# Patient Record
Sex: MALE | Race: WHITE | NOT HISPANIC OR LATINO | Employment: OTHER | URBAN - METROPOLITAN AREA
[De-identification: names, ages, dates, MRNs, and addresses within clinical notes are randomized per-mention and may not be internally consistent; named-entity substitution may affect disease eponyms.]

---

## 2017-01-13 ENCOUNTER — OFFICE VISIT (OUTPATIENT)
Dept: INTERNAL MEDICINE | Facility: CLINIC | Age: 62
End: 2017-01-13
Attending: INTERNAL MEDICINE
Payer: COMMERCIAL

## 2017-01-13 VITALS
OXYGEN SATURATION: 97 % | SYSTOLIC BLOOD PRESSURE: 128 MMHG | DIASTOLIC BLOOD PRESSURE: 68 MMHG | BODY MASS INDEX: 28.79 KG/M2 | WEIGHT: 190 LBS | HEART RATE: 64 BPM | HEIGHT: 68 IN

## 2017-01-13 DIAGNOSIS — G89.29 CHRONIC LOW BACK PAIN WITHOUT SCIATICA, UNSPECIFIED BACK PAIN LATERALITY: ICD-10-CM

## 2017-01-13 DIAGNOSIS — E03.9 HYPOTHYROIDISM, UNSPECIFIED TYPE: ICD-10-CM

## 2017-01-13 DIAGNOSIS — D51.0 PERNICIOUS ANEMIA: ICD-10-CM

## 2017-01-13 DIAGNOSIS — Z12.5 SCREENING FOR PROSTATE CANCER: ICD-10-CM

## 2017-01-13 DIAGNOSIS — R79.89 OTHER ABNORMAL BLOOD CHEMISTRY: ICD-10-CM

## 2017-01-13 DIAGNOSIS — E78.9 DISORDER OF LIPID METABOLISM: ICD-10-CM

## 2017-01-13 DIAGNOSIS — R53.83 OTHER MALAISE AND FATIGUE: ICD-10-CM

## 2017-01-13 DIAGNOSIS — E78.00 HIGH CHOLESTEROL: Primary | ICD-10-CM

## 2017-01-13 DIAGNOSIS — D50.9 IRON DEFICIENCY ANEMIA, UNSPECIFIED IRON DEFICIENCY ANEMIA TYPE: ICD-10-CM

## 2017-01-13 DIAGNOSIS — R53.81 OTHER MALAISE AND FATIGUE: ICD-10-CM

## 2017-01-13 DIAGNOSIS — I10 ESSENTIAL HYPERTENSION: ICD-10-CM

## 2017-01-13 DIAGNOSIS — E11.51 TYPE 2 DIABETES MELLITUS WITH DIABETIC PERIPHERAL ANGIOPATHY WITHOUT GANGRENE, WITHOUT LONG-TERM CURRENT USE OF INSULIN: ICD-10-CM

## 2017-01-13 DIAGNOSIS — E11.59 TYPE 2 DIABETES MELLITUS WITH OTHER CIRCULATORY COMPLICATION, UNSPECIFIED LONG TERM INSULIN USE STATUS: ICD-10-CM

## 2017-01-13 DIAGNOSIS — E55.9 VITAMIN D DEFICIENCY: ICD-10-CM

## 2017-01-13 DIAGNOSIS — Z00.00 ROUTINE ADULT HEALTH MAINTENANCE: Primary | ICD-10-CM

## 2017-01-13 DIAGNOSIS — I65.21 STENOSIS OF RIGHT CAROTID ARTERY: ICD-10-CM

## 2017-01-13 DIAGNOSIS — M54.50 CHRONIC LOW BACK PAIN WITHOUT SCIATICA, UNSPECIFIED BACK PAIN LATERALITY: ICD-10-CM

## 2017-01-13 PROCEDURE — 3078F DIAST BP <80 MM HG: CPT | Mod: S$GLB,,, | Performed by: INTERNAL MEDICINE

## 2017-01-13 PROCEDURE — G0444 DEPRESSION SCREEN ANNUAL: HCPCS | Mod: S$GLB,,, | Performed by: INTERNAL MEDICINE

## 2017-01-13 PROCEDURE — 99396 PREV VISIT EST AGE 40-64: CPT | Mod: 25,S$GLB,, | Performed by: INTERNAL MEDICINE

## 2017-01-13 PROCEDURE — 3074F SYST BP LT 130 MM HG: CPT | Mod: S$GLB,,, | Performed by: INTERNAL MEDICINE

## 2017-01-13 PROCEDURE — G0442 ANNUAL ALCOHOL SCREEN 15 MIN: HCPCS | Mod: S$GLB,,, | Performed by: INTERNAL MEDICINE

## 2017-01-13 RX ORDER — NEBIVOLOL 5 MG/1
5 TABLET ORAL DAILY
Qty: 90 TABLET | Refills: 3 | Status: SHIPPED | OUTPATIENT
Start: 2017-01-13 | End: 2018-01-09 | Stop reason: SDUPTHER

## 2017-01-13 NOTE — MR AVS SNAPSHOT
[facility]  [address] [place] [address], [address] [address]  Phone: [phone]  Fax: [phone]                  Gael Pedroza   2017 10:00 AM   Office Visit    Description:  Male : 1955   Provider:  LINSEY Steele MD   Department:  [facility]           Reason for Visit     Annual Exam           Diagnoses this Visit        Comments    High cholesterol    -  Primary     Essential hypertension         Stenosis of right carotid artery         Chronic low back pain without sciatica, unspecified back pain laterality         Type 2 diabetes mellitus with diabetic peripheral angiopathy without gangrene, without long-term current use of insulin                To Do List           Future Appointments        Provider Department Dept Phone    2017 2:30 PM JAMILAH Hernandez [phone]    2017 3:00 PM JAMILAH Hernandez [phone]      Goals (5 Years of Data)     None      Follow-Up and Disposition     Return in about 6 months (around 2017).       These Medications        Disp Refills Start End    nebivolol (BYSTOLIC) 5 MG Tab 90 tablet 3 2017    Take 1 tablet (5 mg total) by mouth once daily. - Oral    Pharmacy: [facility] - [address] #: [phone]         [facility]     [facility] On [facility] Nurse Care Line -  Assistance  Registered nurses in the [facility] Center provide clinical advisement, health education, appointment booking, and other advisory services.  Call for this free service at [phone].             Medications           Message regarding Medications     Verify the changes and/or additions to your medication regime listed below are the same as discussed with your clinician today.  If any of these changes or additions are incorrect, please notify your healthcare provider.             Verify that the below list of medications is an accurate representation of the medications you are  "currently taking.  If none reported, the list may be blank. If incorrect, please contact your healthcare provider. Carry this list with you in case of emergency.           Current Medications     aspirin (ECOTRIN) 81 MG EC tablet Take 1 tablet (81 mg total) by mouth once daily.    hydrOXYzine HCl (ATARAX) 25 MG tablet Take 1 tablet (25 mg total) by mouth 3 (three) times daily as needed for Itching.    nebivolol (BYSTOLIC) 5 MG Tab Take 1 tablet (5 mg total) by mouth once daily.    rosuvastatin (CRESTOR) 10 MG tablet Take 1 tablet (10 mg total) by mouth once daily.           Clinical Reference Information           Vital Signs - Last Recorded  Most recent update: 1/13/2017 10:24 AM by Cris Keith MA    BP Pulse Ht Wt SpO2 BMI    128/68 64 5' 8" (1.727 m) 86.2 kg (190 lb) 97% 28.89 kg/m2      Blood Pressure          Most Recent Value    BP  128/68      Allergies as of 1/13/2017     Lipitor [Atorvastatin]    Bactrim [Sulfamethoxazole-trimethoprim]    Levaquin [Levofloxacin]      Immunizations Administered on Date of Encounter - 1/13/2017     None      Orders Placed During Today's Visit     Future Labs/Procedures Expected by Expires    2D echo with color flow doppler  As directed 1/13/2018    CAR Ultrasound doppler carotid bliateral  As directed 1/13/2018    EKG 12-lead  As directed 1/13/2018      "

## 2017-01-15 NOTE — PROGRESS NOTES
Subjective:       Patient ID: Gael Pedroza is a 61 y.o. male.    Chief Complaint: Annual Exam    HPI Comments: Routine.    Hypertension   This is a chronic problem. The current episode started more than 1 year ago. The problem is controlled.     Review of Systems   Constitutional: Negative.    Respiratory: Negative.    Cardiovascular: Negative.        Objective:      Physical Exam   Constitutional: He appears well-developed and well-nourished.   HENT:   Head: Normocephalic and atraumatic.   Eyes: Pupils are equal, round, and reactive to light.   Cardiovascular: Normal rate, regular rhythm and normal heart sounds.  Exam reveals no gallop and no friction rub.    No murmur heard.  Pulmonary/Chest: Effort normal and breath sounds normal. He has no wheezes. He has no rales.   Musculoskeletal: He exhibits no edema.       Assessment:       1. High cholesterol    2. Essential hypertension    3. Stenosis of right carotid artery    4. Chronic low back pain without sciatica, unspecified back pain laterality    5. Type 2 diabetes mellitus with diabetic peripheral angiopathy without gangrene, without long-term current use of insulin        Plan:       Per orders and D/C instructions.    Screening: The patient was screened for depression with the PHQ2 questionnaire and possible health consequences were discussed with the patient, who understands (15 minutes spent). The patient was screened for the misuse of alcohol, by asking the number of drinks per average week, and if pt has had more than 4 drinks (more than 3 for women and elderly) in 1 day within the past year. The health and legal consequences of misuse were discussed (15 minutes spent). The patient was screened for obesity (BMI>30), If the current BMI > 30, then the possible consequences of obesity, as well as the benefits of diet, exercise, and weight loss were discussed (15 minutes spent).

## 2017-01-19 ENCOUNTER — CLINICAL SUPPORT (OUTPATIENT)
Dept: INTERNAL MEDICINE | Facility: CLINIC | Age: 62
End: 2017-01-19
Attending: INTERNAL MEDICINE
Payer: COMMERCIAL

## 2017-01-19 DIAGNOSIS — I65.21 STENOSIS OF RIGHT CAROTID ARTERY: Primary | ICD-10-CM

## 2017-01-19 DIAGNOSIS — I65.21 STENOSIS OF RIGHT CAROTID ARTERY: ICD-10-CM

## 2017-01-19 DIAGNOSIS — I51.89 DIASTOLIC DYSFUNCTION: ICD-10-CM

## 2017-01-19 DIAGNOSIS — I10 ESSENTIAL HYPERTENSION: ICD-10-CM

## 2017-01-19 DIAGNOSIS — I77.1 STENOSIS OF LEFT SUBCLAVIAN ARTERY: ICD-10-CM

## 2017-01-19 DIAGNOSIS — I77.1 SUBCLAVIAN ARTERY STENOSIS, LEFT: ICD-10-CM

## 2017-01-19 LAB
ALBUMIN SERPL-MCNC: 4.6 G/DL (ref 3.6–5.1)
ALBUMIN/CREAT UR: 3 MCG/MG CREAT
ALBUMIN/GLOB SERPL: 2 (CALC) (ref 1–2.5)
ALP SERPL-CCNC: 51 U/L (ref 40–115)
ALT SERPL-CCNC: 22 U/L (ref 9–46)
AORTIC MEAN GRADIENT: NORMAL MM HG (ref 0–9.9)
AORTIC PEAK GRADIENT: NORMAL MM HG (ref 0–9.9)
AORTIC PEAK VELOCITY: NORMAL M/S (ref 0–1.9)
AORTIC PRES. HALF TIME: NORMAL M/SEC (ref 599–999)
AORTIC ROOT (DIASTOLE): NORMAL (ref 1.3–3.7)
AORTIC VALVE (DIASTOLE): NORMAL (ref 1.5–2.6)
AORTIC VALVE AREA: NORMAL CM2 (ref 2.5–3.5)
AST SERPL-CCNC: 25 U/L (ref 10–35)
BASOPHILS # BLD AUTO: 40 CELLS/UL (ref 0–200)
BASOPHILS NFR BLD AUTO: 0.6 %
BILIRUB SERPL-MCNC: 0.5 MG/DL (ref 0.2–1.2)
BUN SERPL-MCNC: 22 MG/DL (ref 7–25)
BUN/CREAT SERPL: ABNORMAL (CALC) (ref 6–22)
CALCIUM SERPL-MCNC: 9.8 MG/DL (ref 8.6–10.3)
CHLORIDE SERPL-SCNC: 101 MMOL/L (ref 98–110)
CHOLEST SERPL-MCNC: 141 MG/DL (ref 125–200)
CHOLEST/HDLC SERPL: 2.9 (CALC)
CK SERPL-CCNC: 83 U/L (ref 44–196)
CO2 SERPL-SCNC: 30 MMOL/L (ref 20–31)
CREAT SERPL-MCNC: 0.93 MG/DL (ref 0.7–1.33)
CREAT UR-MCNC: 156 MG/DL (ref 20–370)
EOSINOPHIL # BLD AUTO: 134 CELLS/UL (ref 15–500)
EOSINOPHIL NFR BLD AUTO: 2 %
ERYTHROCYTE [DISTWIDTH] IN BLOOD BY AUTOMATED COUNT: 13.8 % (ref 11–15)
GFR SERPL CREATININE-BSD FRML MDRD: 90 ML/MIN/1.73M2
GLOBULIN SER CALC-MCNC: 2.3 G/DL (CALC) (ref 1.9–3.7)
GLUCOSE SERPL-MCNC: 129 MG/DL (ref 65–99)
HBA1C MFR BLD: 6.6 % OF TOTAL HGB
HCT VFR BLD AUTO: 45.8 % (ref 38.5–50)
HDLC SERPL-MCNC: 48 MG/DL
HGB BLD-MCNC: 15 G/DL (ref 13.2–17.1)
LDLC SERPL CALC-MCNC: 74 MG/DL (CALC)
LEFT ATRIUM (DIASTOLE): NORMAL (ref 2.5–4)
LEFT VENTRICLE (DIASTOLE): NORMAL (ref 3.5–5.5)
LEFT VENTRICLE (SYSTOLE): NORMAL (ref 2.2–4)
LYMPHOCYTES # BLD AUTO: 1802 CELLS/UL (ref 850–3900)
LYMPHOCYTES NFR BLD AUTO: 26.9 %
Lab: NORMAL
MCH RBC QN AUTO: 31.7 PG (ref 27–33)
MCHC RBC AUTO-ENTMCNC: 32.7 G/DL (ref 32–36)
MCV RBC AUTO: 96.9 FL (ref 80–100)
MICROALBUMIN UR-MCNC: 0.5 MG/DL
MITRAL PRES. HALF TIME: NORMAL M/SEC (ref 30–60)
MITRAL VALVE AREA: NORMAL CM2 (ref 4–6)
MITRAL VALVE E-A RATIO: NORMAL (ref 0.75–999)
MITRAL VALVE E-E PRIME: NORMAL (ref 0–7)
MONOCYTES # BLD AUTO: 429 CELLS/UL (ref 200–950)
MONOCYTES NFR BLD AUTO: 6.4 %
NEUTROPHILS # BLD AUTO: 4295 CELLS/UL (ref 1500–7800)
NEUTROPHILS NFR BLD AUTO: 64.1 %
NONHDLC SERPL-MCNC: 93 MG/DL (CALC)
PLATELET # BLD AUTO: 218 THOUSAND/UL (ref 140–400)
PMV BLD REES-ECKER: 8.4 FL (ref 7.5–11.5)
POSTERIOR WALL (SYSTOLE): NORMAL (ref 0.5–1.2)
POSTERIOR WALL(DIASTOLE): NORMAL (ref 0.6–1.2)
POTASSIUM SERPL-SCNC: 5.1 MMOL/L (ref 3.5–5.3)
PROT SERPL-MCNC: 6.9 G/DL (ref 6.1–8.1)
PSA SERPL-MCNC: 0.7 NG/ML
PULMONARY ARTERY PRESSURE: NORMAL MM HG (ref 0–29)
RBC # BLD AUTO: 4.73 MILLION/UL (ref 4.2–5.8)
RETIRED EF AND QEF - SEE NOTES: NORMAL %
RIGHT VENT (DIASTOLE): NORMAL (ref 0.8–2.6)
SEPTUM (DIASTOLE): NORMAL (ref 0.6–1.2)
SEPTUM (SYSTOLE): NORMAL (ref 0.5–1.1)
SODIUM SERPL-SCNC: 139 MMOL/L (ref 135–146)
TRIGL SERPL-MCNC: 94 MG/DL
TSH SERPL-ACNC: 0.97 MIU/L (ref 0.4–4.5)
VIT B12 SERPL-MCNC: 665 PG/ML (ref 200–1100)
WBC # BLD AUTO: 6.7 THOUSAND/UL (ref 3.8–10.8)

## 2017-01-19 PROCEDURE — 93880 EXTRACRANIAL BILAT STUDY: CPT | Mod: 51,S$GLB,, | Performed by: INTERNAL MEDICINE

## 2017-01-19 PROCEDURE — 93306 TTE W/DOPPLER COMPLETE: CPT | Mod: S$GLB,,, | Performed by: INTERNAL MEDICINE

## 2017-01-19 PROCEDURE — 93000 ELECTROCARDIOGRAM COMPLETE: CPT | Mod: 51,S$GLB,, | Performed by: INTERNAL MEDICINE

## 2017-01-21 PROBLEM — I51.89 LEFT VENTRICULAR DIASTOLIC DYSFUNCTION, NYHA CLASS 2: Status: ACTIVE | Noted: 2017-01-21

## 2017-07-06 ENCOUNTER — OFFICE VISIT (OUTPATIENT)
Dept: INTERNAL MEDICINE | Facility: CLINIC | Age: 62
End: 2017-07-06
Attending: INTERNAL MEDICINE
Payer: COMMERCIAL

## 2017-07-06 VITALS
SYSTOLIC BLOOD PRESSURE: 126 MMHG | OXYGEN SATURATION: 99 % | BODY MASS INDEX: 28.64 KG/M2 | WEIGHT: 189 LBS | DIASTOLIC BLOOD PRESSURE: 66 MMHG | HEIGHT: 68 IN | HEART RATE: 71 BPM

## 2017-07-06 DIAGNOSIS — E78.00 HIGH CHOLESTEROL: Primary | ICD-10-CM

## 2017-07-06 DIAGNOSIS — E11.51 TYPE 2 DIABETES MELLITUS WITH DIABETIC PERIPHERAL ANGIOPATHY WITHOUT GANGRENE, WITHOUT LONG-TERM CURRENT USE OF INSULIN: ICD-10-CM

## 2017-07-06 DIAGNOSIS — I65.21 STENOSIS OF RIGHT CAROTID ARTERY: Primary | ICD-10-CM

## 2017-07-06 DIAGNOSIS — I10 ESSENTIAL HYPERTENSION: ICD-10-CM

## 2017-07-06 PROCEDURE — 90471 IMMUNIZATION ADMIN: CPT | Mod: S$GLB,,, | Performed by: INTERNAL MEDICINE

## 2017-07-06 PROCEDURE — 3044F HG A1C LEVEL LT 7.0%: CPT | Mod: S$GLB,,, | Performed by: INTERNAL MEDICINE

## 2017-07-06 PROCEDURE — 99213 OFFICE O/P EST LOW 20 MIN: CPT | Mod: S$GLB,,, | Performed by: INTERNAL MEDICINE

## 2017-07-06 PROCEDURE — 90736 HZV VACCINE LIVE SUBQ: CPT | Mod: S$GLB,,, | Performed by: INTERNAL MEDICINE

## 2017-07-06 RX ORDER — VIT C/E/ZN/COPPR/LUTEIN/ZEAXAN 250MG-90MG
1000 CAPSULE ORAL DAILY
COMMUNITY

## 2017-07-06 RX ORDER — CYCLOBENZAPRINE HCL 10 MG
TABLET ORAL
Qty: 30 TABLET | Refills: 1 | Status: SHIPPED | OUTPATIENT
Start: 2017-07-06 | End: 2020-12-17

## 2017-07-06 RX ORDER — FERROUS SULFATE 325(65) MG
325 TABLET ORAL
COMMUNITY
End: 2021-04-13

## 2017-07-06 RX ORDER — ASCORBIC ACID 500 MG
500 TABLET ORAL DAILY
COMMUNITY

## 2017-07-06 NOTE — PROGRESS NOTES
Subjective:       Patient ID: Gael Pedroza is a 62 y.o. male.    Chief Complaint: Follow-up    Feels good.      Diabetes   He presents for his follow-up diabetic visit. He has type 2 diabetes mellitus. His disease course has been stable.     Review of Systems   Constitutional: Negative.    Respiratory: Negative.    Cardiovascular: Negative.    Musculoskeletal: Positive for back pain.       Objective:      Physical Exam   Constitutional: He appears well-developed and well-nourished.   HENT:   Head: Normocephalic and atraumatic.   Eyes: Pupils are equal, round, and reactive to light.   Cardiovascular: Normal rate, regular rhythm and normal heart sounds.  Exam reveals no gallop and no friction rub.    No murmur heard.  Pulmonary/Chest: Effort normal and breath sounds normal. He has no wheezes. He has no rales.   Musculoskeletal: He exhibits no edema.       Assessment:       1. High cholesterol    2. Type 2 diabetes mellitus with diabetic peripheral angiopathy without gangrene, without long-term current use of insulin    3. Essential hypertension        Plan:       Per orders and D/C instructions.  Continue meds/diet for DM, HTN, and high cholest. which are stable.  Flex prn LBP.

## 2017-11-10 DIAGNOSIS — E78.00 ELEVATED CHOLESTEROL: ICD-10-CM

## 2017-11-10 RX ORDER — ROSUVASTATIN CALCIUM 10 MG/1
TABLET, COATED ORAL
Qty: 90 TABLET | Refills: 3 | Status: SHIPPED | OUTPATIENT
Start: 2017-11-10 | End: 2018-12-04 | Stop reason: SDUPTHER

## 2018-01-09 ENCOUNTER — OFFICE VISIT (OUTPATIENT)
Dept: INTERNAL MEDICINE | Facility: CLINIC | Age: 63
End: 2018-01-09
Attending: INTERNAL MEDICINE
Payer: COMMERCIAL

## 2018-01-09 ENCOUNTER — LAB VISIT (OUTPATIENT)
Dept: LAB | Facility: OTHER | Age: 63
End: 2018-01-09
Attending: INTERNAL MEDICINE
Payer: COMMERCIAL

## 2018-01-09 VITALS
HEART RATE: 71 BPM | WEIGHT: 200 LBS | HEIGHT: 68 IN | OXYGEN SATURATION: 97 % | SYSTOLIC BLOOD PRESSURE: 130 MMHG | BODY MASS INDEX: 30.31 KG/M2 | DIASTOLIC BLOOD PRESSURE: 82 MMHG

## 2018-01-09 DIAGNOSIS — E78.00 HIGH CHOLESTEROL: ICD-10-CM

## 2018-01-09 DIAGNOSIS — R79.89 OTHER SPECIFIED ABNORMAL FINDINGS OF BLOOD CHEMISTRY: ICD-10-CM

## 2018-01-09 DIAGNOSIS — Z13.31 SCREENING FOR DEPRESSION: ICD-10-CM

## 2018-01-09 DIAGNOSIS — E55.9 VITAMIN D DEFICIENCY: ICD-10-CM

## 2018-01-09 DIAGNOSIS — Z12.5 SCREENING FOR PROSTATE CANCER: ICD-10-CM

## 2018-01-09 DIAGNOSIS — D50.8 OTHER IRON DEFICIENCY ANEMIA: ICD-10-CM

## 2018-01-09 DIAGNOSIS — I10 ESSENTIAL HYPERTENSION: ICD-10-CM

## 2018-01-09 DIAGNOSIS — E78.9 DISORDER OF LIPID METABOLISM: ICD-10-CM

## 2018-01-09 DIAGNOSIS — D51.0 PERNICIOUS ANEMIA: ICD-10-CM

## 2018-01-09 DIAGNOSIS — I65.21 STENOSIS OF RIGHT CAROTID ARTERY: Primary | ICD-10-CM

## 2018-01-09 DIAGNOSIS — I65.21 STENOSIS OF RIGHT CAROTID ARTERY: ICD-10-CM

## 2018-01-09 DIAGNOSIS — Z00.00 ROUTINE ADULT HEALTH MAINTENANCE: ICD-10-CM

## 2018-01-09 DIAGNOSIS — E11.51 TYPE 2 DIABETES MELLITUS WITH DIABETIC PERIPHERAL ANGIOPATHY WITHOUT GANGRENE, WITHOUT LONG-TERM CURRENT USE OF INSULIN: ICD-10-CM

## 2018-01-09 DIAGNOSIS — E03.9 HYPOTHYROIDISM, UNSPECIFIED TYPE: ICD-10-CM

## 2018-01-09 DIAGNOSIS — Z13.39 SCREENING FOR ALCOHOLISM: ICD-10-CM

## 2018-01-09 LAB
25(OH)D3+25(OH)D2 SERPL-MCNC: 35 NG/ML
ALBUMIN SERPL BCP-MCNC: 3.9 G/DL
ALP SERPL-CCNC: 63 U/L
ALT SERPL W/O P-5'-P-CCNC: 32 U/L
ANION GAP SERPL CALC-SCNC: 8 MMOL/L
AST SERPL-CCNC: 26 U/L
BASOPHILS # BLD AUTO: 0.04 K/UL
BASOPHILS NFR BLD: 0.6 %
BILIRUB SERPL-MCNC: 0.3 MG/DL
BUN SERPL-MCNC: 13 MG/DL
CALCIUM SERPL-MCNC: 9.3 MG/DL
CHLORIDE SERPL-SCNC: 105 MMOL/L
CHOLEST SERPL-MCNC: 136 MG/DL
CHOLEST/HDLC SERPL: 2.9 {RATIO}
CK SERPL-CCNC: 72 U/L
CO2 SERPL-SCNC: 28 MMOL/L
COMPLEXED PSA SERPL-MCNC: 0.98 NG/ML
CREAT SERPL-MCNC: 0.8 MG/DL
DIFFERENTIAL METHOD: ABNORMAL
EOSINOPHIL # BLD AUTO: 0.2 K/UL
EOSINOPHIL NFR BLD: 2.5 %
ERYTHROCYTE [DISTWIDTH] IN BLOOD BY AUTOMATED COUNT: 13.1 %
EST. GFR  (AFRICAN AMERICAN): >60 ML/MIN/1.73 M^2
EST. GFR  (NON AFRICAN AMERICAN): >60 ML/MIN/1.73 M^2
ESTIMATED AVG GLUCOSE: 131 MG/DL
GLUCOSE SERPL-MCNC: 122 MG/DL
HBA1C MFR BLD HPLC: 6.2 %
HCT VFR BLD AUTO: 42.9 %
HDLC SERPL-MCNC: 47 MG/DL
HDLC SERPL: 34.6 %
HGB BLD-MCNC: 14.1 G/DL
LDLC SERPL CALC-MCNC: 57.8 MG/DL
LYMPHOCYTES # BLD AUTO: 1.8 K/UL
LYMPHOCYTES NFR BLD: 28.4 %
MCH RBC QN AUTO: 31.5 PG
MCHC RBC AUTO-ENTMCNC: 32.9 G/DL
MCV RBC AUTO: 96 FL
MONOCYTES # BLD AUTO: 0.5 K/UL
MONOCYTES NFR BLD: 7.7 %
NEUTROPHILS # BLD AUTO: 3.9 K/UL
NEUTROPHILS NFR BLD: 60.6 %
NONHDLC SERPL-MCNC: 89 MG/DL
PLATELET # BLD AUTO: 216 K/UL
PMV BLD AUTO: 10.1 FL
POTASSIUM SERPL-SCNC: 4 MMOL/L
PROT SERPL-MCNC: 7.1 G/DL
RBC # BLD AUTO: 4.48 M/UL
SODIUM SERPL-SCNC: 141 MMOL/L
TRIGL SERPL-MCNC: 156 MG/DL
TSH SERPL DL<=0.005 MIU/L-ACNC: 0.94 UIU/ML
VIT B12 SERPL-MCNC: 775 PG/ML
WBC # BLD AUTO: 6.48 K/UL

## 2018-01-09 PROCEDURE — 82306 VITAMIN D 25 HYDROXY: CPT

## 2018-01-09 PROCEDURE — 84443 ASSAY THYROID STIM HORMONE: CPT

## 2018-01-09 PROCEDURE — 99396 PREV VISIT EST AGE 40-64: CPT | Mod: S$GLB,,, | Performed by: INTERNAL MEDICINE

## 2018-01-09 PROCEDURE — 93880 EXTRACRANIAL BILAT STUDY: CPT | Mod: S$GLB,,, | Performed by: INTERNAL MEDICINE

## 2018-01-09 PROCEDURE — 84153 ASSAY OF PSA TOTAL: CPT

## 2018-01-09 PROCEDURE — 82550 ASSAY OF CK (CPK): CPT

## 2018-01-09 PROCEDURE — 36415 COLL VENOUS BLD VENIPUNCTURE: CPT

## 2018-01-09 PROCEDURE — 80053 COMPREHEN METABOLIC PANEL: CPT

## 2018-01-09 PROCEDURE — 99214 OFFICE O/P EST MOD 30 MIN: CPT | Mod: 25,S$GLB,, | Performed by: INTERNAL MEDICINE

## 2018-01-09 PROCEDURE — G0444 DEPRESSION SCREEN ANNUAL: HCPCS | Mod: 59,S$GLB,, | Performed by: INTERNAL MEDICINE

## 2018-01-09 PROCEDURE — G0442 ANNUAL ALCOHOL SCREEN 15 MIN: HCPCS | Mod: 59,S$GLB,, | Performed by: INTERNAL MEDICINE

## 2018-01-09 PROCEDURE — 80061 LIPID PANEL: CPT

## 2018-01-09 PROCEDURE — 82607 VITAMIN B-12: CPT

## 2018-01-09 PROCEDURE — 85025 COMPLETE CBC W/AUTO DIFF WBC: CPT

## 2018-01-09 PROCEDURE — 83036 HEMOGLOBIN GLYCOSYLATED A1C: CPT

## 2018-01-09 RX ORDER — VALSARTAN 160 MG/1
160 TABLET ORAL DAILY
Qty: 90 TABLET | Refills: 1 | Status: SHIPPED | OUTPATIENT
Start: 2018-01-09 | End: 2018-12-04

## 2018-01-09 RX ORDER — NEBIVOLOL 5 MG/1
5 TABLET ORAL DAILY
Qty: 90 TABLET | Refills: 1 | Status: SHIPPED | OUTPATIENT
Start: 2018-01-09 | End: 2018-12-04 | Stop reason: SDUPTHER

## 2018-01-09 NOTE — PROGRESS NOTES
Subjective:       Patient ID: Gael Pedroza is a 62 y.o. male.    Chief Complaint: Follow-up (refill Bystolic (wants printed script) #90 )    The patient presents today for an annual wellness exam and also has specific complaints and medical problems to be addressed and managed.      Tired at the end of the work day.  Gained 11 lb.      Hypertension   This is a chronic problem. The current episode started more than 1 year ago. The problem is controlled.   Diabetes   He presents for his follow-up diabetic visit. He has type 2 diabetes mellitus. His disease course has been stable. Associated symptoms include fatigue.     Review of Systems   Constitutional: Positive for fatigue.   Respiratory: Negative.    Cardiovascular: Negative.    Psychiatric/Behavioral: Negative for dysphoric mood.       Objective:      Physical Exam   Constitutional: He appears well-developed and well-nourished.   HENT:   Head: Normocephalic and atraumatic.   Eyes: Pupils are equal, round, and reactive to light.   Neck: Carotid bruit is present.   Bilat carotid bruits   Cardiovascular: Normal rate, regular rhythm and normal heart sounds.  Exam reveals no gallop and no friction rub.    No murmur heard.  Pulmonary/Chest: Effort normal and breath sounds normal. He has no wheezes. He has no rales.   Musculoskeletal: He exhibits no edema.   Neurological: He is alert.       Assessment:       1. Stenosis of right carotid artery    2. Essential hypertension    3. High cholesterol    4. Type 2 diabetes mellitus with diabetic peripheral angiopathy without gangrene, without long-term current use of insulin    5. Routine adult health maintenance    6. Screening for depression    7. Screening for alcoholism        Plan:       Per orders and D/C instructions.  Continue meds/diet for DM and high cholest. which are stable.     Will add Valsartan for HTN.  Check labs. Prob increase Crestor to 20 mg.  Discussed referral to CV surgeon for almost 70% stenosis  of right carotid artery v. increasing meds and repeating US in 3 months. He favors the latter.    Screening: The patient was screened for depression with the PHQ2 questionnaire and possible health consequences were discussed with the patient, who understands (15 minutes spent). The patient was screened for the misuse of alcohol, by asking the number of drinks per average week, and if pt has had more than 4 drinks (more than 3 for women and elderly) in 1 day within the past year. The health and legal consequences of misuse were discussed (15 minutes spent). The patient was screened for obesity (BMI>30), If the current BMI > 30, then the possible consequences of obesity, as well as the benefits of diet, exercise, and weight loss were discussed (15 minutes spent).

## 2018-04-17 ENCOUNTER — OFFICE VISIT (OUTPATIENT)
Dept: INTERNAL MEDICINE | Facility: CLINIC | Age: 63
End: 2018-04-17
Attending: INTERNAL MEDICINE
Payer: COMMERCIAL

## 2018-04-17 VITALS
HEIGHT: 66 IN | OXYGEN SATURATION: 99 % | SYSTOLIC BLOOD PRESSURE: 130 MMHG | DIASTOLIC BLOOD PRESSURE: 80 MMHG | BODY MASS INDEX: 31.34 KG/M2 | HEART RATE: 60 BPM | WEIGHT: 195 LBS

## 2018-04-17 DIAGNOSIS — G89.29 CHRONIC LOW BACK PAIN WITHOUT SCIATICA, UNSPECIFIED BACK PAIN LATERALITY: ICD-10-CM

## 2018-04-17 DIAGNOSIS — E11.51 TYPE 2 DIABETES MELLITUS WITH DIABETIC PERIPHERAL ANGIOPATHY WITHOUT GANGRENE, WITHOUT LONG-TERM CURRENT USE OF INSULIN: ICD-10-CM

## 2018-04-17 DIAGNOSIS — E78.00 HIGH CHOLESTEROL: ICD-10-CM

## 2018-04-17 DIAGNOSIS — I65.23 BILATERAL CAROTID ARTERY STENOSIS: Primary | ICD-10-CM

## 2018-04-17 DIAGNOSIS — M54.50 CHRONIC LOW BACK PAIN WITHOUT SCIATICA, UNSPECIFIED BACK PAIN LATERALITY: ICD-10-CM

## 2018-04-17 DIAGNOSIS — I65.23 CAROTID STENOSIS, ASYMPTOMATIC, BILATERAL: ICD-10-CM

## 2018-04-17 DIAGNOSIS — I10 ESSENTIAL HYPERTENSION: ICD-10-CM

## 2018-04-17 DIAGNOSIS — I65.21 STENOSIS OF RIGHT CAROTID ARTERY: ICD-10-CM

## 2018-04-17 PROCEDURE — 93880 EXTRACRANIAL BILAT STUDY: CPT | Mod: S$GLB,,, | Performed by: INTERNAL MEDICINE

## 2018-04-17 PROCEDURE — 99214 OFFICE O/P EST MOD 30 MIN: CPT | Mod: S$GLB,,, | Performed by: INTERNAL MEDICINE

## 2018-04-18 DIAGNOSIS — I65.23 BILATERAL CAROTID ARTERY STENOSIS: Primary | ICD-10-CM

## 2018-04-18 NOTE — PROGRESS NOTES
Subjective:       Patient ID: Gael Pedroza is a 63 y.o. male.    Chief Complaint: Follow-up (3 month / cards before)    F/u for carotid stenosis.      Diabetes   He presents for his follow-up diabetic visit. He has type 2 diabetes mellitus. His disease course has been stable.   Hypertension   This is a chronic problem. The current episode started more than 1 year ago. The problem is controlled.     Review of Systems   Constitutional: Negative.    Respiratory: Negative.    Cardiovascular: Negative.        Objective:      Physical Exam   Constitutional: He appears well-developed and well-nourished.   HENT:   Head: Normocephalic and atraumatic.   Eyes: Pupils are equal, round, and reactive to light.   Neck: Carotid bruit is present.   Bilat carotid bruits   Cardiovascular: Normal rate, regular rhythm and normal heart sounds.  Exam reveals no gallop and no friction rub.    No murmur heard.  Pulmonary/Chest: Effort normal and breath sounds normal. He has no wheezes. He has no rales.   Musculoskeletal: He exhibits no edema.   Neurological: He is alert.       Assessment:       1. Bilateral carotid artery stenosis    2. Essential hypertension    3. High cholesterol    4. Chronic low back pain without sciatica, unspecified back pain laterality    5. Type 2 diabetes mellitus with diabetic peripheral angiopathy without gangrene, without long-term current use of insulin    6. Carotid stenosis, asymptomatic, bilateral        Plan:       Per orders and D/C instructions.  Continue meds/diet for DM, HTN, and high cholest. which are stable.     Carotid US to evaluate bilateral carotid stenoses.

## 2018-05-10 ENCOUNTER — PATIENT MESSAGE (OUTPATIENT)
Dept: INTERNAL MEDICINE | Facility: CLINIC | Age: 63
End: 2018-05-10

## 2018-05-30 ENCOUNTER — LAB VISIT (OUTPATIENT)
Dept: LAB | Facility: OTHER | Age: 63
End: 2018-05-30
Attending: INTERNAL MEDICINE
Payer: COMMERCIAL

## 2018-05-30 DIAGNOSIS — I65.23 BILATERAL CAROTID ARTERY STENOSIS: ICD-10-CM

## 2018-05-30 LAB
CREAT SERPL-MCNC: 0.9 MG/DL
EST. GFR  (AFRICAN AMERICAN): >60 ML/MIN/1.73 M^2
EST. GFR  (NON AFRICAN AMERICAN): >60 ML/MIN/1.73 M^2

## 2018-05-30 PROCEDURE — 36415 COLL VENOUS BLD VENIPUNCTURE: CPT

## 2018-05-30 PROCEDURE — 82565 ASSAY OF CREATININE: CPT

## 2018-06-01 ENCOUNTER — HOSPITAL ENCOUNTER (OUTPATIENT)
Dept: RADIOLOGY | Facility: OTHER | Age: 63
Discharge: HOME | End: 2018-06-01
Attending: INTERNAL MEDICINE
Payer: COMMERCIAL

## 2018-06-01 DIAGNOSIS — I65.23 BILATERAL CAROTID ARTERY STENOSIS: Primary | ICD-10-CM

## 2018-06-01 DIAGNOSIS — I65.23 BILATERAL CAROTID ARTERY STENOSIS: ICD-10-CM

## 2018-06-01 PROCEDURE — 25500020 PHARM REV CODE 255: Performed by: INTERNAL MEDICINE

## 2018-06-01 PROCEDURE — A9585 GADOBUTROL INJECTION: HCPCS | Performed by: INTERNAL MEDICINE

## 2018-06-01 PROCEDURE — 70548 MR ANGIOGRAPHY NECK W/DYE: CPT | Mod: 26,,, | Performed by: RADIOLOGY

## 2018-06-01 PROCEDURE — 70548 MR ANGIOGRAPHY NECK W/DYE: CPT | Mod: TC

## 2018-06-01 RX ORDER — GADOBUTROL 604.72 MG/ML
10 INJECTION INTRAVENOUS
Status: COMPLETED | OUTPATIENT
Start: 2018-06-01 | End: 2018-06-01

## 2018-06-01 RX ADMIN — GADOBUTROL 10 ML: 604.72 INJECTION INTRAVENOUS at 12:06

## 2018-06-04 ENCOUNTER — PATIENT MESSAGE (OUTPATIENT)
Dept: INTERNAL MEDICINE | Facility: CLINIC | Age: 63
End: 2018-06-04

## 2018-12-03 DIAGNOSIS — I10 ESSENTIAL HYPERTENSION: ICD-10-CM

## 2018-12-03 DIAGNOSIS — E78.00 ELEVATED CHOLESTEROL: ICD-10-CM

## 2018-12-04 ENCOUNTER — OFFICE VISIT (OUTPATIENT)
Dept: INTERNAL MEDICINE | Facility: CLINIC | Age: 63
End: 2018-12-04
Attending: INTERNAL MEDICINE
Payer: COMMERCIAL

## 2018-12-04 VITALS
SYSTOLIC BLOOD PRESSURE: 130 MMHG | HEIGHT: 66 IN | OXYGEN SATURATION: 99 % | DIASTOLIC BLOOD PRESSURE: 74 MMHG | HEART RATE: 73 BPM | BODY MASS INDEX: 31.5 KG/M2 | WEIGHT: 196 LBS

## 2018-12-04 DIAGNOSIS — Z00.00 ROUTINE ADULT HEALTH MAINTENANCE: Primary | ICD-10-CM

## 2018-12-04 DIAGNOSIS — I10 ESSENTIAL HYPERTENSION: ICD-10-CM

## 2018-12-04 DIAGNOSIS — Z12.5 SCREENING FOR PROSTATE CANCER: ICD-10-CM

## 2018-12-04 DIAGNOSIS — I77.1 STENOSIS OF LEFT SUBCLAVIAN ARTERY: ICD-10-CM

## 2018-12-04 DIAGNOSIS — E03.9 HYPOTHYROIDISM, UNSPECIFIED TYPE: ICD-10-CM

## 2018-12-04 DIAGNOSIS — E78.9 DISORDER OF LIPID METABOLISM: ICD-10-CM

## 2018-12-04 DIAGNOSIS — E78.00 ELEVATED CHOLESTEROL: ICD-10-CM

## 2018-12-04 DIAGNOSIS — I65.23 BILATERAL CAROTID ARTERY STENOSIS: ICD-10-CM

## 2018-12-04 DIAGNOSIS — E78.00 HIGH CHOLESTEROL: Primary | ICD-10-CM

## 2018-12-04 DIAGNOSIS — E55.9 VITAMIN D DEFICIENCY: ICD-10-CM

## 2018-12-04 DIAGNOSIS — R79.89 OTHER SPECIFIED ABNORMAL FINDINGS OF BLOOD CHEMISTRY: ICD-10-CM

## 2018-12-04 DIAGNOSIS — E11.51 TYPE 2 DIABETES MELLITUS WITH DIABETIC PERIPHERAL ANGIOPATHY WITHOUT GANGRENE, WITHOUT LONG-TERM CURRENT USE OF INSULIN: ICD-10-CM

## 2018-12-04 DIAGNOSIS — D50.8 OTHER IRON DEFICIENCY ANEMIA: ICD-10-CM

## 2018-12-04 DIAGNOSIS — D51.0 PERNICIOUS ANEMIA: ICD-10-CM

## 2018-12-04 PROCEDURE — 99214 OFFICE O/P EST MOD 30 MIN: CPT | Mod: S$GLB,,, | Performed by: INTERNAL MEDICINE

## 2018-12-04 RX ORDER — ROSUVASTATIN CALCIUM 10 MG/1
10 TABLET, COATED ORAL DAILY
Qty: 90 TABLET | Refills: 3 | Status: SHIPPED | OUTPATIENT
Start: 2018-12-04 | End: 2019-07-12 | Stop reason: SDUPTHER

## 2018-12-04 RX ORDER — NEBIVOLOL 5 MG/1
5 TABLET ORAL DAILY
Qty: 90 TABLET | Refills: 1 | Status: SHIPPED | OUTPATIENT
Start: 2018-12-04 | End: 2019-06-10 | Stop reason: SDUPTHER

## 2018-12-05 NOTE — PROGRESS NOTES
Subjective:       Patient ID: Gael Pedroza is a 63 y.o. male.    Chief Complaint: Follow-up (discuss cardio testing)    He is frustrated that all of his tests/procedures have different results  and he has had to see several MDs.  Carotid US 1/18 - Near 70% right ICA stenosis.  Carotid US 4/18 - >70% Right ICA stenosis.  MRA 6/1/18 - >70% stenosis.  I referred him to Dr. Butterfield, who referred him to Dr. Michelle, who did an Angio at [facility].  Angio - 6/27/18 - 40% Right ICA stenosis.  F/u on 7/1/18. Dr. Michelle told him to cont current meds and get F/u Carotid US, and that he was moving to [place].      Hypertension   This is a chronic problem. The current episode started more than 1 year ago. The problem is controlled.   Diabetes   He presents for his follow-up diabetic visit. He has type 2 diabetes mellitus. His disease course has been stable.     Review of Systems   Constitutional: Negative.    Respiratory: Negative.    Cardiovascular: Negative.        Objective:      Physical Exam   Constitutional: He appears well-developed and well-nourished.   HENT:   Head: Normocephalic and atraumatic.   Eyes: Pupils are equal, round, and reactive to light.   Neck: Carotid bruit is present.   Bilat carotid bruits   Cardiovascular: Normal rate, regular rhythm and normal heart sounds. Exam reveals no gallop and no friction rub.   No murmur heard.  Pulmonary/Chest: Effort normal and breath sounds normal. He has no wheezes. He has no rales.   Musculoskeletal: He exhibits no edema.   Neurological: He is alert.       Assessment:       1. High cholesterol    2. Type 2 diabetes mellitus with diabetic peripheral angiopathy without gangrene, without long-term current use of insulin    3. Bilateral carotid artery stenosis    4. Essential hypertension    5. Elevated cholesterol    6. Stenosis of left subclavian artery        Plan:       Per orders and D/C instructions.  Continue meds/diet for DM, HTN, and high cholest. which are stable.      I suggested he see Dr. Elise for monitoring of right carotid stenosis.

## 2018-12-09 LAB
ALBUMIN SERPL-MCNC: 4.4 G/DL (ref 3.6–5.1)
ALBUMIN/GLOB SERPL: 1.8 (CALC) (ref 1–2.5)
ALP SERPL-CCNC: 54 U/L (ref 40–115)
ALT SERPL-CCNC: 27 U/L (ref 9–46)
AST SERPL-CCNC: 24 U/L (ref 10–35)
BASOPHILS # BLD AUTO: 73 CELLS/UL (ref 0–200)
BASOPHILS NFR BLD AUTO: 1 %
BILIRUB SERPL-MCNC: 0.4 MG/DL (ref 0.2–1.2)
BUN SERPL-MCNC: 20 MG/DL (ref 7–25)
BUN/CREAT SERPL: ABNORMAL (CALC) (ref 6–22)
CALCIUM SERPL-MCNC: 9.5 MG/DL (ref 8.6–10.3)
CHLORIDE SERPL-SCNC: 103 MMOL/L (ref 98–110)
CHOLEST SERPL-MCNC: 159 MG/DL
CHOLEST/HDLC SERPL: 3.1 (CALC)
CK SERPL-CCNC: 53 U/L (ref 44–196)
CO2 SERPL-SCNC: 29 MMOL/L (ref 20–32)
CREAT SERPL-MCNC: 0.74 MG/DL (ref 0.7–1.25)
EOSINOPHIL # BLD AUTO: 131 CELLS/UL (ref 15–500)
EOSINOPHIL NFR BLD AUTO: 1.8 %
ERYTHROCYTE [DISTWIDTH] IN BLOOD BY AUTOMATED COUNT: 12.6 % (ref 11–15)
GFR SERPL CREATININE-BSD FRML MDRD: 100 ML/MIN/1.73M2
GLOBULIN SER CALC-MCNC: 2.4 G/DL (CALC) (ref 1.9–3.7)
GLUCOSE SERPL-MCNC: 140 MG/DL (ref 65–99)
HBA1C MFR BLD: 7 % OF TOTAL HGB
HCT VFR BLD AUTO: 45.3 % (ref 38.5–50)
HDLC SERPL-MCNC: 51 MG/DL
HGB BLD-MCNC: 15.3 G/DL (ref 13.2–17.1)
LDLC SERPL CALC-MCNC: 87 MG/DL (CALC)
LYMPHOCYTES # BLD AUTO: 1935 CELLS/UL (ref 850–3900)
LYMPHOCYTES NFR BLD AUTO: 26.5 %
MCH RBC QN AUTO: 31.9 PG (ref 27–33)
MCHC RBC AUTO-ENTMCNC: 33.8 G/DL (ref 32–36)
MCV RBC AUTO: 94.6 FL (ref 80–100)
MONOCYTES # BLD AUTO: 548 CELLS/UL (ref 200–950)
MONOCYTES NFR BLD AUTO: 7.5 %
NEUTROPHILS # BLD AUTO: 4614 CELLS/UL (ref 1500–7800)
NEUTROPHILS NFR BLD AUTO: 63.2 %
NONHDLC SERPL-MCNC: 108 MG/DL (CALC)
PLATELET # BLD AUTO: 239 THOUSAND/UL (ref 140–400)
PMV BLD REES-ECKER: 10.3 FL (ref 7.5–12.5)
POTASSIUM SERPL-SCNC: 4.3 MMOL/L (ref 3.5–5.3)
PROT SERPL-MCNC: 6.8 G/DL (ref 6.1–8.1)
PSA SERPL-MCNC: 0.8 NG/ML
RBC # BLD AUTO: 4.79 MILLION/UL (ref 4.2–5.8)
SODIUM SERPL-SCNC: 141 MMOL/L (ref 135–146)
TRIGL SERPL-MCNC: 111 MG/DL
TSH SERPL-ACNC: 1.52 MIU/L (ref 0.4–4.5)
VIT B12 SERPL-MCNC: 733 PG/ML (ref 200–1100)
WBC # BLD AUTO: 7.3 THOUSAND/UL (ref 3.8–10.8)

## 2019-06-10 ENCOUNTER — OFFICE VISIT (OUTPATIENT)
Dept: INTERNAL MEDICINE | Facility: CLINIC | Age: 64
End: 2019-06-10
Attending: INTERNAL MEDICINE
Payer: COMMERCIAL

## 2019-06-10 VITALS
SYSTOLIC BLOOD PRESSURE: 160 MMHG | OXYGEN SATURATION: 97 % | HEART RATE: 95 BPM | DIASTOLIC BLOOD PRESSURE: 80 MMHG | BODY MASS INDEX: 31.5 KG/M2 | HEIGHT: 66 IN | WEIGHT: 196 LBS

## 2019-06-10 DIAGNOSIS — E11.51 TYPE 2 DIABETES MELLITUS WITH DIABETIC PERIPHERAL ANGIOPATHY WITHOUT GANGRENE, WITHOUT LONG-TERM CURRENT USE OF INSULIN: ICD-10-CM

## 2019-06-10 DIAGNOSIS — Z13.39 SCREENING FOR ALCOHOLISM: ICD-10-CM

## 2019-06-10 DIAGNOSIS — E66.9 OBESITY (BMI 30.0-34.9): ICD-10-CM

## 2019-06-10 DIAGNOSIS — Z13.31 SCREENING FOR DEPRESSION: ICD-10-CM

## 2019-06-10 DIAGNOSIS — D50.8 OTHER IRON DEFICIENCY ANEMIA: ICD-10-CM

## 2019-06-10 DIAGNOSIS — I10 ESSENTIAL HYPERTENSION: ICD-10-CM

## 2019-06-10 DIAGNOSIS — Z00.00 ROUTINE ADULT HEALTH MAINTENANCE: Primary | ICD-10-CM

## 2019-06-10 DIAGNOSIS — D51.0 PERNICIOUS ANEMIA: ICD-10-CM

## 2019-06-10 DIAGNOSIS — E55.9 VITAMIN D DEFICIENCY: ICD-10-CM

## 2019-06-10 DIAGNOSIS — Z13.89 SCREENING FOR OBESITY: ICD-10-CM

## 2019-06-10 DIAGNOSIS — R79.89 OTHER SPECIFIED ABNORMAL FINDINGS OF BLOOD CHEMISTRY: ICD-10-CM

## 2019-06-10 DIAGNOSIS — I65.23 BILATERAL CAROTID ARTERY STENOSIS: ICD-10-CM

## 2019-06-10 DIAGNOSIS — Z00.00 ROUTINE ADULT HEALTH MAINTENANCE: ICD-10-CM

## 2019-06-10 DIAGNOSIS — Z12.5 SCREENING FOR PROSTATE CANCER: ICD-10-CM

## 2019-06-10 DIAGNOSIS — E78.00 HIGH CHOLESTEROL: Primary | ICD-10-CM

## 2019-06-10 DIAGNOSIS — E78.9 DISORDER OF LIPID METABOLISM: ICD-10-CM

## 2019-06-10 PROBLEM — E66.811 OBESITY (BMI 30.0-34.9): Status: ACTIVE | Noted: 2019-06-10

## 2019-06-10 PROCEDURE — 99396 PR PREVENTIVE VISIT,EST,40-64: ICD-10-PCS | Mod: 25,S$GLB,, | Performed by: INTERNAL MEDICINE

## 2019-06-10 PROCEDURE — 90471 PNEUMOCOCCAL POLYSACCHARIDE VACCINE 23-VALENT =>2YO SQ IM: ICD-10-PCS | Mod: S$GLB,,, | Performed by: INTERNAL MEDICINE

## 2019-06-10 PROCEDURE — G0442 ANNUAL ALCOHOL SCREEN 15 MIN: HCPCS | Mod: 59,S$GLB,, | Performed by: INTERNAL MEDICINE

## 2019-06-10 PROCEDURE — G0442 PR  ALCOHOL SCREENING: ICD-10-PCS | Mod: 59,S$GLB,, | Performed by: INTERNAL MEDICINE

## 2019-06-10 PROCEDURE — G0447 BEHAVIOR COUNSEL OBESITY 15M: HCPCS | Mod: 59,S$GLB,, | Performed by: INTERNAL MEDICINE

## 2019-06-10 PROCEDURE — 90471 IMMUNIZATION ADMIN: CPT | Mod: S$GLB,,, | Performed by: INTERNAL MEDICINE

## 2019-06-10 PROCEDURE — 90732 PPSV23 VACC 2 YRS+ SUBQ/IM: CPT | Mod: S$GLB,,, | Performed by: INTERNAL MEDICINE

## 2019-06-10 PROCEDURE — 99396 PREV VISIT EST AGE 40-64: CPT | Mod: 25,S$GLB,, | Performed by: INTERNAL MEDICINE

## 2019-06-10 PROCEDURE — G0444 PR DEPRESSION SCREENING: ICD-10-PCS | Mod: 59,S$GLB,, | Performed by: INTERNAL MEDICINE

## 2019-06-10 PROCEDURE — G0444 DEPRESSION SCREEN ANNUAL: HCPCS | Mod: 59,S$GLB,, | Performed by: INTERNAL MEDICINE

## 2019-06-10 PROCEDURE — G0447 PR OBESITY COUNSELING: ICD-10-PCS | Mod: 59,S$GLB,, | Performed by: INTERNAL MEDICINE

## 2019-06-10 PROCEDURE — 90732 PNEUMOCOCCAL POLYSACCHARIDE VACCINE 23-VALENT =>2YO SQ IM: ICD-10-PCS | Mod: S$GLB,,, | Performed by: INTERNAL MEDICINE

## 2019-06-10 RX ORDER — VALSARTAN 160 MG/1
160 TABLET ORAL DAILY
Qty: 90 TABLET | Refills: 3 | Status: SHIPPED | OUTPATIENT
Start: 2019-06-10 | End: 2019-06-11

## 2019-06-10 RX ORDER — NEBIVOLOL 5 MG/1
5 TABLET ORAL DAILY
Qty: 90 TABLET | Refills: 3 | Status: SHIPPED | OUTPATIENT
Start: 2019-06-10 | End: 2020-06-23 | Stop reason: SDUPTHER

## 2019-06-10 NOTE — PROGRESS NOTES
Subjective:       Patient ID: Gael Pedroza is a 64 y.o. male.    Chief Complaint: Follow-up (6 month / printed script for Bystolic / need colonoscopy orders / discuss annual cardiac testing)      Adult Wellness Exam:    Mental Conditions: None  Depression Risk Annual Factors: None  BMI: See Vital signs   Colon screen:    See Health Maintenance Report      Females: Mammogram and PAP: per Gyn                                 Vaccines (Flu, Adacel, Shingrix): See Health Maintenance Report  Routine labs (Cholesterol, Glucose/Hgb A1C, and TSH): Done or ordered.     The patient's current health status is: Good   Patient was educated on all medical problems and routine health maintanence. See Patient Instructions.                             Hypertension   This is a chronic problem. The current episode started more than 1 year ago. The problem is uncontrolled.     Review of Systems   Constitutional: Negative.    Respiratory: Negative.    Cardiovascular: Negative.    Musculoskeletal: Positive for back pain.   Psychiatric/Behavioral: Negative for dysphoric mood.       Objective:      Physical Exam   Constitutional: He appears well-developed and well-nourished.   HENT:   Head: Normocephalic and atraumatic.   Eyes: Pupils are equal, round, and reactive to light.   Neck: Carotid bruit is present.   Bilat carotid bruits   Cardiovascular: Normal rate, regular rhythm and normal heart sounds. Exam reveals no gallop and no friction rub.   No murmur heard.  Pulmonary/Chest: Effort normal and breath sounds normal. He has no wheezes. He has no rales.   Musculoskeletal: He exhibits no edema.   Neurological: He is alert.       Assessment:       1. High cholesterol    2. Type 2 diabetes mellitus with diabetic peripheral angiopathy without gangrene, without long-term current use of insulin    3. Bilateral carotid artery stenosis    4. Essential hypertension    5. Routine adult health maintenance    6. Screening for depression    7.  Screening for alcoholism    8. Screening for obesity    9. Obesity (BMI 30.0-34.9)        Plan:       Per orders and D/C instructions.  Continue meds/diet for DM, and high cholest. which are stable.     Add Valvartan for HTN.  He will consult with Cardiology about monitoring his carotid artery stenosis, as he has had multiple conflicting test results.  Cologuard.    Screening: The patient was screened for depression with the PHQ2 questionnaire and possible health consequences were discussed with the patient, who understands (15 minutes spent). The patient was screened for the misuse of alcohol, by asking the number of drinks per average week, and if pt has had more than 4 drinks (more than 3 for women and elderly) in 1 day within the past year. The health and legal consequences of misuse were discussed (15 minutes spent). The patient was screened for obesity (BMI>30), If the current BMI > 30, then the possible consequences of obesity, as well as the benefits of diet, exercise, and weight loss were discussed. Any behavioral risks were identified, and methods to achieve appropriate treatment goals were discussed (15 minutes spent).

## 2019-06-11 DIAGNOSIS — I10 ESSENTIAL HYPERTENSION: ICD-10-CM

## 2019-06-11 DIAGNOSIS — M54.2 NECK PAIN: Primary | ICD-10-CM

## 2019-06-11 RX ORDER — VALSARTAN 160 MG/1
160 TABLET ORAL DAILY
Qty: 90 TABLET | Refills: 3 | Status: SHIPPED | OUTPATIENT
Start: 2019-06-11 | End: 2020-05-24

## 2019-06-12 LAB
ALBUMIN SERPL-MCNC: 4.1 G/DL (ref 3.6–5.1)
ALBUMIN/CREAT UR: 6 MCG/MG CREAT
ALBUMIN/GLOB SERPL: 1.8 (CALC) (ref 1–2.5)
ALP SERPL-CCNC: 53 U/L (ref 40–115)
ALT SERPL-CCNC: 18 U/L (ref 9–46)
AST SERPL-CCNC: 19 U/L (ref 10–35)
BASOPHILS # BLD AUTO: 40 CELLS/UL (ref 0–200)
BASOPHILS NFR BLD AUTO: 0.6 %
BILIRUB SERPL-MCNC: 0.4 MG/DL (ref 0.2–1.2)
BUN SERPL-MCNC: 11 MG/DL (ref 7–25)
BUN/CREAT SERPL: ABNORMAL (CALC) (ref 6–22)
CALCIUM SERPL-MCNC: 9.1 MG/DL (ref 8.6–10.3)
CHLORIDE SERPL-SCNC: 103 MMOL/L (ref 98–110)
CHOLEST SERPL-MCNC: 131 MG/DL
CHOLEST/HDLC SERPL: 2.4 (CALC)
CO2 SERPL-SCNC: 28 MMOL/L (ref 20–32)
CREAT SERPL-MCNC: 0.71 MG/DL (ref 0.7–1.25)
CREAT UR-MCNC: 146 MG/DL (ref 20–320)
EOSINOPHIL # BLD AUTO: 152 CELLS/UL (ref 15–500)
EOSINOPHIL NFR BLD AUTO: 2.3 %
ERYTHROCYTE [DISTWIDTH] IN BLOOD BY AUTOMATED COUNT: 12.2 % (ref 11–15)
GFRSERPLBLD MDRD-ARVRAT: 101 ML/MIN/1.73M2
GLOBULIN SER CALC-MCNC: 2.3 G/DL (CALC) (ref 1.9–3.7)
GLUCOSE SERPL-MCNC: 130 MG/DL (ref 65–99)
HBA1C MFR BLD: 7.2 % OF TOTAL HGB
HCT VFR BLD AUTO: 42 % (ref 38.5–50)
HDLC SERPL-MCNC: 54 MG/DL
HGB BLD-MCNC: 14.3 G/DL (ref 13.2–17.1)
LDLC SERPL CALC-MCNC: 59 MG/DL (CALC)
LYMPHOCYTES # BLD AUTO: 1419 CELLS/UL (ref 850–3900)
LYMPHOCYTES NFR BLD AUTO: 21.5 %
MCH RBC QN AUTO: 32.1 PG (ref 27–33)
MCHC RBC AUTO-ENTMCNC: 34 G/DL (ref 32–36)
MCV RBC AUTO: 94.2 FL (ref 80–100)
MICROALBUMIN UR-MCNC: 0.9 MG/DL
MONOCYTES # BLD AUTO: 508 CELLS/UL (ref 200–950)
MONOCYTES NFR BLD AUTO: 7.7 %
NEUTROPHILS # BLD AUTO: 4481 CELLS/UL (ref 1500–7800)
NEUTROPHILS NFR BLD AUTO: 67.9 %
NONHDLC SERPL-MCNC: 77 MG/DL (CALC)
PLATELET # BLD AUTO: 208 THOUSAND/UL (ref 140–400)
PMV BLD REES-ECKER: 10 FL (ref 7.5–12.5)
POTASSIUM SERPL-SCNC: 4.3 MMOL/L (ref 3.5–5.3)
PROT SERPL-MCNC: 6.4 G/DL (ref 6.1–8.1)
RBC # BLD AUTO: 4.46 MILLION/UL (ref 4.2–5.8)
SODIUM SERPL-SCNC: 139 MMOL/L (ref 135–146)
TRIGL SERPL-MCNC: 93 MG/DL
WBC # BLD AUTO: 6.6 THOUSAND/UL (ref 3.8–10.8)

## 2019-06-15 ENCOUNTER — PATIENT MESSAGE (OUTPATIENT)
Dept: INTERNAL MEDICINE | Facility: CLINIC | Age: 64
End: 2019-06-15

## 2019-06-15 RX ORDER — METFORMIN HYDROCHLORIDE 500 MG/1
500 TABLET, EXTENDED RELEASE ORAL
Qty: 90 TABLET | Refills: 3 | Status: SHIPPED | OUTPATIENT
Start: 2019-06-15 | End: 2020-05-24

## 2019-07-12 DIAGNOSIS — E78.00 ELEVATED CHOLESTEROL: ICD-10-CM

## 2019-07-12 RX ORDER — ROSUVASTATIN CALCIUM 10 MG/1
10 TABLET, COATED ORAL DAILY
Qty: 30 TABLET | Refills: 0 | Status: SHIPPED | OUTPATIENT
Start: 2019-07-12 | End: 2020-01-14

## 2019-08-20 DIAGNOSIS — Z12.11 COLON CANCER SCREENING: Primary | ICD-10-CM

## 2019-09-09 ENCOUNTER — OFFICE VISIT (OUTPATIENT)
Dept: CARDIOLOGY | Facility: CLINIC | Age: 64
End: 2019-09-09
Attending: INTERNAL MEDICINE
Payer: COMMERCIAL

## 2019-09-09 VITALS
WEIGHT: 193 LBS | SYSTOLIC BLOOD PRESSURE: 127 MMHG | HEART RATE: 69 BPM | HEIGHT: 66 IN | DIASTOLIC BLOOD PRESSURE: 69 MMHG | BODY MASS INDEX: 31.02 KG/M2

## 2019-09-09 DIAGNOSIS — E78.00 HIGH CHOLESTEROL: ICD-10-CM

## 2019-09-09 DIAGNOSIS — I65.23 BILATERAL CAROTID ARTERY STENOSIS: ICD-10-CM

## 2019-09-09 DIAGNOSIS — E11.51 TYPE 2 DIABETES MELLITUS WITH DIABETIC PERIPHERAL ANGIOPATHY WITHOUT GANGRENE, WITHOUT LONG-TERM CURRENT USE OF INSULIN: ICD-10-CM

## 2019-09-09 DIAGNOSIS — I10 ESSENTIAL HYPERTENSION: ICD-10-CM

## 2019-09-09 DIAGNOSIS — I77.1 STENOSIS OF LEFT SUBCLAVIAN ARTERY: ICD-10-CM

## 2019-09-09 DIAGNOSIS — E66.9 OBESITY (BMI 30.0-34.9): ICD-10-CM

## 2019-09-09 PROCEDURE — 93000 ELECTROCARDIOGRAM COMPLETE: CPT | Mod: S$GLB,,, | Performed by: INTERNAL MEDICINE

## 2019-09-09 PROCEDURE — 93000 PR ELECTROCARDIOGRAM, COMPLETE: ICD-10-PCS | Mod: S$GLB,,, | Performed by: INTERNAL MEDICINE

## 2019-09-09 PROCEDURE — 99205 PR OFFICE/OUTPT VISIT, NEW, LEVL V, 60-74 MIN: ICD-10-PCS | Mod: 25,S$GLB,, | Performed by: INTERNAL MEDICINE

## 2019-09-09 PROCEDURE — 99205 OFFICE O/P NEW HI 60 MIN: CPT | Mod: 25,S$GLB,, | Performed by: INTERNAL MEDICINE

## 2019-09-09 NOTE — LETTER
September 9, 2019      HARLAN Steele MD  [address]  [address]  [facility] [address]           [facility]  [address], [address]  [facility] [address]  Phone: [phone]  Fax: [phone]          Patient: Gael Pedroza   MR Number: [id number]   YOB: 1955   Date of Visit: 9/9/2019       Dear Dr. HARLAN Steele:    Thank you for referring Gael Pedroza to me for evaluation. Attached you will find relevant portions of my assessment and plan of care.    If you have questions, please do not hesitate to call me. I look forward to following Gael Pedroza along with you.    Sincerely,    Raquel Elise MD    Enclosure  CC:  No Recipients    If you would like to receive this communication electronically, please contact [email or web address] or [phone] to request more information on [employer] Link access.    For providers and/or their staff who would like to refer a patient to [facility], please contact us through our one-stop-shop provider referral line, [facility], at [phone].    If you feel you have received this communication in error or would no longer like to receive these types of communications, please e-mail [email or web address]

## 2019-09-09 NOTE — PROGRESS NOTES
Subjective:     Gael Pedroza is a 64 y.o. male with hypertension, hypercholesterolemia and diabetes mellitus type 2. He is mildly obese. He has [place] carotid disease. A Carotid Duplex study on 4/17/2018 revealed the SUZANNE to have a velocity of 2.4 m/s. He had a MRA that confirmed a 70% stenosis. On 6/27/2008 he had a 4V angiogram at [facility] that revealed the SUZANNE to have a 40% stenosis. The left subclavian had a 30% lesion. He is concerned about the different findings on the tests. No exertional chest pain or exertional dyspnea. No palpitations or weak spells. Feeling well overall. Referred for evaluation.        Hypertension   This is a chronic problem. The current episode started more than 1 year ago. The problem is unchanged. The problem is controlled (usually 120-130/70-80 mmHg at home). Associated symptoms include neck pain. Pertinent negatives include no anxiety, blurred vision, chest pain, headaches, malaise/fatigue, orthopnea, palpitations, peripheral edema, PND, shortness of breath or sweats. There is no history of chronic renal disease.   Hyperlipidemia   This is a chronic problem. The current episode started more than 1 year ago. The problem is controlled. Recent lipid tests were reviewed and are normal. Exacerbating diseases include diabetes and obesity. He has no history of chronic renal disease, hypothyroidism, liver disease or nephrotic syndrome. Pertinent negatives include no chest pain, focal sensory loss, focal weakness, leg pain, myalgias or shortness of breath.       Review of Systems   Constitution: Negative for chills, fever and malaise/fatigue.   HENT: Negative for nosebleeds.    Eyes: Negative for blurred vision, double vision, vision loss in left eye and vision loss in right eye.   Cardiovascular: Negative for chest pain, claudication, dyspnea on exertion, irregular heartbeat, leg swelling, near-syncope, orthopnea, palpitations, paroxysmal nocturnal dyspnea and syncope.   Respiratory:  Negative for cough, hemoptysis, shortness of breath and wheezing.    Endocrine: Negative for cold intolerance and heat intolerance.   Hematologic/Lymphatic: Negative for bleeding problem. Does not bruise/bleed easily.   Skin: Negative for color change and rash.   Musculoskeletal: Positive for back pain and neck pain. Negative for falls, muscle weakness and myalgias.   Gastrointestinal: Negative for heartburn, hematemesis, hematochezia, hemorrhoids, jaundice, melena, nausea and vomiting.   Genitourinary: Negative for dysuria and hematuria.   Neurological: Negative for dizziness, focal weakness, headaches, light-headedness, loss of balance, numbness, vertigo and weakness.   Psychiatric/Behavioral: Negative for altered mental status, depression and memory loss. The patient is not nervous/anxious.    Allergic/Immunologic: Negative for hives and persistent infections.       Current Outpatient Medications on File Prior to Visit   Medication Sig Dispense Refill    ascorbic acid, vitamin C, (VITAMIN C) 500 MG tablet Take 500 mg by mouth once daily.      cholecalciferol, vitamin D3, 1,000 unit capsule Take 1,000 Units by mouth once daily.      cyclobenzaprine (FLEXERIL) 10 MG tablet 1/2-1 tab nightly as needed 30 tablet 1    ferrous sulfate 325 mg (65 mg iron) Tab tablet Take 325 mg by mouth every 48 hours.      metFORMIN (GLUCOPHAGE-XR) 500 MG 24 hr tablet Take 1 tablet (500 mg total) by mouth daily with breakfast. 90 tablet 3    multivitamin with minerals tablet Take 1 tablet by mouth once daily.      nebivolol (BYSTOLIC) 5 MG Tab Take 1 tablet (5 mg total) by mouth once daily. 90 tablet 3    rosuvastatin (CRESTOR) 10 MG tablet Take 1 tablet (10 mg total) by mouth once daily. 30 tablet 0    valsartan (DIOVAN) 160 MG tablet Take 1 tablet (160 mg total) by mouth once daily. 90 tablet 3    aspirin (ECOTRIN) 81 MG EC tablet Take 1 tablet (81 mg total) by mouth once daily. 30 tablet 11     No current  "facility-administered medications on file prior to visit.        /69   Pulse 69   Ht 5' 6" (1.676 m)   Wt 87.5 kg (193 lb)   BMI 31.15 kg/m²       Objective:     Physical Exam   Constitutional: He is oriented to person, place, and time. He appears well-developed and well-nourished.  Non-toxic appearance. No distress.   HENT:   Head: Normocephalic and atraumatic.   Nose: Nose normal.   Eyes: Right eye exhibits no discharge. Left eye exhibits no discharge. Right conjunctiva is not injected. Left conjunctiva is not injected. Right pupil is round. Left pupil is round. Pupils are equal.   Neck: Neck supple. No JVD present. Carotid bruit is present (soft bruit on the right side). No thyromegaly present.   Bruit over left subclavian artery.    Cardiovascular: Normal rate, regular rhythm, S1 normal and S2 normal.  No extrasystoles are present. PMI is not displaced. Exam reveals no gallop.   Pulses:       Radial pulses are 2+ on the right side, and 2+ on the left side.        Femoral pulses are 2+ on the right side, and 2+ on the left side.       Dorsalis pedis pulses are 2+ on the right side, and 2+ on the left side.        Posterior tibial pulses are 2+ on the right side, and 2+ on the left side.   Pulmonary/Chest: Effort normal and breath sounds normal.   Abdominal: Soft. Normal appearance. There is no hepatosplenomegaly. There is no tenderness.   Musculoskeletal:        Right ankle: He exhibits no swelling, no ecchymosis and no deformity.        Left ankle: He exhibits no swelling, no ecchymosis and no deformity.   Lymphadenopathy:        Head (right side): No submandibular adenopathy present.        Head (left side): No submandibular adenopathy present.     He has no cervical adenopathy.   Neurological: He is alert and oriented to person, place, and time. He is not disoriented. No cranial nerve deficit.   Skin: Skin is warm, dry and intact. No rash noted. He is not diaphoretic. No cyanosis. Nails show no " clubbing.   Psychiatric: He has a normal mood and affect. His speech is normal and behavior is normal. Judgment and thought content normal. Cognition and memory are normal.       Assessment:     1. Bilateral carotid artery stenosis    2. Stenosis of left subclavian artery    3. Essential hypertension    4. High cholesterol    5. Type 2 diabetes mellitus with diabetic peripheral angiopathy without gangrene, without long-term current use of insulin    6. Obesity (BMI 30.0-34.9)        Plan:     1. Carotid Artery Stenosis   4/17/2018: Carotid Duplex: SUZANNE: 2.4 m/s - 70-80%. LICA: 1.3 m/s - <50%.   6/1/2018: MRA: SUZANNE: About 70%.   6/27/2018: [facility]: AA, 4V: SUZANNE: 40%. LICA: Mild. Left Subclavian: 30%.   On aspirin 81 mg Q24.   Discussed in detail.   Do Carotid Duplex.    2. Subclavian Artery Disease   6/27/2018: [facility]: AA, 4V: Left Subclavian: 30%.   Asymptomatic.    3. Hypertension   2012: Diagnosed.   On nebivolol 5 mg Q24 and valsartan 160 mg Q24.   Keeping log at home.   Well controlled.    4. Hypercholesterolemia   2012: Began statin.   Felt he had muscle and joint pain on rosuvastatin 20 mg Q24.   On rosuvastatin 10 mg Q24.   6/11/2019: Chol 131. HDL 54. LDL 59. TG 93.   On rosuvastatin 10 mg Q24.   Favorable lipid panel.    5. Diabetes Mellitus, Type 2   2019: Diagnosed. Complications: JERICA & PAD. Medications: Oral agent.   On metformin.    6. Mild Obesity   9/9/2019: Weight 88 kg. BMI 31.    7. Primary Care   Dr. Lino Steele.    F/u 6 months.    Raquel Elise M.D.      Copy:    Dr. Lino Steele.      10/7/2019 8:24 PM, Addendum:    10/7/2019: Carotid Duplex: SUZANNE: Severe plaquing - 2.0 m/s - 60-70%. LICA: Moderate plaquing - 1.1 m/s - <50%.    I discussed the above test result and the implications of the findings over the phone.    Raquel Elise M.D.

## 2019-10-07 ENCOUNTER — CLINICAL SUPPORT (OUTPATIENT)
Dept: CARDIOLOGY | Facility: CLINIC | Age: 64
End: 2019-10-07
Attending: INTERNAL MEDICINE
Payer: COMMERCIAL

## 2019-10-07 DIAGNOSIS — I65.23 BILATERAL CAROTID ARTERY STENOSIS: ICD-10-CM

## 2019-10-07 LAB
LEFT CBA DIAS: 30 CM/S
LEFT CBA SYS: 64 CM/S
LEFT CCA DIST DIAS: 41 CM/S
LEFT CCA DIST SYS: 101 CM/S
LEFT CCA MID DIAS: 41 CM/S
LEFT CCA MID SYS: 125 CM/S
LEFT CCA PROX DIAS: 44 CM/S
LEFT CCA PROX SYS: 130 CM/S
LEFT ECA DIAS: 48 CM/S
LEFT ECA SYS: 163 CM/S
LEFT ICA DIST DIAS: 43 CM/S
LEFT ICA DIST SYS: 113 CM/S
LEFT ICA MID DIAS: 42 CM/S
LEFT ICA MID SYS: 93 CM/S
LEFT ICA PROX DIAS: 32 CM/S
LEFT ICA PROX SYS: 109 CM/S
LEFT VERTEBRAL DIAS: 17 CM/S
LEFT VERTEBRAL SYS: 39 CM/S
OHS CV CAROTID RIGHT ICA EDV HIGHEST: 100
OHS CV CAROTID ULTRASOUND LEFT ICA/CCA RATIO: 0.87
OHS CV CAROTID ULTRASOUND RIGHT ICA/CCA RATIO: 1.85
OHS CV PV CAROTID LEFT HIGHEST CCA: 130
OHS CV PV CAROTID LEFT HIGHEST ICA: 113
OHS CV PV CAROTID RIGHT HIGHEST CCA: 113
OHS CV PV CAROTID RIGHT HIGHEST ICA: 209
OHS CV US CAROTID LEFT HIGHEST EDV: 43
RIGHT CBA DIAS: 23 CM/S
RIGHT CBA SYS: 70 CM/S
RIGHT CCA DIST DIAS: 27 CM/S
RIGHT CCA DIST SYS: 88 CM/S
RIGHT CCA MID DIAS: 30 CM/S
RIGHT CCA MID SYS: 99 CM/S
RIGHT CCA PROX DIAS: 20 CM/S
RIGHT CCA PROX SYS: 113 CM/S
RIGHT ECA DIAS: 48 CM/S
RIGHT ECA SYS: 211 CM/S
RIGHT ICA DIST DIAS: 100 CM/S
RIGHT ICA DIST SYS: 203 CM/S
RIGHT ICA MID DIAS: 73 CM/S
RIGHT ICA MID SYS: 209 CM/S
RIGHT ICA PROX DIAS: 45 CM/S
RIGHT ICA PROX SYS: 136 CM/S
RIGHT VERTEBRAL DIAS: 20 CM/S
RIGHT VERTEBRAL SYS: 58 CM/S

## 2019-10-07 PROCEDURE — 93880 CV US DOPPLER CAROTID (CUPID ONLY): ICD-10-PCS | Mod: S$GLB,,, | Performed by: INTERNAL MEDICINE

## 2019-10-07 PROCEDURE — 93880 EXTRACRANIAL BILAT STUDY: CPT | Mod: S$GLB,,, | Performed by: INTERNAL MEDICINE

## 2019-12-10 ENCOUNTER — OFFICE VISIT (OUTPATIENT)
Dept: INTERNAL MEDICINE | Facility: CLINIC | Age: 64
End: 2019-12-10
Attending: INTERNAL MEDICINE
Payer: COMMERCIAL

## 2019-12-10 VITALS
DIASTOLIC BLOOD PRESSURE: 68 MMHG | HEIGHT: 66 IN | BODY MASS INDEX: 31.02 KG/M2 | SYSTOLIC BLOOD PRESSURE: 112 MMHG | OXYGEN SATURATION: 97 % | WEIGHT: 193 LBS | HEART RATE: 74 BPM

## 2019-12-10 DIAGNOSIS — Z12.5 SCREENING FOR PROSTATE CANCER: ICD-10-CM

## 2019-12-10 DIAGNOSIS — D50.8 OTHER IRON DEFICIENCY ANEMIA: ICD-10-CM

## 2019-12-10 DIAGNOSIS — R79.89 OTHER SPECIFIED ABNORMAL FINDINGS OF BLOOD CHEMISTRY: ICD-10-CM

## 2019-12-10 DIAGNOSIS — E78.9 DISORDER OF LIPID METABOLISM: ICD-10-CM

## 2019-12-10 DIAGNOSIS — D51.0 PERNICIOUS ANEMIA: ICD-10-CM

## 2019-12-10 DIAGNOSIS — E55.9 VITAMIN D DEFICIENCY: ICD-10-CM

## 2019-12-10 DIAGNOSIS — E78.00 HIGH CHOLESTEROL: Primary | ICD-10-CM

## 2019-12-10 DIAGNOSIS — I10 ESSENTIAL HYPERTENSION: ICD-10-CM

## 2019-12-10 DIAGNOSIS — E03.9 HYPOTHYROIDISM, UNSPECIFIED TYPE: ICD-10-CM

## 2019-12-10 DIAGNOSIS — I65.23 BILATERAL CAROTID ARTERY STENOSIS: ICD-10-CM

## 2019-12-10 DIAGNOSIS — E11.51 TYPE 2 DIABETES MELLITUS WITH DIABETIC PERIPHERAL ANGIOPATHY WITHOUT GANGRENE, WITHOUT LONG-TERM CURRENT USE OF INSULIN: ICD-10-CM

## 2019-12-10 PROCEDURE — 99214 PR OFFICE/OUTPT VISIT, EST, LEVL IV, 30-39 MIN: ICD-10-PCS | Mod: S$GLB,,, | Performed by: INTERNAL MEDICINE

## 2019-12-10 PROCEDURE — 99214 OFFICE O/P EST MOD 30 MIN: CPT | Mod: S$GLB,,, | Performed by: INTERNAL MEDICINE

## 2019-12-10 NOTE — PROGRESS NOTES
Subjective:       Patient ID: Gael Pedroza is a 64 y.o. male.    Chief Complaint: Follow-up (6 month)    Follow-up   This is a chronic problem. The current episode started more than 1 year ago. The problem has been unchanged.   Hypertension   This is a chronic problem. The current episode started more than 1 year ago. The problem is controlled.   Diabetes   He presents for his follow-up diabetic visit. He has type 2 diabetes mellitus. His disease course has been stable.     Review of Systems   Constitutional: Negative.    Respiratory: Negative.    Cardiovascular: Negative.        Objective:      Physical Exam   Constitutional: He appears well-developed and well-nourished.   HENT:   Head: Normocephalic and atraumatic.   Eyes: Pupils are equal, round, and reactive to light.   Neck: Carotid bruit is present.   Bilat carotid bruits   Cardiovascular: Normal rate, regular rhythm and normal heart sounds. Exam reveals no gallop and no friction rub.   No murmur heard.  Pulmonary/Chest: Effort normal and breath sounds normal. He has no wheezes. He has no rales.   Musculoskeletal: He exhibits no edema.   Neurological: He is alert.       Assessment:       1. High cholesterol    2. Type 2 diabetes mellitus with diabetic peripheral angiopathy without gangrene, without long-term current use of insulin    3. Bilateral carotid artery stenosis    4. Essential hypertension        Plan:       Per orders and D/C instructions.  Continue meds/diet for DM, HTN, and high cholesterol, which are stable.     F/u with Dr. Elise for carotid stenosis.  Check labs.

## 2019-12-12 LAB
ALBUMIN SERPL-MCNC: 4.2 G/DL (ref 3.6–5.1)
ALBUMIN/GLOB SERPL: 1.8 (CALC) (ref 1–2.5)
ALP SERPL-CCNC: 48 U/L (ref 40–115)
ALT SERPL-CCNC: 20 U/L (ref 9–46)
AST SERPL-CCNC: 19 U/L (ref 10–35)
BASOPHILS # BLD AUTO: 41 CELLS/UL (ref 0–200)
BASOPHILS NFR BLD AUTO: 0.6 %
BILIRUB SERPL-MCNC: 0.5 MG/DL (ref 0.2–1.2)
BUN SERPL-MCNC: 13 MG/DL (ref 7–25)
BUN/CREAT SERPL: ABNORMAL (CALC) (ref 6–22)
CALCIUM SERPL-MCNC: 9.2 MG/DL (ref 8.6–10.3)
CHLORIDE SERPL-SCNC: 102 MMOL/L (ref 98–110)
CHOLEST SERPL-MCNC: 151 MG/DL
CHOLEST/HDLC SERPL: 2.9 (CALC)
CO2 SERPL-SCNC: 29 MMOL/L (ref 20–32)
CREAT SERPL-MCNC: 0.91 MG/DL (ref 0.7–1.25)
EOSINOPHIL # BLD AUTO: 104 CELLS/UL (ref 15–500)
EOSINOPHIL NFR BLD AUTO: 1.5 %
ERYTHROCYTE [DISTWIDTH] IN BLOOD BY AUTOMATED COUNT: 12.4 % (ref 11–15)
GFRSERPLBLD MDRD-ARVRAT: 90 ML/MIN/1.73M2
GLOBULIN SER CALC-MCNC: 2.4 G/DL (CALC) (ref 1.9–3.7)
GLUCOSE SERPL-MCNC: 131 MG/DL (ref 65–99)
HBA1C MFR BLD: 6.8 % OF TOTAL HGB
HCT VFR BLD AUTO: 41.1 % (ref 38.5–50)
HDLC SERPL-MCNC: 52 MG/DL
HGB BLD-MCNC: 14.5 G/DL (ref 13.2–17.1)
LDLC SERPL CALC-MCNC: 82 MG/DL (CALC)
LYMPHOCYTES # BLD AUTO: 1691 CELLS/UL (ref 850–3900)
LYMPHOCYTES NFR BLD AUTO: 24.5 %
MCH RBC QN AUTO: 33.3 PG (ref 27–33)
MCHC RBC AUTO-ENTMCNC: 35.3 G/DL (ref 32–36)
MCV RBC AUTO: 94.5 FL (ref 80–100)
MONOCYTES # BLD AUTO: 483 CELLS/UL (ref 200–950)
MONOCYTES NFR BLD AUTO: 7 %
NEUTROPHILS # BLD AUTO: 4582 CELLS/UL (ref 1500–7800)
NEUTROPHILS NFR BLD AUTO: 66.4 %
NONHDLC SERPL-MCNC: 99 MG/DL (CALC)
PLATELET # BLD AUTO: 222 THOUSAND/UL (ref 140–400)
PMV BLD REES-ECKER: 10.4 FL (ref 7.5–12.5)
POTASSIUM SERPL-SCNC: 4.3 MMOL/L (ref 3.5–5.3)
PROT SERPL-MCNC: 6.6 G/DL (ref 6.1–8.1)
PSA SERPL-MCNC: 0.8 NG/ML
RBC # BLD AUTO: 4.35 MILLION/UL (ref 4.2–5.8)
SODIUM SERPL-SCNC: 139 MMOL/L (ref 135–146)
TRIGL SERPL-MCNC: 87 MG/DL
TSH SERPL-ACNC: 1.23 MIU/L (ref 0.4–4.5)
VIT B12 SERPL-MCNC: 797 PG/ML (ref 200–1100)
WBC # BLD AUTO: 6.9 THOUSAND/UL (ref 3.8–10.8)

## 2019-12-13 ENCOUNTER — PATIENT MESSAGE (OUTPATIENT)
Dept: INTERNAL MEDICINE | Facility: CLINIC | Age: 64
End: 2019-12-13

## 2020-01-14 DIAGNOSIS — E78.00 ELEVATED CHOLESTEROL: ICD-10-CM

## 2020-01-14 RX ORDER — ROSUVASTATIN CALCIUM 10 MG/1
TABLET, COATED ORAL
Qty: 90 TABLET | Refills: 3 | Status: SHIPPED | OUTPATIENT
Start: 2020-01-14 | End: 2021-01-06

## 2020-03-10 ENCOUNTER — OFFICE VISIT (OUTPATIENT)
Dept: CARDIOLOGY | Facility: CLINIC | Age: 65
End: 2020-03-10
Attending: INTERNAL MEDICINE
Payer: COMMERCIAL

## 2020-03-10 VITALS
SYSTOLIC BLOOD PRESSURE: 132 MMHG | BODY MASS INDEX: 30.05 KG/M2 | DIASTOLIC BLOOD PRESSURE: 67 MMHG | WEIGHT: 187 LBS | HEIGHT: 66 IN | HEART RATE: 61 BPM

## 2020-03-10 DIAGNOSIS — I65.23 BILATERAL CAROTID ARTERY STENOSIS: ICD-10-CM

## 2020-03-10 DIAGNOSIS — E66.9 MILD OBESITY: ICD-10-CM

## 2020-03-10 DIAGNOSIS — E78.00 HIGH CHOLESTEROL: ICD-10-CM

## 2020-03-10 DIAGNOSIS — I77.1 STENOSIS OF LEFT SUBCLAVIAN ARTERY: ICD-10-CM

## 2020-03-10 DIAGNOSIS — E11.51 TYPE 2 DIABETES MELLITUS WITH DIABETIC PERIPHERAL ANGIOPATHY WITHOUT GANGRENE, WITHOUT LONG-TERM CURRENT USE OF INSULIN: ICD-10-CM

## 2020-03-10 DIAGNOSIS — I10 ESSENTIAL HYPERTENSION: ICD-10-CM

## 2020-03-10 PROCEDURE — 99214 PR OFFICE/OUTPT VISIT, EST, LEVL IV, 30-39 MIN: ICD-10-PCS | Mod: S$GLB,,, | Performed by: INTERNAL MEDICINE

## 2020-03-10 PROCEDURE — 99214 OFFICE O/P EST MOD 30 MIN: CPT | Mod: S$GLB,,, | Performed by: INTERNAL MEDICINE

## 2020-03-10 NOTE — PROGRESS NOTES
Subjective:     Gael Pedroza is a 64 y.o. male with hypertension, hypercholesterolemia and diabetes mellitus type 2. He is mildly obese. He has known carotid disease. A Carotid Duplex study on 4/17/2018 revealed the SUZANNE to have a velocity of 2.4 m/s. He had a MRA that confirmed a 70% stenosis. On 6/27/2008 he had a 4V angiogram at [facility] that revealed the SUZANNE to have a 40% stenosis. The left subclavian had a 30% lesion. He was concerned about the different findings on the tests. On 10/7/2019 he had a Carotid Duplex which revealed the SUZANNE to have severe plaquing with a peak velocity of 2.0 m/s correlating with a stenosis in the 60-70% range. The LICA had moderate plaquing with a velocity of 1.1 m/s suggesting a less than 50% narrowing. No exertional chest pain or exertional dyspnea. No palpitations or weak spells. Feeling well overall.     Hypertension   This is a chronic problem. The current episode started more than 1 year ago. The problem is unchanged. The problem is controlled (usually 120-130/70-80 mmHg at home). Associated symptoms include neck pain. Pertinent negatives include no anxiety, blurred vision, chest pain, headaches, malaise/fatigue, orthopnea, palpitations, peripheral edema, PND, shortness of breath or sweats. There is no history of chronic renal disease.   Hyperlipidemia   This is a chronic problem. The current episode started more than 1 year ago. The problem is controlled. Recent lipid tests were reviewed and are normal. Exacerbating diseases include diabetes and obesity. He has no history of chronic renal disease, hypothyroidism, liver disease or nephrotic syndrome. Pertinent negatives include no chest pain, focal sensory loss, focal weakness, leg pain, myalgias or shortness of breath.       Review of Systems   Constitution: Negative for chills, fever and malaise/fatigue.   HENT: Negative for nosebleeds.    Eyes: Negative for blurred vision, double vision, photophobia, vision loss in  left eye and vision loss in right eye.   Cardiovascular: Negative for chest pain, claudication, dyspnea on exertion, irregular heartbeat, leg swelling, near-syncope, orthopnea, palpitations, paroxysmal nocturnal dyspnea and syncope.   Respiratory: Negative for cough, hemoptysis, shortness of breath and wheezing.    Endocrine: Negative for cold intolerance and heat intolerance.   Hematologic/Lymphatic: Negative for bleeding problem. Does not bruise/bleed easily.   Skin: Negative for color change and rash.   Musculoskeletal: Positive for back pain and neck pain. Negative for falls, muscle weakness and myalgias.   Gastrointestinal: Negative for heartburn, hematemesis, hematochezia, hemorrhoids, jaundice, melena, nausea and vomiting.   Genitourinary: Negative for dysuria and hematuria.   Neurological: Negative for dizziness, focal weakness, headaches, light-headedness, loss of balance, numbness, tremors, vertigo and weakness.   Psychiatric/Behavioral: Negative for altered mental status, depression and memory loss. The patient is not nervous/anxious.    Allergic/Immunologic: Negative for hives and persistent infections.       Current Outpatient Medications on File Prior to Visit   Medication Sig Dispense Refill    ascorbic acid, vitamin C, (VITAMIN C) 500 MG tablet Take 500 mg by mouth once daily.      aspirin (ECOTRIN) 81 MG EC tablet Take 1 tablet (81 mg total) by mouth once daily. 30 tablet 11    cholecalciferol, vitamin D3, 1,000 unit capsule Take 1,000 Units by mouth once daily.      cyclobenzaprine (FLEXERIL) 10 MG tablet 1/2-1 tab nightly as needed 30 tablet 1    ferrous sulfate 325 mg (65 mg iron) Tab tablet Take 325 mg by mouth every 48 hours.      metFORMIN (GLUCOPHAGE-XR) 500 MG 24 hr tablet Take 1 tablet (500 mg total) by mouth daily with breakfast. 90 tablet 3    multivitamin with minerals tablet Take 1 tablet by mouth once daily.      nebivolol (BYSTOLIC) 5 MG Tab Take 1 tablet (5 mg total) by mouth  "once daily. 90 tablet 3    rosuvastatin (CRESTOR) 10 MG tablet TAKE 1 TABLET ONCE DAILY 90 tablet 3    valsartan (DIOVAN) 160 MG tablet Take 1 tablet (160 mg total) by mouth once daily. 90 tablet 3     No current facility-administered medications on file prior to visit.        /67   Pulse 61   Ht 5' 6" (1.676 m)   Wt 84.8 kg (187 lb)   BMI 30.18 kg/m²       Objective:     Physical Exam   Constitutional: He is oriented to person, place, and time. He appears well-developed and well-nourished.  Non-toxic appearance. No distress.   HENT:   Head: Normocephalic and atraumatic.   Nose: Nose normal.   Eyes: Right eye exhibits no discharge. Left eye exhibits no discharge. Right conjunctiva is not injected. Left conjunctiva is not injected. Right pupil is round. Left pupil is round. Pupils are equal.   Neck: Neck supple. No JVD present. Carotid bruit is present (soft bruit on the right side). No thyromegaly present.   Bruit over left subclavian artery.    Cardiovascular: Normal rate, regular rhythm, S1 normal and S2 normal.  No extrasystoles are present. PMI is not displaced. Exam reveals no gallop.   Pulses:       Radial pulses are 2+ on the right side, and 2+ on the left side.        Femoral pulses are 2+ on the right side, and 2+ on the left side.       Dorsalis pedis pulses are 2+ on the right side, and 2+ on the left side.        Posterior tibial pulses are 2+ on the right side, and 2+ on the left side.   Pulmonary/Chest: Effort normal and breath sounds normal.   Abdominal: Soft. Normal appearance. There is no hepatosplenomegaly. There is no tenderness.   Musculoskeletal:        Right ankle: He exhibits no swelling, no ecchymosis and no deformity.        Left ankle: He exhibits no swelling, no ecchymosis and no deformity.   Lymphadenopathy:        Head (right side): No submandibular adenopathy present.        Head (left side): No submandibular adenopathy present.     He has no cervical adenopathy. "   Neurological: He is alert and oriented to person, place, and time. He is not disoriented. No cranial nerve deficit.   Skin: Skin is warm, dry and intact. No rash noted. He is not diaphoretic.   Psychiatric: He has a normal mood and affect. His speech is normal and behavior is normal. Judgment and thought content normal. Cognition and memory are normal.       Assessment:     1. Bilateral carotid artery stenosis    2. Stenosis of left subclavian artery    3. Essential hypertension    4. High cholesterol    5. Type 2 diabetes mellitus with diabetic peripheral angiopathy without gangrene, without long-term current use of insulin    6. Mild obesity        Plan:     1. Carotid Artery Stenosis   4/17/2018: Carotid Duplex: SUZANNE: 2.4 m/s - 70-80%. LICA: 1.3 m/s - <50%.   6/1/2018: MRA: SUZANNE: About 70%.   6/27/2018: [facility]: AA, 4V: SUZANNE: 40%. LICA: Mild. Left Subclavian: 30%.   10/7/2019: Carotid Duplex: SUZANNE: Severe plaquing - 2.0 m/s - 60-70%. LICA: Moderate plaquing - 1.1 m/s - <50%.   On aspirin 81 mg Q24.   10/2020: Plan next Carotid Duplex. Then yearly.    2. Subclavian Artery Disease   6/27/2018: [facility]: KB 4V: Left Subclavian: 30%.   Asymptomatic.    3. Hypertension   2012: Diagnosed.   On nebivolol 5 mg Q24 and valsartan 160 mg Q24.   Keeping log at home.   Well controlled.    4. Hypercholesterolemia   2012: Began statin.   Felt he had muscle and joint pain on rosuvastatin 20 mg Q24.   On rosuvastatin 10 mg Q24.   6/11/2019: Chol 131. HDL 54. LDL 59. TG 93.   12/11/2019: Chol 151. HDL 52. LDL 82. TG 87.   On rosuvastatin 10 mg Q24.   Favorable lipid panel.    5. Diabetes Mellitus, Type 2   2019: Diagnosed. Complications: JERICA & PAD. Medications: Oral agent.   On metformin.    6. Mild Obesity   9/9/2019: Weight 88 kg. BMI 31.    7. Primary Care   Dr. Lino Steele.    F/u 6 months.    Raquel Elise M.D.      10/6/2020 5:53 PM, Addendum:    10/6/2020: Carotid Duplex: SUZANNE: Moderate plaquing - 1.5 m/s - 50-60%. LICA:  Moderate plaquing - 1.1 m/s - <50%.    I discussed the above test result and the implications of the findings over the phone.    Raquel Elise M.D.

## 2020-05-24 DIAGNOSIS — I10 ESSENTIAL HYPERTENSION: ICD-10-CM

## 2020-05-24 RX ORDER — METFORMIN HYDROCHLORIDE 500 MG/1
TABLET, EXTENDED RELEASE ORAL
Qty: 90 TABLET | Refills: 3 | Status: SHIPPED | OUTPATIENT
Start: 2020-05-24 | End: 2021-05-14

## 2020-05-24 RX ORDER — VALSARTAN 160 MG/1
TABLET ORAL
Qty: 90 TABLET | Refills: 3 | Status: SHIPPED | OUTPATIENT
Start: 2020-05-24 | End: 2021-05-14

## 2020-06-01 DIAGNOSIS — I65.23 BILATERAL CAROTID ARTERY STENOSIS: Primary | ICD-10-CM

## 2020-06-12 ENCOUNTER — TELEPHONE (OUTPATIENT)
Dept: CARDIOLOGY | Facility: CLINIC | Age: 65
End: 2020-06-12

## 2020-06-23 DIAGNOSIS — I10 ESSENTIAL HYPERTENSION: ICD-10-CM

## 2020-06-23 RX ORDER — NEBIVOLOL 5 MG/1
5 TABLET ORAL DAILY
Qty: 90 TABLET | Refills: 3 | Status: SHIPPED | OUTPATIENT
Start: 2020-06-23 | End: 2020-06-23 | Stop reason: SDUPTHER

## 2020-06-23 RX ORDER — NEBIVOLOL 5 MG/1
5 TABLET ORAL DAILY
Qty: 90 TABLET | Refills: 3 | Status: SHIPPED | OUTPATIENT
Start: 2020-06-23 | End: 2021-06-07

## 2020-06-26 ENCOUNTER — OFFICE VISIT (OUTPATIENT)
Dept: INTERNAL MEDICINE | Facility: CLINIC | Age: 65
End: 2020-06-26
Attending: INTERNAL MEDICINE
Payer: COMMERCIAL

## 2020-06-26 VITALS
BODY MASS INDEX: 30.37 KG/M2 | HEART RATE: 86 BPM | DIASTOLIC BLOOD PRESSURE: 70 MMHG | SYSTOLIC BLOOD PRESSURE: 130 MMHG | WEIGHT: 189 LBS | OXYGEN SATURATION: 97 % | HEIGHT: 66 IN

## 2020-06-26 DIAGNOSIS — Z13.39 SCREENING FOR ALCOHOLISM: ICD-10-CM

## 2020-06-26 DIAGNOSIS — R79.89 OTHER SPECIFIED ABNORMAL FINDINGS OF BLOOD CHEMISTRY: ICD-10-CM

## 2020-06-26 DIAGNOSIS — Z00.00 ROUTINE ADULT HEALTH MAINTENANCE: ICD-10-CM

## 2020-06-26 DIAGNOSIS — R22.1 NECK MASS: ICD-10-CM

## 2020-06-26 DIAGNOSIS — Z13.31 SCREENING FOR DEPRESSION: ICD-10-CM

## 2020-06-26 DIAGNOSIS — Z12.5 SCREENING FOR PROSTATE CANCER: ICD-10-CM

## 2020-06-26 DIAGNOSIS — E11.51 TYPE 2 DIABETES MELLITUS WITH DIABETIC PERIPHERAL ANGIOPATHY WITHOUT GANGRENE, WITHOUT LONG-TERM CURRENT USE OF INSULIN: ICD-10-CM

## 2020-06-26 DIAGNOSIS — D51.0 PERNICIOUS ANEMIA: ICD-10-CM

## 2020-06-26 DIAGNOSIS — M77.8 THUMB TENDONITIS: ICD-10-CM

## 2020-06-26 DIAGNOSIS — E55.9 VITAMIN D DEFICIENCY: ICD-10-CM

## 2020-06-26 DIAGNOSIS — I65.23 BILATERAL CAROTID ARTERY STENOSIS: ICD-10-CM

## 2020-06-26 DIAGNOSIS — E66.9 MILD OBESITY: ICD-10-CM

## 2020-06-26 DIAGNOSIS — E78.9 DISORDER OF LIPID METABOLISM: ICD-10-CM

## 2020-06-26 DIAGNOSIS — D50.8 OTHER IRON DEFICIENCY ANEMIA: ICD-10-CM

## 2020-06-26 DIAGNOSIS — E78.00 HIGH CHOLESTEROL: Primary | ICD-10-CM

## 2020-06-26 DIAGNOSIS — L29.9 ITCHING: ICD-10-CM

## 2020-06-26 DIAGNOSIS — I10 ESSENTIAL HYPERTENSION: ICD-10-CM

## 2020-06-26 DIAGNOSIS — Z13.89 SCREENING FOR OBESITY: ICD-10-CM

## 2020-06-26 PROCEDURE — 99396 PREV VISIT EST AGE 40-64: CPT | Mod: 25,S$GLB,, | Performed by: INTERNAL MEDICINE

## 2020-06-26 PROCEDURE — 99214 PR OFFICE/OUTPT VISIT, EST, LEVL IV, 30-39 MIN: ICD-10-PCS | Mod: 25,S$GLB,, | Performed by: INTERNAL MEDICINE

## 2020-06-26 PROCEDURE — G0442 PR  ALCOHOL SCREENING: ICD-10-PCS | Mod: 59,S$GLB,, | Performed by: INTERNAL MEDICINE

## 2020-06-26 PROCEDURE — 3017F COLORECTAL CA SCREEN DOC REV: CPT | Mod: S$GLB,,, | Performed by: INTERNAL MEDICINE

## 2020-06-26 PROCEDURE — G0442 ANNUAL ALCOHOL SCREEN 15 MIN: HCPCS | Mod: 59,S$GLB,, | Performed by: INTERNAL MEDICINE

## 2020-06-26 PROCEDURE — 3066F NEPHROPATHY DOC TX: CPT | Mod: S$GLB,,, | Performed by: INTERNAL MEDICINE

## 2020-06-26 PROCEDURE — 99396 PR PREVENTIVE VISIT,EST,40-64: ICD-10-PCS | Mod: 25,S$GLB,, | Performed by: INTERNAL MEDICINE

## 2020-06-26 PROCEDURE — 99214 OFFICE O/P EST MOD 30 MIN: CPT | Mod: 25,S$GLB,, | Performed by: INTERNAL MEDICINE

## 2020-06-26 PROCEDURE — G0447 BEHAVIOR COUNSEL OBESITY 15M: HCPCS | Mod: S$GLB,,, | Performed by: INTERNAL MEDICINE

## 2020-06-26 PROCEDURE — G0447 PR OBESITY COUNSELING: ICD-10-PCS | Mod: S$GLB,,, | Performed by: INTERNAL MEDICINE

## 2020-06-26 PROCEDURE — 3066F PR DOCUMENTATION OF TREATMENT FOR NEPHROPATHY: ICD-10-PCS | Mod: S$GLB,,, | Performed by: INTERNAL MEDICINE

## 2020-06-26 PROCEDURE — 3017F PR COLORECTAL CANCER SCREEN RESULTS DOCUMENT/REVIEW: ICD-10-PCS | Mod: S$GLB,,, | Performed by: INTERNAL MEDICINE

## 2020-06-26 RX ORDER — HYDROXYZINE HYDROCHLORIDE 25 MG/1
25 TABLET, FILM COATED ORAL 3 TIMES DAILY PRN
Qty: 30 TABLET | Refills: 1 | Status: SHIPPED | OUTPATIENT
Start: 2020-06-26 | End: 2023-01-06

## 2020-06-26 NOTE — PROGRESS NOTES
Adult Wellness Exam:    Mental Conditions: None  Depression Risk Factors: None  BMI: See Vital signs   Colon screen:    See Health Maintenance Report      Females: Mammogram and PAP: per Gyn                                 Vaccines (Flu, Adacel, Shingrix): See Health Maintenance Report  Routine labs (Cholesterol, Glucose/Hgb A1C, and TSH): Done or ordered.     The patient's current health status is: Fair/Good   Patient was educated on all medical problems and routine health maintanence. See Patient Instructions.

## 2020-06-26 NOTE — PROGRESS NOTES
Subjective:       Patient ID: Gael Pedroza is a 65 y.o. male.    Chief Complaint: Follow-up and Wrist Pain (right)    He has had pain and tenderness in his right wrist for a few weeks.  It is mostly at the base of the thumb but some on the opposite side of the wrist.  He has recently noticed a swelling in the back of his neck.    Follow-up  This is a chronic problem. The current episode started more than 1 year ago. The problem has been unchanged. Associated symptoms include arthralgias and neck pain.   Wrist Pain   This is a new problem. The current episode started 1 to 4 weeks ago.   Diabetes  He presents for his follow-up diabetic visit. He has type 2 diabetes mellitus. His disease course has been stable.   Hypertension  This is a chronic problem. The problem is unchanged. The problem is controlled. Associated symptoms include neck pain.     Review of Systems   Constitutional: Negative.    Respiratory: Negative.    Cardiovascular: Negative.    Musculoskeletal: Positive for arthralgias, back pain and neck pain.   Psychiatric/Behavioral: Negative for dysphoric mood.         Objective:      Physical Exam  Constitutional:       Appearance: He is well-developed.   HENT:      Head: Normocephalic and atraumatic.   Eyes:      Pupils: Pupils are equal, round, and reactive to light.   Neck:      Vascular: Carotid bruit present.      Comments: Bilat carotid bruits  Cardiovascular:      Rate and Rhythm: Normal rate and regular rhythm.      Heart sounds: Normal heart sounds. No murmur. No friction rub. No gallop.    Pulmonary:      Effort: Pulmonary effort is normal.      Breath sounds: Normal breath sounds. No wheezing or rales.   Musculoskeletal:      Right wrist: He exhibits tenderness.        Arms:       Comments: Tender tendon at the base of the right.   Skin:            Comments: About a 3 x 4 cm mobile subcutaneous mass in the posterior neck.   Neurological:      Mental Status: He is alert.         Assessment:        1. High cholesterol    2. Itching    3. Type 2 diabetes mellitus with diabetic peripheral angiopathy without gangrene, without long-term current use of insulin    4. Bilateral carotid artery stenosis    5. Essential hypertension    6. Mild obesity    7. Thumb tendonitis    8. Neck mass    9. Routine adult health maintenance    10. Screening for alcoholism    11. Screening for obesity    12. Screening for depression        Plan:       Per orders and D/C instructions.  Continue meds/diet for DM, HTN, and high cholesterol, which are stable.     follow-up with Dr. Elise for carotid artery stenosis.  He will wear a thumb splint for his right thumb tendinitis.  He will monitor the posterior neck mass which I believe is a lipoma.  Check labs.    Screening: The patient was screened for depression with the PHQ2 questionnaire and possible health consequences were discussed with the patient, who understands (15 minutes spent). The patient was screened for the misuse of alcohol, by asking the number of drinks per average week, and if pt has had more than 4 drinks (more than 3 for women and elderly) in 1 day within the past year. The health and legal consequences of misuse were discussed (15 minutes spent). The patient was screened for obesity (BMI>30), If the current BMI > 30, then the possible consequences of obesity, as well as the benefits of diet, exercise, and weight loss were discussed. Any behavioral risks were identified, and methods to achieve appropriate treatment goals were discussed (15 minutes spent).

## 2020-06-30 ENCOUNTER — LAB VISIT (OUTPATIENT)
Dept: LAB | Facility: HOSPITAL | Age: 65
End: 2020-06-30
Attending: INTERNAL MEDICINE
Payer: COMMERCIAL

## 2020-06-30 DIAGNOSIS — R79.89 OTHER SPECIFIED ABNORMAL FINDINGS OF BLOOD CHEMISTRY: ICD-10-CM

## 2020-06-30 DIAGNOSIS — D50.8 OTHER IRON DEFICIENCY ANEMIA: ICD-10-CM

## 2020-06-30 DIAGNOSIS — E78.9 DISORDER OF LIPID METABOLISM: ICD-10-CM

## 2020-06-30 LAB
ALBUMIN SERPL BCP-MCNC: 4 G/DL (ref 3.5–5.2)
ALP SERPL-CCNC: 49 U/L (ref 55–135)
ALT SERPL W/O P-5'-P-CCNC: 25 U/L (ref 10–44)
ANION GAP SERPL CALC-SCNC: 7 MMOL/L (ref 8–16)
AST SERPL-CCNC: 22 U/L (ref 10–40)
BASOPHILS # BLD AUTO: 0.05 K/UL (ref 0–0.2)
BASOPHILS NFR BLD: 0.6 % (ref 0–1.9)
BILIRUB SERPL-MCNC: 0.5 MG/DL (ref 0.1–1)
BUN SERPL-MCNC: 20 MG/DL (ref 8–23)
CALCIUM SERPL-MCNC: 9.2 MG/DL (ref 8.7–10.5)
CHLORIDE SERPL-SCNC: 103 MMOL/L (ref 95–110)
CHOLEST SERPL-MCNC: 149 MG/DL (ref 120–199)
CHOLEST/HDLC SERPL: 2.9 {RATIO} (ref 2–5)
CO2 SERPL-SCNC: 28 MMOL/L (ref 23–29)
CREAT SERPL-MCNC: 0.9 MG/DL (ref 0.5–1.4)
DIFFERENTIAL METHOD: ABNORMAL
EOSINOPHIL # BLD AUTO: 0.2 K/UL (ref 0–0.5)
EOSINOPHIL NFR BLD: 2 % (ref 0–8)
ERYTHROCYTE [DISTWIDTH] IN BLOOD BY AUTOMATED COUNT: 12.8 % (ref 11.5–14.5)
EST. GFR  (AFRICAN AMERICAN): >60 ML/MIN/1.73 M^2
EST. GFR  (NON AFRICAN AMERICAN): >60 ML/MIN/1.73 M^2
ESTIMATED AVG GLUCOSE: 146 MG/DL (ref 68–131)
GLUCOSE SERPL-MCNC: 144 MG/DL (ref 70–110)
HBA1C MFR BLD HPLC: 6.7 % (ref 4–5.6)
HCT VFR BLD AUTO: 43.8 % (ref 40–54)
HDLC SERPL-MCNC: 52 MG/DL (ref 40–75)
HDLC SERPL: 34.9 % (ref 20–50)
HGB BLD-MCNC: 14.5 G/DL (ref 14–18)
IMM GRANULOCYTES # BLD AUTO: 0.03 K/UL (ref 0–0.04)
IMM GRANULOCYTES NFR BLD AUTO: 0.4 % (ref 0–0.5)
LDLC SERPL CALC-MCNC: 80 MG/DL (ref 63–159)
LYMPHOCYTES # BLD AUTO: 2.1 K/UL (ref 1–4.8)
LYMPHOCYTES NFR BLD: 25.9 % (ref 18–48)
MCH RBC QN AUTO: 32.4 PG (ref 27–31)
MCHC RBC AUTO-ENTMCNC: 33.1 G/DL (ref 32–36)
MCV RBC AUTO: 98 FL (ref 82–98)
MONOCYTES # BLD AUTO: 0.6 K/UL (ref 0.3–1)
MONOCYTES NFR BLD: 7.9 % (ref 4–15)
NEUTROPHILS # BLD AUTO: 5 K/UL (ref 1.8–7.7)
NEUTROPHILS NFR BLD: 63.2 % (ref 38–73)
NONHDLC SERPL-MCNC: 97 MG/DL
NRBC BLD-RTO: 0 /100 WBC
PLATELET # BLD AUTO: 230 K/UL (ref 150–350)
PMV BLD AUTO: 9.8 FL (ref 9.2–12.9)
POTASSIUM SERPL-SCNC: 4.5 MMOL/L (ref 3.5–5.1)
PROT SERPL-MCNC: 7.2 G/DL (ref 6–8.4)
RBC # BLD AUTO: 4.47 M/UL (ref 4.6–6.2)
SODIUM SERPL-SCNC: 138 MMOL/L (ref 136–145)
TRIGL SERPL-MCNC: 85 MG/DL (ref 30–150)
WBC # BLD AUTO: 7.95 K/UL (ref 3.9–12.7)

## 2020-06-30 PROCEDURE — 85025 COMPLETE CBC W/AUTO DIFF WBC: CPT

## 2020-06-30 PROCEDURE — 80053 COMPREHEN METABOLIC PANEL: CPT

## 2020-06-30 PROCEDURE — 36415 COLL VENOUS BLD VENIPUNCTURE: CPT

## 2020-06-30 PROCEDURE — 83036 HEMOGLOBIN GLYCOSYLATED A1C: CPT

## 2020-06-30 PROCEDURE — 80061 LIPID PANEL: CPT

## 2020-10-06 ENCOUNTER — HOSPITAL ENCOUNTER (OUTPATIENT)
Dept: CARDIOLOGY | Facility: OTHER | Age: 65
Discharge: HOME | End: 2020-10-06
Attending: INTERNAL MEDICINE
Payer: COMMERCIAL

## 2020-10-06 LAB
LEFT ARM DIASTOLIC BLOOD PRESSURE: 64 MMHG
LEFT ARM SYSTOLIC BLOOD PRESSURE: 137 MMHG
LEFT CCA DIST DIAS: 23 CM/S
LEFT CCA DIST SYS: 107 CM/S
LEFT CCA PROX DIAS: 16 CM/S
LEFT CCA PROX SYS: 94 CM/S
LEFT ECA SYS: 181 CM/S
LEFT ICA DIST DIAS: 26 CM/S
LEFT ICA DIST SYS: 84 CM/S
LEFT ICA MID DIAS: 23 CM/S
LEFT ICA MID SYS: 68 CM/S
LEFT ICA PROX DIAS: 12 CM/S
LEFT ICA PROX SYS: 73 CM/S
LEFT VERTEBRAL DIAS: 14 CM/S
LEFT VERTEBRAL SYS: 44 CM/S
OHS CV CAROTID RIGHT ICA EDV HIGHEST: 39
OHS CV CAROTID ULTRASOUND LEFT ICA/CCA RATIO: 0.79
OHS CV CAROTID ULTRASOUND RIGHT ICA/CCA RATIO: 1.65
OHS CV PV CAROTID LEFT HIGHEST CCA: 107
OHS CV PV CAROTID LEFT HIGHEST ICA: 84
OHS CV PV CAROTID RIGHT HIGHEST CCA: 114
OHS CV PV CAROTID RIGHT HIGHEST ICA: 150
OHS CV US CAROTID LEFT HIGHEST EDV: 26
RIGHT ARM DIASTOLIC BLOOD PRESSURE: 62 MMHG
RIGHT ARM SYSTOLIC BLOOD PRESSURE: 135 MMHG
RIGHT CCA DIST DIAS: 21 CM/S
RIGHT CCA DIST SYS: 91 CM/S
RIGHT CCA PROX DIAS: 16 CM/S
RIGHT CCA PROX SYS: 114 CM/S
RIGHT ECA SYS: 282 CM/S
RIGHT ICA DIST DIAS: 30 CM/S
RIGHT ICA DIST SYS: 102 CM/S
RIGHT ICA MID DIAS: 30 CM/S
RIGHT ICA MID SYS: 121 CM/S
RIGHT ICA PROX DIAS: 39 CM/S
RIGHT ICA PROX SYS: 150 CM/S
RIGHT VERTEBRAL DIAS: 12 CM/S
RIGHT VERTEBRAL SYS: 57 CM/S

## 2020-10-06 PROCEDURE — 93880 EXTRACRANIAL BILAT STUDY: CPT

## 2020-10-13 ENCOUNTER — OFFICE VISIT (OUTPATIENT)
Dept: CARDIOLOGY | Facility: CLINIC | Age: 65
End: 2020-10-13
Attending: INTERNAL MEDICINE
Payer: COMMERCIAL

## 2020-10-13 VITALS
DIASTOLIC BLOOD PRESSURE: 80 MMHG | HEART RATE: 70 BPM | WEIGHT: 190.5 LBS | SYSTOLIC BLOOD PRESSURE: 130 MMHG | BODY MASS INDEX: 30.62 KG/M2 | HEIGHT: 66 IN

## 2020-10-13 DIAGNOSIS — I65.23 BILATERAL CAROTID ARTERY STENOSIS: ICD-10-CM

## 2020-10-13 DIAGNOSIS — I77.1 STENOSIS OF LEFT SUBCLAVIAN ARTERY: ICD-10-CM

## 2020-10-13 DIAGNOSIS — I10 ESSENTIAL HYPERTENSION: ICD-10-CM

## 2020-10-13 DIAGNOSIS — E66.9 MILD OBESITY: ICD-10-CM

## 2020-10-13 DIAGNOSIS — E78.00 HIGH CHOLESTEROL: ICD-10-CM

## 2020-10-13 DIAGNOSIS — E11.51 TYPE 2 DIABETES MELLITUS WITH DIABETIC PERIPHERAL ANGIOPATHY WITHOUT GANGRENE, WITHOUT LONG-TERM CURRENT USE OF INSULIN: ICD-10-CM

## 2020-10-13 PROCEDURE — 93000 ELECTROCARDIOGRAM COMPLETE: CPT | Mod: S$GLB,,, | Performed by: INTERNAL MEDICINE

## 2020-10-13 PROCEDURE — 99999 PR PBB SHADOW E&M-EST. PATIENT-LVL IV: CPT | Mod: PBBFAC,,, | Performed by: INTERNAL MEDICINE

## 2020-10-13 PROCEDURE — 99214 OFFICE O/P EST MOD 30 MIN: CPT | Mod: 25,S$GLB,, | Performed by: INTERNAL MEDICINE

## 2020-10-13 PROCEDURE — 93000 PR ELECTROCARDIOGRAM, COMPLETE: ICD-10-PCS | Mod: S$GLB,,, | Performed by: INTERNAL MEDICINE

## 2020-10-13 PROCEDURE — 93010 ELECTROCARDIOGRAM REPORT: CPT | Mod: S$GLB,,, | Performed by: INTERNAL MEDICINE

## 2020-10-13 PROCEDURE — 93010 EKG 12-LEAD: ICD-10-PCS | Mod: S$GLB,,, | Performed by: INTERNAL MEDICINE

## 2020-10-13 PROCEDURE — 99214 PR OFFICE/OUTPT VISIT, EST, LEVL IV, 30-39 MIN: ICD-10-PCS | Mod: 25,S$GLB,, | Performed by: INTERNAL MEDICINE

## 2020-10-13 PROCEDURE — 99999 PR PBB SHADOW E&M-EST. PATIENT-LVL IV: ICD-10-PCS | Mod: PBBFAC,,, | Performed by: INTERNAL MEDICINE

## 2020-10-13 PROCEDURE — 93005 ELECTROCARDIOGRAM TRACING: CPT

## 2020-10-13 RX ORDER — EZETIMIBE 10 MG/1
10 TABLET ORAL DAILY
Qty: 90 TABLET | Refills: 3 | Status: SHIPPED | OUTPATIENT
Start: 2020-10-13 | End: 2021-04-13 | Stop reason: SDUPTHER

## 2020-10-13 RX ORDER — ASPIRIN 81 MG/1
81 TABLET ORAL DAILY
Qty: 90 TABLET | Refills: 3 | Status: SHIPPED | OUTPATIENT
Start: 2020-10-13 | End: 2021-10-14 | Stop reason: SDUPTHER

## 2020-10-13 RX ORDER — SULCONAZOLE NITRATE 10 MG/ML
SOLUTION TOPICAL
COMMUNITY
Start: 2020-09-18

## 2020-10-13 NOTE — PROGRESS NOTES
Subjective:     Gael Pedroza is a 65 y.o. male with hypertension, hypercholesterolemia and diabetes mellitus type 2. He is mildly obese. He has known carotid disease. A Carotid Duplex study on 4/17/2018 revealed the SUZANNE to have a velocity of 2.4 m/s. He had a MRA that confirmed a 70% stenosis. On 6/27/2008 he had a 4V angiogram at [facility] that revealed the SUZANNE to have a 40% stenosis. The left subclavian had a 30% lesion. He was concerned about the different findings on the tests. On 10/7/2019 he had a Carotid Duplex which revealed the SUZANNE to have severe plaquing with a peak velocity of 2.0 m/s correlating with a stenosis in the 60-70% range. The LICA had moderate plaquing with a velocity of 1.1 m/s suggesting a less than 50% narrowing. On 10/6/2020 he had a Carotid Duplex that revealed SUZANNE to have moderate plaquing with a peak velocity of 1.5 m/s correlating with a stenosis in the 50-60% range. The LICA had moderate plaquing with a velocity of 1.1 m/s correlating with a <50% stenosis. No exertional chest pain or exertional dyspnea. No palpitations or weak spells. Feeling well overall.     Hypertension  This is a chronic problem. The current episode started more than 1 year ago. The problem is unchanged. The problem is controlled (usually 125-150/75-85 mmHg at home). Associated symptoms include neck pain. Pertinent negatives include no anxiety, blurred vision, chest pain, headaches, malaise/fatigue, orthopnea, palpitations, peripheral edema, PND, shortness of breath or sweats. There is no history of chronic renal disease.   Hyperlipidemia  This is a chronic problem. The current episode started more than 1 year ago. The problem is controlled. Recent lipid tests were reviewed and are normal. Exacerbating diseases include diabetes and obesity. He has no history of chronic renal disease, hypothyroidism, liver disease or nephrotic syndrome. Pertinent negatives include no chest pain, focal sensory loss, focal  weakness, leg pain, myalgias or shortness of breath.       Review of Systems   Constitution: Negative for chills, fever and malaise/fatigue.   HENT: Negative for nosebleeds.    Eyes: Negative for blurred vision, double vision, photophobia, vision loss in left eye and vision loss in right eye.   Cardiovascular: Negative for chest pain, claudication, dyspnea on exertion, irregular heartbeat, leg swelling, near-syncope, orthopnea, palpitations, paroxysmal nocturnal dyspnea and syncope.   Respiratory: Negative for cough, hemoptysis, shortness of breath and wheezing.    Endocrine: Negative for cold intolerance and heat intolerance.   Hematologic/Lymphatic: Negative for bleeding problem. Does not bruise/bleed easily.   Skin: Negative for color change and rash.   Musculoskeletal: Positive for back pain and neck pain. Negative for falls, muscle weakness and myalgias.   Gastrointestinal: Negative for heartburn, hematemesis, hematochezia, hemorrhoids, jaundice, melena, nausea and vomiting.   Genitourinary: Negative for dysuria and hematuria.   Neurological: Negative for dizziness, focal weakness, headaches, light-headedness, loss of balance, numbness, tremors, vertigo and weakness.   Psychiatric/Behavioral: Negative for altered mental status, depression and memory loss. The patient is not nervous/anxious.    Allergic/Immunologic: Negative for hives and persistent infections.       Current Outpatient Medications on File Prior to Visit   Medication Sig Dispense Refill    ascorbic acid, vitamin C, (VITAMIN C) 500 MG tablet Take 500 mg by mouth once daily.      aspirin (ECOTRIN) 81 MG EC tablet Take 1 tablet (81 mg total) by mouth once daily. 30 tablet 11    cholecalciferol, vitamin D3, 1,000 unit capsule Take 1,000 Units by mouth once daily.      EXELDERM 1 % external solution       hydrOXYzine HCL (ATARAX) 25 MG tablet Take 1 tablet (25 mg total) by mouth 3 (three) times daily as needed for Itching. (Patient taking  "differently: Take 25 mg by mouth as needed for Itching. ) 30 tablet 1    metFORMIN (GLUCOPHAGE-XR) 500 MG XR 24hr tablet TAKE 1 TABLET DAILY WITH   BREAKFAST 90 tablet 3    multivitamin with minerals tablet Take 1 tablet by mouth once daily.      nebivoloL (BYSTOLIC) 5 MG Tab Take 1 tablet (5 mg total) by mouth once daily. 90 tablet 3    rosuvastatin (CRESTOR) 10 MG tablet TAKE 1 TABLET ONCE DAILY 90 tablet 3    valsartan (DIOVAN) 160 MG tablet TAKE 1 TABLET ONCE DAILY 90 tablet 3    cyclobenzaprine (FLEXERIL) 10 MG tablet 1/2-1 tab nightly as needed (Patient not taking: Reported on 10/13/2020) 30 tablet 1    ferrous sulfate 325 mg (65 mg iron) Tab tablet Take 325 mg by mouth every 48 hours.       No current facility-administered medications on file prior to visit.        /80 Comment: Average at home  Pulse 70   Ht 5' 6" (1.676 m)   Wt 86.4 kg (190 lb 7.6 oz)   BMI 30.74 kg/m²       Objective:     Physical Exam   Constitutional: He is oriented to person, place, and time. He appears well-developed and well-nourished.  Non-toxic appearance. No distress.   HENT:   Head: Normocephalic and atraumatic.   Nose: Nose normal.   Eyes: Right eye exhibits no discharge. Left eye exhibits no discharge. Right conjunctiva is not injected. Left conjunctiva is not injected. Right pupil is round. Left pupil is round. Pupils are equal.   Neck: Neck supple. No JVD present. Carotid bruit is present (soft bruit on the right side). No thyromegaly present.   Bruit over left subclavian artery.    Cardiovascular: Normal rate, regular rhythm, S1 normal and S2 normal.  No extrasystoles are present. PMI is not displaced. Exam reveals no gallop.   Pulses:       Radial pulses are 2+ on the right side and 2+ on the left side.        Femoral pulses are 2+ on the right side and 2+ on the left side.       Dorsalis pedis pulses are 2+ on the right side and 2+ on the left side.        Posterior tibial pulses are 2+ on the right side and " 2+ on the left side.   Pulmonary/Chest: Effort normal and breath sounds normal.   Abdominal: Soft. Normal appearance. There is no hepatosplenomegaly. There is no abdominal tenderness.   Musculoskeletal:      Right ankle: He exhibits no swelling, no ecchymosis and no deformity.      Left ankle: He exhibits no swelling, no ecchymosis and no deformity.   Lymphadenopathy:        Head (right side): No submandibular adenopathy present.        Head (left side): No submandibular adenopathy present.     He has no cervical adenopathy.   Neurological: He is alert and oriented to person, place, and time. He is not disoriented. No cranial nerve deficit.   Skin: Skin is warm, dry and intact. No rash noted. He is not diaphoretic.   Psychiatric: He has a normal mood and affect. His speech is normal and behavior is normal. Judgment and thought content normal. Cognition and memory are normal.       Assessment:     1. Bilateral carotid artery stenosis    2. Stenosis of left subclavian artery    3. Essential hypertension    4. High cholesterol    5. Type 2 diabetes mellitus with diabetic peripheral angiopathy without gangrene, without long-term current use of insulin    6. Mild obesity        Plan:     1. Carotid Artery Stenosis   4/17/2018: Carotid Duplex: SUZANNE: 2.4 m/s - 70-80%. LICA: 1.3 m/s - <50%.   6/1/2018: MRA: SUZANNE: About 70%.   6/27/2018: [facility]: KB 4V: SUZANNE: 40%. LICA: Mild. Left Subclavian: 30%.   10/7/2019: Carotid Duplex: SUZANNE: Severe plaquing - 2.0 m/s - 60-70%. LICA: Moderate plaquing - 1.1 m/s - <50%.   10/6/2020: Carotid Duplex: SUZANNE: Moderate plaquing - 1.5 m/s - 50-60%. LICA: Moderate plaquing - 1.1 m/s - <50%.    On aspirin 81 mg Q24.   10/2021: Plan next Carotid Duplex. Then yearly.    2. Subclavian Artery Disease   6/27/2018: [facility]: AA 4V: Left Subclavian: 30%.   Asymptomatic.    3. Hypertension   2012: Diagnosed.   On nebivolol 5 mg Q24 and valsartan 160 mg Q24.   Keeping log at home.   Well controlled.    4.  Hypercholesterolemia   2012: Began statin.   Felt he had muscle and joint pain on rosuvastatin 20 mg Q24.   On rosuvastatin 10 mg Q24.   6/11/2019: Chol 131. HDL 54. LDL 59. TG 93.   12/11/2019: Chol 151. HDL 52. LDL 82. TG 87.   6/30/2020: Chol 149. HDL 52. LDL 80. TG 85.   On rosuvastatin 10 mg Q24.   Quite favorable lipid panel but JERICA.    10/13/2020: Ezetimibe 10 mg Q24 was begun in addition to rosuvastatin 10 mg Q24.   12/2020: Do lipid panel, LFTs and CPK.    5. Diabetes Mellitus, Type 2   2019: Diagnosed. Complications: JERICA & PAD. Medications: Oral agent.   On metformin.    6. Mild Obesity   9/9/2019: Weight 88 kg. BMI 31.    7. Primary Care   Dr. Lino Steele.    F/u 6 months.    Raquel Elise M.D.

## 2020-10-14 ENCOUNTER — TELEPHONE (OUTPATIENT)
Dept: CARDIOLOGY | Facility: CLINIC | Age: 65
End: 2020-10-14

## 2020-10-14 NOTE — TELEPHONE ENCOUNTER
Patient would like you to call him regarding his cholesterol medication and his blood pressure medication. Please call.    ----- Message from Susie Dominguez sent at 10/14/2020 12:47 PM CDT -----  Regarding: Patient Call Back  Who Called:BARBARA MARTINEZ [[id number]]    What is the request in detail: Would like a call back in regards to needing clarity on medication prescribed for him.    Can the clinic reply by  [facility]?No     Best Call Back Number:[phone]

## 2020-12-17 ENCOUNTER — OFFICE VISIT (OUTPATIENT)
Dept: INTERNAL MEDICINE | Facility: CLINIC | Age: 65
End: 2020-12-17
Attending: INTERNAL MEDICINE
Payer: COMMERCIAL

## 2020-12-17 VITALS
BODY MASS INDEX: 31.02 KG/M2 | WEIGHT: 193 LBS | DIASTOLIC BLOOD PRESSURE: 70 MMHG | HEIGHT: 66 IN | HEART RATE: 83 BPM | OXYGEN SATURATION: 97 % | SYSTOLIC BLOOD PRESSURE: 120 MMHG

## 2020-12-17 DIAGNOSIS — M19.90 ARTHRITIS: Primary | ICD-10-CM

## 2020-12-17 DIAGNOSIS — Z12.5 SCREENING FOR PROSTATE CANCER: ICD-10-CM

## 2020-12-17 DIAGNOSIS — Z00.00 ROUTINE ADULT HEALTH MAINTENANCE: ICD-10-CM

## 2020-12-17 DIAGNOSIS — E03.9 HYPOTHYROIDISM, UNSPECIFIED TYPE: ICD-10-CM

## 2020-12-17 DIAGNOSIS — I65.23 BILATERAL CAROTID ARTERY STENOSIS: ICD-10-CM

## 2020-12-17 DIAGNOSIS — I10 ESSENTIAL HYPERTENSION: ICD-10-CM

## 2020-12-17 DIAGNOSIS — E55.9 VITAMIN D DEFICIENCY: ICD-10-CM

## 2020-12-17 DIAGNOSIS — E78.00 HIGH CHOLESTEROL: Primary | ICD-10-CM

## 2020-12-17 DIAGNOSIS — R79.89 OTHER SPECIFIED ABNORMAL FINDINGS OF BLOOD CHEMISTRY: ICD-10-CM

## 2020-12-17 DIAGNOSIS — D50.8 OTHER IRON DEFICIENCY ANEMIA: ICD-10-CM

## 2020-12-17 DIAGNOSIS — E78.9 DISORDER OF LIPID METABOLISM: ICD-10-CM

## 2020-12-17 DIAGNOSIS — M25.531 RIGHT WRIST PAIN: ICD-10-CM

## 2020-12-17 DIAGNOSIS — D51.0 PERNICIOUS ANEMIA: ICD-10-CM

## 2020-12-17 DIAGNOSIS — E11.51 TYPE 2 DIABETES MELLITUS WITH DIABETIC PERIPHERAL ANGIOPATHY WITHOUT GANGRENE, WITHOUT LONG-TERM CURRENT USE OF INSULIN: ICD-10-CM

## 2020-12-17 PROCEDURE — 99214 OFFICE O/P EST MOD 30 MIN: CPT | Mod: S$GLB,,, | Performed by: INTERNAL MEDICINE

## 2020-12-17 PROCEDURE — 99214 PR OFFICE/OUTPT VISIT, EST, LEVL IV, 30-39 MIN: ICD-10-PCS | Mod: S$GLB,,, | Performed by: INTERNAL MEDICINE

## 2020-12-17 NOTE — PROGRESS NOTES
Subjective:       Patient ID: Gael Pedroza is a 65 y.o. male.    Chief Complaint: Follow-up (6 month)    He has chronic pain in his right wrist.  Is worse when he tries to use it.    Diabetes  He presents for his follow-up diabetic visit. He has type 2 diabetes mellitus. His disease course has been stable.   Hypertension  This is a chronic problem. The current episode started more than 1 year ago. The problem is controlled.     Review of Systems   Constitutional: Negative.    Respiratory: Negative.    Cardiovascular: Negative.    Musculoskeletal: Positive for arthralgias and back pain.         Objective:      Physical Exam  Constitutional:       Appearance: He is well-developed.   HENT:      Head: Normocephalic and atraumatic.   Eyes:      Pupils: Pupils are equal, round, and reactive to light.   Neck:      Vascular: Carotid bruit present.      Comments: Bilat carotid bruits  Cardiovascular:      Rate and Rhythm: Normal rate and regular rhythm.      Heart sounds: Normal heart sounds. No murmur. No friction rub. No gallop.    Pulmonary:      Effort: Pulmonary effort is normal.      Breath sounds: Normal breath sounds. No wheezing or rales.   Musculoskeletal:      Right wrist: He exhibits tenderness.        Arms:       Comments: Tender tendon at the base of the right.   Skin:            Comments: About a 3 x 4 cm mobile subcutaneous mass in the posterior neck.   Neurological:      Mental Status: He is alert.         Assessment:       1. High cholesterol    2. Type 2 diabetes mellitus with diabetic peripheral angiopathy without gangrene, without long-term current use of insulin    3. Bilateral carotid artery stenosis    4. Essential hypertension    5. Right wrist pain        Plan:       Per orders and D/C instructions.  Continue meds/diet for DM, HTN, and high cholesterol, which are stable.     follow-up with cardiology for carotid stenosis.  He will see Dr. kusum Parrish for his right wrist pain.  Check labs.

## 2020-12-18 ENCOUNTER — LAB VISIT (OUTPATIENT)
Dept: LAB | Facility: HOSPITAL | Age: 65
End: 2020-12-18
Attending: INTERNAL MEDICINE
Payer: COMMERCIAL

## 2020-12-18 DIAGNOSIS — Z12.5 SCREENING FOR PROSTATE CANCER: ICD-10-CM

## 2020-12-18 DIAGNOSIS — D50.8 OTHER IRON DEFICIENCY ANEMIA: ICD-10-CM

## 2020-12-18 DIAGNOSIS — Z00.00 ROUTINE ADULT HEALTH MAINTENANCE: ICD-10-CM

## 2020-12-18 DIAGNOSIS — R79.89 OTHER SPECIFIED ABNORMAL FINDINGS OF BLOOD CHEMISTRY: ICD-10-CM

## 2020-12-18 DIAGNOSIS — E78.00 HIGH CHOLESTEROL: ICD-10-CM

## 2020-12-18 DIAGNOSIS — E03.9 HYPOTHYROIDISM, UNSPECIFIED TYPE: ICD-10-CM

## 2020-12-18 DIAGNOSIS — E78.9 DISORDER OF LIPID METABOLISM: ICD-10-CM

## 2020-12-18 DIAGNOSIS — M19.90 ARTHRITIS: ICD-10-CM

## 2020-12-18 LAB
ALBUMIN SERPL BCP-MCNC: 4.1 G/DL (ref 3.5–5.2)
ALBUMIN SERPL BCP-MCNC: 4.1 G/DL (ref 3.5–5.2)
ALP SERPL-CCNC: 51 U/L (ref 55–135)
ALP SERPL-CCNC: 51 U/L (ref 55–135)
ALT SERPL W/O P-5'-P-CCNC: 35 U/L (ref 10–44)
ALT SERPL W/O P-5'-P-CCNC: 35 U/L (ref 10–44)
ANION GAP SERPL CALC-SCNC: 10 MMOL/L (ref 8–16)
AST SERPL-CCNC: 29 U/L (ref 10–40)
AST SERPL-CCNC: 29 U/L (ref 10–40)
BASOPHILS # BLD AUTO: 0.04 K/UL (ref 0–0.2)
BASOPHILS NFR BLD: 0.7 % (ref 0–1.9)
BILIRUB DIRECT SERPL-MCNC: 0.2 MG/DL (ref 0.1–0.3)
BILIRUB SERPL-MCNC: 0.4 MG/DL (ref 0.1–1)
BILIRUB SERPL-MCNC: 0.4 MG/DL (ref 0.1–1)
BUN SERPL-MCNC: 17 MG/DL (ref 8–23)
CALCIUM SERPL-MCNC: 9 MG/DL (ref 8.7–10.5)
CCP AB SER IA-ACNC: 1.1 U/ML
CHLORIDE SERPL-SCNC: 104 MMOL/L (ref 95–110)
CHOLEST SERPL-MCNC: 115 MG/DL (ref 120–199)
CHOLEST/HDLC SERPL: 2.3 {RATIO} (ref 2–5)
CK SERPL-CCNC: 57 U/L (ref 20–200)
CO2 SERPL-SCNC: 26 MMOL/L (ref 23–29)
COMPLEXED PSA SERPL-MCNC: 0.67 NG/ML (ref 0–4)
CREAT SERPL-MCNC: 0.8 MG/DL (ref 0.5–1.4)
DIFFERENTIAL METHOD: ABNORMAL
EOSINOPHIL # BLD AUTO: 0.1 K/UL (ref 0–0.5)
EOSINOPHIL NFR BLD: 2 % (ref 0–8)
ERYTHROCYTE [DISTWIDTH] IN BLOOD BY AUTOMATED COUNT: 12.5 % (ref 11.5–14.5)
EST. GFR  (AFRICAN AMERICAN): >60 ML/MIN/1.73 M^2
EST. GFR  (NON AFRICAN AMERICAN): >60 ML/MIN/1.73 M^2
ESTIMATED AVG GLUCOSE: 154 MG/DL (ref 68–131)
GLUCOSE SERPL-MCNC: 150 MG/DL (ref 70–110)
HBA1C MFR BLD HPLC: 7 % (ref 4–5.6)
HCT VFR BLD AUTO: 42 % (ref 40–54)
HDLC SERPL-MCNC: 51 MG/DL (ref 40–75)
HDLC SERPL: 44.3 % (ref 20–50)
HGB BLD-MCNC: 14 G/DL (ref 14–18)
IMM GRANULOCYTES # BLD AUTO: 0.02 K/UL (ref 0–0.04)
IMM GRANULOCYTES NFR BLD AUTO: 0.3 % (ref 0–0.5)
LDLC SERPL CALC-MCNC: 48.6 MG/DL (ref 63–159)
LYMPHOCYTES # BLD AUTO: 1.6 K/UL (ref 1–4.8)
LYMPHOCYTES NFR BLD: 26.4 % (ref 18–48)
MCH RBC QN AUTO: 32.3 PG (ref 27–31)
MCHC RBC AUTO-ENTMCNC: 33.3 G/DL (ref 32–36)
MCV RBC AUTO: 97 FL (ref 82–98)
MONOCYTES # BLD AUTO: 0.5 K/UL (ref 0.3–1)
MONOCYTES NFR BLD: 7.6 % (ref 4–15)
NEUTROPHILS # BLD AUTO: 3.8 K/UL (ref 1.8–7.7)
NEUTROPHILS NFR BLD: 63 % (ref 38–73)
NONHDLC SERPL-MCNC: 64 MG/DL
NRBC BLD-RTO: 0 /100 WBC
PLATELET # BLD AUTO: 243 K/UL (ref 150–350)
PMV BLD AUTO: 9.8 FL (ref 9.2–12.9)
POTASSIUM SERPL-SCNC: 4.4 MMOL/L (ref 3.5–5.1)
PROT SERPL-MCNC: 7 G/DL (ref 6–8.4)
PROT SERPL-MCNC: 7 G/DL (ref 6–8.4)
RBC # BLD AUTO: 4.33 M/UL (ref 4.6–6.2)
SODIUM SERPL-SCNC: 140 MMOL/L (ref 136–145)
TRIGL SERPL-MCNC: 77 MG/DL (ref 30–150)
TSH SERPL DL<=0.005 MIU/L-ACNC: 0.98 UIU/ML (ref 0.4–4)
WBC # BLD AUTO: 6.05 K/UL (ref 3.9–12.7)

## 2020-12-18 PROCEDURE — 84153 ASSAY OF PSA TOTAL: CPT

## 2020-12-18 PROCEDURE — 86703 HIV-1/HIV-2 1 RESULT ANTBDY: CPT

## 2020-12-18 PROCEDURE — 83036 HEMOGLOBIN GLYCOSYLATED A1C: CPT

## 2020-12-18 PROCEDURE — 80053 COMPREHEN METABOLIC PANEL: CPT

## 2020-12-18 PROCEDURE — 85025 COMPLETE CBC W/AUTO DIFF WBC: CPT

## 2020-12-18 PROCEDURE — 36415 COLL VENOUS BLD VENIPUNCTURE: CPT

## 2020-12-18 PROCEDURE — 80061 LIPID PANEL: CPT

## 2020-12-18 PROCEDURE — 84443 ASSAY THYROID STIM HORMONE: CPT

## 2020-12-18 PROCEDURE — 82550 ASSAY OF CK (CPK): CPT

## 2020-12-18 PROCEDURE — 80076 HEPATIC FUNCTION PANEL: CPT

## 2020-12-18 PROCEDURE — 86200 CCP ANTIBODY: CPT

## 2020-12-21 LAB — HIV 1+2 AB+HIV1 P24 AG SERPL QL IA: NEGATIVE

## 2021-04-13 ENCOUNTER — OFFICE VISIT (OUTPATIENT)
Dept: CARDIOLOGY | Facility: CLINIC | Age: 66
End: 2021-04-13
Attending: INTERNAL MEDICINE
Payer: COMMERCIAL

## 2021-04-13 VITALS
HEART RATE: 69 BPM | BODY MASS INDEX: 30.54 KG/M2 | DIASTOLIC BLOOD PRESSURE: 75 MMHG | HEIGHT: 66 IN | WEIGHT: 190.06 LBS | SYSTOLIC BLOOD PRESSURE: 125 MMHG

## 2021-04-13 DIAGNOSIS — I65.23 BILATERAL CAROTID ARTERY STENOSIS: ICD-10-CM

## 2021-04-13 DIAGNOSIS — E66.9 MILD OBESITY: ICD-10-CM

## 2021-04-13 DIAGNOSIS — I77.1 STENOSIS OF LEFT SUBCLAVIAN ARTERY: ICD-10-CM

## 2021-04-13 DIAGNOSIS — E11.51 TYPE 2 DIABETES MELLITUS WITH DIABETIC PERIPHERAL ANGIOPATHY WITHOUT GANGRENE, WITHOUT LONG-TERM CURRENT USE OF INSULIN: ICD-10-CM

## 2021-04-13 DIAGNOSIS — I10 ESSENTIAL HYPERTENSION: ICD-10-CM

## 2021-04-13 DIAGNOSIS — E78.00 HIGH CHOLESTEROL: ICD-10-CM

## 2021-04-13 PROCEDURE — 99999 PR PBB SHADOW E&M-EST. PATIENT-LVL III: ICD-10-PCS | Mod: PBBFAC,,, | Performed by: INTERNAL MEDICINE

## 2021-04-13 PROCEDURE — 99214 PR OFFICE/OUTPT VISIT, EST, LEVL IV, 30-39 MIN: ICD-10-PCS | Mod: S$GLB,,, | Performed by: INTERNAL MEDICINE

## 2021-04-13 PROCEDURE — 99214 OFFICE O/P EST MOD 30 MIN: CPT | Mod: S$GLB,,, | Performed by: INTERNAL MEDICINE

## 2021-04-13 PROCEDURE — 99999 PR PBB SHADOW E&M-EST. PATIENT-LVL III: CPT | Mod: PBBFAC,,, | Performed by: INTERNAL MEDICINE

## 2021-04-13 RX ORDER — EZETIMIBE 10 MG/1
10 TABLET ORAL DAILY
Qty: 90 TABLET | Refills: 3 | Status: SHIPPED | OUTPATIENT
Start: 2021-04-13 | End: 2022-04-18 | Stop reason: SDUPTHER

## 2021-06-17 ENCOUNTER — OFFICE VISIT (OUTPATIENT)
Dept: INTERNAL MEDICINE | Facility: CLINIC | Age: 66
End: 2021-06-17
Attending: INTERNAL MEDICINE
Payer: COMMERCIAL

## 2021-06-17 VITALS
BODY MASS INDEX: 30.53 KG/M2 | HEART RATE: 80 BPM | SYSTOLIC BLOOD PRESSURE: 120 MMHG | HEIGHT: 66 IN | OXYGEN SATURATION: 96 % | WEIGHT: 190 LBS | DIASTOLIC BLOOD PRESSURE: 70 MMHG

## 2021-06-17 DIAGNOSIS — E11.51 TYPE 2 DIABETES MELLITUS WITH DIABETIC PERIPHERAL ANGIOPATHY WITHOUT GANGRENE, WITHOUT LONG-TERM CURRENT USE OF INSULIN: ICD-10-CM

## 2021-06-17 DIAGNOSIS — Z12.5 SCREENING FOR PROSTATE CANCER: ICD-10-CM

## 2021-06-17 DIAGNOSIS — I65.23 BILATERAL CAROTID ARTERY STENOSIS: ICD-10-CM

## 2021-06-17 DIAGNOSIS — Z13.89 SCREENING FOR OBESITY: ICD-10-CM

## 2021-06-17 DIAGNOSIS — D50.8 OTHER IRON DEFICIENCY ANEMIA: ICD-10-CM

## 2021-06-17 DIAGNOSIS — R79.89 OTHER SPECIFIED ABNORMAL FINDINGS OF BLOOD CHEMISTRY: ICD-10-CM

## 2021-06-17 DIAGNOSIS — D51.0 PERNICIOUS ANEMIA: ICD-10-CM

## 2021-06-17 DIAGNOSIS — E55.9 VITAMIN D DEFICIENCY: ICD-10-CM

## 2021-06-17 DIAGNOSIS — E78.00 HIGH CHOLESTEROL: Primary | ICD-10-CM

## 2021-06-17 DIAGNOSIS — E78.9 DISORDER OF LIPID METABOLISM: ICD-10-CM

## 2021-06-17 DIAGNOSIS — I51.89 LEFT VENTRICULAR DIASTOLIC DYSFUNCTION, NYHA CLASS 2: ICD-10-CM

## 2021-06-17 DIAGNOSIS — Z13.31 SCREENING FOR DEPRESSION: ICD-10-CM

## 2021-06-17 DIAGNOSIS — E03.9 HYPOTHYROIDISM, UNSPECIFIED TYPE: ICD-10-CM

## 2021-06-17 DIAGNOSIS — Z00.00 ROUTINE ADULT HEALTH MAINTENANCE: Primary | ICD-10-CM

## 2021-06-17 DIAGNOSIS — G89.29 CHRONIC LOW BACK PAIN WITHOUT SCIATICA, UNSPECIFIED BACK PAIN LATERALITY: ICD-10-CM

## 2021-06-17 DIAGNOSIS — I10 ESSENTIAL HYPERTENSION: ICD-10-CM

## 2021-06-17 DIAGNOSIS — E66.9 MILD OBESITY: ICD-10-CM

## 2021-06-17 DIAGNOSIS — Z13.39 SCREENING FOR ALCOHOLISM: ICD-10-CM

## 2021-06-17 DIAGNOSIS — M54.50 CHRONIC LOW BACK PAIN WITHOUT SCIATICA, UNSPECIFIED BACK PAIN LATERALITY: ICD-10-CM

## 2021-06-17 PROCEDURE — G0447 PR OBESITY COUNSELING: ICD-10-PCS | Mod: S$GLB,,, | Performed by: INTERNAL MEDICINE

## 2021-06-17 PROCEDURE — G0442 ANNUAL ALCOHOL SCREEN 15 MIN: HCPCS | Mod: S$GLB,,, | Performed by: INTERNAL MEDICINE

## 2021-06-17 PROCEDURE — G0447 BEHAVIOR COUNSEL OBESITY 15M: HCPCS | Mod: S$GLB,,, | Performed by: INTERNAL MEDICINE

## 2021-06-17 PROCEDURE — 99214 OFFICE O/P EST MOD 30 MIN: CPT | Mod: 25,S$GLB,, | Performed by: INTERNAL MEDICINE

## 2021-06-17 PROCEDURE — G0444 PR DEPRESSION SCREENING: ICD-10-PCS | Mod: S$GLB,,, | Performed by: INTERNAL MEDICINE

## 2021-06-17 PROCEDURE — 99214 PR OFFICE/OUTPT VISIT, EST, LEVL IV, 30-39 MIN: ICD-10-PCS | Mod: 25,S$GLB,, | Performed by: INTERNAL MEDICINE

## 2021-06-17 PROCEDURE — G0442 PR  ALCOHOL SCREENING: ICD-10-PCS | Mod: S$GLB,,, | Performed by: INTERNAL MEDICINE

## 2021-06-17 PROCEDURE — G0444 DEPRESSION SCREEN ANNUAL: HCPCS | Mod: S$GLB,,, | Performed by: INTERNAL MEDICINE

## 2021-06-18 ENCOUNTER — LAB VISIT (OUTPATIENT)
Dept: LAB | Facility: HOSPITAL | Age: 66
End: 2021-06-18
Attending: INTERNAL MEDICINE
Payer: COMMERCIAL

## 2021-06-18 DIAGNOSIS — D51.0 PERNICIOUS ANEMIA: ICD-10-CM

## 2021-06-18 DIAGNOSIS — E78.00 HIGH CHOLESTEROL: ICD-10-CM

## 2021-06-18 DIAGNOSIS — Z12.5 SCREENING FOR PROSTATE CANCER: ICD-10-CM

## 2021-06-18 DIAGNOSIS — E03.9 HYPOTHYROIDISM, UNSPECIFIED TYPE: ICD-10-CM

## 2021-06-18 DIAGNOSIS — Z00.00 ROUTINE ADULT HEALTH MAINTENANCE: ICD-10-CM

## 2021-06-18 DIAGNOSIS — E78.9 DISORDER OF LIPID METABOLISM: ICD-10-CM

## 2021-06-18 DIAGNOSIS — R79.89 OTHER SPECIFIED ABNORMAL FINDINGS OF BLOOD CHEMISTRY: ICD-10-CM

## 2021-06-18 DIAGNOSIS — D50.8 OTHER IRON DEFICIENCY ANEMIA: ICD-10-CM

## 2021-06-18 DIAGNOSIS — E55.9 VITAMIN D DEFICIENCY: ICD-10-CM

## 2021-06-18 LAB
25(OH)D3+25(OH)D2 SERPL-MCNC: 48 NG/ML (ref 30–96)
ALBUMIN SERPL BCP-MCNC: 4.2 G/DL (ref 3.5–5.2)
ALP SERPL-CCNC: 47 U/L (ref 55–135)
ALT SERPL W/O P-5'-P-CCNC: 48 U/L (ref 10–44)
ANION GAP SERPL CALC-SCNC: 9 MMOL/L (ref 8–16)
AST SERPL-CCNC: 32 U/L (ref 10–40)
BASOPHILS # BLD AUTO: 0.05 K/UL (ref 0–0.2)
BASOPHILS NFR BLD: 0.6 % (ref 0–1.9)
BILIRUB SERPL-MCNC: 0.4 MG/DL (ref 0.1–1)
BUN SERPL-MCNC: 14 MG/DL (ref 8–23)
CALCIUM SERPL-MCNC: 9.3 MG/DL (ref 8.7–10.5)
CHLORIDE SERPL-SCNC: 104 MMOL/L (ref 95–110)
CHOLEST SERPL-MCNC: 115 MG/DL (ref 120–199)
CHOLEST SERPL-MCNC: 115 MG/DL (ref 120–199)
CHOLEST/HDLC SERPL: 2.1 {RATIO} (ref 2–5)
CHOLEST/HDLC SERPL: 2.1 {RATIO} (ref 2–5)
CO2 SERPL-SCNC: 28 MMOL/L (ref 23–29)
COMPLEXED PSA SERPL-MCNC: 0.84 NG/ML (ref 0–4)
CREAT SERPL-MCNC: 0.9 MG/DL (ref 0.5–1.4)
DIFFERENTIAL METHOD: ABNORMAL
EOSINOPHIL # BLD AUTO: 0.1 K/UL (ref 0–0.5)
EOSINOPHIL NFR BLD: 1.3 % (ref 0–8)
ERYTHROCYTE [DISTWIDTH] IN BLOOD BY AUTOMATED COUNT: 12.3 % (ref 11.5–14.5)
EST. GFR  (AFRICAN AMERICAN): >60 ML/MIN/1.73 M^2
EST. GFR  (NON AFRICAN AMERICAN): >60 ML/MIN/1.73 M^2
ESTIMATED AVG GLUCOSE: 163 MG/DL (ref 68–131)
GLUCOSE SERPL-MCNC: 156 MG/DL (ref 70–110)
HBA1C MFR BLD: 7.3 % (ref 4–5.6)
HCT VFR BLD AUTO: 43.8 % (ref 40–54)
HDLC SERPL-MCNC: 54 MG/DL (ref 40–75)
HDLC SERPL-MCNC: 54 MG/DL (ref 40–75)
HDLC SERPL: 47 % (ref 20–50)
HDLC SERPL: 47 % (ref 20–50)
HGB BLD-MCNC: 14.6 G/DL (ref 14–18)
IMM GRANULOCYTES # BLD AUTO: 0.02 K/UL (ref 0–0.04)
IMM GRANULOCYTES NFR BLD AUTO: 0.2 % (ref 0–0.5)
LDLC SERPL CALC-MCNC: 48 MG/DL (ref 63–159)
LDLC SERPL CALC-MCNC: 48 MG/DL (ref 63–159)
LYMPHOCYTES # BLD AUTO: 2.1 K/UL (ref 1–4.8)
LYMPHOCYTES NFR BLD: 25.5 % (ref 18–48)
MCH RBC QN AUTO: 32.2 PG (ref 27–31)
MCHC RBC AUTO-ENTMCNC: 33.3 G/DL (ref 32–36)
MCV RBC AUTO: 97 FL (ref 82–98)
MONOCYTES # BLD AUTO: 0.6 K/UL (ref 0.3–1)
MONOCYTES NFR BLD: 6.8 % (ref 4–15)
NEUTROPHILS # BLD AUTO: 5.4 K/UL (ref 1.8–7.7)
NEUTROPHILS NFR BLD: 65.6 % (ref 38–73)
NONHDLC SERPL-MCNC: 61 MG/DL
NONHDLC SERPL-MCNC: 61 MG/DL
NRBC BLD-RTO: 0 /100 WBC
PLATELET # BLD AUTO: 217 K/UL (ref 150–450)
PMV BLD AUTO: 9.4 FL (ref 9.2–12.9)
POTASSIUM SERPL-SCNC: 4.2 MMOL/L (ref 3.5–5.1)
PROT SERPL-MCNC: 6.9 G/DL (ref 6–8.4)
RBC # BLD AUTO: 4.54 M/UL (ref 4.6–6.2)
SODIUM SERPL-SCNC: 141 MMOL/L (ref 136–145)
TRIGL SERPL-MCNC: 65 MG/DL (ref 30–150)
TRIGL SERPL-MCNC: 65 MG/DL (ref 30–150)
TSH SERPL DL<=0.005 MIU/L-ACNC: 1.3 UIU/ML (ref 0.4–4)
VIT B12 SERPL-MCNC: 791 PG/ML (ref 210–950)
WBC # BLD AUTO: 8.19 K/UL (ref 3.9–12.7)

## 2021-06-18 PROCEDURE — 82306 VITAMIN D 25 HYDROXY: CPT | Performed by: INTERNAL MEDICINE

## 2021-06-18 PROCEDURE — 85025 COMPLETE CBC W/AUTO DIFF WBC: CPT | Performed by: INTERNAL MEDICINE

## 2021-06-18 PROCEDURE — 83036 HEMOGLOBIN GLYCOSYLATED A1C: CPT | Performed by: INTERNAL MEDICINE

## 2021-06-18 PROCEDURE — 80053 COMPREHEN METABOLIC PANEL: CPT | Performed by: INTERNAL MEDICINE

## 2021-06-18 PROCEDURE — 82607 VITAMIN B-12: CPT | Performed by: INTERNAL MEDICINE

## 2021-06-18 PROCEDURE — 80061 LIPID PANEL: CPT | Performed by: INTERNAL MEDICINE

## 2021-06-18 PROCEDURE — 84443 ASSAY THYROID STIM HORMONE: CPT | Performed by: INTERNAL MEDICINE

## 2021-06-18 PROCEDURE — 84153 ASSAY OF PSA TOTAL: CPT | Performed by: INTERNAL MEDICINE

## 2021-06-18 PROCEDURE — 36415 COLL VENOUS BLD VENIPUNCTURE: CPT | Performed by: INTERNAL MEDICINE

## 2021-06-19 RX ORDER — METFORMIN HYDROCHLORIDE 500 MG/1
1000 TABLET, EXTENDED RELEASE ORAL
Qty: 180 TABLET | Refills: 3 | Status: SHIPPED | OUTPATIENT
Start: 2021-06-19 | End: 2021-09-14 | Stop reason: SDUPTHER

## 2021-09-13 ENCOUNTER — LAB VISIT (OUTPATIENT)
Dept: FAMILY MEDICINE | Facility: CLINIC | Age: 66
End: 2021-09-13
Payer: COMMERCIAL

## 2021-09-13 DIAGNOSIS — R05.9 COUGH: ICD-10-CM

## 2021-09-13 PROCEDURE — U0003 INFECTIOUS AGENT DETECTION BY NUCLEIC ACID (DNA OR RNA); SEVERE ACUTE RESPIRATORY SYNDROME CORONAVIRUS 2 (SARS-COV-2) (CORONAVIRUS DISEASE [COVID-19]), AMPLIFIED PROBE TECHNIQUE, MAKING USE OF HIGH THROUGHPUT TECHNOLOGIES AS DESCRIBED BY CMS-2020-01-R: HCPCS | Performed by: INTERNAL MEDICINE

## 2021-09-13 PROCEDURE — U0005 INFEC AGEN DETEC AMPLI PROBE: HCPCS | Performed by: INTERNAL MEDICINE

## 2021-09-14 ENCOUNTER — TELEPHONE (OUTPATIENT)
Dept: FAMILY MEDICINE | Facility: CLINIC | Age: 66
End: 2021-09-14

## 2021-09-14 DIAGNOSIS — U07.1 COVID-19: Primary | ICD-10-CM

## 2021-09-14 DIAGNOSIS — U07.1 COVID-19 VIRUS DETECTED: Primary | ICD-10-CM

## 2021-09-14 LAB
SARS-COV-2 RNA RESP QL NAA+PROBE: DETECTED
SARS-COV-2- CYCLE NUMBER: 21

## 2021-09-14 RX ORDER — METFORMIN HYDROCHLORIDE 500 MG/1
1000 TABLET, EXTENDED RELEASE ORAL
Qty: 180 TABLET | Refills: 3 | Status: SHIPPED | OUTPATIENT
Start: 2021-09-14 | End: 2022-04-12 | Stop reason: SDUPTHER

## 2021-09-16 ENCOUNTER — INFUSION (OUTPATIENT)
Dept: INFECTIOUS DISEASES | Facility: HOSPITAL | Age: 66
End: 2021-09-16
Attending: EMERGENCY MEDICINE
Payer: COMMERCIAL

## 2021-09-16 VITALS
WEIGHT: 190 LBS | TEMPERATURE: 98 F | OXYGEN SATURATION: 97 % | RESPIRATION RATE: 18 BRPM | HEART RATE: 60 BPM | DIASTOLIC BLOOD PRESSURE: 63 MMHG | SYSTOLIC BLOOD PRESSURE: 128 MMHG | BODY MASS INDEX: 28.79 KG/M2 | HEIGHT: 68 IN

## 2021-09-16 DIAGNOSIS — U07.1 COVID-19: Primary | ICD-10-CM

## 2021-09-16 PROCEDURE — 63600175 PHARM REV CODE 636 W HCPCS: Performed by: INTERNAL MEDICINE

## 2021-09-16 PROCEDURE — 25000003 PHARM REV CODE 250: Performed by: INTERNAL MEDICINE

## 2021-09-16 PROCEDURE — M0243 CASIRIVI AND IMDEVI INFUSION: HCPCS | Performed by: INTERNAL MEDICINE

## 2021-09-16 RX ORDER — ALBUTEROL SULFATE 90 UG/1
2 AEROSOL, METERED RESPIRATORY (INHALATION)
Status: DISCONTINUED | OUTPATIENT
Start: 2021-09-16 | End: 2021-12-14

## 2021-09-16 RX ORDER — SODIUM CHLORIDE 0.9 % (FLUSH) 0.9 %
10 SYRINGE (ML) INJECTION
Status: DISCONTINUED | OUTPATIENT
Start: 2021-09-16 | End: 2021-12-14

## 2021-09-16 RX ORDER — DIPHENHYDRAMINE HYDROCHLORIDE 50 MG/ML
25 INJECTION INTRAMUSCULAR; INTRAVENOUS ONCE AS NEEDED
Status: DISCONTINUED | OUTPATIENT
Start: 2021-09-16 | End: 2021-12-14

## 2021-09-16 RX ORDER — ONDANSETRON 4 MG/1
4 TABLET, ORALLY DISINTEGRATING ORAL ONCE AS NEEDED
Status: DISCONTINUED | OUTPATIENT
Start: 2021-09-16 | End: 2021-12-14

## 2021-09-16 RX ORDER — ACETAMINOPHEN 325 MG/1
650 TABLET ORAL ONCE AS NEEDED
Status: DISCONTINUED | OUTPATIENT
Start: 2021-09-16 | End: 2021-12-14

## 2021-09-16 RX ORDER — EPINEPHRINE 0.3 MG/.3ML
0.3 INJECTION SUBCUTANEOUS
Status: DISCONTINUED | OUTPATIENT
Start: 2021-09-16 | End: 2021-12-14

## 2021-09-16 RX ADMIN — CASIRIVIMAB AND IMDEVIMAB 600 MG: 600; 600 INJECTION, SOLUTION, CONCENTRATE INTRAVENOUS at 11:09

## 2021-10-01 ENCOUNTER — HOSPITAL ENCOUNTER (OUTPATIENT)
Dept: CARDIOLOGY | Facility: OTHER | Age: 66
Discharge: HOME | End: 2021-10-01
Attending: INTERNAL MEDICINE
Payer: COMMERCIAL

## 2021-10-01 DIAGNOSIS — I65.23 BILATERAL CAROTID ARTERY STENOSIS: ICD-10-CM

## 2021-10-01 LAB
LEFT CCA DIST DIAS: 23 CM/S
LEFT CCA DIST SYS: 112 CM/S
LEFT CCA PROX DIAS: 20 CM/S
LEFT CCA PROX SYS: 103 CM/S
LEFT ECA SYS: 162 CM/S
LEFT ICA DIST DIAS: 27 CM/S
LEFT ICA DIST SYS: 89 CM/S
LEFT ICA MID DIAS: 21 CM/S
LEFT ICA MID SYS: 84 CM/S
LEFT ICA PROX DIAS: 19 CM/S
LEFT ICA PROX SYS: 100 CM/S
LEFT VERTEBRAL DIAS: 11 CM/S
LEFT VERTEBRAL SYS: 45 CM/S
OHS CV CAROTID RIGHT ICA EDV HIGHEST: 40
OHS CV CAROTID ULTRASOUND LEFT ICA/CCA RATIO: 0.89
OHS CV CAROTID ULTRASOUND RIGHT ICA/CCA RATIO: 2.49
OHS CV PV CAROTID LEFT HIGHEST CCA: 112
OHS CV PV CAROTID LEFT HIGHEST ICA: 100
OHS CV PV CAROTID RIGHT HIGHEST CCA: 122
OHS CV PV CAROTID RIGHT HIGHEST ICA: 187
OHS CV US CAROTID LEFT HIGHEST EDV: 27
RIGHT ARM DIASTOLIC BLOOD PRESSURE: 65 MMHG
RIGHT ARM SYSTOLIC BLOOD PRESSURE: 129 MMHG
RIGHT CCA DIST DIAS: 16 CM/S
RIGHT CCA DIST SYS: 75 CM/S
RIGHT CCA PROX DIAS: 17 CM/S
RIGHT CCA PROX SYS: 122 CM/S
RIGHT ECA SYS: 306 CM/S
RIGHT ICA DIST DIAS: 20 CM/S
RIGHT ICA DIST SYS: 80 CM/S
RIGHT ICA MID DIAS: 40 CM/S
RIGHT ICA MID SYS: 156 CM/S
RIGHT ICA PROX DIAS: 36 CM/S
RIGHT ICA PROX SYS: 187 CM/S
RIGHT VERTEBRAL DIAS: 13 CM/S
RIGHT VERTEBRAL SYS: 84 CM/S

## 2021-10-01 PROCEDURE — 93880 CV US DOPPLER CAROTID (CUPID ONLY): ICD-10-PCS | Mod: 26,,, | Performed by: INTERNAL MEDICINE

## 2021-10-01 PROCEDURE — 93880 EXTRACRANIAL BILAT STUDY: CPT

## 2021-10-01 PROCEDURE — 93880 EXTRACRANIAL BILAT STUDY: CPT | Mod: 26,,, | Performed by: INTERNAL MEDICINE

## 2021-10-14 ENCOUNTER — OFFICE VISIT (OUTPATIENT)
Dept: CARDIOLOGY | Facility: CLINIC | Age: 66
End: 2021-10-14
Attending: INTERNAL MEDICINE
Payer: COMMERCIAL

## 2021-10-14 VITALS
HEIGHT: 68 IN | OXYGEN SATURATION: 98 % | DIASTOLIC BLOOD PRESSURE: 62 MMHG | WEIGHT: 187.38 LBS | HEART RATE: 64 BPM | BODY MASS INDEX: 28.4 KG/M2 | SYSTOLIC BLOOD PRESSURE: 113 MMHG

## 2021-10-14 DIAGNOSIS — Z86.16 HISTORY OF SEVERE ACUTE RESPIRATORY SYNDROME CORONAVIRUS 2 (SARS-COV-2) DISEASE: ICD-10-CM

## 2021-10-14 DIAGNOSIS — E78.00 HIGH CHOLESTEROL: ICD-10-CM

## 2021-10-14 DIAGNOSIS — E11.51 TYPE 2 DIABETES MELLITUS WITH DIABETIC PERIPHERAL ANGIOPATHY WITHOUT GANGRENE, WITHOUT LONG-TERM CURRENT USE OF INSULIN: ICD-10-CM

## 2021-10-14 DIAGNOSIS — I10 ESSENTIAL HYPERTENSION: ICD-10-CM

## 2021-10-14 DIAGNOSIS — I77.1 STENOSIS OF LEFT SUBCLAVIAN ARTERY: ICD-10-CM

## 2021-10-14 DIAGNOSIS — I65.23 BILATERAL CAROTID ARTERY STENOSIS: ICD-10-CM

## 2021-10-14 DIAGNOSIS — E66.3 OVERWEIGHT: ICD-10-CM

## 2021-10-14 PROCEDURE — 99999 PR PBB SHADOW E&M-EST. PATIENT-LVL III: CPT | Mod: PBBFAC,,, | Performed by: INTERNAL MEDICINE

## 2021-10-14 PROCEDURE — 99214 OFFICE O/P EST MOD 30 MIN: CPT | Mod: S$GLB,,, | Performed by: INTERNAL MEDICINE

## 2021-10-14 PROCEDURE — 99214 PR OFFICE/OUTPT VISIT, EST, LEVL IV, 30-39 MIN: ICD-10-PCS | Mod: S$GLB,,, | Performed by: INTERNAL MEDICINE

## 2021-10-14 PROCEDURE — 99999 PR PBB SHADOW E&M-EST. PATIENT-LVL III: ICD-10-PCS | Mod: PBBFAC,,, | Performed by: INTERNAL MEDICINE

## 2021-10-14 RX ORDER — ASPIRIN 81 MG/1
81 TABLET ORAL DAILY
Qty: 90 TABLET | Refills: 3 | Status: SHIPPED | OUTPATIENT
Start: 2021-10-14 | End: 2022-04-18 | Stop reason: SDUPTHER

## 2021-12-14 ENCOUNTER — OFFICE VISIT (OUTPATIENT)
Dept: INTERNAL MEDICINE | Facility: CLINIC | Age: 66
End: 2021-12-14
Attending: INTERNAL MEDICINE
Payer: COMMERCIAL

## 2021-12-14 VITALS
HEIGHT: 68 IN | BODY MASS INDEX: 28.64 KG/M2 | HEART RATE: 75 BPM | DIASTOLIC BLOOD PRESSURE: 69 MMHG | WEIGHT: 189 LBS | SYSTOLIC BLOOD PRESSURE: 116 MMHG | OXYGEN SATURATION: 98 %

## 2021-12-14 DIAGNOSIS — E11.51 TYPE 2 DIABETES MELLITUS WITH DIABETIC PERIPHERAL ANGIOPATHY WITHOUT GANGRENE, WITHOUT LONG-TERM CURRENT USE OF INSULIN: Primary | ICD-10-CM

## 2021-12-14 DIAGNOSIS — E55.9 VITAMIN D DEFICIENCY: ICD-10-CM

## 2021-12-14 DIAGNOSIS — E11.51 TYPE 2 DIABETES MELLITUS WITH DIABETIC PERIPHERAL ANGIOPATHY WITHOUT GANGRENE, WITHOUT LONG-TERM CURRENT USE OF INSULIN: ICD-10-CM

## 2021-12-14 DIAGNOSIS — E78.9 DISORDER OF LIPID METABOLISM: ICD-10-CM

## 2021-12-14 DIAGNOSIS — R79.89 OTHER SPECIFIED ABNORMAL FINDINGS OF BLOOD CHEMISTRY: ICD-10-CM

## 2021-12-14 DIAGNOSIS — D51.0 PERNICIOUS ANEMIA: ICD-10-CM

## 2021-12-14 DIAGNOSIS — Z00.00 ROUTINE ADULT HEALTH MAINTENANCE: ICD-10-CM

## 2021-12-14 DIAGNOSIS — E78.00 HIGH CHOLESTEROL: Primary | ICD-10-CM

## 2021-12-14 DIAGNOSIS — I10 ESSENTIAL HYPERTENSION: ICD-10-CM

## 2021-12-14 DIAGNOSIS — D50.8 OTHER IRON DEFICIENCY ANEMIA: ICD-10-CM

## 2021-12-14 DIAGNOSIS — Z12.5 SCREENING FOR PROSTATE CANCER: ICD-10-CM

## 2021-12-14 PROCEDURE — 99396 PR PREVENTIVE VISIT,EST,40-64: ICD-10-PCS | Mod: S$GLB,,, | Performed by: INTERNAL MEDICINE

## 2021-12-14 PROCEDURE — 99396 PREV VISIT EST AGE 40-64: CPT | Mod: S$GLB,,, | Performed by: INTERNAL MEDICINE

## 2021-12-15 ENCOUNTER — LAB VISIT (OUTPATIENT)
Dept: LAB | Facility: HOSPITAL | Age: 66
End: 2021-12-15
Attending: INTERNAL MEDICINE
Payer: COMMERCIAL

## 2021-12-15 DIAGNOSIS — E78.9 DISORDER OF LIPID METABOLISM: ICD-10-CM

## 2021-12-15 DIAGNOSIS — R79.89 OTHER SPECIFIED ABNORMAL FINDINGS OF BLOOD CHEMISTRY: ICD-10-CM

## 2021-12-15 DIAGNOSIS — D50.8 OTHER IRON DEFICIENCY ANEMIA: ICD-10-CM

## 2021-12-15 PROBLEM — Z78.9 MICROALBUMINURIA ABSENT: Status: ACTIVE | Noted: 2021-12-15

## 2021-12-15 LAB
ALBUMIN SERPL BCP-MCNC: 4 G/DL (ref 3.5–5.2)
ALP SERPL-CCNC: 49 U/L (ref 55–135)
ALT SERPL W/O P-5'-P-CCNC: 29 U/L (ref 10–44)
ANION GAP SERPL CALC-SCNC: 6 MMOL/L (ref 8–16)
AST SERPL-CCNC: 23 U/L (ref 10–40)
BASOPHILS # BLD AUTO: 0.04 K/UL (ref 0–0.2)
BASOPHILS NFR BLD: 0.7 % (ref 0–1.9)
BILIRUB SERPL-MCNC: 0.5 MG/DL (ref 0.1–1)
BUN SERPL-MCNC: 14 MG/DL (ref 8–23)
CALCIUM SERPL-MCNC: 9.5 MG/DL (ref 8.7–10.5)
CHLORIDE SERPL-SCNC: 103 MMOL/L (ref 95–110)
CHOLEST SERPL-MCNC: 101 MG/DL (ref 120–199)
CHOLEST/HDLC SERPL: 2.3 {RATIO} (ref 2–5)
CO2 SERPL-SCNC: 31 MMOL/L (ref 23–29)
CREAT SERPL-MCNC: 0.9 MG/DL (ref 0.5–1.4)
DIFFERENTIAL METHOD: ABNORMAL
EOSINOPHIL # BLD AUTO: 0.2 K/UL (ref 0–0.5)
EOSINOPHIL NFR BLD: 2.5 % (ref 0–8)
ERYTHROCYTE [DISTWIDTH] IN BLOOD BY AUTOMATED COUNT: 12.5 % (ref 11.5–14.5)
EST. GFR  (AFRICAN AMERICAN): >60 ML/MIN/1.73 M^2
EST. GFR  (NON AFRICAN AMERICAN): >60 ML/MIN/1.73 M^2
ESTIMATED AVG GLUCOSE: 183 MG/DL (ref 68–131)
GLUCOSE SERPL-MCNC: 161 MG/DL (ref 70–110)
HBA1C MFR BLD: 8 % (ref 4–5.6)
HCT VFR BLD AUTO: 43.5 % (ref 40–54)
HDLC SERPL-MCNC: 43 MG/DL (ref 40–75)
HDLC SERPL: 42.6 % (ref 20–50)
HGB BLD-MCNC: 14.7 G/DL (ref 14–18)
IMM GRANULOCYTES # BLD AUTO: 0 K/UL (ref 0–0.04)
IMM GRANULOCYTES NFR BLD AUTO: 0 % (ref 0–0.5)
LDLC SERPL CALC-MCNC: 43.2 MG/DL (ref 63–159)
LYMPHOCYTES # BLD AUTO: 1.9 K/UL (ref 1–4.8)
LYMPHOCYTES NFR BLD: 30.5 % (ref 18–48)
MCH RBC QN AUTO: 32.5 PG (ref 27–31)
MCHC RBC AUTO-ENTMCNC: 33.8 G/DL (ref 32–36)
MCV RBC AUTO: 96 FL (ref 82–98)
MONOCYTES # BLD AUTO: 0.6 K/UL (ref 0.3–1)
MONOCYTES NFR BLD: 10 % (ref 4–15)
NEUTROPHILS # BLD AUTO: 3.4 K/UL (ref 1.8–7.7)
NEUTROPHILS NFR BLD: 56.3 % (ref 38–73)
NONHDLC SERPL-MCNC: 58 MG/DL
NRBC BLD-RTO: 0 /100 WBC
PLATELET # BLD AUTO: 208 K/UL (ref 150–450)
PMV BLD AUTO: 9.7 FL (ref 9.2–12.9)
POTASSIUM SERPL-SCNC: 4.7 MMOL/L (ref 3.5–5.1)
PROT SERPL-MCNC: 6.6 G/DL (ref 6–8.4)
RBC # BLD AUTO: 4.52 M/UL (ref 4.6–6.2)
SODIUM SERPL-SCNC: 140 MMOL/L (ref 136–145)
TRIGL SERPL-MCNC: 74 MG/DL (ref 30–150)
WBC # BLD AUTO: 6.07 K/UL (ref 3.9–12.7)

## 2021-12-15 PROCEDURE — 80053 COMPREHEN METABOLIC PANEL: CPT | Performed by: INTERNAL MEDICINE

## 2021-12-15 PROCEDURE — 80061 LIPID PANEL: CPT | Performed by: INTERNAL MEDICINE

## 2021-12-15 PROCEDURE — 36415 COLL VENOUS BLD VENIPUNCTURE: CPT | Performed by: INTERNAL MEDICINE

## 2021-12-15 PROCEDURE — 83036 HEMOGLOBIN GLYCOSYLATED A1C: CPT | Performed by: INTERNAL MEDICINE

## 2021-12-15 PROCEDURE — 85025 COMPLETE CBC W/AUTO DIFF WBC: CPT | Performed by: INTERNAL MEDICINE

## 2022-04-12 DIAGNOSIS — I10 ESSENTIAL HYPERTENSION: ICD-10-CM

## 2022-04-12 DIAGNOSIS — E11.51 TYPE 2 DIABETES MELLITUS WITH DIABETIC PERIPHERAL ANGIOPATHY WITHOUT GANGRENE, WITHOUT LONG-TERM CURRENT USE OF INSULIN: Primary | ICD-10-CM

## 2022-04-12 RX ORDER — METFORMIN HYDROCHLORIDE 500 MG/1
1000 TABLET, EXTENDED RELEASE ORAL
Qty: 180 TABLET | Refills: 3 | Status: SHIPPED | OUTPATIENT
Start: 2022-04-12 | End: 2022-07-01

## 2022-04-12 RX ORDER — VALSARTAN 160 MG/1
160 TABLET ORAL DAILY
Qty: 90 TABLET | Refills: 3 | Status: SHIPPED | OUTPATIENT
Start: 2022-04-12 | End: 2023-02-28 | Stop reason: SDUPTHER

## 2022-04-13 DIAGNOSIS — E11.9 TYPE 2 DIABETES MELLITUS WITHOUT COMPLICATION: ICD-10-CM

## 2022-04-18 ENCOUNTER — OFFICE VISIT (OUTPATIENT)
Dept: CARDIOLOGY | Facility: CLINIC | Age: 67
End: 2022-04-18
Attending: INTERNAL MEDICINE
Payer: MEDICARE

## 2022-04-18 VITALS
HEART RATE: 67 BPM | WEIGHT: 189.38 LBS | OXYGEN SATURATION: 97 % | DIASTOLIC BLOOD PRESSURE: 70 MMHG | HEIGHT: 68 IN | BODY MASS INDEX: 28.7 KG/M2 | SYSTOLIC BLOOD PRESSURE: 130 MMHG

## 2022-04-18 DIAGNOSIS — Z86.16 HISTORY OF SEVERE ACUTE RESPIRATORY SYNDROME CORONAVIRUS 2 (SARS-COV-2) DISEASE: ICD-10-CM

## 2022-04-18 DIAGNOSIS — I77.1 STENOSIS OF LEFT SUBCLAVIAN ARTERY: ICD-10-CM

## 2022-04-18 DIAGNOSIS — E11.51 TYPE 2 DIABETES MELLITUS WITH DIABETIC PERIPHERAL ANGIOPATHY WITHOUT GANGRENE, WITHOUT LONG-TERM CURRENT USE OF INSULIN: ICD-10-CM

## 2022-04-18 DIAGNOSIS — E66.3 OVERWEIGHT: ICD-10-CM

## 2022-04-18 DIAGNOSIS — E78.00 HIGH CHOLESTEROL: ICD-10-CM

## 2022-04-18 DIAGNOSIS — I65.23 BILATERAL CAROTID ARTERY STENOSIS: ICD-10-CM

## 2022-04-18 DIAGNOSIS — I10 ESSENTIAL HYPERTENSION: ICD-10-CM

## 2022-04-18 PROCEDURE — 3078F DIAST BP <80 MM HG: CPT | Mod: CPTII,S$GLB,, | Performed by: INTERNAL MEDICINE

## 2022-04-18 PROCEDURE — 99214 PR OFFICE/OUTPT VISIT, EST, LEVL IV, 30-39 MIN: ICD-10-PCS | Mod: 25,S$GLB,, | Performed by: INTERNAL MEDICINE

## 2022-04-18 PROCEDURE — 3078F PR MOST RECENT DIASTOLIC BLOOD PRESSURE < 80 MM HG: ICD-10-PCS | Mod: CPTII,S$GLB,, | Performed by: INTERNAL MEDICINE

## 2022-04-18 PROCEDURE — 4010F PR ACE/ARB THEARPY RXD/TAKEN: ICD-10-PCS | Mod: CPTII,S$GLB,, | Performed by: INTERNAL MEDICINE

## 2022-04-18 PROCEDURE — 3008F PR BODY MASS INDEX (BMI) DOCUMENTED: ICD-10-PCS | Mod: CPTII,S$GLB,, | Performed by: INTERNAL MEDICINE

## 2022-04-18 PROCEDURE — 1159F PR MEDICATION LIST DOCUMENTED IN MEDICAL RECORD: ICD-10-PCS | Mod: CPTII,S$GLB,, | Performed by: INTERNAL MEDICINE

## 2022-04-18 PROCEDURE — 3052F PR MOST RECENT HEMOGLOBIN A1C LEVEL 8.0 - < 9.0%: ICD-10-PCS | Mod: CPTII,S$GLB,, | Performed by: INTERNAL MEDICINE

## 2022-04-18 PROCEDURE — 93000 PR ELECTROCARDIOGRAM, COMPLETE: ICD-10-PCS | Mod: S$GLB,,, | Performed by: INTERNAL MEDICINE

## 2022-04-18 PROCEDURE — 93000 ELECTROCARDIOGRAM COMPLETE: CPT | Mod: S$GLB,,, | Performed by: INTERNAL MEDICINE

## 2022-04-18 PROCEDURE — 93005 ELECTROCARDIOGRAM TRACING: CPT

## 2022-04-18 PROCEDURE — 1160F RVW MEDS BY RX/DR IN RCRD: CPT | Mod: CPTII,S$GLB,, | Performed by: INTERNAL MEDICINE

## 2022-04-18 PROCEDURE — 99499 RISK ADDL DX/OHS AUDIT: ICD-10-PCS | Mod: S$GLB,,, | Performed by: INTERNAL MEDICINE

## 2022-04-18 PROCEDURE — 3075F SYST BP GE 130 - 139MM HG: CPT | Mod: CPTII,S$GLB,, | Performed by: INTERNAL MEDICINE

## 2022-04-18 PROCEDURE — 99214 OFFICE O/P EST MOD 30 MIN: CPT | Mod: 25,S$GLB,, | Performed by: INTERNAL MEDICINE

## 2022-04-18 PROCEDURE — 3052F HG A1C>EQUAL 8.0%<EQUAL 9.0%: CPT | Mod: CPTII,S$GLB,, | Performed by: INTERNAL MEDICINE

## 2022-04-18 PROCEDURE — 1159F MED LIST DOCD IN RCRD: CPT | Mod: CPTII,S$GLB,, | Performed by: INTERNAL MEDICINE

## 2022-04-18 PROCEDURE — 3008F BODY MASS INDEX DOCD: CPT | Mod: CPTII,S$GLB,, | Performed by: INTERNAL MEDICINE

## 2022-04-18 PROCEDURE — 3075F PR MOST RECENT SYSTOLIC BLOOD PRESS GE 130-139MM HG: ICD-10-PCS | Mod: CPTII,S$GLB,, | Performed by: INTERNAL MEDICINE

## 2022-04-18 PROCEDURE — 99499 UNLISTED E&M SERVICE: CPT | Mod: S$GLB,,, | Performed by: INTERNAL MEDICINE

## 2022-04-18 PROCEDURE — 99999 PR PBB SHADOW E&M-EST. PATIENT-LVL III: CPT | Mod: PBBFAC,,, | Performed by: INTERNAL MEDICINE

## 2022-04-18 PROCEDURE — 99999 PR PBB SHADOW E&M-EST. PATIENT-LVL III: ICD-10-PCS | Mod: PBBFAC,,, | Performed by: INTERNAL MEDICINE

## 2022-04-18 PROCEDURE — 1160F PR REVIEW ALL MEDS BY PRESCRIBER/CLIN PHARMACIST DOCUMENTED: ICD-10-PCS | Mod: CPTII,S$GLB,, | Performed by: INTERNAL MEDICINE

## 2022-04-18 PROCEDURE — 4010F ACE/ARB THERAPY RXD/TAKEN: CPT | Mod: CPTII,S$GLB,, | Performed by: INTERNAL MEDICINE

## 2022-04-18 RX ORDER — ASPIRIN 81 MG/1
81 TABLET ORAL DAILY
Qty: 90 TABLET | Refills: 3 | Status: SHIPPED | OUTPATIENT
Start: 2022-04-18

## 2022-04-18 RX ORDER — EZETIMIBE 10 MG/1
10 TABLET ORAL DAILY
Qty: 90 TABLET | Refills: 3 | Status: SHIPPED | OUTPATIENT
Start: 2022-04-18 | End: 2023-02-28 | Stop reason: SDUPTHER

## 2022-04-18 RX ORDER — NEBIVOLOL 5 MG/1
5 TABLET ORAL DAILY
Qty: 90 TABLET | Refills: 3 | Status: SHIPPED | OUTPATIENT
Start: 2022-04-18 | End: 2023-05-08

## 2022-04-18 NOTE — PROGRESS NOTES
Subjective:     Gael Pedroza is a 67 y.o. male with hypertension, hypercholesterolemia and diabetes mellitus type 2. He is mildly obese. He has known carotid disease. A carotid duplex study on 4/17/2018 revealed the right internal carotid artery to have a velocity of 2.4 m/s. He had a MRA that confirmed a 70% stenosis. On 6/27/2008 he had a four vessel cerebral angiogram at [facility] that revealed the right internal carotid artery to have a 40% stenosis. The left subclavian had a 30% lesion. He was concerned about the different findings on the tests. On 10/7/2019 he had a carotid duplex which revealed the right internal carotid artery to have severe plaquing with a peak velocity of 2.0 m/s correlating with a stenosis in the 60-70% range. The left internal carotid artery had moderate plaquing with a velocity of 1.1 m/s suggesting a less than 50% narrowing. On 10/6/2020 he had a carotid duplex that revealed the right internal carotid artery to have moderate plaquing with a peak velocity of 1.5 m/s correlating with a stenosis in the 50-60% range. The left internal carotid artery had moderate plaquing with a velocity of 1.1 m/s correlating with a <50% stenosis. In 9/2021 he had a mild case of COVID-19 after he evacuated even after he was fully vaccinated. No exertional chest pain or exertional dyspnea. No palpitations or weak spells. No bleeding. No issues with any of his prescribed medications. Feeling well overall.       Hypertension  This is a chronic problem. The current episode started more than 1 year ago. The problem is unchanged. The problem is controlled (usually 120-130/70-80 mmHg at home). Pertinent negatives include no anxiety, blurred vision, chest pain, headaches, malaise/fatigue, neck pain, orthopnea, palpitations, peripheral edema, PND, shortness of breath or sweats. There is no history of chronic renal disease.   Hyperlipidemia  This is a chronic problem. The current episode started more  than 1 year ago. The problem is controlled. Recent lipid tests were reviewed and are normal. Exacerbating diseases include diabetes and obesity. He has no history of chronic renal disease, hypothyroidism, liver disease or nephrotic syndrome. Pertinent negatives include no chest pain, focal sensory loss, focal weakness, leg pain, myalgias or shortness of breath.       Review of Systems   Constitutional: Negative for chills, fever and malaise/fatigue.   HENT: Negative for nosebleeds.    Eyes: Negative for blurred vision, double vision, photophobia, vision loss in left eye and vision loss in right eye.   Cardiovascular: Negative for chest pain, claudication, dyspnea on exertion, irregular heartbeat, leg swelling, near-syncope, orthopnea, palpitations, paroxysmal nocturnal dyspnea and syncope.   Respiratory: Negative for cough, hemoptysis, shortness of breath and wheezing.    Endocrine: Negative for cold intolerance and heat intolerance.   Hematologic/Lymphatic: Negative for bleeding problem. Does not bruise/bleed easily.   Skin: Negative for color change and rash.   Musculoskeletal: Positive for back pain. Negative for falls, muscle weakness, myalgias and neck pain.   Gastrointestinal: Negative for heartburn, hematemesis, hematochezia, hemorrhoids, jaundice, melena, nausea and vomiting.   Genitourinary: Negative for dysuria and hematuria.   Neurological: Negative for dizziness, focal weakness, headaches, light-headedness, loss of balance, numbness, tremors, vertigo and weakness.   Psychiatric/Behavioral: Negative for altered mental status, depression and memory loss. The patient is not nervous/anxious.    Allergic/Immunologic: Negative for hives and persistent infections.       Current Outpatient Medications on File Prior to Visit   Medication Sig Dispense Refill    ascorbic acid, vitamin C, (VITAMIN C) 500 MG tablet Take 500 mg by mouth once daily.      aspirin (ECOTRIN) 81 MG EC tablet Take 1 tablet (81 mg total)  "by mouth once daily. 90 tablet 3    BYSTOLIC 5 mg Tab TAKE 1 TABLET DAILY 90 tablet 3    cholecalciferol, vitamin D3, 1,000 unit capsule Take 1,000 Units by mouth once daily.      EXELDERM 1 % external solution       ezetimibe (ZETIA) 10 mg tablet Take 1 tablet (10 mg total) by mouth once daily. 90 tablet 3    hydrOXYzine HCL (ATARAX) 25 MG tablet Take 1 tablet (25 mg total) by mouth 3 (three) times daily as needed for Itching. 30 tablet 1    metFORMIN (GLUCOPHAGE-XR) 500 MG ER 24hr tablet Take 2 tablets (1,000 mg total) by mouth daily with breakfast. 180 tablet 3    multivitamin with minerals tablet Take 1 tablet by mouth once daily.      rosuvastatin (CRESTOR) 10 MG tablet TAKE 1 TABLET ONCE DAILY 90 tablet 3    valsartan (DIOVAN) 160 MG tablet Take 1 tablet (160 mg total) by mouth once daily. 90 tablet 3     No current facility-administered medications on file prior to visit.       /70 (BP Location: Left arm, Patient Position: Sitting, BP Method: Large (Manual))   Pulse 67   Ht 5' 8" (1.727 m)   Wt 85.9 kg (189 lb 6 oz)   SpO2 97%   BMI 28.79 kg/m²       Objective:     Physical Exam  Constitutional:       General: He is not in acute distress.     Appearance: Normal appearance. He is well-developed. He is not toxic-appearing or diaphoretic.   HENT:      Head: Normocephalic and atraumatic.      Nose: Nose normal.   Eyes:      General:         Right eye: No discharge.         Left eye: No discharge.      Conjunctiva/sclera:      Right eye: Right conjunctiva is not injected.      Left eye: Left conjunctiva is not injected.      Pupils: Pupils are equal.      Right eye: Pupil is round.      Left eye: Pupil is round.   Neck:      Thyroid: No thyromegaly.      Vascular: Carotid bruit (soft bruit on the right side) present. No JVD.      Comments: Bruit over left subclavian artery.   Cardiovascular:      Rate and Rhythm: Normal rate and regular rhythm.  No extrasystoles are present.     Chest Wall: PMI " is not displaced.      Pulses:           Radial pulses are 2+ on the right side and 2+ on the left side.        Femoral pulses are 2+ on the right side and 2+ on the left side.       Dorsalis pedis pulses are 2+ on the right side and 2+ on the left side.        Posterior tibial pulses are 2+ on the right side and 2+ on the left side.      Heart sounds: S1 normal and S2 normal.     No gallop.   Pulmonary:      Effort: Pulmonary effort is normal.      Breath sounds: Normal breath sounds.   Abdominal:      Palpations: Abdomen is soft.      Tenderness: There is no abdominal tenderness.   Musculoskeletal:      Cervical back: Neck supple.      Right ankle: No swelling, deformity or ecchymosis.      Left ankle: No swelling, deformity or ecchymosis.   Lymphadenopathy:      Head:      Right side of head: No submandibular adenopathy.      Left side of head: No submandibular adenopathy.      Cervical: No cervical adenopathy.   Skin:     General: Skin is warm and dry.      Findings: No rash.   Neurological:      General: No focal deficit present.      Mental Status: He is alert and oriented to person, place, and time. He is not disoriented.      Cranial Nerves: No cranial nerve deficit.   Psychiatric:         Attention and Perception: Attention and perception normal.         Mood and Affect: Mood and affect normal.         Speech: Speech normal.         Behavior: Behavior normal.         Thought Content: Thought content normal.         Cognition and Memory: Cognition and memory normal.         Judgment: Judgment normal.         Assessment:     1. Bilateral carotid artery stenosis    2. Stenosis of left subclavian artery    3. Essential hypertension    4. High cholesterol    5. Type 2 diabetes mellitus with diabetic peripheral angiopathy without gangrene, without long-term current use of insulin    6. Overweight    7. History of severe acute respiratory syndrome coronavirus 2 (SARS-CoV-2) disease        Plan:     1. Carotid  Artery Stenosis   4/17/2018: Carotid Duplex: SUZANNE: 2.4 m/s - 70-80%. LICA: 1.3 m/s - <50%.   6/1/2018: MRA: SUZANNE: About 70%.   6/27/2018: [facility]: AA, 4V: SUZANNE: 40%. LICA: Mild. Left Subclavian: 30%.   10/7/2019: Carotid Duplex: SUZANNE: Severe plaquing - 2.0 m/s - 60-70%. LICA: Moderate plaquing - 1.1 m/s - <50%.   10/6/2020: Carotid Duplex: SUZANNE: Moderate plaquing - 1.5 m/s - 50-60%. LICA: Moderate plaquing - 1.1 m/s - <50%.   10/1/2021: Carotid Duplex: SUZANNE: Severe plaquing - 1.9 m/s - 60-70%. LICA: Severe plaquing - 1.1 m/s - <50%.    On aspirin 81 mg Q24.   10/2022: Plan next Carotid Duplex. Then yearly.    2. Subclavian Artery Disease   6/27/2018: [facility]: AA, 4V: Left Subclavian: 30%.   Asymptomatic.    3. Hypertension   2012: Diagnosed.   On nebivolol 5 mg Q24 and valsartan 160 mg Q24.   Keeping log at home.   Well controlled.    4. Hypercholesterolemia   2012: Began statin.   Felt he had muscle and joint pain on rosuvastatin 20 mg Q24.   On rosuvastatin 10 mg Q24.   6/11/2019: Chol 131. HDL 54. LDL 59. TG 93.   12/11/2019: Chol 151. HDL 52. LDL 82. TG 87.   6/30/2020: Chol 149. HDL 52. LDL 80. TG 85.   On rosuvastatin 10 mg Q24.   10/13/2020: Ezetimibe 10 mg Q24 was begun in addition to rosuvastatin 10 mg Q24 with quite favorable lipid panel but JERICA.    On rosuvastatin 10 mg Q24 and ezetimibe 10 mg Q24.   12/18/2020: Chol 115. HDL 51. LDL 49. TG 77.   12/15/2021: Chol 101. HDL 43. LDL 43. TG 74.   On rosuvastatin 10 mg Q24 and ezetimibe 10 mg Q24.   Very favorable lipid panel.    5. Diabetes Mellitus, Type 2   2019: Diagnosed. Complications: JERICA & PAD. Medications: Oral agent.   On metformin.   12/15/2021: HgbA1C 8.0%.    6. Overweight   9/9/2019: Weight 88 kg. BMI 31.   10/14/2021: Weight 85 kg. BMI 28.    7. History of Severe Acute Respiratory Syndrome - Corona Virus 2 Infection   9/2021: Mild course. Fully vaccinated. Received antibody.    8. Primary Care   Dr. Lino Steele.    F/u 6 months.    Raquel Elise,  M.D.

## 2022-05-19 DIAGNOSIS — E11.9 TYPE 2 DIABETES MELLITUS WITHOUT COMPLICATION, UNSPECIFIED WHETHER LONG TERM INSULIN USE: ICD-10-CM

## 2022-05-24 ENCOUNTER — PATIENT MESSAGE (OUTPATIENT)
Dept: ADMINISTRATIVE | Facility: HOSPITAL | Age: 67
End: 2022-05-24
Payer: MEDICARE

## 2022-05-30 ENCOUNTER — PATIENT MESSAGE (OUTPATIENT)
Dept: ADMINISTRATIVE | Facility: HOSPITAL | Age: 67
End: 2022-05-30
Payer: MEDICARE

## 2022-06-09 DIAGNOSIS — E78.00 ELEVATED CHOLESTEROL: ICD-10-CM

## 2022-06-09 RX ORDER — ROSUVASTATIN CALCIUM 10 MG/1
10 TABLET, COATED ORAL DAILY
Qty: 90 TABLET | Refills: 3 | Status: SHIPPED | OUTPATIENT
Start: 2022-06-09 | End: 2023-04-04

## 2022-06-30 ENCOUNTER — OFFICE VISIT (OUTPATIENT)
Dept: INTERNAL MEDICINE | Facility: CLINIC | Age: 67
End: 2022-06-30
Attending: INTERNAL MEDICINE
Payer: MEDICARE

## 2022-06-30 VITALS
HEIGHT: 68 IN | HEART RATE: 80 BPM | BODY MASS INDEX: 28.04 KG/M2 | DIASTOLIC BLOOD PRESSURE: 65 MMHG | WEIGHT: 185 LBS | OXYGEN SATURATION: 98 % | SYSTOLIC BLOOD PRESSURE: 110 MMHG

## 2022-06-30 DIAGNOSIS — E03.9 HYPOTHYROIDISM, UNSPECIFIED TYPE: ICD-10-CM

## 2022-06-30 DIAGNOSIS — E55.9 VITAMIN D DEFICIENCY: ICD-10-CM

## 2022-06-30 DIAGNOSIS — E78.00 HIGH CHOLESTEROL: Primary | ICD-10-CM

## 2022-06-30 DIAGNOSIS — D51.0 PERNICIOUS ANEMIA: ICD-10-CM

## 2022-06-30 DIAGNOSIS — M79.605 LEFT LEG PAIN: ICD-10-CM

## 2022-06-30 DIAGNOSIS — E11.51 TYPE 2 DIABETES MELLITUS WITH DIABETIC PERIPHERAL ANGIOPATHY WITHOUT GANGRENE, WITHOUT LONG-TERM CURRENT USE OF INSULIN: Primary | ICD-10-CM

## 2022-06-30 DIAGNOSIS — Z13.31 SCREENING FOR DEPRESSION: ICD-10-CM

## 2022-06-30 DIAGNOSIS — E11.51 TYPE 2 DIABETES MELLITUS WITH DIABETIC PERIPHERAL ANGIOPATHY WITHOUT GANGRENE, WITHOUT LONG-TERM CURRENT USE OF INSULIN: ICD-10-CM

## 2022-06-30 DIAGNOSIS — Z12.5 SCREENING FOR PROSTATE CANCER: ICD-10-CM

## 2022-06-30 DIAGNOSIS — D50.8 OTHER IRON DEFICIENCY ANEMIA: ICD-10-CM

## 2022-06-30 DIAGNOSIS — I65.23 BILATERAL CAROTID ARTERY STENOSIS: ICD-10-CM

## 2022-06-30 DIAGNOSIS — I10 ESSENTIAL HYPERTENSION: ICD-10-CM

## 2022-06-30 DIAGNOSIS — Z13.39 SCREENING FOR ALCOHOLISM: ICD-10-CM

## 2022-06-30 DIAGNOSIS — R79.89 OTHER SPECIFIED ABNORMAL FINDINGS OF BLOOD CHEMISTRY: ICD-10-CM

## 2022-06-30 DIAGNOSIS — E78.9 DISORDER OF LIPID METABOLISM: ICD-10-CM

## 2022-06-30 PROCEDURE — 3074F PR MOST RECENT SYSTOLIC BLOOD PRESSURE < 130 MM HG: ICD-10-PCS | Mod: CPTII,S$GLB,, | Performed by: INTERNAL MEDICINE

## 2022-06-30 PROCEDURE — 3008F PR BODY MASS INDEX (BMI) DOCUMENTED: ICD-10-PCS | Mod: CPTII,S$GLB,, | Performed by: INTERNAL MEDICINE

## 2022-06-30 PROCEDURE — G0442 PR  ALCOHOL SCREENING: ICD-10-PCS | Mod: 59,S$GLB,, | Performed by: INTERNAL MEDICINE

## 2022-06-30 PROCEDURE — 4010F ACE/ARB THERAPY RXD/TAKEN: CPT | Mod: CPTII,S$GLB,, | Performed by: INTERNAL MEDICINE

## 2022-06-30 PROCEDURE — 1159F MED LIST DOCD IN RCRD: CPT | Mod: CPTII,S$GLB,, | Performed by: INTERNAL MEDICINE

## 2022-06-30 PROCEDURE — G0444 PR DEPRESSION SCREENING: ICD-10-PCS | Mod: 59,S$GLB,, | Performed by: INTERNAL MEDICINE

## 2022-06-30 PROCEDURE — 1160F PR REVIEW ALL MEDS BY PRESCRIBER/CLIN PHARMACIST DOCUMENTED: ICD-10-PCS | Mod: CPTII,S$GLB,, | Performed by: INTERNAL MEDICINE

## 2022-06-30 PROCEDURE — G0444 DEPRESSION SCREEN ANNUAL: HCPCS | Mod: 59,S$GLB,, | Performed by: INTERNAL MEDICINE

## 2022-06-30 PROCEDURE — 1160F RVW MEDS BY RX/DR IN RCRD: CPT | Mod: CPTII,S$GLB,, | Performed by: INTERNAL MEDICINE

## 2022-06-30 PROCEDURE — 3074F SYST BP LT 130 MM HG: CPT | Mod: CPTII,S$GLB,, | Performed by: INTERNAL MEDICINE

## 2022-06-30 PROCEDURE — G0442 ANNUAL ALCOHOL SCREEN 15 MIN: HCPCS | Mod: 59,S$GLB,, | Performed by: INTERNAL MEDICINE

## 2022-06-30 PROCEDURE — 3008F BODY MASS INDEX DOCD: CPT | Mod: CPTII,S$GLB,, | Performed by: INTERNAL MEDICINE

## 2022-06-30 PROCEDURE — 3052F HG A1C>EQUAL 8.0%<EQUAL 9.0%: CPT | Mod: CPTII,S$GLB,, | Performed by: INTERNAL MEDICINE

## 2022-06-30 PROCEDURE — 99214 OFFICE O/P EST MOD 30 MIN: CPT | Mod: S$GLB,,, | Performed by: INTERNAL MEDICINE

## 2022-06-30 PROCEDURE — 3078F PR MOST RECENT DIASTOLIC BLOOD PRESSURE < 80 MM HG: ICD-10-PCS | Mod: CPTII,S$GLB,, | Performed by: INTERNAL MEDICINE

## 2022-06-30 PROCEDURE — 99214 PR OFFICE/OUTPT VISIT, EST, LEVL IV, 30-39 MIN: ICD-10-PCS | Mod: S$GLB,,, | Performed by: INTERNAL MEDICINE

## 2022-06-30 PROCEDURE — 1159F PR MEDICATION LIST DOCUMENTED IN MEDICAL RECORD: ICD-10-PCS | Mod: CPTII,S$GLB,, | Performed by: INTERNAL MEDICINE

## 2022-06-30 PROCEDURE — 4010F PR ACE/ARB THEARPY RXD/TAKEN: ICD-10-PCS | Mod: CPTII,S$GLB,, | Performed by: INTERNAL MEDICINE

## 2022-06-30 PROCEDURE — 3078F DIAST BP <80 MM HG: CPT | Mod: CPTII,S$GLB,, | Performed by: INTERNAL MEDICINE

## 2022-06-30 PROCEDURE — 3052F PR MOST RECENT HEMOGLOBIN A1C LEVEL 8.0 - < 9.0%: ICD-10-PCS | Mod: CPTII,S$GLB,, | Performed by: INTERNAL MEDICINE

## 2022-06-30 NOTE — PROGRESS NOTES
Subjective:       Patient ID: Gael Pedroza is a 67 y.o. male.    Chief Complaint: Follow-up and Leg Pain (Pain behind knee)    He has been climbing up and down a ladder recently.  He has some pain below his left knee on the lateral side.  After looking at a computer screen for 2 hours his eyes water significantly.    Follow-up    Leg Pain     Diabetes  He presents for his follow-up diabetic visit. He has type 2 diabetes mellitus. His disease course has been stable.   Hypertension  This is a chronic problem. The current episode started more than 1 year ago. The problem is controlled.     Review of Systems   Constitutional: Negative.    Eyes: Positive for visual disturbance.   Respiratory: Negative.    Cardiovascular: Negative.    Musculoskeletal: Positive for leg pain.         Objective:      Physical Exam  Vitals and nursing note reviewed.   Constitutional:       Appearance: He is well-developed.   HENT:      Head: Normocephalic and atraumatic.   Eyes:      Pupils: Pupils are equal, round, and reactive to light.   Neck:      Vascular: Carotid bruit present.   Cardiovascular:      Rate and Rhythm: Normal rate and regular rhythm.      Heart sounds: Normal heart sounds.   Pulmonary:      Effort: Pulmonary effort is normal.   Musculoskeletal:        Legs:    Neurological:      Mental Status: He is alert.         Assessment:       Problem List Items Addressed This Visit        Unprioritized    Type 2 diabetes mellitus with circulatory disorder, without long-term current use of insulin    High cholesterol - Primary    Essential hypertension    Bilateral carotid artery stenosis      Other Visit Diagnoses     Left leg pain        Screening for depression        Screening for alcoholism              Plan:       Per orders and D/C instructions.  Continue diet and/or meds for DM, HTN, and high cholesterol, which are stable.     follow-up with cardiology for bilateral carotid artery stenosis.  His left leg pain is  consistent with tendinitis.  Check routine labs.    Screening: The patient was screened for depression with the PHQ2 questionnaire and possible health consequences were discussed with the patient, who understands (15 minutes spent).       The patient was screened for the misuse of alcohol, by asking the number of drinks per average week, and if pt has had more than 4 drinks (more than 3 for women and elderly) in 1 day within the past year. The health and legal consequences of misuse were discussed (15 minutes spent).

## 2022-06-30 NOTE — PATIENT INSTRUCTIONS
See your ophthalmologist Dr. Elder as soon as possible.                                                                        Tips for Good Diabetes Care - 2022    The ABCs    A: Hemoglobin A1C at least every 6 months with a goal < 7.0  (Every 3 months if diabetes not controlled)    B: Blood pressure every visit with a goal < 130/80 mm Hg    C. Cholesterol profile at least yearly with a goal LDL <100    D. Measure GFR - Glomerular Filtration Rate (a measure of kidney function done by blood draw) every 6 months. Some meds may need to be adjusted or eliminated when GFR < 60 ml/min.    E. Dilated eye exam done by an Ophthalmologist at least every 2 years    F. Have a comprehensive foot exam at least every year    1. Exercise - Exercise aerobically with a target heart rate of (220-age) x 0.8  Exercise 30-45 minutes on most days of the week    2. Diet and Supplements- All supplements can be obtained through a varied, healthy diet   Calcium: 1,000 - 1,500 mg each day   8 oz milk or Calcium fortified O.J. = 300 mg,  1oz of cheese = 100-200 mg  Vitamin D: 1,000 iu each day- Can probably be obtained by 30 min. of direct sunlight each day       3 oz. Hqshdu=984 iu,  3 oz. Tuna =150 iu, Milk or fortified O.J. = 120 iu  Fish oil: 1-2 grams each day or about 840 mg of EPA and DHA (Omega-3 fatty acids) each day       3 oz. [place]=2 grams,  3 oz. Tuna=1.3 grams,  3 oz. drained light Tuna= 0.25 grams  Fat intake: Not to exceed 30% of total daily calories    3. Lifestyle - Alcohol: 1 drink = 12 oz. domestic beer, 5 oz. wine, or 1 oz. hard liquor             Males: </= 14 drinks per week with no more than 4 in any one day             Females: </= 7 drinks per week with no more than 3 in any one day  Salt: 1.5-3 grams of Sodium each day.  Tobacco: Dont smoke, or quit smoking (discuss with your doctor)  Depression: If you feel depressed discuss with your doctor  Weight: Maintain a healthy body weight. Stay within 10% of:     Males:  106 lbs. + 6 lbs per inch height above 5 feet                Females: 100 lbs + 5 lbs per inch height above 5 feet    4. Immunizations - Influenza vaccine every year in the fall  Pneumonia vaccine after diagnosis (Pneumovax-23), at age 65 (Prevnar-13), and at age 66 (Pneumovax-23).       For more information from the American Diabetes Association about diabetes and diet go to www.diabetes.org.

## 2022-07-01 ENCOUNTER — LAB VISIT (OUTPATIENT)
Dept: LAB | Facility: HOSPITAL | Age: 67
End: 2022-07-01
Attending: INTERNAL MEDICINE
Payer: MEDICARE

## 2022-07-01 DIAGNOSIS — E11.51 TYPE 2 DIABETES MELLITUS WITH DIABETIC PERIPHERAL ANGIOPATHY WITHOUT GANGRENE, WITHOUT LONG-TERM CURRENT USE OF INSULIN: ICD-10-CM

## 2022-07-01 LAB
ALBUMIN/CREAT UR: 5.2 UG/MG (ref 0–30)
CREAT UR-MCNC: 172 MG/DL (ref 23–375)
MICROALBUMIN UR DL<=1MG/L-MCNC: 9 UG/ML

## 2022-07-01 PROCEDURE — 82570 ASSAY OF URINE CREATININE: CPT | Performed by: INTERNAL MEDICINE

## 2022-07-01 PROCEDURE — 82043 UR ALBUMIN QUANTITATIVE: CPT | Performed by: INTERNAL MEDICINE

## 2022-07-01 RX ORDER — DAPAGLIFLOZIN AND METFORMIN HYDROCHLORIDE 5; 1000 MG/1; MG/1
2 TABLET, FILM COATED, EXTENDED RELEASE ORAL DAILY
Qty: 60 TABLET | Refills: 5 | Status: SHIPPED | OUTPATIENT
Start: 2022-07-01 | End: 2022-08-03 | Stop reason: SDUPTHER

## 2022-07-26 DIAGNOSIS — Z12.11 COLON CANCER SCREENING: Primary | ICD-10-CM

## 2022-08-03 DIAGNOSIS — E11.51 TYPE 2 DIABETES MELLITUS WITH DIABETIC PERIPHERAL ANGIOPATHY WITHOUT GANGRENE, WITHOUT LONG-TERM CURRENT USE OF INSULIN: Primary | ICD-10-CM

## 2022-08-09 RX ORDER — DAPAGLIFLOZIN AND METFORMIN HYDROCHLORIDE 5; 1000 MG/1; MG/1
2 TABLET, FILM COATED, EXTENDED RELEASE ORAL DAILY
Qty: 180 TABLET | Refills: 2 | Status: SHIPPED | OUTPATIENT
Start: 2022-08-09 | End: 2022-12-02

## 2022-08-16 LAB — NONINV COLON CA DNA+OCC BLD SCRN STL QL: NEGATIVE

## 2022-10-18 ENCOUNTER — OFFICE VISIT (OUTPATIENT)
Dept: CARDIOLOGY | Facility: CLINIC | Age: 67
End: 2022-10-18
Attending: INTERNAL MEDICINE
Payer: MEDICARE

## 2022-10-18 VITALS
SYSTOLIC BLOOD PRESSURE: 125 MMHG | DIASTOLIC BLOOD PRESSURE: 70 MMHG | WEIGHT: 173.75 LBS | HEART RATE: 90 BPM | BODY MASS INDEX: 26.33 KG/M2 | OXYGEN SATURATION: 95 % | HEIGHT: 68 IN

## 2022-10-18 DIAGNOSIS — I10 ESSENTIAL HYPERTENSION: ICD-10-CM

## 2022-10-18 DIAGNOSIS — E66.3 OVERWEIGHT: ICD-10-CM

## 2022-10-18 DIAGNOSIS — E78.00 ELEVATED CHOLESTEROL: ICD-10-CM

## 2022-10-18 DIAGNOSIS — E11.51 TYPE 2 DIABETES MELLITUS WITH DIABETIC PERIPHERAL ANGIOPATHY WITHOUT GANGRENE, WITHOUT LONG-TERM CURRENT USE OF INSULIN: ICD-10-CM

## 2022-10-18 DIAGNOSIS — Z86.16 HISTORY OF SEVERE ACUTE RESPIRATORY SYNDROME CORONAVIRUS 2 (SARS-COV-2) DISEASE: ICD-10-CM

## 2022-10-18 DIAGNOSIS — I77.1 STENOSIS OF LEFT SUBCLAVIAN ARTERY: ICD-10-CM

## 2022-10-18 DIAGNOSIS — I65.23 BILATERAL CAROTID ARTERY STENOSIS: ICD-10-CM

## 2022-10-18 DIAGNOSIS — E78.00 HIGH CHOLESTEROL: ICD-10-CM

## 2022-10-18 PROCEDURE — 3074F SYST BP LT 130 MM HG: CPT | Mod: CPTII,S$GLB,, | Performed by: INTERNAL MEDICINE

## 2022-10-18 PROCEDURE — 3061F NEG MICROALBUMINURIA REV: CPT | Mod: CPTII,S$GLB,, | Performed by: INTERNAL MEDICINE

## 2022-10-18 PROCEDURE — 1160F PR REVIEW ALL MEDS BY PRESCRIBER/CLIN PHARMACIST DOCUMENTED: ICD-10-PCS | Mod: CPTII,S$GLB,, | Performed by: INTERNAL MEDICINE

## 2022-10-18 PROCEDURE — 99214 PR OFFICE/OUTPT VISIT, EST, LEVL IV, 30-39 MIN: ICD-10-PCS | Mod: S$GLB,,, | Performed by: INTERNAL MEDICINE

## 2022-10-18 PROCEDURE — 3046F HEMOGLOBIN A1C LEVEL >9.0%: CPT | Mod: CPTII,S$GLB,, | Performed by: INTERNAL MEDICINE

## 2022-10-18 PROCEDURE — 3061F PR NEG MICROALBUMINURIA RESULT DOCUMENTED/REVIEW: ICD-10-PCS | Mod: CPTII,S$GLB,, | Performed by: INTERNAL MEDICINE

## 2022-10-18 PROCEDURE — 3046F PR MOST RECENT HEMOGLOBIN A1C LEVEL > 9.0%: ICD-10-PCS | Mod: CPTII,S$GLB,, | Performed by: INTERNAL MEDICINE

## 2022-10-18 PROCEDURE — 99214 OFFICE O/P EST MOD 30 MIN: CPT | Mod: S$GLB,,, | Performed by: INTERNAL MEDICINE

## 2022-10-18 PROCEDURE — 1101F PT FALLS ASSESS-DOCD LE1/YR: CPT | Mod: CPTII,S$GLB,, | Performed by: INTERNAL MEDICINE

## 2022-10-18 PROCEDURE — 3078F DIAST BP <80 MM HG: CPT | Mod: CPTII,S$GLB,, | Performed by: INTERNAL MEDICINE

## 2022-10-18 PROCEDURE — 1101F PR PT FALLS ASSESS DOC 0-1 FALLS W/OUT INJ PAST YR: ICD-10-PCS | Mod: CPTII,S$GLB,, | Performed by: INTERNAL MEDICINE

## 2022-10-18 PROCEDURE — 1159F PR MEDICATION LIST DOCUMENTED IN MEDICAL RECORD: ICD-10-PCS | Mod: CPTII,S$GLB,, | Performed by: INTERNAL MEDICINE

## 2022-10-18 PROCEDURE — 1159F MED LIST DOCD IN RCRD: CPT | Mod: CPTII,S$GLB,, | Performed by: INTERNAL MEDICINE

## 2022-10-18 PROCEDURE — 99999 PR PBB SHADOW E&M-EST. PATIENT-LVL III: CPT | Mod: PBBFAC,,, | Performed by: INTERNAL MEDICINE

## 2022-10-18 PROCEDURE — 3288F FALL RISK ASSESSMENT DOCD: CPT | Mod: CPTII,S$GLB,, | Performed by: INTERNAL MEDICINE

## 2022-10-18 PROCEDURE — 3078F PR MOST RECENT DIASTOLIC BLOOD PRESSURE < 80 MM HG: ICD-10-PCS | Mod: CPTII,S$GLB,, | Performed by: INTERNAL MEDICINE

## 2022-10-18 PROCEDURE — 3066F NEPHROPATHY DOC TX: CPT | Mod: CPTII,S$GLB,, | Performed by: INTERNAL MEDICINE

## 2022-10-18 PROCEDURE — 3066F PR DOCUMENTATION OF TREATMENT FOR NEPHROPATHY: ICD-10-PCS | Mod: CPTII,S$GLB,, | Performed by: INTERNAL MEDICINE

## 2022-10-18 PROCEDURE — 3288F PR FALLS RISK ASSESSMENT DOCUMENTED: ICD-10-PCS | Mod: CPTII,S$GLB,, | Performed by: INTERNAL MEDICINE

## 2022-10-18 PROCEDURE — 3074F PR MOST RECENT SYSTOLIC BLOOD PRESSURE < 130 MM HG: ICD-10-PCS | Mod: CPTII,S$GLB,, | Performed by: INTERNAL MEDICINE

## 2022-10-18 PROCEDURE — 4010F PR ACE/ARB THEARPY RXD/TAKEN: ICD-10-PCS | Mod: CPTII,S$GLB,, | Performed by: INTERNAL MEDICINE

## 2022-10-18 PROCEDURE — 99999 PR PBB SHADOW E&M-EST. PATIENT-LVL III: ICD-10-PCS | Mod: PBBFAC,,, | Performed by: INTERNAL MEDICINE

## 2022-10-18 PROCEDURE — 1160F RVW MEDS BY RX/DR IN RCRD: CPT | Mod: CPTII,S$GLB,, | Performed by: INTERNAL MEDICINE

## 2022-10-18 PROCEDURE — 4010F ACE/ARB THERAPY RXD/TAKEN: CPT | Mod: CPTII,S$GLB,, | Performed by: INTERNAL MEDICINE

## 2022-10-18 NOTE — PROGRESS NOTES
Subjective:     Gael Pedroza is a 67 y.o. male with hypertension, hypercholesterolemia and diabetes mellitus type 2. He is mildly obese. He has known carotid disease. A carotid duplex study on 4/17/2018 revealed the right internal carotid artery to have a velocity of 2.4 m/s. He had a magnetic resonance angiographic imaging study that confirmed a 70% stenosis. On 6/27/2008 he had a four vessel cerebral angiogram at [facility] that revealed the right internal carotid artery to have a 40% stenosis. The left subclavian had a 30% lesion. He was concerned about the different findings on the tests. On 10/7/2019 he had a carotid duplex which revealed the right internal carotid artery to have severe plaquing with a peak velocity of 2.0 m/s correlating with a stenosis in the 60-70% range. The left internal carotid artery had moderate plaquing with a velocity of 1.1 m/s suggesting a less than 50% narrowing. On 10/6/2020 he had a carotid duplex that revealed the right internal carotid artery to have moderate plaquing with a peak velocity of 1.5 m/s correlating with a stenosis in the 50-60% range. The left internal carotid artery had moderate plaquing with a velocity of 1.1 m/s correlating with a <50% stenosis. In 9/2021 he had a mild case of COVID-19 being fully vaccinated. No exertional chest pain or exertional dyspnea. No palpitations or weak spells. No bleeding. No issues with any of his prescribed medications. Feeling well overall.       Hypertension  This is a chronic problem. The current episode started more than 1 year ago. The problem is unchanged. The problem is controlled (usually 120-130/70-80 mmHg at home). Pertinent negatives include no anxiety, blurred vision, chest pain, headaches, malaise/fatigue, neck pain, orthopnea, palpitations, peripheral edema, PND, shortness of breath or sweats. There is no history of chronic renal disease.   Hyperlipidemia  This is a chronic problem. The current episode  started more than 1 year ago. The problem is controlled. Recent lipid tests were reviewed and are normal. Exacerbating diseases include diabetes and obesity. He has no history of chronic renal disease, hypothyroidism, liver disease or nephrotic syndrome. Pertinent negatives include no chest pain, focal sensory loss, focal weakness, leg pain, myalgias or shortness of breath.     Review of Systems   Constitutional: Negative for chills, fever and malaise/fatigue.   HENT:  Negative for nosebleeds.    Eyes:  Negative for blurred vision, double vision, photophobia, vision loss in left eye and vision loss in right eye.   Cardiovascular:  Negative for chest pain, claudication, dyspnea on exertion, irregular heartbeat, leg swelling, near-syncope, orthopnea, palpitations, paroxysmal nocturnal dyspnea and syncope.   Respiratory:  Negative for cough, hemoptysis, shortness of breath and wheezing.    Endocrine: Negative for cold intolerance and heat intolerance.   Hematologic/Lymphatic: Negative for bleeding problem. Does not bruise/bleed easily.   Skin:  Negative for color change and rash.   Musculoskeletal:  Positive for back pain. Negative for falls, muscle weakness, myalgias and neck pain.   Gastrointestinal:  Negative for heartburn, hematemesis, hematochezia, hemorrhoids, jaundice, melena, nausea and vomiting.   Genitourinary:  Negative for dysuria and hematuria.   Neurological:  Negative for dizziness, focal weakness, headaches, light-headedness, loss of balance, numbness, tremors, vertigo and weakness.   Psychiatric/Behavioral:  Negative for altered mental status, depression and memory loss. The patient is not nervous/anxious.    Allergic/Immunologic: Negative for hives and persistent infections.       Current Outpatient Medications on File Prior to Visit   Medication Sig Dispense Refill    ascorbic acid, vitamin C, (VITAMIN C) 500 MG tablet Take 500 mg by mouth once daily.      aspirin (ECOTRIN) 81 MG EC tablet Take 1  "tablet (81 mg total) by mouth once daily. 90 tablet 3    cholecalciferol, vitamin D3, 1,000 unit capsule Take 1,000 Units by mouth once daily.      dapagliflozin-metformin (XIGDUO XR) 5-1,000 mg TBph Take 2 tablets by mouth once daily. 180 tablet 2    ezetimibe (ZETIA) 10 mg tablet Take 1 tablet (10 mg total) by mouth once daily. 90 tablet 3    multivitamin with minerals tablet Take 1 tablet by mouth once daily.      nebivoloL (BYSTOLIC) 5 MG Tab Take 1 tablet (5 mg total) by mouth once daily. 90 tablet 3    rosuvastatin (CRESTOR) 10 MG tablet Take 1 tablet (10 mg total) by mouth once daily. 90 tablet 3    valsartan (DIOVAN) 160 MG tablet Take 1 tablet (160 mg total) by mouth once daily. 90 tablet 3    EXELDERM 1 % external solution       hydrOXYzine HCL (ATARAX) 25 MG tablet Take 1 tablet (25 mg total) by mouth 3 (three) times daily as needed for Itching. (Patient not taking: Reported on 10/18/2022) 30 tablet 1     No current facility-administered medications on file prior to visit.       /70 Comment: average at home  Pulse 90   Ht 5' 8" (1.727 m)   Wt 78.8 kg (173 lb 11.6 oz)   SpO2 95%   BMI 26.41 kg/m²       Objective:     Physical Exam  Constitutional:       General: He is not in acute distress.     Appearance: Normal appearance. He is well-developed. He is not toxic-appearing or diaphoretic.   HENT:      Head: Normocephalic and atraumatic.      Nose: Nose normal.   Eyes:      General:         Right eye: No discharge.         Left eye: No discharge.      Conjunctiva/sclera:      Right eye: Right conjunctiva is not injected.      Left eye: Left conjunctiva is not injected.      Pupils: Pupils are equal.      Right eye: Pupil is round.      Left eye: Pupil is round.   Neck:      Thyroid: No thyromegaly.      Vascular: Carotid bruit (soft bruit on the right side) present. No JVD.      Comments: Bruit over left subclavian artery.   Cardiovascular:      Rate and Rhythm: Normal rate and regular rhythm. No " extrasystoles are present.     Chest Wall: PMI is not displaced.      Pulses:           Radial pulses are 2+ on the right side and 2+ on the left side.        Femoral pulses are 2+ on the right side and 2+ on the left side.       Dorsalis pedis pulses are 2+ on the right side and 2+ on the left side.        Posterior tibial pulses are 2+ on the right side and 2+ on the left side.      Heart sounds: S1 normal and S2 normal.     No gallop.   Pulmonary:      Effort: Pulmonary effort is normal.      Breath sounds: Normal breath sounds.   Abdominal:      Palpations: Abdomen is soft.      Tenderness: There is no abdominal tenderness.   Musculoskeletal:      Cervical back: Neck supple.      Right ankle: No swelling, deformity or ecchymosis.      Left ankle: No swelling, deformity or ecchymosis.   Lymphadenopathy:      Head:      Right side of head: No submandibular adenopathy.      Left side of head: No submandibular adenopathy.      Cervical: No cervical adenopathy.   Skin:     General: Skin is warm and dry.      Findings: No rash.   Neurological:      General: No focal deficit present.      Mental Status: He is alert and oriented to person, place, and time. He is not disoriented.      Cranial Nerves: No cranial nerve deficit.   Psychiatric:         Attention and Perception: Attention and perception normal.         Mood and Affect: Mood and affect normal.         Speech: Speech normal.         Behavior: Behavior normal.         Thought Content: Thought content normal.         Cognition and Memory: Cognition and memory normal.         Judgment: Judgment normal.       Assessment:     1. Bilateral carotid artery stenosis    2. Stenosis of left subclavian artery    3. Essential hypertension    4. High cholesterol    5. Type 2 diabetes mellitus with diabetic peripheral angiopathy without gangrene, without long-term current use of insulin    6. Overweight    7. History of severe acute respiratory syndrome coronavirus 2  (SARS-CoV-2) disease        Plan:     1. Carotid Artery Stenosis   4/17/2018: Carotid Duplex: SUZANNE: 2.4 m/s - 70-80%. LICA: 1.3 m/s - <50%.   6/1/2018: MRA: SUZANNE: About 70%.   6/27/2018: [facility]: AA, 4V: SUZANNE: 40%. LICA: Mild. Left Subclavian: 30%.   10/7/2019: Carotid Duplex: SUZANNE: Severe plaquing - 2.0 m/s - 60-70%. LICA: Moderate plaquing - 1.1 m/s - <50%.   10/6/2020: Carotid Duplex: SUZANNE: Moderate plaquing - 1.5 m/s - 50-60%. LICA: Moderate plaquing - 1.1 m/s - <50%.   10/1/2021: Carotid Duplex: SUZANNE: Severe plaquing - 1.9 m/s - 60-70%. LICA: Severe plaquing - 1.1 m/s - <50%.    On aspirin 81 mg Q24.   10/2022: Plan next Carotid Duplex. Then yearly.    2. Subclavian Artery Disease   6/27/2018: [facility]: AA, 4V: Left Subclavian: 30%.   Asymptomatic.    3. Hypertension   2012: Diagnosed.   On nebivolol 5 mg Q24 and valsartan 160 mg Q24.   Keeping log at home.   Well controlled.    4. Hypercholesterolemia   2012: Began statin.   Felt he had muscle and joint pain on rosuvastatin 20 mg Q24.   On rosuvastatin 10 mg Q24.   6/11/2019: Chol 131. HDL 54. LDL 59. TG 93.   12/11/2019: Chol 151. HDL 52. LDL 82. TG 87.   6/30/2020: Chol 149. HDL 52. LDL 80. TG 85.   On rosuvastatin 10 mg Q24.   10/13/2020: Ezetimibe 10 mg Q24 was begun in addition to rosuvastatin 10 mg Q24 with quite favorable lipid panel but JERICA.    On rosuvastatin 10 mg Q24 and ezetimibe 10 mg Q24.   12/18/2020: Chol 115. HDL 51. LDL 49. TG 77.   12/15/2021: Chol 101. HDL 43. LDL 43. TG 74.   7/1/2022: Chol 106. HDL 49. LDL 45. TG 58.   On rosuvastatin 10 mg Q24 and ezetimibe 10 mg Q24.   Very favorable lipid panel.    5. Diabetes Mellitus, Type 2   2019: Diagnosed. Complications: JERICA & PAD. Medications: Oral agent.   On dapagliflozin 5 mg/metformin 1,000 mg 2 tabs Q24.   12/15/2021: HgbA1C 8.0%.   7/1/2022: HbgA1C 9.1%.    6. Overweight   9/9/2019: Weight 88 kg. BMI 31.   10/14/2021: Weight 85 kg. BMI 28.   10/18/2022: Weight 79 kg. BMI 26.    7. History of  Severe Acute Respiratory Syndrome - Corona Virus 2 Infection   9/2021: Mild course. Fully vaccinated. Received antibody.    8. Primary Care   Dr. Lino Steele.    F/u 6 months.    Raquel Elise M.D.        10/20/2022 5:35 PM, Addendum:    10/20/2022: Carotid Duplex: SUZANNE: Moderate plaquing - 1.5 m/s - 50-60%. LICA: Moderate plaquing - 1.1 m/s - <50%.    I discussed the above test result and the implications of the findings over the phone.    Raquel Elise M.D.

## 2022-10-20 ENCOUNTER — HOSPITAL ENCOUNTER (OUTPATIENT)
Dept: CARDIOLOGY | Facility: OTHER | Age: 67
Discharge: HOME | End: 2022-10-20
Attending: INTERNAL MEDICINE
Payer: MEDICARE

## 2022-10-20 VITALS
HEIGHT: 68 IN | WEIGHT: 173 LBS | BODY MASS INDEX: 26.22 KG/M2 | SYSTOLIC BLOOD PRESSURE: 127 MMHG | DIASTOLIC BLOOD PRESSURE: 61 MMHG

## 2022-10-20 DIAGNOSIS — I65.23 BILATERAL CAROTID ARTERY STENOSIS: ICD-10-CM

## 2022-10-20 DIAGNOSIS — I77.1 STENOSIS OF LEFT SUBCLAVIAN ARTERY: ICD-10-CM

## 2022-10-20 LAB
LEFT ARM DIASTOLIC BLOOD PRESSURE: 63 MMHG
LEFT ARM SYSTOLIC BLOOD PRESSURE: 125 MMHG
LEFT CBA DIAS: 20 CM/S
LEFT CBA SYS: 103 CM/S
LEFT CCA DIST DIAS: 24 CM/S
LEFT CCA DIST SYS: 101 CM/S
LEFT CCA MID DIAS: 21 CM/S
LEFT CCA MID SYS: 121 CM/S
LEFT CCA PROX DIAS: 18 CM/S
LEFT CCA PROX SYS: 95 CM/S
LEFT ECA DIAS: 21 CM/S
LEFT ECA SYS: 200 CM/S
LEFT ICA DIST DIAS: 22 CM/S
LEFT ICA DIST SYS: 74 CM/S
LEFT ICA MID DIAS: 24 CM/S
LEFT ICA MID SYS: 113 CM/S
LEFT ICA PROX DIAS: 22 CM/S
LEFT ICA PROX SYS: 114 CM/S
LEFT VERTEBRAL DIAS: 13 CM/S
LEFT VERTEBRAL SYS: 45 CM/S
OHS CV CAROTID RIGHT ICA EDV HIGHEST: 42
OHS CV CAROTID ULTRASOUND LEFT ICA/CCA RATIO: 1.13
OHS CV CAROTID ULTRASOUND RIGHT ICA/CCA RATIO: 1.93
OHS CV PV CAROTID LEFT HIGHEST CCA: 121
OHS CV PV CAROTID LEFT HIGHEST ICA: 114
OHS CV PV CAROTID RIGHT HIGHEST CCA: 112
OHS CV PV CAROTID RIGHT HIGHEST ICA: 145
OHS CV US CAROTID LEFT HIGHEST EDV: 24
RIGHT ARM DIASTOLIC BLOOD PRESSURE: 61 MMHG
RIGHT ARM SYSTOLIC BLOOD PRESSURE: 127 MMHG
RIGHT CBA DIAS: 12 CM/S
RIGHT CBA SYS: 69 CM/S
RIGHT CCA DIST DIAS: 11 CM/S
RIGHT CCA DIST SYS: 75 CM/S
RIGHT CCA MID DIAS: 16 CM/S
RIGHT CCA MID SYS: 100 CM/S
RIGHT CCA PROX DIAS: 18 CM/S
RIGHT CCA PROX SYS: 112 CM/S
RIGHT ECA DIAS: 21 CM/S
RIGHT ECA SYS: 228 CM/S
RIGHT ICA DIST DIAS: 25 CM/S
RIGHT ICA DIST SYS: 100 CM/S
RIGHT ICA MID DIAS: 42 CM/S
RIGHT ICA MID SYS: 145 CM/S
RIGHT ICA PROX DIAS: 19 CM/S
RIGHT ICA PROX SYS: 89 CM/S
RIGHT VERTEBRAL DIAS: 11 CM/S
RIGHT VERTEBRAL SYS: 54 CM/S

## 2022-10-20 PROCEDURE — 93880 CV US DOPPLER CAROTID (CUPID ONLY): ICD-10-PCS | Mod: 26,,, | Performed by: INTERNAL MEDICINE

## 2022-10-20 PROCEDURE — 93880 EXTRACRANIAL BILAT STUDY: CPT | Mod: 26,,, | Performed by: INTERNAL MEDICINE

## 2022-10-20 PROCEDURE — 93880 EXTRACRANIAL BILAT STUDY: CPT

## 2022-10-23 ENCOUNTER — PATIENT MESSAGE (OUTPATIENT)
Dept: CARDIOLOGY | Facility: CLINIC | Age: 67
End: 2022-10-23
Payer: MEDICARE

## 2023-01-03 ENCOUNTER — PES CALL (OUTPATIENT)
Dept: ADMINISTRATIVE | Facility: OTHER | Age: 68
End: 2023-01-03
Payer: MEDICARE

## 2023-01-06 ENCOUNTER — OFFICE VISIT (OUTPATIENT)
Dept: INTERNAL MEDICINE | Facility: CLINIC | Age: 68
End: 2023-01-06
Attending: INTERNAL MEDICINE
Payer: MEDICARE

## 2023-01-06 VITALS
WEIGHT: 169 LBS | HEIGHT: 68 IN | SYSTOLIC BLOOD PRESSURE: 118 MMHG | HEART RATE: 87 BPM | DIASTOLIC BLOOD PRESSURE: 62 MMHG | OXYGEN SATURATION: 96 % | BODY MASS INDEX: 25.61 KG/M2

## 2023-01-06 DIAGNOSIS — E78.9 DISORDER OF LIPID METABOLISM: Primary | ICD-10-CM

## 2023-01-06 DIAGNOSIS — D50.8 OTHER IRON DEFICIENCY ANEMIA: ICD-10-CM

## 2023-01-06 DIAGNOSIS — D51.0 PERNICIOUS ANEMIA: ICD-10-CM

## 2023-01-06 DIAGNOSIS — Z12.5 SCREENING FOR PROSTATE CANCER: ICD-10-CM

## 2023-01-06 DIAGNOSIS — I10 ESSENTIAL HYPERTENSION: ICD-10-CM

## 2023-01-06 DIAGNOSIS — E11.51 TYPE 2 DIABETES MELLITUS WITH DIABETIC PERIPHERAL ANGIOPATHY WITHOUT GANGRENE, WITHOUT LONG-TERM CURRENT USE OF INSULIN: ICD-10-CM

## 2023-01-06 DIAGNOSIS — E55.9 VITAMIN D DEFICIENCY: ICD-10-CM

## 2023-01-06 DIAGNOSIS — E78.00 HIGH CHOLESTEROL: Primary | ICD-10-CM

## 2023-01-06 DIAGNOSIS — Z13.31 SCREENING FOR DEPRESSION: ICD-10-CM

## 2023-01-06 DIAGNOSIS — Z13.39 SCREENING FOR ALCOHOLISM: ICD-10-CM

## 2023-01-06 DIAGNOSIS — R79.89 OTHER SPECIFIED ABNORMAL FINDINGS OF BLOOD CHEMISTRY: ICD-10-CM

## 2023-01-06 PROCEDURE — 3074F PR MOST RECENT SYSTOLIC BLOOD PRESSURE < 130 MM HG: ICD-10-PCS | Mod: CPTII,S$GLB,, | Performed by: INTERNAL MEDICINE

## 2023-01-06 PROCEDURE — 99499 UNLISTED E&M SERVICE: CPT | Mod: S$GLB,,, | Performed by: INTERNAL MEDICINE

## 2023-01-06 PROCEDURE — 1160F PR REVIEW ALL MEDS BY PRESCRIBER/CLIN PHARMACIST DOCUMENTED: ICD-10-PCS | Mod: CPTII,S$GLB,, | Performed by: INTERNAL MEDICINE

## 2023-01-06 PROCEDURE — 1160F RVW MEDS BY RX/DR IN RCRD: CPT | Mod: CPTII,S$GLB,, | Performed by: INTERNAL MEDICINE

## 2023-01-06 PROCEDURE — 3078F PR MOST RECENT DIASTOLIC BLOOD PRESSURE < 80 MM HG: ICD-10-PCS | Mod: CPTII,S$GLB,, | Performed by: INTERNAL MEDICINE

## 2023-01-06 PROCEDURE — 99499 RISK ADDL DX/OHS AUDIT: ICD-10-PCS | Mod: S$GLB,,, | Performed by: INTERNAL MEDICINE

## 2023-01-06 PROCEDURE — 3074F SYST BP LT 130 MM HG: CPT | Mod: CPTII,S$GLB,, | Performed by: INTERNAL MEDICINE

## 2023-01-06 PROCEDURE — G0444 DEPRESSION SCREEN ANNUAL: HCPCS | Mod: 59,S$GLB,, | Performed by: INTERNAL MEDICINE

## 2023-01-06 PROCEDURE — 1159F PR MEDICATION LIST DOCUMENTED IN MEDICAL RECORD: ICD-10-PCS | Mod: CPTII,S$GLB,, | Performed by: INTERNAL MEDICINE

## 2023-01-06 PROCEDURE — 3008F BODY MASS INDEX DOCD: CPT | Mod: CPTII,S$GLB,, | Performed by: INTERNAL MEDICINE

## 2023-01-06 PROCEDURE — 3078F DIAST BP <80 MM HG: CPT | Mod: CPTII,S$GLB,, | Performed by: INTERNAL MEDICINE

## 2023-01-06 PROCEDURE — G0444 PR DEPRESSION SCREENING: ICD-10-PCS | Mod: 59,S$GLB,, | Performed by: INTERNAL MEDICINE

## 2023-01-06 PROCEDURE — 3008F PR BODY MASS INDEX (BMI) DOCUMENTED: ICD-10-PCS | Mod: CPTII,S$GLB,, | Performed by: INTERNAL MEDICINE

## 2023-01-06 PROCEDURE — G0442 ANNUAL ALCOHOL SCREEN 15 MIN: HCPCS | Mod: 59,S$GLB,, | Performed by: INTERNAL MEDICINE

## 2023-01-06 PROCEDURE — 99214 OFFICE O/P EST MOD 30 MIN: CPT | Mod: 25,S$GLB,, | Performed by: INTERNAL MEDICINE

## 2023-01-06 PROCEDURE — 99214 PR OFFICE/OUTPT VISIT, EST, LEVL IV, 30-39 MIN: ICD-10-PCS | Mod: 25,S$GLB,, | Performed by: INTERNAL MEDICINE

## 2023-01-06 PROCEDURE — G0442 PR  ALCOHOL SCREENING: ICD-10-PCS | Mod: 59,S$GLB,, | Performed by: INTERNAL MEDICINE

## 2023-01-06 PROCEDURE — 1159F MED LIST DOCD IN RCRD: CPT | Mod: CPTII,S$GLB,, | Performed by: INTERNAL MEDICINE

## 2023-01-06 NOTE — PATIENT INSTRUCTIONS
Tips for Good Diabetes Care - 2023    The ABCs    A: Hemoglobin A1C at least every 6 months with a goal < 7.0  (Every 3 months if diabetes not controlled)    B: Blood pressure every visit with a goal < 130/80 mm Hg    C. Cholesterol profile at least yearly with a goal LDL <100    D. Measure GFR - Glomerular Filtration Rate (a measure of kidney function done by blood draw) every 6 months. Some meds may need to be adjusted or eliminated when GFR < 60 ml/min.    E. Dilated eye exam done by an Ophthalmologist at least every 2 years    F. Have a comprehensive foot exam at least every year    1. Exercise - Exercise aerobically with a target heart rate of (220-age) x 0.8  Exercise 30-45 minutes on most days of the week    2. Diet and Supplements- All supplements can be obtained through a varied, healthy diet   Calcium: 1,000 - 1,500 mg each day   8 oz milk = 300 mg,  1oz of cheese = 100-200 mg  Vitamin D: 1,000 iu each day- Can probably be obtained by 30 min. of direct sunlight each day       3 oz. Nnggtt=272 iu,  3 oz. Tuna =150 iu, Milk = 120 iu  Fish oil: 1-2 grams each day or about 840 mg of EPA and DHA (Omega-3 fatty acids) each day       3 oz. [place]=2 grams,  3 oz. Tuna=1.3 grams,  3 oz. drained light Tuna= 0.25 grams  Fat intake: Not to exceed 30% of total daily calories    3. Lifestyle - Alcohol: 1 drink = 12 oz. domestic beer, 5 oz. wine, or 1 oz. hard liquor             Males: </= 14 drinks per week with no more than 4 in any one day             Females: </= 7 drinks per week with no more than 3 in any one day  Salt: 1.5-3 grams of Sodium each day.  Tobacco: Dont smoke, or quit smoking (discuss with your doctor)  Depression: If you feel depressed discuss with your doctor  Weight: Maintain a healthy body weight. Stay within 10% of:     Males: 106 lbs. + 6 lbs per inch height above 5 feet                Females: 100 lbs + 5 lbs per inch height  above 5 feet    4. Immunizations - Influenza vaccine every year in the fall  Pneumonia vaccine after diagnosis (Prevnar-20), and at age 65 (Prevnar-20)      For more information from the American Diabetes Association about diabetes and diet go to www.diabetes.org.                                                                                   r

## 2023-01-06 NOTE — PROGRESS NOTES
Subjective:       Patient ID: Gael Pedroza is a 67 y.o. male.    Chief Complaint: Follow-up (Would like A1C checked ) and Hyperlipidemia    He has lost 16 lb in last 6 months.  His recent blood sugar has been from 100-110 fasting.  He was taking Glucoguard, which has chromium and cinnamon in it and says his sugars were lower.    Follow-up    Hyperlipidemia    Diabetes  He presents for his follow-up diabetic visit. He has type 2 diabetes mellitus. His disease course has been stable.   Hypertension  This is a chronic problem. The current episode started more than 1 year ago. The problem is controlled.   Review of Systems   Constitutional: Negative.    Respiratory: Negative.     Cardiovascular: Negative.        Objective:      Physical Exam  Vitals and nursing note reviewed.   Constitutional:       Appearance: He is well-developed.   HENT:      Head: Normocephalic and atraumatic.   Eyes:      Pupils: Pupils are equal, round, and reactive to light.   Cardiovascular:      Rate and Rhythm: Normal rate and regular rhythm.      Heart sounds: Normal heart sounds.   Pulmonary:      Effort: Pulmonary effort is normal.   Neurological:      Mental Status: He is alert.       Assessment:       Problem List Items Addressed This Visit          Unprioritized    High cholesterol - Primary    Type 2 diabetes mellitus with circulatory disorder, without long-term current use of insulin    Essential hypertension     Other Visit Diagnoses       Screening for alcoholism        Screening for depression                  Plan:       Per orders and D/C instructions.  Continue diet and/or meds for DM, HTN, and high cholesterol, which are stable.    Check routine labs.    Screening: The patient was screened for depression with the PHQ2 questionnaire and possible health consequences were discussed with the patient, who understands (15 minutes spent).       The patient was screened for the misuse of alcohol, by asking the number of drinks per  average week, and if pt has had more than 4 drinks (more than 3 for women and elderly) in 1 day within the past year. The health and legal consequences of misuse were discussed (15 minutes spent).

## 2023-01-10 ENCOUNTER — LAB VISIT (OUTPATIENT)
Dept: LAB | Facility: HOSPITAL | Age: 68
End: 2023-01-10
Attending: INTERNAL MEDICINE
Payer: MEDICARE

## 2023-01-10 DIAGNOSIS — D50.8 OTHER IRON DEFICIENCY ANEMIA: ICD-10-CM

## 2023-01-10 DIAGNOSIS — E78.9 DISORDER OF LIPID METABOLISM: ICD-10-CM

## 2023-01-10 DIAGNOSIS — E55.9 VITAMIN D DEFICIENCY: ICD-10-CM

## 2023-01-10 DIAGNOSIS — R79.89 OTHER SPECIFIED ABNORMAL FINDINGS OF BLOOD CHEMISTRY: ICD-10-CM

## 2023-01-10 LAB
25(OH)D3+25(OH)D2 SERPL-MCNC: 58 NG/ML (ref 30–96)
ALBUMIN SERPL BCP-MCNC: 3.8 G/DL (ref 3.5–5.2)
ALP SERPL-CCNC: 40 U/L (ref 55–135)
ALT SERPL W/O P-5'-P-CCNC: 21 U/L (ref 10–44)
ANION GAP SERPL CALC-SCNC: 10 MMOL/L (ref 8–16)
AST SERPL-CCNC: 23 U/L (ref 10–40)
BASOPHILS # BLD AUTO: 0.05 K/UL (ref 0–0.2)
BASOPHILS NFR BLD: 0.9 % (ref 0–1.9)
BILIRUB SERPL-MCNC: 0.6 MG/DL (ref 0.1–1)
BUN SERPL-MCNC: 12 MG/DL (ref 8–23)
CALCIUM SERPL-MCNC: 9.2 MG/DL (ref 8.7–10.5)
CHLORIDE SERPL-SCNC: 103 MMOL/L (ref 95–110)
CHOLEST SERPL-MCNC: 80 MG/DL (ref 120–199)
CHOLEST/HDLC SERPL: 1.9 {RATIO} (ref 2–5)
CO2 SERPL-SCNC: 26 MMOL/L (ref 23–29)
CREAT SERPL-MCNC: 0.8 MG/DL (ref 0.5–1.4)
DIFFERENTIAL METHOD: ABNORMAL
EOSINOPHIL # BLD AUTO: 0.1 K/UL (ref 0–0.5)
EOSINOPHIL NFR BLD: 2.1 % (ref 0–8)
ERYTHROCYTE [DISTWIDTH] IN BLOOD BY AUTOMATED COUNT: 13.6 % (ref 11.5–14.5)
EST. GFR  (NO RACE VARIABLE): >60 ML/MIN/1.73 M^2
ESTIMATED AVG GLUCOSE: 128 MG/DL (ref 68–131)
GLUCOSE SERPL-MCNC: 101 MG/DL (ref 70–110)
HBA1C MFR BLD: 6.1 % (ref 4–5.6)
HCT VFR BLD AUTO: 44.3 % (ref 40–54)
HDLC SERPL-MCNC: 42 MG/DL (ref 40–75)
HDLC SERPL: 52.5 % (ref 20–50)
HGB BLD-MCNC: 14.6 G/DL (ref 14–18)
IMM GRANULOCYTES # BLD AUTO: 0.02 K/UL (ref 0–0.04)
IMM GRANULOCYTES NFR BLD AUTO: 0.4 % (ref 0–0.5)
LDLC SERPL CALC-MCNC: 30.4 MG/DL (ref 63–159)
LYMPHOCYTES # BLD AUTO: 1.4 K/UL (ref 1–4.8)
LYMPHOCYTES NFR BLD: 24.1 % (ref 18–48)
MCH RBC QN AUTO: 31.6 PG (ref 27–31)
MCHC RBC AUTO-ENTMCNC: 33 G/DL (ref 32–36)
MCV RBC AUTO: 96 FL (ref 82–98)
MONOCYTES # BLD AUTO: 0.5 K/UL (ref 0.3–1)
MONOCYTES NFR BLD: 8.5 % (ref 4–15)
NEUTROPHILS # BLD AUTO: 3.6 K/UL (ref 1.8–7.7)
NEUTROPHILS NFR BLD: 64 % (ref 38–73)
NONHDLC SERPL-MCNC: 38 MG/DL
NRBC BLD-RTO: 0 /100 WBC
PLATELET # BLD AUTO: 208 K/UL (ref 150–450)
PMV BLD AUTO: 10.1 FL (ref 9.2–12.9)
POTASSIUM SERPL-SCNC: 4.2 MMOL/L (ref 3.5–5.1)
PROT SERPL-MCNC: 6.6 G/DL (ref 6–8.4)
RBC # BLD AUTO: 4.62 M/UL (ref 4.6–6.2)
SODIUM SERPL-SCNC: 139 MMOL/L (ref 136–145)
TRIGL SERPL-MCNC: 38 MG/DL (ref 30–150)
WBC # BLD AUTO: 5.65 K/UL (ref 3.9–12.7)

## 2023-01-10 PROCEDURE — 82306 VITAMIN D 25 HYDROXY: CPT | Performed by: INTERNAL MEDICINE

## 2023-01-10 PROCEDURE — 80061 LIPID PANEL: CPT | Performed by: INTERNAL MEDICINE

## 2023-01-10 PROCEDURE — 80053 COMPREHEN METABOLIC PANEL: CPT | Performed by: INTERNAL MEDICINE

## 2023-01-10 PROCEDURE — 85025 COMPLETE CBC W/AUTO DIFF WBC: CPT | Performed by: INTERNAL MEDICINE

## 2023-01-10 PROCEDURE — 36415 COLL VENOUS BLD VENIPUNCTURE: CPT | Performed by: INTERNAL MEDICINE

## 2023-01-10 PROCEDURE — 83036 HEMOGLOBIN GLYCOSYLATED A1C: CPT | Mod: 91 | Performed by: INTERNAL MEDICINE

## 2023-03-30 ENCOUNTER — PATIENT MESSAGE (OUTPATIENT)
Dept: INTERNAL MEDICINE | Facility: CLINIC | Age: 68
End: 2023-03-30
Payer: MEDICARE

## 2023-04-18 ENCOUNTER — OFFICE VISIT (OUTPATIENT)
Dept: CARDIOLOGY | Facility: CLINIC | Age: 68
End: 2023-04-18
Attending: INTERNAL MEDICINE
Payer: MEDICARE

## 2023-04-18 VITALS
DIASTOLIC BLOOD PRESSURE: 55 MMHG | SYSTOLIC BLOOD PRESSURE: 115 MMHG | WEIGHT: 166.25 LBS | HEIGHT: 68 IN | OXYGEN SATURATION: 96 % | BODY MASS INDEX: 25.2 KG/M2 | HEART RATE: 64 BPM

## 2023-04-18 DIAGNOSIS — I65.23 BILATERAL CAROTID ARTERY STENOSIS: ICD-10-CM

## 2023-04-18 DIAGNOSIS — E66.3 OVERWEIGHT: ICD-10-CM

## 2023-04-18 DIAGNOSIS — I10 ESSENTIAL HYPERTENSION: ICD-10-CM

## 2023-04-18 DIAGNOSIS — E11.51 TYPE 2 DIABETES MELLITUS WITH DIABETIC PERIPHERAL ANGIOPATHY WITHOUT GANGRENE, WITHOUT LONG-TERM CURRENT USE OF INSULIN: ICD-10-CM

## 2023-04-18 DIAGNOSIS — I77.1 STENOSIS OF LEFT SUBCLAVIAN ARTERY: ICD-10-CM

## 2023-04-18 DIAGNOSIS — E78.00 HIGH CHOLESTEROL: ICD-10-CM

## 2023-04-18 PROCEDURE — 4010F ACE/ARB THERAPY RXD/TAKEN: CPT | Mod: CPTII,S$GLB,, | Performed by: INTERNAL MEDICINE

## 2023-04-18 PROCEDURE — 1159F PR MEDICATION LIST DOCUMENTED IN MEDICAL RECORD: ICD-10-PCS | Mod: CPTII,S$GLB,, | Performed by: INTERNAL MEDICINE

## 2023-04-18 PROCEDURE — 99214 OFFICE O/P EST MOD 30 MIN: CPT | Mod: 25,S$GLB,, | Performed by: INTERNAL MEDICINE

## 2023-04-18 PROCEDURE — 93005 ELECTROCARDIOGRAM TRACING: CPT

## 2023-04-18 PROCEDURE — 1101F PR PT FALLS ASSESS DOC 0-1 FALLS W/OUT INJ PAST YR: ICD-10-PCS | Mod: CPTII,S$GLB,, | Performed by: INTERNAL MEDICINE

## 2023-04-18 PROCEDURE — 3074F PR MOST RECENT SYSTOLIC BLOOD PRESSURE < 130 MM HG: ICD-10-PCS | Mod: CPTII,S$GLB,, | Performed by: INTERNAL MEDICINE

## 2023-04-18 PROCEDURE — 3078F DIAST BP <80 MM HG: CPT | Mod: CPTII,S$GLB,, | Performed by: INTERNAL MEDICINE

## 2023-04-18 PROCEDURE — 99214 PR OFFICE/OUTPT VISIT, EST, LEVL IV, 30-39 MIN: ICD-10-PCS | Mod: 25,S$GLB,, | Performed by: INTERNAL MEDICINE

## 2023-04-18 PROCEDURE — 3008F PR BODY MASS INDEX (BMI) DOCUMENTED: ICD-10-PCS | Mod: CPTII,S$GLB,, | Performed by: INTERNAL MEDICINE

## 2023-04-18 PROCEDURE — 3008F BODY MASS INDEX DOCD: CPT | Mod: CPTII,S$GLB,, | Performed by: INTERNAL MEDICINE

## 2023-04-18 PROCEDURE — 3044F PR MOST RECENT HEMOGLOBIN A1C LEVEL <7.0%: ICD-10-PCS | Mod: CPTII,S$GLB,, | Performed by: INTERNAL MEDICINE

## 2023-04-18 PROCEDURE — 93000 ELECTROCARDIOGRAM COMPLETE: CPT | Mod: S$GLB,,, | Performed by: INTERNAL MEDICINE

## 2023-04-18 PROCEDURE — 1159F MED LIST DOCD IN RCRD: CPT | Mod: CPTII,S$GLB,, | Performed by: INTERNAL MEDICINE

## 2023-04-18 PROCEDURE — 1126F AMNT PAIN NOTED NONE PRSNT: CPT | Mod: CPTII,S$GLB,, | Performed by: INTERNAL MEDICINE

## 2023-04-18 PROCEDURE — 1126F PR PAIN SEVERITY QUANTIFIED, NO PAIN PRESENT: ICD-10-PCS | Mod: CPTII,S$GLB,, | Performed by: INTERNAL MEDICINE

## 2023-04-18 PROCEDURE — 93000 PR ELECTROCARDIOGRAM, COMPLETE: ICD-10-PCS | Mod: S$GLB,,, | Performed by: INTERNAL MEDICINE

## 2023-04-18 PROCEDURE — 3044F HG A1C LEVEL LT 7.0%: CPT | Mod: CPTII,S$GLB,, | Performed by: INTERNAL MEDICINE

## 2023-04-18 PROCEDURE — 99999 PR PBB SHADOW E&M-EST. PATIENT-LVL III: ICD-10-PCS | Mod: PBBFAC,,, | Performed by: INTERNAL MEDICINE

## 2023-04-18 PROCEDURE — 3288F FALL RISK ASSESSMENT DOCD: CPT | Mod: CPTII,S$GLB,, | Performed by: INTERNAL MEDICINE

## 2023-04-18 PROCEDURE — 99999 PR PBB SHADOW E&M-EST. PATIENT-LVL III: CPT | Mod: PBBFAC,,, | Performed by: INTERNAL MEDICINE

## 2023-04-18 PROCEDURE — 4010F PR ACE/ARB THEARPY RXD/TAKEN: ICD-10-PCS | Mod: CPTII,S$GLB,, | Performed by: INTERNAL MEDICINE

## 2023-04-18 PROCEDURE — 3078F PR MOST RECENT DIASTOLIC BLOOD PRESSURE < 80 MM HG: ICD-10-PCS | Mod: CPTII,S$GLB,, | Performed by: INTERNAL MEDICINE

## 2023-04-18 PROCEDURE — 1101F PT FALLS ASSESS-DOCD LE1/YR: CPT | Mod: CPTII,S$GLB,, | Performed by: INTERNAL MEDICINE

## 2023-04-18 PROCEDURE — 3288F PR FALLS RISK ASSESSMENT DOCUMENTED: ICD-10-PCS | Mod: CPTII,S$GLB,, | Performed by: INTERNAL MEDICINE

## 2023-04-18 PROCEDURE — 3074F SYST BP LT 130 MM HG: CPT | Mod: CPTII,S$GLB,, | Performed by: INTERNAL MEDICINE

## 2023-04-18 NOTE — PROGRESS NOTES
Subjective:     Geal Pedroza is a 68 y.o. male with hypertension, hypercholesterolemia and diabetes mellitus type 2. He is mildly obese. He has known carotid disease. A carotid duplex study on 4/17/2018 revealed the right internal carotid artery to have a velocity of 2.4 m/s. He had a magnetic resonance angiographic imaging study that confirmed a 70% stenosis. On 6/27/2008 he had a four vessel cerebral angiogram at [facility] that revealed the right internal carotid artery to have a 40% stenosis. The left subclavian had a 30% lesion. He was concerned about the different findings on the tests. On 10/7/2019 he had a carotid duplex which revealed the right internal carotid artery to have severe plaquing with a peak velocity of 2.0 m/s correlating with a stenosis in the 60-70% range. The left internal carotid artery had moderate plaquing with a velocity of 1.1 m/s suggesting a less than 50% narrowing. On 10/6/2020 he had a carotid duplex that revealed the right internal carotid artery to have moderate plaquing with a peak velocity of 1.5 m/s correlating with a stenosis in the 50-60% range. The left internal carotid artery had moderate plaquing with a velocity of 1.1 m/s correlating with a <50% stenosis. In 9/2021 he had a mild case of COVID-19 being fully vaccinated. Santa arthritis. No exertional chest pain or exertional dyspnea. No palpitations or weak spells. No bleeding. No issues with any of his prescribed medications. Feeling well overall.       Hypertension  This is a chronic problem. The current episode started more than 1 year ago. The problem is unchanged. The problem is controlled (usually 120-130/70-80 mmHg at home). Pertinent negatives include no anxiety, blurred vision, chest pain, headaches, malaise/fatigue, neck pain, orthopnea, palpitations, peripheral edema, PND, shortness of breath or sweats. There is no history of chronic renal disease.   Hyperlipidemia  This is a chronic problem. The  current episode started more than 1 year ago. The problem is controlled. Recent lipid tests were reviewed and are normal. Exacerbating diseases include diabetes and obesity. He has no history of chronic renal disease, hypothyroidism, liver disease or nephrotic syndrome. Pertinent negatives include no chest pain, focal sensory loss, focal weakness, leg pain, myalgias or shortness of breath.     Review of Systems   Constitutional: Negative for chills, fever and malaise/fatigue.   HENT:  Negative for nosebleeds.    Eyes:  Negative for blurred vision, double vision, photophobia, vision loss in left eye and vision loss in right eye.   Cardiovascular:  Negative for chest pain, claudication, dyspnea on exertion, irregular heartbeat, leg swelling, near-syncope, orthopnea, palpitations, paroxysmal nocturnal dyspnea and syncope.   Respiratory:  Negative for cough, hemoptysis, shortness of breath and wheezing.    Endocrine: Negative for cold intolerance and heat intolerance.   Hematologic/Lymphatic: Negative for bleeding problem. Does not bruise/bleed easily.   Skin:  Negative for color change and rash.   Musculoskeletal:  Positive for arthritis, back pain and joint pain. Negative for falls, muscle weakness, myalgias and neck pain.   Gastrointestinal:  Negative for heartburn, hematemesis, hematochezia, hemorrhoids, jaundice, melena, nausea and vomiting.   Genitourinary:  Negative for dysuria and hematuria.   Neurological:  Negative for dizziness, focal weakness, headaches, light-headedness, loss of balance, numbness, tremors, vertigo and weakness.   Psychiatric/Behavioral:  Negative for altered mental status, depression and memory loss. The patient is not nervous/anxious.    Allergic/Immunologic: Negative for hives and persistent infections.       Current Outpatient Medications on File Prior to Visit   Medication Sig Dispense Refill    ascorbic acid, vitamin C, (VITAMIN C) 500 MG tablet Take 500 mg by mouth once daily.       "aspirin (ECOTRIN) 81 MG EC tablet Take 1 tablet (81 mg total) by mouth once daily. 90 tablet 3    cholecalciferol, vitamin D3, 1,000 unit capsule Take 1,000 Units by mouth once daily.      ezetimibe (ZETIA) 10 mg tablet Take 1 tablet (10 mg total) by mouth once daily. 90 tablet 3    multivitamin with minerals tablet Take 1 tablet by mouth once daily.      nebivoloL (BYSTOLIC) 5 MG Tab Take 1 tablet (5 mg total) by mouth once daily. 90 tablet 3    rosuvastatin (CRESTOR) 10 MG tablet TAKE 1 TABLET BY MOUTH ONCE DAILY 90 tablet 3    valsartan (DIOVAN) 160 MG tablet Take 1 tablet (160 mg total) by mouth once daily. 90 tablet 3    XIGDUO XR 5-1,000 mg TAKE 2 TABLETS BY MOUTH EVERY  tablet 1    EXELDERM 1 % external solution        No current facility-administered medications on file prior to visit.       BP (!) 115/55 (BP Location: Right arm, Patient Position: Sitting, BP Method: Medium (Manual))   Pulse 64   Ht 5' 8" (1.727 m)   Wt 75.4 kg (166 lb 3.6 oz)   SpO2 96%   BMI 25.27 kg/m²       Objective:     Physical Exam  Constitutional:       General: He is not in acute distress.     Appearance: Normal appearance. He is well-developed. He is not toxic-appearing or diaphoretic.   HENT:      Head: Normocephalic and atraumatic.      Nose: Nose normal.   Eyes:      General:         Right eye: No discharge.         Left eye: No discharge.      Conjunctiva/sclera:      Right eye: Right conjunctiva is not injected.      Left eye: Left conjunctiva is not injected.      Pupils: Pupils are equal.      Right eye: Pupil is round.      Left eye: Pupil is round.   Neck:      Thyroid: No thyromegaly.      Vascular: Carotid bruit (soft bruit on the right side) present. No JVD.      Comments: Bruit over left subclavian artery.   Cardiovascular:      Rate and Rhythm: Normal rate and regular rhythm. No extrasystoles are present.     Chest Wall: PMI is not displaced.      Pulses:           Radial pulses are 2+ on the right side " and 2+ on the left side.        Femoral pulses are 2+ on the right side and 2+ on the left side.       Dorsalis pedis pulses are 2+ on the right side and 2+ on the left side.        Posterior tibial pulses are 2+ on the right side and 2+ on the left side.      Heart sounds: S1 normal and S2 normal.     No gallop.   Pulmonary:      Effort: Pulmonary effort is normal.      Breath sounds: Normal breath sounds.   Abdominal:      Palpations: Abdomen is soft.      Tenderness: There is no abdominal tenderness.   Musculoskeletal:      Cervical back: Neck supple.      Right ankle: No swelling, deformity or ecchymosis.      Left ankle: No swelling, deformity or ecchymosis.   Lymphadenopathy:      Head:      Right side of head: No submandibular adenopathy.      Left side of head: No submandibular adenopathy.      Cervical: No cervical adenopathy.   Skin:     General: Skin is warm and dry.      Findings: No rash.   Neurological:      General: No focal deficit present.      Mental Status: He is alert and oriented to person, place, and time. He is not disoriented.      Cranial Nerves: No cranial nerve deficit.   Psychiatric:         Attention and Perception: Attention and perception normal.         Mood and Affect: Mood and affect normal.         Speech: Speech normal.         Behavior: Behavior normal.         Thought Content: Thought content normal.         Cognition and Memory: Cognition and memory normal.         Judgment: Judgment normal.       Assessment:     1. Bilateral carotid artery stenosis    2. Stenosis of left subclavian artery    3. Essential hypertension    4. High cholesterol    5. Type 2 diabetes mellitus with diabetic peripheral angiopathy without gangrene, without long-term current use of insulin    6. Overweight        Plan:     1. Carotid Artery Stenosis   4/17/2018: Carotid Duplex: SUZANNE: 2.4 m/s - 70-80%. LICA: 1.3 m/s - <50%.   6/1/2018: MRA: SUZANNE: About 70%.   6/27/2018: [facility]: AA, 4V: SUZANNE: 40%. LICA:  Mild. Left Subclavian: 30%.   10/7/2019: Carotid Duplex: SUZANNE: Severe plaquing - 2.0 m/s - 60-70%. LICA: Moderate plaquing - 1.1 m/s - <50%.   10/6/2020: Carotid Duplex: SUZANNE: Moderate plaquing - 1.5 m/s - 50-60%. LICA: Moderate plaquing - 1.1 m/s - <50%.   10/1/2021: Carotid Duplex: SUZANNE: Severe plaquing - 1.9 m/s - 60-70%. LICA: Severe plaquing - 1.1 m/s - <50%.   10/20/2022: Carotid Duplex: SUZANNE: Moderate plaquing - 1.5 m/s - 50-60%. LICA: Moderate plaquing - 1.1 m/s - <50%.    On aspirin 81 mg Q24.   10/2023: Plan next Carotid Duplex. Then yearly.    2. Subclavian Artery Disease   6/27/2018: [facility]: AA, 4V: Left Subclavian: 30%.   Asymptomatic.    3. Hypertension   2012: Diagnosed.   On nebivolol 5 mg Q24 and valsartan 160 mg Q24.   Keeping log at home.   Well controlled.    4. Hypercholesterolemia   2012: Began statin.   Felt he had muscle and joint pain on rosuvastatin 20 mg Q24.   On rosuvastatin 10 mg Q24.   6/11/2019: Chol 131. HDL 54. LDL 59. TG 93.   12/11/2019: Chol 151. HDL 52. LDL 82. TG 87.   6/30/2020: Chol 149. HDL 52. LDL 80. TG 85.   On rosuvastatin 10 mg Q24.   10/13/2020: Ezetimibe 10 mg Q24 was begun in addition to rosuvastatin 10 mg Q24 with quite favorable lipid panel but JERICA.    On rosuvastatin 10 mg Q24 and ezetimibe 10 mg Q24.   12/18/2020: Chol 115. HDL 51. LDL 49. TG 77.   12/15/2021: Chol 101. HDL 43. LDL 43. TG 74.   7/1/2022: Chol 106. HDL 49. LDL 45. TG 58.   1/10/2023: Chol 80. HDL 42. LDL 30. TG 38.   On rosuvastatin 10 mg Q24 and ezetimibe 10 mg Q24.   Very favorable lipid panel.    5. Diabetes Mellitus, Type 2   2019: Diagnosed. Complications: JERICA & PAD. Medications: Oral agent.   On dapagliflozin 5 mg/metformin 1,000 mg 2 tabs Q24.   12/15/2021: HgbA1C 8.0%.   7/1/2022: HbgA1C 9.1%.   1/10/2023: HgbA1C 6.0%.    6. Overweight   9/9/2019: Weight 88 kg. BMI 31.   10/14/2021: Weight 85 kg. BMI 28.   10/18/2022: Weight 79 kg. BMI 26.    7. History of Severe Acute Respiratory Syndrome  - Corona Virus 2 Infection   9/2021: Mild course. Fully vaccinated. Received antibody.    8. Primary Care   Dr. Lino Steele.    F/u 6 months.    Raquel Elise M.D.

## 2023-04-19 ENCOUNTER — PATIENT MESSAGE (OUTPATIENT)
Dept: CARDIOLOGY | Facility: CLINIC | Age: 68
End: 2023-04-19
Payer: MEDICARE

## 2023-05-08 DIAGNOSIS — I10 ESSENTIAL HYPERTENSION: ICD-10-CM

## 2023-05-08 RX ORDER — NEBIVOLOL 5 MG/1
TABLET ORAL
Qty: 90 TABLET | Refills: 3 | Status: SHIPPED | OUTPATIENT
Start: 2023-05-08 | End: 2024-02-12 | Stop reason: SDUPTHER

## 2023-05-19 DIAGNOSIS — G89.29 CHRONIC LOW BACK PAIN WITHOUT SCIATICA, UNSPECIFIED BACK PAIN LATERALITY: Primary | ICD-10-CM

## 2023-05-19 DIAGNOSIS — M54.50 CHRONIC LOW BACK PAIN WITHOUT SCIATICA, UNSPECIFIED BACK PAIN LATERALITY: Primary | ICD-10-CM

## 2023-05-19 RX ORDER — TAMSULOSIN HYDROCHLORIDE 0.4 MG/1
0.4 CAPSULE ORAL DAILY
Qty: 30 CAPSULE | Refills: 3 | Status: SHIPPED | OUTPATIENT
Start: 2023-05-19 | End: 2023-10-27 | Stop reason: SDUPTHER

## 2023-05-19 RX ORDER — CYCLOBENZAPRINE HCL 10 MG
TABLET ORAL
Qty: 20 TABLET | Refills: 0 | Status: SHIPPED | OUTPATIENT
Start: 2023-05-19

## 2023-06-05 ENCOUNTER — OFFICE VISIT (OUTPATIENT)
Dept: INTERNAL MEDICINE | Facility: CLINIC | Age: 68
End: 2023-06-05
Attending: INTERNAL MEDICINE
Payer: MEDICARE

## 2023-06-05 VITALS
HEART RATE: 70 BPM | HEIGHT: 68 IN | SYSTOLIC BLOOD PRESSURE: 112 MMHG | DIASTOLIC BLOOD PRESSURE: 64 MMHG | WEIGHT: 165 LBS | OXYGEN SATURATION: 97 % | BODY MASS INDEX: 25.01 KG/M2

## 2023-06-05 DIAGNOSIS — D51.0 PERNICIOUS ANEMIA: ICD-10-CM

## 2023-06-05 DIAGNOSIS — R79.89 OTHER SPECIFIED ABNORMAL FINDINGS OF BLOOD CHEMISTRY: ICD-10-CM

## 2023-06-05 DIAGNOSIS — E55.9 VITAMIN D DEFICIENCY: ICD-10-CM

## 2023-06-05 DIAGNOSIS — E11.51 TYPE 2 DIABETES MELLITUS WITH DIABETIC PERIPHERAL ANGIOPATHY WITHOUT GANGRENE, WITHOUT LONG-TERM CURRENT USE OF INSULIN: ICD-10-CM

## 2023-06-05 DIAGNOSIS — D50.8 OTHER IRON DEFICIENCY ANEMIA: ICD-10-CM

## 2023-06-05 DIAGNOSIS — Z12.5 SCREENING FOR PROSTATE CANCER: ICD-10-CM

## 2023-06-05 DIAGNOSIS — E11.51 TYPE 2 DIABETES MELLITUS WITH DIABETIC PERIPHERAL ANGIOPATHY WITHOUT GANGRENE, WITHOUT LONG-TERM CURRENT USE OF INSULIN: Primary | ICD-10-CM

## 2023-06-05 DIAGNOSIS — I10 ESSENTIAL HYPERTENSION: ICD-10-CM

## 2023-06-05 DIAGNOSIS — R35.0 BENIGN PROSTATIC HYPERPLASIA WITH URINARY FREQUENCY: ICD-10-CM

## 2023-06-05 DIAGNOSIS — N40.1 BENIGN PROSTATIC HYPERPLASIA WITH URINARY FREQUENCY: ICD-10-CM

## 2023-06-05 DIAGNOSIS — E78.00 HIGH CHOLESTEROL: Primary | ICD-10-CM

## 2023-06-05 DIAGNOSIS — E78.9 DISORDER OF LIPID METABOLISM: ICD-10-CM

## 2023-06-05 DIAGNOSIS — E03.9 HYPOTHYROIDISM, UNSPECIFIED TYPE: ICD-10-CM

## 2023-06-05 PROCEDURE — 90677 PCV20 VACCINE IM: CPT | Mod: S$GLB,,, | Performed by: INTERNAL MEDICINE

## 2023-06-05 PROCEDURE — 3008F BODY MASS INDEX DOCD: CPT | Mod: CPTII,S$GLB,, | Performed by: INTERNAL MEDICINE

## 2023-06-05 PROCEDURE — 4010F ACE/ARB THERAPY RXD/TAKEN: CPT | Mod: CPTII,S$GLB,, | Performed by: INTERNAL MEDICINE

## 2023-06-05 PROCEDURE — 3074F SYST BP LT 130 MM HG: CPT | Mod: CPTII,S$GLB,, | Performed by: INTERNAL MEDICINE

## 2023-06-05 PROCEDURE — 3078F PR MOST RECENT DIASTOLIC BLOOD PRESSURE < 80 MM HG: ICD-10-PCS | Mod: CPTII,S$GLB,, | Performed by: INTERNAL MEDICINE

## 2023-06-05 PROCEDURE — 3008F PR BODY MASS INDEX (BMI) DOCUMENTED: ICD-10-PCS | Mod: CPTII,S$GLB,, | Performed by: INTERNAL MEDICINE

## 2023-06-05 PROCEDURE — 1160F RVW MEDS BY RX/DR IN RCRD: CPT | Mod: CPTII,S$GLB,, | Performed by: INTERNAL MEDICINE

## 2023-06-05 PROCEDURE — 90471 TDAP VACCINE GREATER THAN OR EQUAL TO 7YO IM: ICD-10-PCS | Mod: S$GLB,,, | Performed by: INTERNAL MEDICINE

## 2023-06-05 PROCEDURE — 3078F DIAST BP <80 MM HG: CPT | Mod: CPTII,S$GLB,, | Performed by: INTERNAL MEDICINE

## 2023-06-05 PROCEDURE — G0009 PNEUMOCOCCAL CONJUGATE VACCINE 20-VALENT: ICD-10-PCS | Mod: 59,S$GLB,, | Performed by: INTERNAL MEDICINE

## 2023-06-05 PROCEDURE — G0009 ADMIN PNEUMOCOCCAL VACCINE: HCPCS | Mod: 59,S$GLB,, | Performed by: INTERNAL MEDICINE

## 2023-06-05 PROCEDURE — 1159F MED LIST DOCD IN RCRD: CPT | Mod: CPTII,S$GLB,, | Performed by: INTERNAL MEDICINE

## 2023-06-05 PROCEDURE — 90677 PNEUMOCOCCAL CONJUGATE VACCINE 20-VALENT: ICD-10-PCS | Mod: S$GLB,,, | Performed by: INTERNAL MEDICINE

## 2023-06-05 PROCEDURE — 90471 IMMUNIZATION ADMIN: CPT | Mod: S$GLB,,, | Performed by: INTERNAL MEDICINE

## 2023-06-05 PROCEDURE — 3044F PR MOST RECENT HEMOGLOBIN A1C LEVEL <7.0%: ICD-10-PCS | Mod: CPTII,S$GLB,, | Performed by: INTERNAL MEDICINE

## 2023-06-05 PROCEDURE — 99214 PR OFFICE/OUTPT VISIT, EST, LEVL IV, 30-39 MIN: ICD-10-PCS | Mod: 25,S$GLB,, | Performed by: INTERNAL MEDICINE

## 2023-06-05 PROCEDURE — 90715 TDAP VACCINE 7 YRS/> IM: CPT | Mod: S$GLB,,, | Performed by: INTERNAL MEDICINE

## 2023-06-05 PROCEDURE — 99214 OFFICE O/P EST MOD 30 MIN: CPT | Mod: 25,S$GLB,, | Performed by: INTERNAL MEDICINE

## 2023-06-05 PROCEDURE — 3074F PR MOST RECENT SYSTOLIC BLOOD PRESSURE < 130 MM HG: ICD-10-PCS | Mod: CPTII,S$GLB,, | Performed by: INTERNAL MEDICINE

## 2023-06-05 PROCEDURE — 90715 TDAP VACCINE GREATER THAN OR EQUAL TO 7YO IM: ICD-10-PCS | Mod: S$GLB,,, | Performed by: INTERNAL MEDICINE

## 2023-06-05 PROCEDURE — 3044F HG A1C LEVEL LT 7.0%: CPT | Mod: CPTII,S$GLB,, | Performed by: INTERNAL MEDICINE

## 2023-06-05 PROCEDURE — 4010F PR ACE/ARB THEARPY RXD/TAKEN: ICD-10-PCS | Mod: CPTII,S$GLB,, | Performed by: INTERNAL MEDICINE

## 2023-06-05 PROCEDURE — 1159F PR MEDICATION LIST DOCUMENTED IN MEDICAL RECORD: ICD-10-PCS | Mod: CPTII,S$GLB,, | Performed by: INTERNAL MEDICINE

## 2023-06-05 PROCEDURE — 1160F PR REVIEW ALL MEDS BY PRESCRIBER/CLIN PHARMACIST DOCUMENTED: ICD-10-PCS | Mod: CPTII,S$GLB,, | Performed by: INTERNAL MEDICINE

## 2023-06-05 NOTE — PATIENT INSTRUCTIONS
Tips for Good Diabetes Care - 2023    The ABCs    A: Hemoglobin A1C at least every 6 months with a goal < 7.0  (Every 3 months if diabetes not controlled)    B: Blood pressure every visit with a goal < 130/80 mm Hg    C. Cholesterol profile at least yearly with a goal LDL < 70    D. Measure GFR - Glomerular Filtration Rate (a measure of kidney function done by blood draw) every 6 months. Some meds may need to be adjusted or eliminated when GFR < 60 ml/min.    E. Dilated eye exam done by an Ophthalmologist at least every 2 years    F. Have a comprehensive foot exam at least every year    1. Exercise - Exercise aerobically with a target heart rate of (220-age) x 0.8  Exercise 30-45 minutes on most days of the week    2. Diet and Supplements- All supplements can be obtained through a varied, healthy diet   Calcium: 1,000 - 1,500 mg each day   8 oz milk = 300 mg,  1oz of cheese = 100-200 mg  Vitamin D: 1,000 iu each day- Can probably be obtained by 30 min. of direct sunlight each day       3 oz. Vcchcy=564 iu,  3 oz. Tuna =150 iu, Milk = 120 iu  Fish oil: 1-2 grams each day or about 840 mg of EPA and DHA (Omega-3 fatty acids) each day       3 oz. Clifford=2 grams,  3 oz. Tuna=1.3 grams,  3 oz. drained light Tuna= 0.25 grams  Fat intake: Not to exceed 30% of total daily calories    3. Lifestyle - Alcohol: 1 drink = 12 oz. domestic beer, 5 oz. wine, or 1 oz. hard liquor             Males: </= 14 drinks per week with no more than 4 in any one day             Females: </= 7 drinks per week with no more than 3 in any one day  Salt: 1.5-3 grams of Sodium each day.  Tobacco: Dont smoke, or quit smoking (discuss with your doctor)  Depression: If you feel depressed discuss with your doctor  Weight: Maintain a healthy body weight. Stay within 10% of:     Males: 106 lbs. + 6 lbs per inch height above 5 feet                Females: 100 lbs + 5 lbs per inch height  above 5 feet    4. Immunizations - Influenza vaccine every year in the fall  Pneumonia vaccine after diagnosis (Prevnar-20), and at age 65 (Prevnar-20)      For more information from the American Diabetes Association about diabetes and diet go to www.diabetes.org.

## 2023-06-05 NOTE — PROGRESS NOTES
Subjective     Patient ID: Gael Pedroza is a 68 y.o. male.    Chief Complaint: Follow-up and Hyperlipidemia    He was started on Flomax 2 weeks ago for urinary frequency presumed to be from BPH.  He is already better.  He has lost 20 lb in the past year through a better diet.    Follow-up    Hyperlipidemia    Diabetes  He presents for his follow-up diabetic visit. He has type 2 diabetes mellitus. His disease course has been stable.   Hypertension  This is a chronic problem. The current episode started more than 1 year ago. The problem is controlled.   Review of Systems   Constitutional: Negative.    Respiratory: Negative.     Cardiovascular: Negative.         Objective     Physical Exam  Vitals and nursing note reviewed.   Constitutional:       Appearance: He is well-developed.   HENT:      Head: Normocephalic and atraumatic.   Eyes:      Pupils: Pupils are equal, round, and reactive to light.   Cardiovascular:      Rate and Rhythm: Normal rate and regular rhythm.      Heart sounds: Normal heart sounds.   Pulmonary:      Effort: Pulmonary effort is normal.   Neurological:      Mental Status: He is alert.          Assessment and Plan     1. High cholesterol    2. Type 2 diabetes mellitus with diabetic peripheral angiopathy without gangrene, without long-term current use of insulin  Overview:  2015      3. Essential hypertension  Overview:  2013      4. Benign prostatic hyperplasia with urinary frequency    Other orders  -     (In Office Administered) Pneumococcal Conjugate Vaccine (20 Valent) (IM)  -     Tdap Vaccine        Per orders and D/C instructions.  Continue diet and/or meds for BPH, DM, HTN, and high cholesterol, which are stable.    Between 30 and 39 min of total time for evaluation and management services were spent on the patient today.  The medical problems and treatment options were discussed, and all questions were answered.             Follow up in about 6 months (around 12/5/2023).

## 2023-06-07 ENCOUNTER — LAB VISIT (OUTPATIENT)
Dept: LAB | Facility: HOSPITAL | Age: 68
End: 2023-06-07
Payer: MEDICARE

## 2023-06-07 DIAGNOSIS — E11.51 TYPE 2 DIABETES MELLITUS WITH DIABETIC PERIPHERAL ANGIOPATHY WITHOUT GANGRENE, WITHOUT LONG-TERM CURRENT USE OF INSULIN: ICD-10-CM

## 2023-06-07 LAB
ALBUMIN/CREAT UR: 6.9 UG/MG (ref 0–30)
CREAT UR-MCNC: 130 MG/DL (ref 23–375)
MICROALBUMIN UR DL<=1MG/L-MCNC: 9 UG/ML

## 2023-06-07 PROCEDURE — 82570 ASSAY OF URINE CREATININE: CPT | Performed by: INTERNAL MEDICINE

## 2023-06-26 ENCOUNTER — ANESTHESIA EVENT (OUTPATIENT)
Dept: SURGERY | Facility: OTHER | Age: 68
End: 2023-06-26
Payer: MEDICARE

## 2023-06-26 ENCOUNTER — HOSPITAL ENCOUNTER (OUTPATIENT)
Dept: PREADMISSION TESTING | Facility: OTHER | Age: 68
Discharge: HOME | End: 2023-06-26
Attending: ORTHOPAEDIC SURGERY
Payer: MEDICARE

## 2023-06-26 VITALS
BODY MASS INDEX: 24.4 KG/M2 | OXYGEN SATURATION: 98 % | RESPIRATION RATE: 16 BRPM | HEIGHT: 68 IN | HEART RATE: 74 BPM | WEIGHT: 161 LBS | DIASTOLIC BLOOD PRESSURE: 61 MMHG | TEMPERATURE: 98 F | SYSTOLIC BLOOD PRESSURE: 120 MMHG

## 2023-06-26 RX ORDER — FAMOTIDINE 20 MG/1
20 TABLET, FILM COATED ORAL
Status: CANCELLED | OUTPATIENT
Start: 2023-06-26 | End: 2023-06-26

## 2023-06-26 RX ORDER — ACETAMINOPHEN 325 MG/1
975 TABLET ORAL
Status: CANCELLED | OUTPATIENT
Start: 2023-06-26 | End: 2023-06-26

## 2023-06-26 RX ORDER — LIDOCAINE HYDROCHLORIDE 10 MG/ML
0.5 INJECTION, SOLUTION EPIDURAL; INFILTRATION; INTRACAUDAL; PERINEURAL ONCE
Status: CANCELLED | OUTPATIENT
Start: 2023-06-26 | End: 2023-06-26

## 2023-06-26 RX ORDER — SODIUM CHLORIDE, SODIUM LACTATE, POTASSIUM CHLORIDE, CALCIUM CHLORIDE 600; 310; 30; 20 MG/100ML; MG/100ML; MG/100ML; MG/100ML
INJECTION, SOLUTION INTRAVENOUS CONTINUOUS
Status: CANCELLED | OUTPATIENT
Start: 2023-06-26

## 2023-06-26 NOTE — ANESTHESIA PREPROCEDURE EVALUATION
06/26/2023  Gael Pedroza is a 68 y.o., male.      Pre-op Assessment    I have reviewed the Patient Summary Reports.     I have reviewed the Nursing Notes.    I have reviewed the Medications.     Review of Systems  Anesthesia Hx:  No problems with previous Anesthesia  History of prior surgery of interest to airway management or planning: Denies Family Hx of Anesthesia complications.  Personal Hx of Anesthesia complications   Social:  Non-Smoker, Former Smoker    Hematology/Oncology:  Hematology Normal   Oncology Normal     Cardiovascular:   Hypertension, well controlled Has documented carotid dz   Pulmonary:  Pulmonary Normal    Renal/:  Renal/ Normal     Hepatic/GI:  Hepatic/GI Normal    Musculoskeletal:   Arthritis  Previous lumbar fusion Spine Disorders: lumbar Disc disease, Degenerative disease and Chronic Pain    Neurological:  Neurology Normal    Endocrine:   Diabetes, well controlled    Dermatological:  Skin Normal    Psych:  Psychiatric Normal           Physical Exam  General: Cooperative, Alert, Oriented and Well nourished    Airway:  Mallampati: II   Mouth Opening: Normal  TM Distance: Normal  Tongue: Normal  Neck ROM: Normal ROM    Dental:  Intact        Anesthesia Plan  Type of Anesthesia, risks & benefits discussed:    Anesthesia Type: Gen Supraglottic Airway  Intra-op Monitoring Plan: Standard ASA Monitors  Post Op Pain Control Plan: multimodal analgesia  Induction:  IV  Informed Consent: Informed consent signed with the Patient and all parties understand the risks and agree with anesthesia plan.  All questions answered.   ASA Score: 3  Anesthesia Plan Notes: Pt sees dr torres q 6 mos for his Htn and carotid dz.  Last visit was apr 2023. Pt sees dr crum PCP also, last visit may 2023. All issues addressed.  Has labs from June 2023.    Ready For Surgery From Anesthesia Perspective.      .

## 2023-06-26 NOTE — DISCHARGE INSTRUCTIONS
Information to Prepare you for your Surgery    PRE-ADMIT TESTING -  [phone]    [address] [facility]          Your surgery has been scheduled at [facility]. We are pleased to have the opportunity to serve you. For Further Information please call [phone].    On the day of surgery please report to the Information Desk on the 1st floor.    CONTACT YOUR PHYSICIAN'S OFFICE THE DAY PRIOR TO YOUR SURGERY TO OBTAIN YOUR ARRIVAL TIME.     The evening before surgery do not eat anything after 9 p.m. ( this includes hard candy, chewing gum and mints).  You may only have GATORADE, POWERADE AND WATER  from 9 p.m. until you leave your home.   DO NOT DRINK ANY LIQUIDS ON THE WAY TO THE HOSPITAL.      Why does your anesthesiologist allow you to drink Gatorade/Powerade before surgery?  Gatorade/Powerade helps to increase your comfort before surgery and to decrease your nausea after surgery. The carbohydrates in Gatorade/Powerade help reduce your body's stress response to surgery.  If you are a diabetic-drink only water prior to surgery.       Patients may have 2 visitors pre and post procedure. Only 2 visitors will be allowed in the Surgical building with the patient. No one under the age of 12 will be allowed into the facility.    SPECIAL MEDICATION INSTRUCTIONS: TAKE medications checked off by the Anesthesiologist on your Medication List.    Angiogram Patients: Take medications as instructed by your physician, including aspirin.     Surgery Patients:    If you take ASPIRIN - Your PHYSICIAN/SURGEON will need to inform you IF/OR when you need to stop taking aspirin prior to your surgery.     The week prior to surgery do not ot take any medications containing IBUPROFEN or NSAIDS ( Advil, Motrin, Goodys, BC, Aleve, Naproxen etc) If you are not sure if you should take a medicine please call your surgeon's office.  Ok to take Tylenol    Do Not Wear any make-up  (especially eye make-up) to surgery. Please remove any false eyelashes or eyelash extensions. If you arrive the day of surgery with makeup/eyelashes on you will be required to remove prior to surgery. (There is a risk of corneal abrasions if eye makeup/eyelash extensions are not removed)      Leave all valuables at home.   Do Not wear any jewelry or watches, including any metal in body piercings. Jewelry must be removed prior to coming to the hospital.  There is a possibility that rings that are unable to be removed may be cut off if they are on the surgical extremity.    Please remove all hair extensions, wigs, clips and any other metal accessories/ ornaments from your hair.  These items may pose a flammable/fire risk in Surgery and must be removed.    Do not shave your surgical area at least 5 days prior to your surgery. The surgical prep will be performed at the hospital according to Infection Control regulations.    Contact Lens must be removed before surgery. Either do not wear the contact lens or bring a case and solution for storage.  Please bring a container for eyeglasses or dentures as required.  Bring any paperwork your physician has provided, such as consent forms,  history and physicals, doctor's orders, etc.   Bring comfortable clothes that are loose fitting to wear upon discharge. Take into consideration the type of surgery being performed.  Maintain your diet as advised per your physician the day prior to surgery.      Adequate rest the night before surgery is advised.   Park in the Parking lot behind the hospital or in the [place] across the street from the parking lot. Parking is complimentary.  If you will be discharged the same day as your procedure, please arrange for a responsible adult to drive you home or to accompany you if traveling by taxi.   YOU WILL NOT BE PERMITTED TO DRIVE OR TO LEAVE THE HOSPITAL ALONE AFTER SURGERY.   If you are being discharged the same day, it is  strongly recommended that you arrange for someone to remain with you for the first 24 hrs following your surgery.    The Surgeon will speak to your family/visitor after your surgery regarding the outcome of your surgery and post op care.  The Surgeon may speak to you after your surgery, but there is a possibility you may not remember the details.  Please check with your family members regarding the conversation with the Surgeon.    We strongly recommend whoever is bringing you home be present for discharge instructions.  This will ensure a thorough understanding for your post op home care.    ALL CHILDREN MUST ALWAYS BE ACCOMPANIED BY AN ADULT.    Visitors-Refer to current Visitor policy handouts.    Thank you for your cooperation.  The Staff of [facility].            Bathing Instructions with Hibiclens    Shower the evening before and morning of your procedure with Chlorhexidine (Hibiclens)  do not use Chlorhexidine on your face or genitals. Do not get in your eyes.  Wash your face with water and your regular face wash/soap  Use your regular shampoo  Apply Chlorhexidine (Hibiclens) directly on your skin or on a wet washcloth and wash gently. When showering: Move away from the shower stream when applying Chlorhexidine (Hibiclens) to avoid rinsing off too soon.  Rinse thoroughly with warm water  Do not dilute Chlorhexidine (Hibiclens)   Dry off as usual, do not use any deodorant, powder, body lotions, perfume, after shave or cologne.

## 2023-07-28 DIAGNOSIS — E11.51 TYPE 2 DIABETES MELLITUS WITH DIABETIC PERIPHERAL ANGIOPATHY WITHOUT GANGRENE, WITHOUT LONG-TERM CURRENT USE OF INSULIN: Primary | ICD-10-CM

## 2023-07-28 RX ORDER — METFORMIN HYDROCHLORIDE 500 MG/1
1000 TABLET, EXTENDED RELEASE ORAL
Qty: 180 TABLET | Refills: 3 | Status: SHIPPED | OUTPATIENT
Start: 2023-07-28 | End: 2023-12-11

## 2023-08-11 ENCOUNTER — HOSPITAL ENCOUNTER (OUTPATIENT)
Facility: OTHER | Age: 68
Discharge: HOME | End: 2023-08-11
Attending: ORTHOPAEDIC SURGERY | Admitting: ORTHOPAEDIC SURGERY
Payer: MEDICARE

## 2023-08-11 ENCOUNTER — ANESTHESIA (OUTPATIENT)
Dept: SURGERY | Facility: OTHER | Age: 68
End: 2023-08-11
Payer: MEDICARE

## 2023-08-11 DIAGNOSIS — M18.11 OSTEOARTHRITIS OF THUMB, RIGHT: Primary | ICD-10-CM

## 2023-08-11 LAB
POCT GLUCOSE: 101 MG/DL (ref 70–110)
POCT GLUCOSE: 117 MG/DL (ref 70–110)

## 2023-08-11 PROCEDURE — 25000003 PHARM REV CODE 250: Performed by: ANESTHESIOLOGY

## 2023-08-11 PROCEDURE — 71000039 HC RECOVERY, EACH ADD'L HOUR: Performed by: ORTHOPAEDIC SURGERY

## 2023-08-11 PROCEDURE — D9220A PRA ANESTHESIA: ICD-10-PCS | Mod: CRNA,,, | Performed by: NURSE ANESTHETIST, CERTIFIED REGISTERED

## 2023-08-11 PROCEDURE — 63600175 PHARM REV CODE 636 W HCPCS: Performed by: ORTHOPAEDIC SURGERY

## 2023-08-11 PROCEDURE — 37000009 HC ANESTHESIA EA ADD 15 MINS: Performed by: ORTHOPAEDIC SURGERY

## 2023-08-11 PROCEDURE — 71000016 HC POSTOP RECOV ADDL HR: Performed by: ORTHOPAEDIC SURGERY

## 2023-08-11 PROCEDURE — 71000033 HC RECOVERY, INTIAL HOUR: Performed by: ORTHOPAEDIC SURGERY

## 2023-08-11 PROCEDURE — 25000003 PHARM REV CODE 250: Performed by: ORTHOPAEDIC SURGERY

## 2023-08-11 PROCEDURE — 25000003 PHARM REV CODE 250: Performed by: NURSE ANESTHETIST, CERTIFIED REGISTERED

## 2023-08-11 PROCEDURE — 63600175 PHARM REV CODE 636 W HCPCS: Performed by: NURSE ANESTHETIST, CERTIFIED REGISTERED

## 2023-08-11 PROCEDURE — D9220A PRA ANESTHESIA: Mod: ANES,,, | Performed by: ANESTHESIOLOGY

## 2023-08-11 PROCEDURE — 36000708 HC OR TIME LEV III 1ST 15 MIN: Performed by: ORTHOPAEDIC SURGERY

## 2023-08-11 PROCEDURE — D9220A PRA ANESTHESIA: Mod: CRNA,,, | Performed by: NURSE ANESTHETIST, CERTIFIED REGISTERED

## 2023-08-11 PROCEDURE — 82962 GLUCOSE BLOOD TEST: CPT | Performed by: ORTHOPAEDIC SURGERY

## 2023-08-11 PROCEDURE — D9220A PRA ANESTHESIA: ICD-10-PCS | Mod: ANES,,, | Performed by: ANESTHESIOLOGY

## 2023-08-11 PROCEDURE — 36000709 HC OR TIME LEV III EA ADD 15 MIN: Performed by: ORTHOPAEDIC SURGERY

## 2023-08-11 PROCEDURE — 71000015 HC POSTOP RECOV 1ST HR: Performed by: ORTHOPAEDIC SURGERY

## 2023-08-11 PROCEDURE — C1713 ANCHOR/SCREW BN/BN,TIS/BN: HCPCS | Performed by: ORTHOPAEDIC SURGERY

## 2023-08-11 PROCEDURE — 37000008 HC ANESTHESIA 1ST 15 MINUTES: Performed by: ORTHOPAEDIC SURGERY

## 2023-08-11 PROCEDURE — 63600175 PHARM REV CODE 636 W HCPCS: Performed by: ANESTHESIOLOGY

## 2023-08-11 DEVICE — SYS CMC LIG RECON IMPLANT: Type: IMPLANTABLE DEVICE | Site: HAND | Status: FUNCTIONAL

## 2023-08-11 RX ORDER — EPHEDRINE SULFATE 50 MG/ML
INJECTION, SOLUTION INTRAVENOUS
Status: DISCONTINUED | OUTPATIENT
Start: 2023-08-11 | End: 2023-08-11

## 2023-08-11 RX ORDER — FAMOTIDINE 20 MG/1
20 TABLET, FILM COATED ORAL
Status: COMPLETED | OUTPATIENT
Start: 2023-08-11 | End: 2023-08-11

## 2023-08-11 RX ORDER — ACETAMINOPHEN 325 MG/1
975 TABLET ORAL
Status: COMPLETED | OUTPATIENT
Start: 2023-08-11 | End: 2023-08-11

## 2023-08-11 RX ORDER — BUPIVACAINE HYDROCHLORIDE AND EPINEPHRINE 2.5; 5 MG/ML; UG/ML
INJECTION, SOLUTION EPIDURAL; INFILTRATION; INTRACAUDAL; PERINEURAL
Status: DISCONTINUED | OUTPATIENT
Start: 2023-08-11 | End: 2023-08-11 | Stop reason: HOSPADM

## 2023-08-11 RX ORDER — LIDOCAINE HYDROCHLORIDE 20 MG/ML
INJECTION INTRAVENOUS
Status: DISCONTINUED | OUTPATIENT
Start: 2023-08-11 | End: 2023-08-11

## 2023-08-11 RX ORDER — MEPERIDINE HYDROCHLORIDE 25 MG/ML
12.5 INJECTION INTRAMUSCULAR; INTRAVENOUS; SUBCUTANEOUS ONCE AS NEEDED
Status: DISCONTINUED | OUTPATIENT
Start: 2023-08-11 | End: 2023-08-11 | Stop reason: HOSPADM

## 2023-08-11 RX ORDER — ONDANSETRON 2 MG/ML
INJECTION INTRAMUSCULAR; INTRAVENOUS
Status: DISCONTINUED | OUTPATIENT
Start: 2023-08-11 | End: 2023-08-11

## 2023-08-11 RX ORDER — FENTANYL CITRATE 50 UG/ML
INJECTION, SOLUTION INTRAMUSCULAR; INTRAVENOUS
Status: DISCONTINUED | OUTPATIENT
Start: 2023-08-11 | End: 2023-08-11

## 2023-08-11 RX ORDER — HYDROCODONE BITARTRATE AND ACETAMINOPHEN 5; 325 MG/1; MG/1
1 TABLET ORAL EVERY 4 HOURS PRN
Qty: 20 TABLET | Refills: 0 | Status: SHIPPED | OUTPATIENT
Start: 2023-08-11 | End: 2023-09-07

## 2023-08-11 RX ORDER — HYDROMORPHONE HYDROCHLORIDE 2 MG/ML
0.4 INJECTION, SOLUTION INTRAMUSCULAR; INTRAVENOUS; SUBCUTANEOUS EVERY 5 MIN PRN
Status: DISCONTINUED | OUTPATIENT
Start: 2023-08-11 | End: 2023-08-11 | Stop reason: HOSPADM

## 2023-08-11 RX ORDER — MIDAZOLAM HYDROCHLORIDE 1 MG/ML
INJECTION INTRAMUSCULAR; INTRAVENOUS
Status: DISCONTINUED | OUTPATIENT
Start: 2023-08-11 | End: 2023-08-11

## 2023-08-11 RX ORDER — PHENYLEPHRINE HYDROCHLORIDE 10 MG/ML
INJECTION INTRAVENOUS
Status: DISCONTINUED | OUTPATIENT
Start: 2023-08-11 | End: 2023-08-11

## 2023-08-11 RX ORDER — SODIUM CHLORIDE, SODIUM LACTATE, POTASSIUM CHLORIDE, CALCIUM CHLORIDE 600; 310; 30; 20 MG/100ML; MG/100ML; MG/100ML; MG/100ML
INJECTION, SOLUTION INTRAVENOUS CONTINUOUS
Status: DISCONTINUED | OUTPATIENT
Start: 2023-08-11 | End: 2023-08-11 | Stop reason: HOSPADM

## 2023-08-11 RX ORDER — LIDOCAINE HYDROCHLORIDE 10 MG/ML
0.5 INJECTION, SOLUTION EPIDURAL; INFILTRATION; INTRACAUDAL; PERINEURAL ONCE
Status: DISCONTINUED | OUTPATIENT
Start: 2023-08-11 | End: 2023-08-11 | Stop reason: HOSPADM

## 2023-08-11 RX ORDER — CEFAZOLIN SODIUM 1 G/3ML
2 INJECTION, POWDER, FOR SOLUTION INTRAMUSCULAR; INTRAVENOUS
Status: COMPLETED | OUTPATIENT
Start: 2023-08-11 | End: 2023-08-11

## 2023-08-11 RX ORDER — PROCHLORPERAZINE EDISYLATE 5 MG/ML
5 INJECTION INTRAMUSCULAR; INTRAVENOUS EVERY 30 MIN PRN
Status: DISCONTINUED | OUTPATIENT
Start: 2023-08-11 | End: 2023-08-11 | Stop reason: HOSPADM

## 2023-08-11 RX ORDER — OXYCODONE HYDROCHLORIDE 5 MG/1
5 TABLET ORAL
Status: DISCONTINUED | OUTPATIENT
Start: 2023-08-11 | End: 2023-08-11 | Stop reason: HOSPADM

## 2023-08-11 RX ORDER — PROPOFOL 10 MG/ML
VIAL (ML) INTRAVENOUS
Status: DISCONTINUED | OUTPATIENT
Start: 2023-08-11 | End: 2023-08-11

## 2023-08-11 RX ORDER — SODIUM CHLORIDE 0.9 % (FLUSH) 0.9 %
3 SYRINGE (ML) INJECTION
Status: DISCONTINUED | OUTPATIENT
Start: 2023-08-11 | End: 2023-08-11 | Stop reason: HOSPADM

## 2023-08-11 RX ORDER — DEXAMETHASONE SODIUM PHOSPHATE 4 MG/ML
INJECTION, SOLUTION INTRA-ARTICULAR; INTRALESIONAL; INTRAMUSCULAR; INTRAVENOUS; SOFT TISSUE
Status: DISCONTINUED | OUTPATIENT
Start: 2023-08-11 | End: 2023-08-11

## 2023-08-11 RX ADMIN — SODIUM CHLORIDE, SODIUM LACTATE, POTASSIUM CHLORIDE, AND CALCIUM CHLORIDE: 600; 310; 30; 20 INJECTION, SOLUTION INTRAVENOUS at 07:08

## 2023-08-11 RX ADMIN — DEXAMETHASONE SODIUM PHOSPHATE 4 MG: 4 INJECTION, SOLUTION INTRAMUSCULAR; INTRAVENOUS at 07:08

## 2023-08-11 RX ADMIN — PHENYLEPHRINE HYDROCHLORIDE 100 MCG: 10 INJECTION INTRAVENOUS at 08:08

## 2023-08-11 RX ADMIN — MIDAZOLAM HYDROCHLORIDE 2 MG: 1 INJECTION, SOLUTION INTRAMUSCULAR; INTRAVENOUS at 07:08

## 2023-08-11 RX ADMIN — ONDANSETRON HYDROCHLORIDE 4 MG: 2 INJECTION INTRAMUSCULAR; INTRAVENOUS at 07:08

## 2023-08-11 RX ADMIN — PHENYLEPHRINE HYDROCHLORIDE 100 MCG: 10 INJECTION INTRAVENOUS at 07:08

## 2023-08-11 RX ADMIN — PROPOFOL 200 MG: 10 INJECTION, EMULSION INTRAVENOUS at 07:08

## 2023-08-11 RX ADMIN — EPHEDRINE SULFATE 10 MG: 50 INJECTION INTRAVENOUS at 08:08

## 2023-08-11 RX ADMIN — PHENYLEPHRINE HYDROCHLORIDE 200 MCG: 10 INJECTION INTRAVENOUS at 08:08

## 2023-08-11 RX ADMIN — EPHEDRINE SULFATE 15 MG: 50 INJECTION INTRAVENOUS at 08:08

## 2023-08-11 RX ADMIN — ACETAMINOPHEN 975 MG: 325 TABLET, FILM COATED ORAL at 06:08

## 2023-08-11 RX ADMIN — SODIUM CHLORIDE, SODIUM LACTATE, POTASSIUM CHLORIDE, AND CALCIUM CHLORIDE: 600; 310; 30; 20 INJECTION, SOLUTION INTRAVENOUS at 08:08

## 2023-08-11 RX ADMIN — FENTANYL CITRATE 25 MCG: 50 INJECTION, SOLUTION INTRAMUSCULAR; INTRAVENOUS at 08:08

## 2023-08-11 RX ADMIN — FENTANYL CITRATE 50 MCG: 50 INJECTION, SOLUTION INTRAMUSCULAR; INTRAVENOUS at 07:08

## 2023-08-11 RX ADMIN — PHENYLEPHRINE HYDROCHLORIDE 200 MCG: 10 INJECTION INTRAVENOUS at 07:08

## 2023-08-11 RX ADMIN — LIDOCAINE HYDROCHLORIDE 40 MG: 20 INJECTION, SOLUTION INTRAVENOUS at 07:08

## 2023-08-11 RX ADMIN — FAMOTIDINE 20 MG: 20 TABLET, FILM COATED ORAL at 06:08

## 2023-08-11 RX ADMIN — OXYCODONE HYDROCHLORIDE 5 MG: 5 TABLET ORAL at 09:08

## 2023-08-11 RX ADMIN — EPHEDRINE SULFATE 15 MG: 50 INJECTION INTRAVENOUS at 07:08

## 2023-08-11 RX ADMIN — CEFAZOLIN 2 G: 330 INJECTION, POWDER, FOR SOLUTION INTRAMUSCULAR; INTRAVENOUS at 07:08

## 2023-08-11 RX ADMIN — GLYCOPYRROLATE 0.2 MG: 0.2 INJECTION, SOLUTION INTRAMUSCULAR; INTRAVITREAL at 07:08

## 2023-08-11 NOTE — BRIEF OP NOTE
[facility] ([place])   Operative Note     SUMMARY     Surgery Date: 8/11/2023     Surgeon(s) and Role:     * Williams, Claude S. IV, MD - Primary    Assisting Surgeon: None    Pre-op Diagnosis:  Osteoarthritis of thumb, right [M18.11]    Post-op Diagnosis:  Post-Op Diagnosis Codes:     * Osteoarthritis of thumb, right [M18.11]    Procedure(s) (LRB):  INTERPOSITION ARTHROPLASTY, CMC JOINT (Right)    Anesthesia: General    Description of the findings of the procedure: as above    Findings/Key Components: as above    Surgery Date: 08/11/2023  Patient Name: Gael Pedroza  CSN: [id number]  Surgeon(s) and Role:    Claude S. Williams IV, MD - Primary    Pre-op Diagnosis:  Osteoarthritis of thumb, right [M18.11]    Post-op Diagnosis:  Osteoarthritis of thumb, right [M18.11]    Procedure(s) (LRB):  INTERPOSITION ARTHROPLASTY, CMC JOINT (Right)        Procedure in detail:  After proper informed consent was obtained and questions were answered the operative site was marked and the patient was transported to the operating suite and placed supine on the operating table.  General endotracheal anesthesia was administered per the anesthesiologist without difficulty.  All bony prominences were well padded and the upper extremity was placed into a well-padded upper arm tourniquet and onto a hand table. The upper extremity was then thoroughly prepped with alcohol ChloraPrep and draped in usual sterile fashion.  Preoperative antibiotics were administered.  Routine preoperative time-out was then taken and the operative site was positively identified by the operative team.  After Esmarch examination the upper extremity tourniquet was elevated to 250 mm of mercury.  A longitudinal incision was then made over the base of the thumb and careful dissection was carried down through the subdermal tissue using bipolar cautery for hemostasis.  Care was taken to identify and protect the cutaneous nerve branches which were carefully  retracted during the procedure. The extensor tendons were retracted and a longitudinal incision was made in the trapezial metacarpal joint capsule.  The capsular flaps were elevated from the trapezium and subperiosteal dissection of the trapezium was carried out. The trapezium was divided and then removed in piecemeal fashion. Care was taken to protect the vascular structures and flexor carpi radialis distal insertion.  The image intensifier was then used to visualize complete excision of the trapezium.  An incision was then made in the forearm and the flexor carpi radialis tendon was dissected and divided and then withdrawn into the distal wound. The FCR was traced to its distal insertion.  A guide pin was then advanced from the base of the 1st metacarpal radial cortex retrograde to the articular surface. A 4 mm cannulated drill was then advanced over the guidewire.  The flexor carpi radialis graft was then passed through the base of the thumb metacarpal and with the metacarpal appropriately positioned and absorbable interference screw was advanced in the bone tunnel to secure the FCR graft.  The remainder of the graft was then placed within the joint space as an anchovy spacer.  The tourniquet was deflated and meticulous hemostasis was confirmed. The capsule was then closed with figure-of-eight interrupted Vicryl suture. The image intensifier again was used to visualize complete excision of the trapezium and appropriate position of the thumb metacarpal, stability and placement of the bone tunnel.  The skin incisions were then closed with Monocryl and nylon skin suture. There was normal circulation throughout the hand after deflation of the tourniquet. A sterile soft dressing was applied and a thumb spica splint was placed.  The patient was awakened in the operating suite and taken to the recovery area in stable condition. She tolerated the procedure very well. Lap instrument and needle counts were correct x2.   Complications none.         Estimated Blood Loss: * No values recorded between 8/11/2023  7:58 AM and 8/11/2023  8:55 AM *         Specimens:   Specimen (24h ago, onward)      None            Discharge Note    SUMMARY     Admit Date: 8/11/2023    Discharge Date and Time:  08/11/2023 9:11 AM    Hospital Course (synopsis of major diagnoses, care, treatment, and services provided during the course of the hospital stay): uneventful     Final Diagnosis: Post-Op Diagnosis Codes:     * Osteoarthritis of thumb, right [M18.11]    Disposition: Home or Self Care    Follow Up/Patient Instructions:     Medications:  Reconciled Home Medications:      Medication List        START taking these medications      HYDROcodone-acetaminophen 5-325 mg per tablet  Commonly known as: NORCO  Take 1 tablet by mouth every 4 (four) hours as needed for Pain.            CONTINUE taking these medications      ascorbic acid (vitamin C) 500 MG tablet  Commonly known as: VITAMIN C  Take 500 mg by mouth once daily.     aspirin 81 MG EC tablet  Commonly known as: ECOTRIN  Take 1 tablet (81 mg total) by mouth once daily.     cholecalciferol (vitamin D3) 25 mcg (1,000 unit) capsule  Commonly known as: VITAMIN D3  Take 1,000 Units by mouth once daily.     cyclobenzaprine 10 MG tablet  Commonly known as: FLEXERIL  1/2-1 tab nightly as needed     EXELDERM 1 % external solution  Generic drug: sulconazole     ezetimibe 10 mg tablet  Commonly known as: ZETIA  Take 1 tablet (10 mg total) by mouth once daily.     metFORMIN 500 MG ER 24hr tablet  Commonly known as: GLUCOPHAGE-XR  Take 2 tablets (1,000 mg total) by mouth daily with breakfast.     multivitamin with minerals tablet  Take 1 tablet by mouth once daily.     nebivoloL 5 MG Tab  Commonly known as: BYSTOLIC  TAKE ONE TABLET BY MOUTH ONCE DAILY.     rosuvastatin 10 MG tablet  Commonly known as: CRESTOR  TAKE 1 TABLET BY MOUTH ONCE DAILY     tamsulosin 0.4 mg Cap  Commonly known as: FLOMAX  Take 1 capsule  (0.4 mg total) by mouth once daily.     valsartan 160 MG tablet  Commonly known as: DIOVAN  Take 1 tablet (160 mg total) by mouth once daily.     XIGDUO XR 5-1,000 mg  Generic drug: dapaglifloz propaned-metformin  TAKE 2 TABLETS BY MOUTH EVERY DAY            Discharge Procedure Orders   Diet general     Leave dressing on - Keep it clean, dry, and intact until clinic visit     Call MD for:  temperature >100.4     Call MD for:  persistent nausea and vomiting     Call MD for:  severe uncontrolled pain     Call MD for:  difficulty breathing, headache or visual disturbances     Call MD for:  redness, tenderness, or signs of infection (pain, swelling, redness, odor or green/yellow discharge around incision site)     Call MD for:  hives     Call MD for:  persistent dizziness or light-headedness     Call MD for:  extreme fatigue     Keep surgical extremity elevated     Lifting restrictions     No driving, operating heavy equipment or signing legal documents while taking pain medication      Follow-up Information       Williams, Claude S. IV, MD Follow up.    Specialty: Orthopedic Surgery  Why: For suture removal, For wound re-check  Contact information:  [address]  [facility] [id number] [phone]

## 2023-08-11 NOTE — TRANSFER OF CARE
Anesthesia Transfer of Care Note    Patient: Gale Pedroza    Procedure(s) Performed: Procedure(s) (LRB):  INTERPOSITION ARTHROPLASTY, CMC JOINT (Right)    Patient location: PACU    Anesthesia Type: general    Transport from OR: Transported from OR on 6-10 L/min O2 by face mask with adequate spontaneous ventilation    Post pain: adequate analgesia    Post assessment: no apparent anesthetic complications and tolerated procedure well    Post vital signs: stable    Level of consciousness: awake    Nausea/Vomiting: no nausea/vomiting    Complications: none    Transfer of care protocol was followed      Last vitals:   Visit Vitals  /66 (BP Location: Right arm, Patient Position: Sitting)   Pulse 68   Temp 36.2 °C (97.1 °F) (Temporal)   Resp 18   SpO2 99%

## 2023-08-11 NOTE — PLAN OF CARE
Gael Pedroza has met all discharge criteria from Phase II. Vital Signs are stable, ambulating  without difficulty. Discharge instructions given, patient verbalized understanding. Discharged from facility via wheelchair in stable condition.

## 2023-08-11 NOTE — ANESTHESIA POSTPROCEDURE EVALUATION
Anesthesia Post Evaluation    Patient: Gael Pedroza    Procedure(s) Performed: Procedure(s) (LRB):  INTERPOSITION ARTHROPLASTY, CMC JOINT (Right)    Final Anesthesia Type: general      Patient location during evaluation: PACU  Patient participation: Yes- Able to Participate  Level of consciousness: awake and alert  Post-procedure vital signs: reviewed and stable  Pain management: adequate  Airway patency: patent    PONV status at discharge: No PONV  Anesthetic complications: no      Cardiovascular status: blood pressure returned to baseline  Respiratory status: unassisted, spontaneous ventilation and room air  Hydration status: euvolemic  Follow-up not needed.          Vitals Value Taken Time   BP 93/55 08/11/23 0936   Temp 36.2 °C (97.1 °F) 08/11/23 0857   Pulse 93 08/11/23 0937   Resp 18 08/11/23 0915   SpO2 92 % 08/11/23 0937   Vitals shown include unvalidated device data.      No case tracking events are documented in the log.      Pain/Tre Score: Pain Rating Prior to Med Admin: 4 (8/11/2023  9:02 AM)  Pain Rating Post Med Admin: 3 (8/11/2023  9:20 AM)  Tre Score: 9 (8/11/2023  9:27 AM)

## 2023-08-11 NOTE — ANESTHESIA PROCEDURE NOTES
Intubation    Date/Time: 8/11/2023 7:46 AM    Performed by: Oma Salcido  Authorized by: Raf Morales MD    Intubation:     Induction:  Intravenous    Intubated:  Postinduction    Mask Ventilation:  Easy mask    Attempts:  1    Attempted By:  CRNA    Difficult Airway Encountered?: No      Complications:  None    Airway Device:  Supraglottic airway/LMA    Airway Device Size:  5.0 (igel)    Placement Verified By:  Capnometry    Complicating Factors:  None    Findings Post-Intubation:  BS equal bilateral and atraumatic/condition of teeth unchanged

## 2023-08-14 ENCOUNTER — PATIENT MESSAGE (OUTPATIENT)
Dept: INTERNAL MEDICINE | Facility: CLINIC | Age: 68
End: 2023-08-14
Payer: MEDICARE

## 2023-08-14 VITALS
HEART RATE: 70 BPM | DIASTOLIC BLOOD PRESSURE: 75 MMHG | OXYGEN SATURATION: 99 % | SYSTOLIC BLOOD PRESSURE: 121 MMHG | RESPIRATION RATE: 18 BRPM | TEMPERATURE: 98 F

## 2023-09-07 ENCOUNTER — OFFICE VISIT (OUTPATIENT)
Dept: INTERNAL MEDICINE | Facility: CLINIC | Age: 68
End: 2023-09-07
Attending: INTERNAL MEDICINE
Payer: MEDICARE

## 2023-09-07 VITALS
HEART RATE: 73 BPM | WEIGHT: 163 LBS | DIASTOLIC BLOOD PRESSURE: 62 MMHG | BODY MASS INDEX: 24.71 KG/M2 | HEIGHT: 68 IN | SYSTOLIC BLOOD PRESSURE: 120 MMHG | OXYGEN SATURATION: 95 %

## 2023-09-07 DIAGNOSIS — E11.51 TYPE 2 DIABETES MELLITUS WITH DIABETIC PERIPHERAL ANGIOPATHY WITHOUT GANGRENE, WITHOUT LONG-TERM CURRENT USE OF INSULIN: ICD-10-CM

## 2023-09-07 DIAGNOSIS — I10 ESSENTIAL HYPERTENSION: ICD-10-CM

## 2023-09-07 DIAGNOSIS — E78.00 HIGH CHOLESTEROL: Primary | ICD-10-CM

## 2023-09-07 DIAGNOSIS — N40.1 BENIGN PROSTATIC HYPERPLASIA WITH URINARY FREQUENCY: ICD-10-CM

## 2023-09-07 DIAGNOSIS — I65.23 BILATERAL CAROTID ARTERY STENOSIS: ICD-10-CM

## 2023-09-07 DIAGNOSIS — R35.0 BENIGN PROSTATIC HYPERPLASIA WITH URINARY FREQUENCY: ICD-10-CM

## 2023-09-07 PROBLEM — Z86.16 HISTORY OF SEVERE ACUTE RESPIRATORY SYNDROME CORONAVIRUS 2 (SARS-COV-2) DISEASE: Status: RESOLVED | Noted: 2021-10-14 | Resolved: 2023-09-07

## 2023-09-07 PROCEDURE — 3066F NEPHROPATHY DOC TX: CPT | Mod: CPTII,S$GLB,, | Performed by: INTERNAL MEDICINE

## 2023-09-07 PROCEDURE — 3008F PR BODY MASS INDEX (BMI) DOCUMENTED: ICD-10-PCS | Mod: CPTII,S$GLB,, | Performed by: INTERNAL MEDICINE

## 2023-09-07 PROCEDURE — 3078F DIAST BP <80 MM HG: CPT | Mod: CPTII,S$GLB,, | Performed by: INTERNAL MEDICINE

## 2023-09-07 PROCEDURE — 99214 OFFICE O/P EST MOD 30 MIN: CPT | Mod: S$GLB,,, | Performed by: INTERNAL MEDICINE

## 2023-09-07 PROCEDURE — 3008F BODY MASS INDEX DOCD: CPT | Mod: CPTII,S$GLB,, | Performed by: INTERNAL MEDICINE

## 2023-09-07 PROCEDURE — 4010F PR ACE/ARB THEARPY RXD/TAKEN: ICD-10-PCS | Mod: CPTII,S$GLB,, | Performed by: INTERNAL MEDICINE

## 2023-09-07 PROCEDURE — 3066F PR DOCUMENTATION OF TREATMENT FOR NEPHROPATHY: ICD-10-PCS | Mod: CPTII,S$GLB,, | Performed by: INTERNAL MEDICINE

## 2023-09-07 PROCEDURE — 1160F RVW MEDS BY RX/DR IN RCRD: CPT | Mod: CPTII,S$GLB,, | Performed by: INTERNAL MEDICINE

## 2023-09-07 PROCEDURE — 1159F MED LIST DOCD IN RCRD: CPT | Mod: CPTII,S$GLB,, | Performed by: INTERNAL MEDICINE

## 2023-09-07 PROCEDURE — 3044F PR MOST RECENT HEMOGLOBIN A1C LEVEL <7.0%: ICD-10-PCS | Mod: CPTII,S$GLB,, | Performed by: INTERNAL MEDICINE

## 2023-09-07 PROCEDURE — 3061F PR NEG MICROALBUMINURIA RESULT DOCUMENTED/REVIEW: ICD-10-PCS | Mod: CPTII,S$GLB,, | Performed by: INTERNAL MEDICINE

## 2023-09-07 PROCEDURE — 3061F NEG MICROALBUMINURIA REV: CPT | Mod: CPTII,S$GLB,, | Performed by: INTERNAL MEDICINE

## 2023-09-07 PROCEDURE — 1160F PR REVIEW ALL MEDS BY PRESCRIBER/CLIN PHARMACIST DOCUMENTED: ICD-10-PCS | Mod: CPTII,S$GLB,, | Performed by: INTERNAL MEDICINE

## 2023-09-07 PROCEDURE — 3044F HG A1C LEVEL LT 7.0%: CPT | Mod: CPTII,S$GLB,, | Performed by: INTERNAL MEDICINE

## 2023-09-07 PROCEDURE — 3074F SYST BP LT 130 MM HG: CPT | Mod: CPTII,S$GLB,, | Performed by: INTERNAL MEDICINE

## 2023-09-07 PROCEDURE — 99214 PR OFFICE/OUTPT VISIT, EST, LEVL IV, 30-39 MIN: ICD-10-PCS | Mod: S$GLB,,, | Performed by: INTERNAL MEDICINE

## 2023-09-07 PROCEDURE — 1159F PR MEDICATION LIST DOCUMENTED IN MEDICAL RECORD: ICD-10-PCS | Mod: CPTII,S$GLB,, | Performed by: INTERNAL MEDICINE

## 2023-09-07 PROCEDURE — 4010F ACE/ARB THERAPY RXD/TAKEN: CPT | Mod: CPTII,S$GLB,, | Performed by: INTERNAL MEDICINE

## 2023-09-07 PROCEDURE — 3074F PR MOST RECENT SYSTOLIC BLOOD PRESSURE < 130 MM HG: ICD-10-PCS | Mod: CPTII,S$GLB,, | Performed by: INTERNAL MEDICINE

## 2023-09-07 PROCEDURE — 3078F PR MOST RECENT DIASTOLIC BLOOD PRESSURE < 80 MM HG: ICD-10-PCS | Mod: CPTII,S$GLB,, | Performed by: INTERNAL MEDICINE

## 2023-09-07 RX ORDER — VALSARTAN 80 MG/1
80 TABLET ORAL DAILY
Qty: 90 TABLET | Refills: 3 | Status: SHIPPED | OUTPATIENT
Start: 2023-09-07 | End: 2024-02-12 | Stop reason: SDUPTHER

## 2023-09-07 NOTE — PROGRESS NOTES
Subjective     Patient ID: Gael Pedroza is a 68 y.o. male.    Chief Complaint: Follow-up (Discuss Valsartan BP was low so instructed to cut 1/2 which he did start on 8/14/23), Hyperlipidemia, Diabetes, and Hypertension    About 1 month ago a few days after hand surgery he felt dizzy and checked his BP which was 110/56.  He called wanting to cut back his blood pressure medication.  We cut back his valsartan from 160-80 mg.  Since then his blood pressure has been from 114-132/58-74.    Follow-up    Hyperlipidemia    Diabetes  He presents for his follow-up diabetic visit. He has type 2 diabetes mellitus. His disease course has been stable.   Hypertension      Review of Systems   Constitutional: Negative.    Respiratory: Negative.     Cardiovascular: Negative.           Objective     Physical Exam  Vitals and nursing note reviewed.   Constitutional:       Appearance: He is well-developed.   HENT:      Head: Normocephalic and atraumatic.   Eyes:      Pupils: Pupils are equal, round, and reactive to light.   Cardiovascular:      Rate and Rhythm: Normal rate and regular rhythm.      Heart sounds: Normal heart sounds.   Pulmonary:      Effort: Pulmonary effort is normal.   Neurological:      Mental Status: He is alert.            Assessment and Plan     1. High cholesterol    2. Type 2 diabetes mellitus with diabetic peripheral angiopathy without gangrene, without long-term current use of insulin  Overview:  2015      3. Bilateral carotid artery stenosis  Overview:  50-70% right 8/12, 5/13, 3/14, 3/15, 3/16, 1/17  60-70% Right and 50-69% Left - 1/18  MRA 6/18 - >70% right ICA stenosis  Angiogram at [facility] 6/27/18 - 40% right ICA stenosis, Left - no stenosis  10/7/2019: Carotid Duplex: SUZANNE: Severe plaquing - 2.0 m/s - 60-70%. LICA: Moderate plaquing - 1.1 m/s - <50%.  10/6/2020: Carotid Duplex: SUZANNE: Moderate plaquing - 1.5 m/s - 50-60%. LICA: Moderate plaquing - 1.1 m/s - <50%.  10/1/2021: Carotid Duplex: SUZANNE: Severe  plaquing - 1.9 m/s - 60-70%. LICA: Severe plaquing - 1.1 m/s - <50%.  10/20/2022: Carotid Duplex: SUZANNE: Moderate plaquing - 1.5 m/s - 50-60%. LICA: Moderate plaquing - 1.1 m/s - <50%.             4. Essential hypertension  Overview:  2013      5. Benign prostatic hyperplasia with urinary frequency    Other orders  -     valsartan (DIOVAN) 80 MG tablet; Take 1 tablet (80 mg total) by mouth once daily.  Dispense: 90 tablet; Refill: 3        Per orders and D/C instructions.  Continue diet and/or meds for DM, bilateral carotid artery stenosis, BPH, HTN, and high cholesterol, which are stable.    Between 30 and 39 min of total time for evaluation and management services were spent on the patient today.  The medical problems and treatment options were discussed, and all questions were answered.             Follow up in 3 months (on 12/11/2023).

## 2023-10-06 DIAGNOSIS — E11.51 TYPE 2 DIABETES MELLITUS WITH DIABETIC PERIPHERAL ANGIOPATHY WITHOUT GANGRENE, WITHOUT LONG-TERM CURRENT USE OF INSULIN: ICD-10-CM

## 2023-10-06 RX ORDER — DAPAGLIFLOZIN AND METFORMIN HYDROCHLORIDE 5; 1000 MG/1; MG/1
2 TABLET, FILM COATED, EXTENDED RELEASE ORAL DAILY
Qty: 180 TABLET | Refills: 1 | Status: SHIPPED | OUTPATIENT
Start: 2023-10-06 | End: 2024-04-02

## 2023-10-06 RX ORDER — DAPAGLIFLOZIN AND METFORMIN HYDROCHLORIDE 5; 1000 MG/1; MG/1
2 TABLET, FILM COATED, EXTENDED RELEASE ORAL DAILY
Qty: 180 TABLET | Refills: 1 | Status: SHIPPED | OUTPATIENT
Start: 2023-10-06 | End: 2023-10-06 | Stop reason: SDUPTHER

## 2023-10-27 RX ORDER — TAMSULOSIN HYDROCHLORIDE 0.4 MG/1
0.4 CAPSULE ORAL DAILY
Qty: 90 CAPSULE | Refills: 1 | Status: SHIPPED | OUTPATIENT
Start: 2023-10-27 | End: 2024-10-26

## 2023-12-11 ENCOUNTER — OFFICE VISIT (OUTPATIENT)
Dept: INTERNAL MEDICINE | Facility: CLINIC | Age: 68
End: 2023-12-11
Attending: INTERNAL MEDICINE
Payer: MEDICARE

## 2023-12-11 VITALS
BODY MASS INDEX: 24.55 KG/M2 | HEIGHT: 68 IN | WEIGHT: 162 LBS | DIASTOLIC BLOOD PRESSURE: 72 MMHG | HEART RATE: 73 BPM | OXYGEN SATURATION: 97 % | SYSTOLIC BLOOD PRESSURE: 126 MMHG

## 2023-12-11 DIAGNOSIS — N40.1 BENIGN PROSTATIC HYPERPLASIA WITH URINARY FREQUENCY: ICD-10-CM

## 2023-12-11 DIAGNOSIS — E11.51 TYPE 2 DIABETES MELLITUS WITH DIABETIC PERIPHERAL ANGIOPATHY WITHOUT GANGRENE, WITHOUT LONG-TERM CURRENT USE OF INSULIN: ICD-10-CM

## 2023-12-11 DIAGNOSIS — R35.0 BENIGN PROSTATIC HYPERPLASIA WITH URINARY FREQUENCY: ICD-10-CM

## 2023-12-11 DIAGNOSIS — I10 ESSENTIAL HYPERTENSION: ICD-10-CM

## 2023-12-11 DIAGNOSIS — E78.00 HIGH CHOLESTEROL: Primary | ICD-10-CM

## 2023-12-11 DIAGNOSIS — J32.9 SINUSITIS, UNSPECIFIED CHRONICITY, UNSPECIFIED LOCATION: ICD-10-CM

## 2023-12-11 DIAGNOSIS — I65.23 BILATERAL CAROTID ARTERY STENOSIS: ICD-10-CM

## 2023-12-11 PROCEDURE — 99214 PR OFFICE/OUTPT VISIT, EST, LEVL IV, 30-39 MIN: ICD-10-PCS | Mod: S$GLB,,, | Performed by: INTERNAL MEDICINE

## 2023-12-11 PROCEDURE — 1160F RVW MEDS BY RX/DR IN RCRD: CPT | Mod: CPTII,S$GLB,, | Performed by: INTERNAL MEDICINE

## 2023-12-11 PROCEDURE — 3061F PR NEG MICROALBUMINURIA RESULT DOCUMENTED/REVIEW: ICD-10-PCS | Mod: CPTII,S$GLB,, | Performed by: INTERNAL MEDICINE

## 2023-12-11 PROCEDURE — 3008F PR BODY MASS INDEX (BMI) DOCUMENTED: ICD-10-PCS | Mod: CPTII,S$GLB,, | Performed by: INTERNAL MEDICINE

## 2023-12-11 PROCEDURE — 1160F PR REVIEW ALL MEDS BY PRESCRIBER/CLIN PHARMACIST DOCUMENTED: ICD-10-PCS | Mod: CPTII,S$GLB,, | Performed by: INTERNAL MEDICINE

## 2023-12-11 PROCEDURE — 3074F PR MOST RECENT SYSTOLIC BLOOD PRESSURE < 130 MM HG: ICD-10-PCS | Mod: CPTII,S$GLB,, | Performed by: INTERNAL MEDICINE

## 2023-12-11 PROCEDURE — 3066F PR DOCUMENTATION OF TREATMENT FOR NEPHROPATHY: ICD-10-PCS | Mod: CPTII,S$GLB,, | Performed by: INTERNAL MEDICINE

## 2023-12-11 PROCEDURE — 99214 OFFICE O/P EST MOD 30 MIN: CPT | Mod: S$GLB,,, | Performed by: INTERNAL MEDICINE

## 2023-12-11 PROCEDURE — 3078F PR MOST RECENT DIASTOLIC BLOOD PRESSURE < 80 MM HG: ICD-10-PCS | Mod: CPTII,S$GLB,, | Performed by: INTERNAL MEDICINE

## 2023-12-11 PROCEDURE — 1159F MED LIST DOCD IN RCRD: CPT | Mod: CPTII,S$GLB,, | Performed by: INTERNAL MEDICINE

## 2023-12-11 PROCEDURE — 3066F NEPHROPATHY DOC TX: CPT | Mod: CPTII,S$GLB,, | Performed by: INTERNAL MEDICINE

## 2023-12-11 PROCEDURE — 4010F ACE/ARB THERAPY RXD/TAKEN: CPT | Mod: CPTII,S$GLB,, | Performed by: INTERNAL MEDICINE

## 2023-12-11 PROCEDURE — 3061F NEG MICROALBUMINURIA REV: CPT | Mod: CPTII,S$GLB,, | Performed by: INTERNAL MEDICINE

## 2023-12-11 PROCEDURE — 4010F PR ACE/ARB THEARPY RXD/TAKEN: ICD-10-PCS | Mod: CPTII,S$GLB,, | Performed by: INTERNAL MEDICINE

## 2023-12-11 PROCEDURE — 3044F PR MOST RECENT HEMOGLOBIN A1C LEVEL <7.0%: ICD-10-PCS | Mod: CPTII,S$GLB,, | Performed by: INTERNAL MEDICINE

## 2023-12-11 PROCEDURE — 3044F HG A1C LEVEL LT 7.0%: CPT | Mod: CPTII,S$GLB,, | Performed by: INTERNAL MEDICINE

## 2023-12-11 PROCEDURE — 3078F DIAST BP <80 MM HG: CPT | Mod: CPTII,S$GLB,, | Performed by: INTERNAL MEDICINE

## 2023-12-11 PROCEDURE — 3074F SYST BP LT 130 MM HG: CPT | Mod: CPTII,S$GLB,, | Performed by: INTERNAL MEDICINE

## 2023-12-11 PROCEDURE — 3008F BODY MASS INDEX DOCD: CPT | Mod: CPTII,S$GLB,, | Performed by: INTERNAL MEDICINE

## 2023-12-11 PROCEDURE — 1159F PR MEDICATION LIST DOCUMENTED IN MEDICAL RECORD: ICD-10-PCS | Mod: CPTII,S$GLB,, | Performed by: INTERNAL MEDICINE

## 2023-12-11 NOTE — PROGRESS NOTES
Subjective     Patient ID: Gael Pedroza is a 68 y.o. male.    Chief Complaint: Follow-up, Hyperlipidemia, Sinus Problem (Sinus congestion in head and chest for the last 8 days, cough with clear phlegm, nasal mucus was yellowish green, was taking levofloxacin from ENT which he finished yesterday, prednisone started yesterday should he be taking these with Hypertension and Diabetes  ), Diabetes, and Hypertension    He just finished Levaquin for sinusitis and started Prednisone last night.      Hypertension  This is a chronic problem. The current episode started more than 1 year ago. The problem is controlled.         Diabetes  The patient presents for a follow-up diabetic visit for type 2 diabetes mellitus. The disease course has been stable.     Follow-up  Associated symptoms include congestion.   Hyperlipidemia    Sinus Problem  Associated symptoms include congestion.   Diabetes    Hypertension      Review of Systems   Constitutional: Negative.    HENT:  Positive for nasal congestion.    Respiratory: Negative.     Cardiovascular: Negative.           Objective     Physical Exam  Vitals and nursing note reviewed.   Constitutional:       Appearance: He is well-developed.   HENT:      Head: Normocephalic and atraumatic.   Eyes:      Pupils: Pupils are equal, round, and reactive to light.   Cardiovascular:      Rate and Rhythm: Normal rate and regular rhythm.      Heart sounds: Normal heart sounds.   Pulmonary:      Effort: Pulmonary effort is normal.   Neurological:      Mental Status: He is alert.            Assessment and Plan     1. High cholesterol    2. Type 2 diabetes mellitus with diabetic peripheral angiopathy without gangrene, without long-term current use of insulin  Overview:  2015    Orders:  -     Comprehensive metabolic panel; Future; Expected date: 12/11/2023  -     Lipid panel; Future; Expected date: 12/11/2023  -     Hemoglobin A1c; Future; Expected date: 12/11/2023    3. Bilateral carotid artery  stenosis  Overview:  50-70% right 8/12, 5/13, 3/14, 3/15, 3/16, 1/17  60-70% Right and 50-69% Left - 1/18  MRA 6/18 - >70% right ICA stenosis  Angiogram at [facility] 6/27/18 - 40% right ICA stenosis, Left - no stenosis  10/7/2019: Carotid Duplex: SUZANNE: Severe plaquing - 2.0 m/s - 60-70%. LICA: Moderate plaquing - 1.1 m/s - <50%.  10/6/2020: Carotid Duplex: SUZANNE: Moderate plaquing - 1.5 m/s - 50-60%. LICA: Moderate plaquing - 1.1 m/s - <50%.  10/1/2021: Carotid Duplex: SUZANNE: Severe plaquing - 1.9 m/s - 60-70%. LICA: Severe plaquing - 1.1 m/s - <50%.  10/20/2022: Carotid Duplex: SUZANNE: Moderate plaquing - 1.5 m/s - 50-60%. LICA: Moderate plaquing - 1.1 m/s - <50%.             4. Essential hypertension  Overview:  2013      5. Benign prostatic hyperplasia with urinary frequency        PLAN:  Per orders and D/C instructions.  Continue diet and/or meds for DM, BPH, HTN, and high cholesterol, which are stable.  Follow-up with cardiology for bilateral carotid artery stenosis.  Follow-up with ENT for sinusitis.  Check routine labs.    Between 30 and 39 min of total time for evaluation and management services were spent on the patient today.  The medical problems and treatment options were discussed, and all questions were answered.         Follow up in about 6 months (around 6/11/2024).

## 2023-12-11 NOTE — PATIENT INSTRUCTIONS
[facility].                                                                        Tips for Good Diabetes Care - 2023    The ABCs    A: Hemoglobin A1C at least every 6 months with a goal < 7.0  (Every 3 months if diabetes not controlled)    B: Blood pressure every visit with a goal < 130/80 mm Hg    C. Cholesterol profile at least yearly with a goal LDL < 70    D. Measure GFR - Glomerular Filtration Rate (a measure of kidney function done by blood draw) every 6 months. Some meds may need to be adjusted or eliminated when GFR < 60 ml/min.    E. Dilated eye exam done by an Ophthalmologist at least every 2 years    F. Have a comprehensive foot exam at least every year    1. Exercise - Exercise aerobically with a target heart rate of (220-age) x 0.8  Exercise 30-45 minutes on most days of the week    2. Diet and Supplements- All supplements can be obtained through a varied, healthy diet   Calcium: 1,000 - 1,500 mg each day   8 oz milk = 300 mg,  1oz of cheese = 100-200 mg  Vitamin D: 1,000 iu each day- Can probably be obtained by 30 min. of direct sunlight each day       3 oz. Omfuow=361 iu,  3 oz. Tuna =150 iu, Milk = 120 iu  Fish oil: 1-2 grams each day or about 840 mg of EPA and DHA (Omega-3 fatty acids) each day       3 oz. [place]=2 grams,  3 oz. Tuna=1.3 grams,  3 oz. drained light Tuna= 0.25 grams  Fat intake: Not to exceed 30% of total daily calories    3. Lifestyle - Alcohol: 1 drink = 12 oz. domestic beer, 5 oz. wine, or 1 oz. hard liquor             Males: </= 14 drinks per week with no more than 4 in any one day             Females: </= 7 drinks per week with no more than 3 in any one day  Salt: 1.5-3 grams of Sodium each day.  Tobacco: Dont smoke, or quit smoking (discuss with your doctor)  Depression: If you feel depressed discuss with your doctor  Weight: Maintain a healthy body weight. Stay within 10% of:     Males: 106 lbs. + 6 lbs per inch height above 5 feet                 Females: 100 lbs + 5 lbs per inch height above 5 feet    4. Immunizations - Influenza vaccine every year in the fall  Pneumonia vaccine after diagnosis (Prevnar-20), and at age 65 (Prevnar-20)      For more information from the American Diabetes Association about diabetes and diet go to www.diabetes.org.

## 2023-12-14 ENCOUNTER — LAB VISIT (OUTPATIENT)
Dept: LAB | Facility: HOSPITAL | Age: 68
End: 2023-12-14
Attending: INTERNAL MEDICINE
Payer: MEDICARE

## 2023-12-14 DIAGNOSIS — E11.51 TYPE 2 DIABETES MELLITUS WITH DIABETIC PERIPHERAL ANGIOPATHY WITHOUT GANGRENE, WITHOUT LONG-TERM CURRENT USE OF INSULIN: ICD-10-CM

## 2023-12-14 LAB
ALBUMIN SERPL BCP-MCNC: 4 G/DL (ref 3.5–5.2)
ALP SERPL-CCNC: 41 U/L (ref 55–135)
ALT SERPL W/O P-5'-P-CCNC: 22 U/L (ref 10–44)
ANION GAP SERPL CALC-SCNC: 10 MMOL/L (ref 8–16)
AST SERPL-CCNC: 21 U/L (ref 10–40)
BILIRUB SERPL-MCNC: 0.3 MG/DL (ref 0.1–1)
BUN SERPL-MCNC: 19 MG/DL (ref 8–23)
CALCIUM SERPL-MCNC: 9.4 MG/DL (ref 8.7–10.5)
CHLORIDE SERPL-SCNC: 104 MMOL/L (ref 95–110)
CHOLEST SERPL-MCNC: 93 MG/DL (ref 120–199)
CHOLEST/HDLC SERPL: 2.1 {RATIO} (ref 2–5)
CO2 SERPL-SCNC: 27 MMOL/L (ref 23–29)
CREAT SERPL-MCNC: 0.8 MG/DL (ref 0.5–1.4)
EST. GFR  (NO RACE VARIABLE): >60 ML/MIN/1.73 M^2
ESTIMATED AVG GLUCOSE: 137 MG/DL (ref 68–131)
GLUCOSE SERPL-MCNC: 122 MG/DL (ref 70–110)
HBA1C MFR BLD: 6.4 % (ref 4–5.6)
HDLC SERPL-MCNC: 45 MG/DL (ref 40–75)
HDLC SERPL: 48.4 % (ref 20–50)
LDLC SERPL CALC-MCNC: 39 MG/DL (ref 63–159)
NONHDLC SERPL-MCNC: 48 MG/DL
POTASSIUM SERPL-SCNC: 5 MMOL/L (ref 3.5–5.1)
PROT SERPL-MCNC: 6.9 G/DL (ref 6–8.4)
SODIUM SERPL-SCNC: 141 MMOL/L (ref 136–145)
TRIGL SERPL-MCNC: 45 MG/DL (ref 30–150)

## 2023-12-14 PROCEDURE — 83036 HEMOGLOBIN GLYCOSYLATED A1C: CPT | Performed by: INTERNAL MEDICINE

## 2023-12-14 PROCEDURE — 36415 COLL VENOUS BLD VENIPUNCTURE: CPT | Performed by: INTERNAL MEDICINE

## 2023-12-14 PROCEDURE — 80053 COMPREHEN METABOLIC PANEL: CPT | Performed by: INTERNAL MEDICINE

## 2023-12-14 PROCEDURE — 80061 LIPID PANEL: CPT | Performed by: INTERNAL MEDICINE

## 2024-02-12 ENCOUNTER — OFFICE VISIT (OUTPATIENT)
Dept: CARDIOLOGY | Facility: CLINIC | Age: 69
End: 2024-02-12
Attending: INTERNAL MEDICINE
Payer: MEDICARE

## 2024-02-12 VITALS
WEIGHT: 165 LBS | BODY MASS INDEX: 25.01 KG/M2 | SYSTOLIC BLOOD PRESSURE: 122 MMHG | HEIGHT: 68 IN | DIASTOLIC BLOOD PRESSURE: 58 MMHG | OXYGEN SATURATION: 97 % | HEART RATE: 65 BPM

## 2024-02-12 DIAGNOSIS — E78.00 HIGH CHOLESTEROL: ICD-10-CM

## 2024-02-12 DIAGNOSIS — E11.51 TYPE 2 DIABETES MELLITUS WITH DIABETIC PERIPHERAL ANGIOPATHY WITHOUT GANGRENE, WITHOUT LONG-TERM CURRENT USE OF INSULIN: ICD-10-CM

## 2024-02-12 DIAGNOSIS — I77.1 STENOSIS OF LEFT SUBCLAVIAN ARTERY: ICD-10-CM

## 2024-02-12 DIAGNOSIS — E78.00 ELEVATED CHOLESTEROL: ICD-10-CM

## 2024-02-12 DIAGNOSIS — I10 ESSENTIAL HYPERTENSION: ICD-10-CM

## 2024-02-12 DIAGNOSIS — I65.23 BILATERAL CAROTID ARTERY STENOSIS: ICD-10-CM

## 2024-02-12 DIAGNOSIS — E66.3 OVERWEIGHT: ICD-10-CM

## 2024-02-12 PROCEDURE — 99214 OFFICE O/P EST MOD 30 MIN: CPT | Mod: S$GLB,,, | Performed by: INTERNAL MEDICINE

## 2024-02-12 PROCEDURE — 99999 PR PBB SHADOW E&M-EST. PATIENT-LVL III: CPT | Mod: PBBFAC,,, | Performed by: INTERNAL MEDICINE

## 2024-02-12 RX ORDER — ROSUVASTATIN CALCIUM 10 MG/1
10 TABLET, COATED ORAL DAILY
Qty: 90 TABLET | Refills: 3 | Status: SHIPPED | OUTPATIENT
Start: 2024-02-12

## 2024-02-12 RX ORDER — VALSARTAN 80 MG/1
80 TABLET ORAL DAILY
Qty: 90 TABLET | Refills: 3 | Status: SHIPPED | OUTPATIENT
Start: 2024-02-12

## 2024-02-12 RX ORDER — NEBIVOLOL 5 MG/1
5 TABLET ORAL DAILY
Qty: 90 TABLET | Refills: 3 | Status: SHIPPED | OUTPATIENT
Start: 2024-02-12

## 2024-02-12 RX ORDER — EZETIMIBE 10 MG/1
10 TABLET ORAL DAILY
Qty: 90 TABLET | Refills: 3 | Status: SHIPPED | OUTPATIENT
Start: 2024-02-12 | End: 2024-05-20 | Stop reason: SDUPTHER

## 2024-02-12 NOTE — PROGRESS NOTES
Subjective:     Gael Pedroza is a 68 y.o. male with hypertension, hypercholesterolemia and diabetes mellitus type 2. He is mildly obese. He has known carotid disease. A carotid duplex study on 4/17/2018 revealed the right internal carotid artery to have a velocity of 2.4 m/s. He had a magnetic resonance angiographic imaging study that confirmed a 70% stenosis. On 6/27/2008 he had a four vessel cerebral angiogram at [facility] that revealed the right internal carotid artery to have a 40% stenosis. The left subclavian had a 30% lesion. He was concerned about the different findings on the tests. On 10/7/2019 he had a carotid duplex which revealed the right internal carotid artery to have severe plaquing with a peak velocity of 2.0 m/s correlating with a stenosis in the 60-70% range. The left internal carotid artery had moderate plaquing with a velocity of 1.1 m/s suggesting a less than 50% narrowing. On 10/6/2020 he had a carotid duplex that revealed the right internal carotid artery to have moderate plaquing with a peak velocity of 1.5 m/s correlating with a stenosis in the 50-60% range. The left internal carotid artery had moderate plaquing with a velocity of 1.1 m/s correlating with a <50% stenosis. In 9/2021 he had a mild case of COVID-19 being fully vaccinated. Some arthritis. No exertional chest pain or exertional dyspnea. No palpitations or weak spells. No bleeding. No issues with any of his prescribed medications. Feeling well overall.       Hypertension  This is a chronic problem. The current episode started more than 1 year ago. The problem is unchanged. The problem is controlled (usually 120-130/70-80 mmHg at home). Pertinent negatives include no anxiety, blurred vision, chest pain, headaches, malaise/fatigue, neck pain, orthopnea, palpitations, peripheral edema, PND, shortness of breath or sweats. There is no history of chronic renal disease.   Hyperlipidemia  This is a chronic problem. The  current episode started more than 1 year ago. The problem is controlled. Recent lipid tests were reviewed and are normal. Exacerbating diseases include diabetes and obesity. He has no history of chronic renal disease, hypothyroidism, liver disease or nephrotic syndrome. Pertinent negatives include no chest pain, focal sensory loss, focal weakness, leg pain, myalgias or shortness of breath.       Review of Systems   Constitutional: Negative for chills, fever and malaise/fatigue.   HENT:  Negative for nosebleeds.    Eyes:  Negative for blurred vision, double vision, photophobia, vision loss in left eye and vision loss in right eye.   Cardiovascular:  Negative for chest pain, claudication, dyspnea on exertion, irregular heartbeat, leg swelling, near-syncope, orthopnea, palpitations, paroxysmal nocturnal dyspnea and syncope.   Respiratory:  Negative for cough, hemoptysis, shortness of breath and wheezing.    Endocrine: Negative for cold intolerance and heat intolerance.   Hematologic/Lymphatic: Negative for bleeding problem. Does not bruise/bleed easily.   Skin:  Negative for color change and rash.   Musculoskeletal:  Positive for arthritis, back pain and joint pain. Negative for falls, muscle weakness, myalgias and neck pain.   Gastrointestinal:  Negative for heartburn, hematemesis, hematochezia, hemorrhoids, jaundice, melena, nausea and vomiting.   Genitourinary:  Negative for dysuria and hematuria.   Neurological:  Negative for dizziness, focal weakness, headaches, light-headedness, loss of balance, numbness, tremors, vertigo and weakness.   Psychiatric/Behavioral:  Negative for altered mental status, depression and memory loss. The patient is not nervous/anxious.    Allergic/Immunologic: Negative for hives and persistent infections.         Current Outpatient Medications on File Prior to Visit   Medication Sig Dispense Refill    ascorbic acid, vitamin C, (VITAMIN C) 500 MG tablet Take 500 mg by mouth once daily.    "   aspirin (ECOTRIN) 81 MG EC tablet Take 1 tablet (81 mg total) by mouth once daily. 90 tablet 3    cholecalciferol, vitamin D3, 1,000 unit capsule Take 1,000 Units by mouth once daily.      cyclobenzaprine (FLEXERIL) 10 MG tablet 1/2-1 tab nightly as needed 20 tablet 0    dapaglifloz propaned-metformin (XIGDUO XR) 5-1,000 mg Take 2 tablets by mouth once daily. 180 tablet 1    EXELDERM 1 % external solution       ezetimibe (ZETIA) 10 mg tablet Take 1 tablet (10 mg total) by mouth once daily. 90 tablet 3    multivitamin with minerals tablet Take 1 tablet by mouth once daily.      nebivoloL (BYSTOLIC) 5 MG Tab TAKE ONE TABLET BY MOUTH ONCE DAILY. 90 tablet 3    rosuvastatin (CRESTOR) 10 MG tablet TAKE 1 TABLET BY MOUTH ONCE DAILY 90 tablet 3    tamsulosin (FLOMAX) 0.4 mg Cap Take 1 capsule (0.4 mg total) by mouth once daily. 90 capsule 1    valsartan (DIOVAN) 80 MG tablet Take 1 tablet (80 mg total) by mouth once daily. 90 tablet 3     No current facility-administered medications on file prior to visit.       BP (!) 122/58 (BP Location: Right arm, Patient Position: Sitting, BP Method: Large (Manual))   Pulse 65   Ht 5' 8" (1.727 m)   Wt 74.8 kg (165 lb)   SpO2 97%   BMI 25.09 kg/m²       Objective:     Physical Exam  Constitutional:       General: He is not in acute distress.     Appearance: Normal appearance. He is well-developed. He is not toxic-appearing or diaphoretic.   HENT:      Head: Normocephalic and atraumatic.      Nose: Nose normal.   Eyes:      General:         Right eye: No discharge.         Left eye: No discharge.      Conjunctiva/sclera:      Right eye: Right conjunctiva is not injected.      Left eye: Left conjunctiva is not injected.      Pupils: Pupils are equal.      Right eye: Pupil is round.      Left eye: Pupil is round.   Neck:      Thyroid: No thyromegaly.      Vascular: Carotid bruit (soft bruit on the right side) present. No JVD.      Comments: Bruit over left subclavian artery. "   Cardiovascular:      Rate and Rhythm: Normal rate and regular rhythm. No extrasystoles are present.     Chest Wall: PMI is not displaced.      Pulses:           Radial pulses are 2+ on the right side and 2+ on the left side.        Femoral pulses are 2+ on the right side and 2+ on the left side.       Dorsalis pedis pulses are 2+ on the right side and 2+ on the left side.        Posterior tibial pulses are 2+ on the right side and 2+ on the left side.      Heart sounds: S1 normal and S2 normal.      No gallop.   Pulmonary:      Effort: Pulmonary effort is normal.      Breath sounds: Normal breath sounds.   Abdominal:      Palpations: Abdomen is soft.      Tenderness: There is no abdominal tenderness.   Musculoskeletal:      Cervical back: Neck supple.      Right ankle: No swelling, deformity or ecchymosis.      Left ankle: No swelling, deformity or ecchymosis.   Lymphadenopathy:      Head:      Right side of head: No submandibular adenopathy.      Left side of head: No submandibular adenopathy.      Cervical: No cervical adenopathy.   Skin:     General: Skin is warm and dry.      Findings: No rash.   Neurological:      General: No focal deficit present.      Mental Status: He is alert and oriented to person, place, and time. He is not disoriented.      Cranial Nerves: No cranial nerve deficit.   Psychiatric:         Attention and Perception: Attention and perception normal.         Mood and Affect: Mood and affect normal.         Speech: Speech normal.         Behavior: Behavior normal.         Thought Content: Thought content normal.         Cognition and Memory: Cognition and memory normal.         Judgment: Judgment normal.         Assessment:     1. Bilateral carotid artery stenosis    2. Stenosis of left subclavian artery    3. Essential hypertension    4. High cholesterol    5. Type 2 diabetes mellitus with diabetic peripheral angiopathy without gangrene, without long-term current use of insulin    6.  Overweight        Plan:     1. Carotid Artery Stenosis   4/17/2018: Carotid Duplex: SUZANNE: 2.4 m/s - 70-80%. LICA: 1.3 m/s - <50%.   6/1/2018: MRA: SUZANNE: About 70%.   6/27/2018: [facility]: AA, 4V: SUZANNE: 40%. LICA: Mild. Left Subclavian: 30%.   10/7/2019: Carotid Duplex: SUZANNE: Severe plaquing - 2.0 m/s - 60-70%. LICA: Moderate plaquing - 1.1 m/s - <50%.   10/6/2020: Carotid Duplex: SUZANNE: Moderate plaquing - 1.5 m/s - 50-60%. LICA: Moderate plaquing - 1.1 m/s - <50%.   10/1/2021: Carotid Duplex: SUZANNE: Severe plaquing - 1.9 m/s - 60-70%. LICA: Severe plaquing - 1.1 m/s - <50%.   10/20/2022: Carotid Duplex: SUZANNE: Moderate plaquing - 1.5 m/s - 50-60%. LICA: Moderate plaquing - 1.1 m/s - <50%.    On aspirin 81 mg Q24.   10/2023: Plan was next Carotid Duplex. Then yearly. Do soon.    2. Subclavian Artery Disease   6/27/2018: [facility]: KB, 4V: Left Subclavian: 30%.   Asymptomatic.    3. Hypertension   2012: Diagnosed.   On nebivolol 5 mg Q24 and valsartan 160 mg Q24.   Keeping log at home.   Well controlled.    4. Hypercholesterolemia   2012: Began statin.   Felt he had muscle and joint pain on rosuvastatin 20 mg Q24.   On rosuvastatin 10 mg Q24.   6/11/2019: Chol 131. HDL 54. LDL 59. TG 93.   12/11/2019: Chol 151. HDL 52. LDL 82. TG 87.   6/30/2020: Chol 149. HDL 52. LDL 80. TG 85.   On rosuvastatin 10 mg Q24.   10/13/2020: Ezetimibe 10 mg Q24 was begun in addition to rosuvastatin 10 mg Q24 with quite favorable lipid panel but JERICA.    On rosuvastatin 10 mg Q24 and ezetimibe 10 mg Q24.   12/18/2020: Chol 115. HDL 51. LDL 49. TG 77.   12/15/2021: Chol 101. HDL 43. LDL 43. TG 74.   7/1/2022: Chol 106. HDL 49. LDL 45. TG 58.   1/10/2023: Chol 80. HDL 42. LDL 30. TG 38.   12/14/2023: Chol 93. HDL 45. LDL 39. TG 45.   On rosuvastatin 10 mg Q24 and ezetimibe 10 mg Q24.   Very favorable lipid panel.    5. Diabetes Mellitus, Type 2   2019: Diagnosed. Complications: JERICA & PAD. Medications: Oral agent.   On dapagliflozin 5 mg/metformin 1,000  mg 2 tabs Q24.   12/15/2021: HgbA1C 8.0%.   7/1/2022: HbgA1C 9.1%.   1/10/2023: HgbA1C 6.0%.    6. Overweight   9/9/2019: Weight 88 kg. BMI 31.   10/14/2021: Weight 85 kg. BMI 28.   10/18/2022: Weight 79 kg. BMI 26.   2/12/2024: Weight 75 kg. BMI 25.    7. History of Severe Acute Respiratory Syndrome - Corona Virus 2 Infection   9/2021: Mild course. Fully vaccinated. Received antibody.    8. Primary Care   Dr. Lino Steele.    F/u 6 months.    Raquel Elise M.D.      2/26/2024 5:31 PM, Addendum:    2/26/2024: Carotid Duplex: SUZANNE: Severe plaquing - 1.3 m/s - 50-60%. LICA: Severe plaquing - 1.4 m/s - 50-60%.    I discussed the above test result and the implications of the findings over the phone.    Raquel Elise M.D.

## 2024-02-26 ENCOUNTER — HOSPITAL ENCOUNTER (OUTPATIENT)
Dept: CARDIOLOGY | Facility: OTHER | Age: 69
Discharge: HOME | End: 2024-02-26
Attending: INTERNAL MEDICINE
Payer: MEDICARE

## 2024-02-26 DIAGNOSIS — I77.1 STENOSIS OF LEFT SUBCLAVIAN ARTERY: ICD-10-CM

## 2024-02-26 DIAGNOSIS — I65.23 BILATERAL CAROTID ARTERY STENOSIS: ICD-10-CM

## 2024-02-26 LAB
LEFT ARM DIASTOLIC BLOOD PRESSURE: 61 MMHG
LEFT ARM SYSTOLIC BLOOD PRESSURE: 117 MMHG
LEFT CBA DIAS: 11 CM/S
LEFT CBA SYS: 141 CM/S
LEFT CCA DIST DIAS: 18 CM/S
LEFT CCA DIST SYS: 93 CM/S
LEFT CCA MID DIAS: 16 CM/S
LEFT CCA MID SYS: 107 CM/S
LEFT CCA PROX DIAS: 19 CM/S
LEFT CCA PROX SYS: 108 CM/S
LEFT ECA DIAS: 10 CM/S
LEFT ECA SYS: 184 CM/S
LEFT ICA DIST DIAS: 23 CM/S
LEFT ICA DIST SYS: 74 CM/S
LEFT ICA MID DIAS: 22 CM/S
LEFT ICA MID SYS: 81 CM/S
LEFT ICA PROX DIAS: 12 CM/S
LEFT ICA PROX SYS: 86 CM/S
LEFT VERTEBRAL DIAS: 9 CM/S
LEFT VERTEBRAL SYS: 60 CM/S
OHS CV CAROTID RIGHT ICA EDV HIGHEST: 30
OHS CV CAROTID ULTRASOUND LEFT ICA/CCA RATIO: 0.92
OHS CV CAROTID ULTRASOUND RIGHT ICA/CCA RATIO: 1.36
OHS CV PV CAROTID LEFT HIGHEST CCA: 108
OHS CV PV CAROTID LEFT HIGHEST ICA: 86
OHS CV PV CAROTID RIGHT HIGHEST CCA: 99
OHS CV PV CAROTID RIGHT HIGHEST ICA: 128
OHS CV US CAROTID LEFT HIGHEST EDV: 23
RIGHT ARM DIASTOLIC BLOOD PRESSURE: 68 MMHG
RIGHT ARM SYSTOLIC BLOOD PRESSURE: 120 MMHG
RIGHT CBA DIAS: 13 CM/S
RIGHT CBA SYS: 82 CM/S
RIGHT CCA DIST DIAS: 15 CM/S
RIGHT CCA DIST SYS: 94 CM/S
RIGHT CCA MID DIAS: 14 CM/S
RIGHT CCA MID SYS: 99 CM/S
RIGHT CCA PROX DIAS: 10 CM/S
RIGHT CCA PROX SYS: 79 CM/S
RIGHT ECA DIAS: 10 CM/S
RIGHT ECA SYS: 261 CM/S
RIGHT ICA DIST DIAS: 16 CM/S
RIGHT ICA DIST SYS: 72 CM/S
RIGHT ICA MID DIAS: 21 CM/S
RIGHT ICA MID SYS: 95 CM/S
RIGHT ICA PROX DIAS: 30 CM/S
RIGHT ICA PROX SYS: 128 CM/S
RIGHT VERTEBRAL DIAS: 11 CM/S
RIGHT VERTEBRAL SYS: 73 CM/S

## 2024-02-26 PROCEDURE — 93880 EXTRACRANIAL BILAT STUDY: CPT

## 2024-02-26 PROCEDURE — 93880 EXTRACRANIAL BILAT STUDY: CPT | Mod: 26,,, | Performed by: INTERNAL MEDICINE

## 2024-04-02 DIAGNOSIS — E11.51 TYPE 2 DIABETES MELLITUS WITH DIABETIC PERIPHERAL ANGIOPATHY WITHOUT GANGRENE, WITHOUT LONG-TERM CURRENT USE OF INSULIN: ICD-10-CM

## 2024-04-02 RX ORDER — DAPAGLIFLOZIN AND METFORMIN HYDROCHLORIDE 5; 1000 MG/1; MG/1
2 TABLET, FILM COATED, EXTENDED RELEASE ORAL
Qty: 180 TABLET | Refills: 1 | Status: SHIPPED | OUTPATIENT
Start: 2024-04-02

## 2024-05-20 DIAGNOSIS — E78.00 HIGH CHOLESTEROL: ICD-10-CM

## 2024-05-20 RX ORDER — EZETIMIBE 10 MG/1
10 TABLET ORAL DAILY
Qty: 90 TABLET | Refills: 3 | Status: SHIPPED | OUTPATIENT
Start: 2024-05-20

## 2024-05-20 NOTE — TELEPHONE ENCOUNTER
----- Message from Shira Carter sent at 5/20/2024 10:11 AM CDT -----  Type: RX Refill Request       Who Called: self        Have you contacted your pharmacy: yes       Refill or New Rx: refill        RX Name and Strength: ezetimibe (ZETIA) 10 mg tablet        Preferred Pharmacy with phone number:    [facility] - [address] [address]  Phone: [phone] Fax: [phone]         Local or Mail Order: local        Would the patient rather a call back or a response via [facility]? [phone] (home)

## 2024-06-11 ENCOUNTER — OFFICE VISIT (OUTPATIENT)
Dept: INTERNAL MEDICINE | Facility: CLINIC | Age: 69
End: 2024-06-11
Attending: INTERNAL MEDICINE
Payer: MEDICARE

## 2024-06-11 VITALS
HEART RATE: 72 BPM | BODY MASS INDEX: 24.55 KG/M2 | SYSTOLIC BLOOD PRESSURE: 130 MMHG | DIASTOLIC BLOOD PRESSURE: 64 MMHG | HEIGHT: 68 IN | WEIGHT: 162 LBS | OXYGEN SATURATION: 98 %

## 2024-06-11 DIAGNOSIS — Z13.39 SCREENING FOR ALCOHOLISM: ICD-10-CM

## 2024-06-11 DIAGNOSIS — D51.0 PERNICIOUS ANEMIA: ICD-10-CM

## 2024-06-11 DIAGNOSIS — E78.00 HIGH CHOLESTEROL: Primary | ICD-10-CM

## 2024-06-11 DIAGNOSIS — E78.9 DISORDER OF LIPID METABOLISM: Primary | ICD-10-CM

## 2024-06-11 DIAGNOSIS — E03.9 HYPOTHYROIDISM, UNSPECIFIED TYPE: ICD-10-CM

## 2024-06-11 DIAGNOSIS — Z13.31 SCREENING FOR DEPRESSION: ICD-10-CM

## 2024-06-11 DIAGNOSIS — R79.89 OTHER SPECIFIED ABNORMAL FINDINGS OF BLOOD CHEMISTRY: ICD-10-CM

## 2024-06-11 DIAGNOSIS — E11.51 TYPE 2 DIABETES MELLITUS WITH DIABETIC PERIPHERAL ANGIOPATHY WITHOUT GANGRENE, WITHOUT LONG-TERM CURRENT USE OF INSULIN: ICD-10-CM

## 2024-06-11 DIAGNOSIS — D50.8 OTHER IRON DEFICIENCY ANEMIA: ICD-10-CM

## 2024-06-11 DIAGNOSIS — Z12.5 SCREENING FOR PROSTATE CANCER: ICD-10-CM

## 2024-06-11 DIAGNOSIS — J32.9 SINUSITIS, UNSPECIFIED CHRONICITY, UNSPECIFIED LOCATION: ICD-10-CM

## 2024-06-11 DIAGNOSIS — I10 ESSENTIAL HYPERTENSION: ICD-10-CM

## 2024-06-11 DIAGNOSIS — R53.83 FATIGUE, UNSPECIFIED TYPE: ICD-10-CM

## 2024-06-11 DIAGNOSIS — E55.9 VITAMIN D DEFICIENCY: ICD-10-CM

## 2024-06-11 PROCEDURE — 3008F BODY MASS INDEX DOCD: CPT | Mod: CPTII,S$GLB,, | Performed by: INTERNAL MEDICINE

## 2024-06-11 PROCEDURE — G0442 ANNUAL ALCOHOL SCREEN 15 MIN: HCPCS | Mod: 59,S$GLB,, | Performed by: INTERNAL MEDICINE

## 2024-06-11 PROCEDURE — 3075F SYST BP GE 130 - 139MM HG: CPT | Mod: CPTII,S$GLB,, | Performed by: INTERNAL MEDICINE

## 2024-06-11 PROCEDURE — G0444 DEPRESSION SCREEN ANNUAL: HCPCS | Mod: 59,S$GLB,, | Performed by: INTERNAL MEDICINE

## 2024-06-11 PROCEDURE — 3078F DIAST BP <80 MM HG: CPT | Mod: CPTII,S$GLB,, | Performed by: INTERNAL MEDICINE

## 2024-06-11 PROCEDURE — 99214 OFFICE O/P EST MOD 30 MIN: CPT | Mod: 25,S$GLB,, | Performed by: INTERNAL MEDICINE

## 2024-06-11 PROCEDURE — 1160F RVW MEDS BY RX/DR IN RCRD: CPT | Mod: CPTII,S$GLB,, | Performed by: INTERNAL MEDICINE

## 2024-06-11 PROCEDURE — 1159F MED LIST DOCD IN RCRD: CPT | Mod: CPTII,S$GLB,, | Performed by: INTERNAL MEDICINE

## 2024-06-11 PROCEDURE — 96372 THER/PROPH/DIAG INJ SC/IM: CPT | Mod: S$GLB,,, | Performed by: INTERNAL MEDICINE

## 2024-06-11 PROCEDURE — 4010F ACE/ARB THERAPY RXD/TAKEN: CPT | Mod: CPTII,S$GLB,, | Performed by: INTERNAL MEDICINE

## 2024-06-11 RX ORDER — METHYLPREDNISOLONE ACETATE 80 MG/ML
80 INJECTION, SUSPENSION INTRA-ARTICULAR; INTRALESIONAL; INTRAMUSCULAR; SOFT TISSUE ONCE
Status: COMPLETED | OUTPATIENT
Start: 2024-06-11 | End: 2024-06-11

## 2024-06-11 RX ORDER — LEVOFLOXACIN 500 MG/1
500 TABLET, FILM COATED ORAL
COMMUNITY
Start: 2024-01-06 | End: 2024-06-11

## 2024-06-11 RX ORDER — TRIAMCINOLONE ACETONIDE 40 MG/ML
40 INJECTION, SUSPENSION INTRA-ARTICULAR; INTRAMUSCULAR ONCE
Status: COMPLETED | OUTPATIENT
Start: 2024-06-11 | End: 2024-06-11

## 2024-06-11 RX ADMIN — METHYLPREDNISOLONE ACETATE 80 MG: 80 INJECTION, SUSPENSION INTRA-ARTICULAR; INTRALESIONAL; INTRAMUSCULAR; SOFT TISSUE at 10:06

## 2024-06-11 RX ADMIN — TRIAMCINOLONE ACETONIDE 40 MG: 40 INJECTION, SUSPENSION INTRA-ARTICULAR; INTRAMUSCULAR at 10:06

## 2024-06-11 NOTE — PATIENT INSTRUCTIONS
Go to an [facility] lab for urine and blood tests.                                                                       Tips for Good Diabetes Care - 2024    The ABCs    A: Hemoglobin A1C at least every 6 months with a goal < 7.0  (Every 3 months if diabetes not controlled)    B: Blood pressure every visit with a goal < 130/80 mm Hg    C. Cholesterol profile at least yearly with a goal LDL < 70    D. Measure GFR - Glomerular Filtration Rate (a measure of kidney function done by blood draw) every 6 months. Some meds may need to be adjusted or eliminated when GFR < 60 ml/min.    E. Dilated eye exam done by an Ophthalmologist at least every 2 years    F. Have a comprehensive foot exam at least every year    1. Exercise - Exercise aerobically with a target heart rate of (220-age) x 0.8  Exercise 30-45 minutes on most days of the week    2. Diet and Supplements- All supplements can be obtained through a varied, healthy diet   Calcium: 1,000 - 1,500 mg each day   8 oz milk = 300 mg,  1oz of cheese = 100-200 mg  Vitamin D: 1,000 iu each day- Can probably be obtained by 30 min. of direct sunlight each day       3 oz. Wkdffz=812 iu,  3 oz. Tuna =150 iu, Milk = 120 iu  Fish oil: 1-2 grams each day or about 840 mg of EPA and DHA (Omega-3 fatty acids) each day       3 oz. Andrew=2 grams,  3 oz. Tuna=1.3 grams,  3 oz. drained light Tuna= 0.25 grams  Fat intake: Not to exceed 30% of total daily calories    3. Lifestyle - Alcohol: 1 drink = 12 oz. domestic beer, 5 oz. wine, or 1 oz. hard liquor             Males: </= 14 drinks per week with no more than 4 in any one day             Females: </= 7 drinks per week with no more than 3 in any one day  Salt: 1.5-3 grams of Sodium each day.  Tobacco: Dont smoke, or quit smoking (discuss with your doctor)  Depression: If you feel depressed discuss with your doctor  Weight: Maintain a healthy body weight. Stay within 10% of:     Males: 106 lbs. + 6 lbs per inch height above 5 feet                 Females: 100 lbs + 5 lbs per inch height above 5 feet    4. Immunizations - Influenza vaccine every year in the fall  Pneumonia vaccine after diagnosis (Prevnar-20), and at age 65 (Prevnar-20)      For more information from the American Diabetes Association about diabetes and diet go to www.diabetes.org.

## 2024-06-11 NOTE — PROGRESS NOTES
Subjective     Patient ID: Gael Pedroza is a 69 y.o. male.    Chief Complaint: Annual Exam (Was sick last week with sinuses and began to take Levaquin which makes him achy, now feels as if he was hit by a bus ) and Hyperlipidemia    He recently developed sinus congestion and green nasal discharge.  He had Levaquin at home which he has been taking for 4 days.  Feels extreme fatigue.  He forgot that he has taken Levaquin in the past and it has given him extreme fatigue.    He has not been as good about his diabetic diet recently.  His fasting glucose is from 120-140.      Hypertension  This is a chronic problem. The current episode started more than 1 year ago. The problem is controlled.         Diabetes  The patient presents for a follow-up diabetic visit for type 2 diabetes mellitus. The disease course has been stable.     Hyperlipidemia  This is a chronic problem. The current episode started more than 1 year ago. The problem is controlled.     Review of Systems   Constitutional: Negative.    HENT:  Positive for nasal congestion and sinus pressure/congestion.    Respiratory: Negative.     Cardiovascular: Negative.           Objective     Physical Exam  Vitals and nursing note reviewed.   Constitutional:       Appearance: He is well-developed.   HENT:      Head: Normocephalic and atraumatic.   Eyes:      Pupils: Pupils are equal, round, and reactive to light.   Cardiovascular:      Rate and Rhythm: Normal rate and regular rhythm.      Heart sounds: Normal heart sounds.   Pulmonary:      Effort: Pulmonary effort is normal.   Neurological:      Mental Status: He is alert.            Assessment and Plan     1. High cholesterol    2. Type 2 diabetes mellitus with diabetic peripheral angiopathy without gangrene, without long-term current use of insulin  Overview:  2015      3. Essential hypertension  Overview:  2013      4. Screening for alcoholism    5. Screening for depression    6. Sinusitis, unspecified  chronicity, unspecified location    Other orders  -     methylPREDNISolone acetate injection 80 mg  -     triamcinolone acetonide injection 40 mg        PLAN:  Per orders and D/C instructions.  Continue diet and/or meds for DM, HTN, and high cholesterol, which are stable.  Discontinue Levaquin, which causes him extreme fatigue.  Continue Flonase and steroid shot today for sinusitis.  Check routine labs with urine for microalbumin.    Screening: The patient was screened for depression with the PHQ2 questionnaire and possible health consequences were discussed with the patient, who understands (15 minutes spent).       The patient was screened for the misuse of alcohol, by asking the number of drinks per average week, and if pt has had more than 4 drinks (more than 3 for women and elderly) in 1 day within the past year. The health and legal consequences of misuse were discussed (15 minutes spent).        Follow up in about 6 months (around 12/11/2024).

## 2024-06-13 ENCOUNTER — LAB VISIT (OUTPATIENT)
Dept: LAB | Facility: HOSPITAL | Age: 69
End: 2024-06-13
Attending: INTERNAL MEDICINE
Payer: MEDICARE

## 2024-06-13 DIAGNOSIS — D50.8 OTHER IRON DEFICIENCY ANEMIA: ICD-10-CM

## 2024-06-13 DIAGNOSIS — R79.89 OTHER SPECIFIED ABNORMAL FINDINGS OF BLOOD CHEMISTRY: ICD-10-CM

## 2024-06-13 DIAGNOSIS — Z12.5 SCREENING FOR PROSTATE CANCER: ICD-10-CM

## 2024-06-13 DIAGNOSIS — D51.0 PERNICIOUS ANEMIA: ICD-10-CM

## 2024-06-13 DIAGNOSIS — E03.9 HYPOTHYROIDISM, UNSPECIFIED TYPE: ICD-10-CM

## 2024-06-13 DIAGNOSIS — E55.9 VITAMIN D DEFICIENCY: ICD-10-CM

## 2024-06-13 DIAGNOSIS — E78.9 DISORDER OF LIPID METABOLISM: ICD-10-CM

## 2024-06-13 LAB
25(OH)D3+25(OH)D2 SERPL-MCNC: 60 NG/ML (ref 30–96)
ALBUMIN SERPL BCP-MCNC: 4.1 G/DL (ref 3.5–5.2)
ALP SERPL-CCNC: 46 U/L (ref 55–135)
ALT SERPL W/O P-5'-P-CCNC: 17 U/L (ref 10–44)
ANION GAP SERPL CALC-SCNC: 13 MMOL/L (ref 8–16)
AST SERPL-CCNC: 19 U/L (ref 10–40)
BASOPHILS # BLD AUTO: 0.04 K/UL (ref 0–0.2)
BASOPHILS NFR BLD: 0.5 % (ref 0–1.9)
BILIRUB SERPL-MCNC: 0.5 MG/DL (ref 0.1–1)
BUN SERPL-MCNC: 19 MG/DL (ref 8–23)
CALCIUM SERPL-MCNC: 10.1 MG/DL (ref 8.7–10.5)
CHLORIDE SERPL-SCNC: 104 MMOL/L (ref 95–110)
CHOLEST SERPL-MCNC: 98 MG/DL (ref 120–199)
CHOLEST/HDLC SERPL: 1.9 {RATIO} (ref 2–5)
CK SERPL-CCNC: 52 U/L (ref 20–200)
CO2 SERPL-SCNC: 24 MMOL/L (ref 23–29)
COMPLEXED PSA SERPL-MCNC: 1.1 NG/ML (ref 0–4)
CREAT SERPL-MCNC: 0.9 MG/DL (ref 0.5–1.4)
DIFFERENTIAL METHOD BLD: ABNORMAL
EOSINOPHIL # BLD AUTO: 0.1 K/UL (ref 0–0.5)
EOSINOPHIL NFR BLD: 0.8 % (ref 0–8)
ERYTHROCYTE [DISTWIDTH] IN BLOOD BY AUTOMATED COUNT: 12.7 % (ref 11.5–14.5)
EST. GFR  (NO RACE VARIABLE): >60 ML/MIN/1.73 M^2
ESTIMATED AVG GLUCOSE: 137 MG/DL (ref 68–131)
GLUCOSE SERPL-MCNC: 116 MG/DL (ref 70–110)
HBA1C MFR BLD: 6.4 % (ref 4–5.6)
HCT VFR BLD AUTO: 46 % (ref 40–54)
HDLC SERPL-MCNC: 51 MG/DL (ref 40–75)
HDLC SERPL: 52 % (ref 20–50)
HGB BLD-MCNC: 15.5 G/DL (ref 14–18)
IMM GRANULOCYTES # BLD AUTO: 0.02 K/UL (ref 0–0.04)
IMM GRANULOCYTES NFR BLD AUTO: 0.3 % (ref 0–0.5)
LDLC SERPL CALC-MCNC: 36.6 MG/DL (ref 63–159)
LYMPHOCYTES # BLD AUTO: 1.7 K/UL (ref 1–4.8)
LYMPHOCYTES NFR BLD: 23.3 % (ref 18–48)
MCH RBC QN AUTO: 32.8 PG (ref 27–31)
MCHC RBC AUTO-ENTMCNC: 33.7 G/DL (ref 32–36)
MCV RBC AUTO: 97 FL (ref 82–98)
MONOCYTES # BLD AUTO: 0.4 K/UL (ref 0.3–1)
MONOCYTES NFR BLD: 5.9 % (ref 4–15)
NEUTROPHILS # BLD AUTO: 5.2 K/UL (ref 1.8–7.7)
NEUTROPHILS NFR BLD: 69.2 % (ref 38–73)
NONHDLC SERPL-MCNC: 47 MG/DL
NRBC BLD-RTO: 0 /100 WBC
PLATELET # BLD AUTO: 250 K/UL (ref 150–450)
PMV BLD AUTO: 9.2 FL (ref 9.2–12.9)
POTASSIUM SERPL-SCNC: 4.4 MMOL/L (ref 3.5–5.1)
PROT SERPL-MCNC: 7.3 G/DL (ref 6–8.4)
RBC # BLD AUTO: 4.73 M/UL (ref 4.6–6.2)
SODIUM SERPL-SCNC: 141 MMOL/L (ref 136–145)
TRIGL SERPL-MCNC: 52 MG/DL (ref 30–150)
TSH SERPL DL<=0.005 MIU/L-ACNC: 1.61 UIU/ML (ref 0.4–4)
VIT B12 SERPL-MCNC: 496 PG/ML (ref 210–950)
WBC # BLD AUTO: 7.47 K/UL (ref 3.9–12.7)

## 2024-06-13 PROCEDURE — 36415 COLL VENOUS BLD VENIPUNCTURE: CPT | Performed by: INTERNAL MEDICINE

## 2024-06-13 PROCEDURE — 80053 COMPREHEN METABOLIC PANEL: CPT | Performed by: INTERNAL MEDICINE

## 2024-06-13 PROCEDURE — 82550 ASSAY OF CK (CPK): CPT | Performed by: INTERNAL MEDICINE

## 2024-06-13 PROCEDURE — 84153 ASSAY OF PSA TOTAL: CPT | Performed by: INTERNAL MEDICINE

## 2024-06-13 PROCEDURE — 84443 ASSAY THYROID STIM HORMONE: CPT | Performed by: INTERNAL MEDICINE

## 2024-06-13 PROCEDURE — 82607 VITAMIN B-12: CPT | Performed by: INTERNAL MEDICINE

## 2024-06-13 PROCEDURE — 80061 LIPID PANEL: CPT | Performed by: INTERNAL MEDICINE

## 2024-06-13 PROCEDURE — 83036 HEMOGLOBIN GLYCOSYLATED A1C: CPT | Performed by: INTERNAL MEDICINE

## 2024-06-13 PROCEDURE — 82306 VITAMIN D 25 HYDROXY: CPT | Performed by: INTERNAL MEDICINE

## 2024-06-13 PROCEDURE — 85025 COMPLETE CBC W/AUTO DIFF WBC: CPT | Performed by: INTERNAL MEDICINE

## 2024-07-05 RX ORDER — TAMSULOSIN HYDROCHLORIDE 0.4 MG/1
1 CAPSULE ORAL
Qty: 90 CAPSULE | Refills: 1 | Status: SHIPPED | OUTPATIENT
Start: 2024-07-05

## 2024-07-08 DIAGNOSIS — E78.00 HIGH CHOLESTEROL: ICD-10-CM

## 2024-07-08 RX ORDER — ROSUVASTATIN CALCIUM 10 MG/1
10 TABLET, COATED ORAL DAILY
Qty: 90 TABLET | Refills: 3 | Status: SHIPPED | OUTPATIENT
Start: 2024-07-08

## 2024-07-19 DIAGNOSIS — M54.50 CHRONIC LOW BACK PAIN WITHOUT SCIATICA, UNSPECIFIED BACK PAIN LATERALITY: ICD-10-CM

## 2024-07-19 DIAGNOSIS — G89.29 CHRONIC LOW BACK PAIN WITHOUT SCIATICA, UNSPECIFIED BACK PAIN LATERALITY: ICD-10-CM

## 2024-07-19 RX ORDER — CYCLOBENZAPRINE HCL 10 MG
TABLET ORAL
Qty: 20 TABLET | Refills: 0 | Status: SHIPPED | OUTPATIENT
Start: 2024-07-19

## 2024-08-05 ENCOUNTER — OFFICE VISIT (OUTPATIENT)
Dept: CARDIOLOGY | Facility: CLINIC | Age: 69
End: 2024-08-05
Attending: INTERNAL MEDICINE
Payer: MEDICARE

## 2024-08-05 VITALS
OXYGEN SATURATION: 97 % | BODY MASS INDEX: 23.89 KG/M2 | SYSTOLIC BLOOD PRESSURE: 119 MMHG | DIASTOLIC BLOOD PRESSURE: 70 MMHG | HEIGHT: 68 IN | WEIGHT: 157.63 LBS

## 2024-08-05 DIAGNOSIS — E78.00 HIGH CHOLESTEROL: ICD-10-CM

## 2024-08-05 DIAGNOSIS — I77.1 STENOSIS OF LEFT SUBCLAVIAN ARTERY: ICD-10-CM

## 2024-08-05 DIAGNOSIS — I65.21 STENOSIS OF RIGHT CAROTID ARTERY: ICD-10-CM

## 2024-08-05 DIAGNOSIS — I65.23 BILATERAL CAROTID ARTERY STENOSIS: ICD-10-CM

## 2024-08-05 DIAGNOSIS — E66.3 OVERWEIGHT: ICD-10-CM

## 2024-08-05 DIAGNOSIS — I10 PRIMARY HYPERTENSION: ICD-10-CM

## 2024-08-05 DIAGNOSIS — E11.51 TYPE 2 DIABETES MELLITUS WITH DIABETIC PERIPHERAL ANGIOPATHY WITHOUT GANGRENE, WITHOUT LONG-TERM CURRENT USE OF INSULIN: ICD-10-CM

## 2024-08-05 DIAGNOSIS — I10 ESSENTIAL HYPERTENSION: ICD-10-CM

## 2024-08-05 LAB
OHS QRS DURATION: 90 MS
OHS QTC CALCULATION: 426 MS

## 2024-08-05 PROCEDURE — 93005 ELECTROCARDIOGRAM TRACING: CPT

## 2024-08-05 PROCEDURE — 3044F HG A1C LEVEL LT 7.0%: CPT | Mod: CPTII,S$GLB,, | Performed by: INTERNAL MEDICINE

## 2024-08-05 PROCEDURE — 1159F MED LIST DOCD IN RCRD: CPT | Mod: CPTII,S$GLB,, | Performed by: INTERNAL MEDICINE

## 2024-08-05 PROCEDURE — 1126F AMNT PAIN NOTED NONE PRSNT: CPT | Mod: CPTII,S$GLB,, | Performed by: INTERNAL MEDICINE

## 2024-08-05 PROCEDURE — 93000 ELECTROCARDIOGRAM COMPLETE: CPT | Mod: S$GLB,,, | Performed by: INTERNAL MEDICINE

## 2024-08-05 PROCEDURE — 93010 ELECTROCARDIOGRAM REPORT: CPT | Mod: S$GLB,,, | Performed by: INTERNAL MEDICINE

## 2024-08-05 PROCEDURE — 99214 OFFICE O/P EST MOD 30 MIN: CPT | Mod: 25,S$GLB,, | Performed by: INTERNAL MEDICINE

## 2024-08-05 PROCEDURE — 3078F DIAST BP <80 MM HG: CPT | Mod: CPTII,S$GLB,, | Performed by: INTERNAL MEDICINE

## 2024-08-05 PROCEDURE — 3074F SYST BP LT 130 MM HG: CPT | Mod: CPTII,S$GLB,, | Performed by: INTERNAL MEDICINE

## 2024-08-05 PROCEDURE — 99999 PR PBB SHADOW E&M-EST. PATIENT-LVL III: CPT | Mod: PBBFAC,,, | Performed by: INTERNAL MEDICINE

## 2024-08-05 PROCEDURE — 3008F BODY MASS INDEX DOCD: CPT | Mod: CPTII,S$GLB,, | Performed by: INTERNAL MEDICINE

## 2024-08-05 PROCEDURE — 4010F ACE/ARB THERAPY RXD/TAKEN: CPT | Mod: CPTII,S$GLB,, | Performed by: INTERNAL MEDICINE

## 2024-08-05 PROCEDURE — 1160F RVW MEDS BY RX/DR IN RCRD: CPT | Mod: CPTII,S$GLB,, | Performed by: INTERNAL MEDICINE

## 2024-08-05 RX ORDER — EZETIMIBE 10 MG/1
10 TABLET ORAL DAILY
Qty: 90 TABLET | Refills: 3 | Status: SHIPPED | OUTPATIENT
Start: 2024-08-05

## 2024-08-05 RX ORDER — VALSARTAN 80 MG/1
80 TABLET ORAL DAILY
Qty: 90 TABLET | Refills: 3 | Status: SHIPPED | OUTPATIENT
Start: 2024-08-05

## 2024-08-05 RX ORDER — ROSUVASTATIN CALCIUM 10 MG/1
10 TABLET, COATED ORAL DAILY
Qty: 90 TABLET | Refills: 3 | Status: SHIPPED | OUTPATIENT
Start: 2024-08-05

## 2024-08-05 RX ORDER — NEBIVOLOL 5 MG/1
5 TABLET ORAL DAILY
Qty: 90 TABLET | Refills: 3 | Status: SHIPPED | OUTPATIENT
Start: 2024-08-05

## 2024-08-05 RX ORDER — ASPIRIN 81 MG/1
81 TABLET ORAL DAILY
Qty: 90 TABLET | Refills: 3 | Status: SHIPPED | OUTPATIENT
Start: 2024-08-05

## 2024-10-07 ENCOUNTER — PATIENT OUTREACH (OUTPATIENT)
Dept: ADMINISTRATIVE | Facility: HOSPITAL | Age: 69
End: 2024-10-07
Payer: MEDICARE

## 2024-10-21 DIAGNOSIS — E11.51 TYPE 2 DIABETES MELLITUS WITH DIABETIC PERIPHERAL ANGIOPATHY WITHOUT GANGRENE, WITHOUT LONG-TERM CURRENT USE OF INSULIN: ICD-10-CM

## 2024-10-21 RX ORDER — DAPAGLIFLOZIN AND METFORMIN HYDROCHLORIDE 5; 1000 MG/1; MG/1
2 TABLET, FILM COATED, EXTENDED RELEASE ORAL
Qty: 180 TABLET | Refills: 1 | Status: SHIPPED | OUTPATIENT
Start: 2024-10-21

## 2024-12-10 ENCOUNTER — OFFICE VISIT (OUTPATIENT)
Dept: INTERNAL MEDICINE | Facility: CLINIC | Age: 69
End: 2024-12-10
Attending: INTERNAL MEDICINE
Payer: MEDICARE

## 2024-12-10 VITALS
OXYGEN SATURATION: 99 % | WEIGHT: 158 LBS | BODY MASS INDEX: 23.95 KG/M2 | SYSTOLIC BLOOD PRESSURE: 106 MMHG | DIASTOLIC BLOOD PRESSURE: 62 MMHG | HEIGHT: 68 IN | HEART RATE: 96 BPM

## 2024-12-10 DIAGNOSIS — E78.00 HIGH CHOLESTEROL: Primary | ICD-10-CM

## 2024-12-10 DIAGNOSIS — E55.9 VITAMIN D DEFICIENCY: ICD-10-CM

## 2024-12-10 DIAGNOSIS — Z00.01 ENCOUNTER FOR GENERAL ADULT MEDICAL EXAMINATION WITH ABNORMAL FINDINGS: ICD-10-CM

## 2024-12-10 DIAGNOSIS — D50.8 OTHER IRON DEFICIENCY ANEMIA: ICD-10-CM

## 2024-12-10 DIAGNOSIS — E11.51 TYPE 2 DIABETES MELLITUS WITH DIABETIC PERIPHERAL ANGIOPATHY WITHOUT GANGRENE, WITHOUT LONG-TERM CURRENT USE OF INSULIN: ICD-10-CM

## 2024-12-10 DIAGNOSIS — I10 PRIMARY HYPERTENSION: ICD-10-CM

## 2024-12-10 DIAGNOSIS — E03.9 HYPOTHYROIDISM, UNSPECIFIED TYPE: ICD-10-CM

## 2024-12-10 DIAGNOSIS — Z12.5 SCREENING FOR PROSTATE CANCER: ICD-10-CM

## 2024-12-10 DIAGNOSIS — M25.551 RIGHT HIP PAIN: ICD-10-CM

## 2024-12-10 DIAGNOSIS — M25.511 CHRONIC RIGHT SHOULDER PAIN: ICD-10-CM

## 2024-12-10 DIAGNOSIS — R79.89 OTHER SPECIFIED ABNORMAL FINDINGS OF BLOOD CHEMISTRY: ICD-10-CM

## 2024-12-10 DIAGNOSIS — M54.50 CHRONIC RIGHT-SIDED LOW BACK PAIN WITHOUT SCIATICA: ICD-10-CM

## 2024-12-10 DIAGNOSIS — D51.0 PERNICIOUS ANEMIA: ICD-10-CM

## 2024-12-10 DIAGNOSIS — E78.9 DISORDER OF LIPID METABOLISM: ICD-10-CM

## 2024-12-10 DIAGNOSIS — Z78.9 MICROALBUMINURIA ABSENT: ICD-10-CM

## 2024-12-10 DIAGNOSIS — E11.51 TYPE 2 DIABETES MELLITUS WITH DIABETIC PERIPHERAL ANGIOPATHY WITHOUT GANGRENE, WITHOUT LONG-TERM CURRENT USE OF INSULIN: Primary | ICD-10-CM

## 2024-12-10 DIAGNOSIS — G89.29 CHRONIC RIGHT SHOULDER PAIN: ICD-10-CM

## 2024-12-10 DIAGNOSIS — G89.29 CHRONIC RIGHT-SIDED LOW BACK PAIN WITHOUT SCIATICA: ICD-10-CM

## 2024-12-10 PROCEDURE — 3008F BODY MASS INDEX DOCD: CPT | Mod: CPTII,S$GLB,, | Performed by: INTERNAL MEDICINE

## 2024-12-10 PROCEDURE — 4010F ACE/ARB THERAPY RXD/TAKEN: CPT | Mod: CPTII,S$GLB,, | Performed by: INTERNAL MEDICINE

## 2024-12-10 PROCEDURE — 3078F DIAST BP <80 MM HG: CPT | Mod: CPTII,S$GLB,, | Performed by: INTERNAL MEDICINE

## 2024-12-10 PROCEDURE — 3074F SYST BP LT 130 MM HG: CPT | Mod: CPTII,S$GLB,, | Performed by: INTERNAL MEDICINE

## 2024-12-10 PROCEDURE — 1160F RVW MEDS BY RX/DR IN RCRD: CPT | Mod: CPTII,S$GLB,, | Performed by: INTERNAL MEDICINE

## 2024-12-10 PROCEDURE — 99214 OFFICE O/P EST MOD 30 MIN: CPT | Mod: 25,S$GLB,, | Performed by: INTERNAL MEDICINE

## 2024-12-10 PROCEDURE — 3044F HG A1C LEVEL LT 7.0%: CPT | Mod: CPTII,S$GLB,, | Performed by: INTERNAL MEDICINE

## 2024-12-10 PROCEDURE — 1159F MED LIST DOCD IN RCRD: CPT | Mod: CPTII,S$GLB,, | Performed by: INTERNAL MEDICINE

## 2024-12-10 PROCEDURE — G0439 PPPS, SUBSEQ VISIT: HCPCS | Mod: S$GLB,,, | Performed by: INTERNAL MEDICINE

## 2024-12-10 NOTE — PROGRESS NOTES
Subjective     Patient ID: Gael Pedroza is a 69 y.o. male.    Chief Complaint: Follow-up (Urine microalbumin due, diabetic foot exam due, joint pain has increased ), Hyperlipidemia, and Diabetes    The patient presents today for an annual wellness exam and also has specific complaints and medical problems to be addressed and managed.     He has a torn right rotator cuff for which he has seen Dr. Parrish and will require surgery.  He has significant pain in his right hip in addition to low back pain which radiates to the right leg.      Hypertension  This is a chronic problem. The current episode started more than 1 year ago. The problem is controlled.         Diabetes  The patient presents for a follow-up diabetic visit for type 2 diabetes mellitus. The disease course has been stable.     Follow-up  This is a chronic problem. The current episode started more than 1 year ago. The problem has been unchanged. Associated symptoms include arthralgias.   Hyperlipidemia  Associated symptoms include leg pain.   Diabetes      Review of Systems   Constitutional: Negative.    Respiratory: Negative.     Cardiovascular: Negative.    Musculoskeletal:  Positive for arthralgias, back pain and leg pain.          Objective     Physical Exam  Vitals and nursing note reviewed.   Constitutional:       Appearance: He is well-developed.   HENT:      Head: Normocephalic and atraumatic.   Eyes:      Pupils: Pupils are equal, round, and reactive to light.   Cardiovascular:      Rate and Rhythm: Normal rate and regular rhythm.      Heart sounds: Normal heart sounds.   Pulmonary:      Effort: Pulmonary effort is normal.   Neurological:      Mental Status: He is alert.            Assessment and Plan     1. High cholesterol    2. Type 2 diabetes mellitus with diabetic peripheral angiopathy without gangrene, without long-term current use of insulin  Overview:  2015      3. Primary hypertension  Overview:  2013      4. Microalbuminuria  absent  Overview:  12/21, 7/22, 6/23      5. Chronic right-sided low back pain without sciatica  Overview:  MRI 1/11  [facility] x 3 - 2013  Dr. Mayorga      6. Chronic right shoulder pain    7. Right hip pain  Overview:  Dr. Mcwilliams      8. Encounter for general adult medical examination with abnormal findings        PLAN:  Per orders and D/C instructions.  Continue diet and/or meds for DM, HTN, and high cholesterol, which are stable.  Follow-up with ortho for right shoulder pain and right hip pain.  Take Tylenol as needed.  Take Flexeril as needed for low back pain  Check routine labs with urine for microalbumin.       Follow up in about 6 months (around 6/10/2025).

## 2024-12-10 NOTE — PATIENT INSTRUCTIONS
Tips for Healthy Living and Routine Preventative Care - 2024                                                            (These guidelines are intended for healthy adults)      1. Exercise  Exercise aerobically with a target heart rate of (220-age) x 0.8  Exercise 30-45 minutes on most days of the week    2. Diet and Supplements- All supplements can be obtained through a varied, healthy diet   Calcium: 1,000 - 1,200 mg each day   8 oz milk or Calcium fortified O.J. = 300, 8 oz Yogurt = 400 mg, 1 oz of cheese =100-200 mg              8 oz Oatmeal = 215 mg, 3 oz [place] = 240 mg  Vitamin D: 800 iu each day- Can probably be obtained by 30 min. of direct sunlight    each day             3 oz. [place] = 800 iu,  3 oz. Tuna =150 iu, Milk or fortified O.J. = 120 iu  Fish oil: 1-2 grams each day or about 840 mg of EPA and DHA (Omega-3 fatty acids) each day             3 oz. [place]=2 grams,  3 oz. Tuna=1.3 grams,  3 oz. drained light Tuna= 0.25 grams  Folic acid 800 mcg each day for all women planning or capable of pregnancy    3. Lifestyle  Alcohol: 1 drink = 12 oz. domestic beer, 4 oz. wine, or 1 oz. hard (80 proof) liquor             Males: </= 14 drinks per week with no more than 4 in any one day             Females: </= 7 drinks per week with no more than 3 in any one day  Salt: 1.2 - 3 grams of Sodium each day.  Tobacco: Dont smoke, or quit smoking (discuss with your doctor)  Depression: If you feel depressed discuss with your doctor  Weight: Maintain a healthy body weight. Stay within 10% of:             Males: 106 lbs. + 6 lbs per inch height above 5 feet             Females: 100 lbs + 5 lbs per inch height above 5 feet    4. Routine tests  Blood pressure check at each visit, or at least once each year  HIV screening (one time) if less than 65 years old  Hepatitis C screen (one time) if less than 80 years old  Cholesterol screening every 3 years starting at age 21  Glucose/Hemaglobin A1C check every 3 years starting at  age 35.  TSH (thyroid screen) every 2 years starting at age 50  Colonoscopy at age 45, and repeat every 10 years until age 75. Consider screening until age 85. DNA stool test (Cologuard) every 3 years is an acceptable alternative.  Vision screen at age 65    Females:  Gyn exam with cervical HPV test every 3 years or Pap smear every three years starting at age 25                  Stop screening at age 65 if past 3 exams were normal                  No screening for women who have had a hysterectomy with removal of cervix  Mammogram every 1-2 years starting at age 40 until age 75  Consider continuing Mammograms every other year for those older than 75 with a life expectancy of more than 10 years  Bone density scan at about age 65    Males:  PSA screening annually at age 50, age 45 for  Americans, until age 75. Consider annual screening after age 75                   5. Immunizations  Influenza vaccine every year in the fall, especially if >50 or with a chronic disease  Consider getting a Covid booster vaccine annually  Tetanus/Diphtheria/Pertussis (Tdap) vaccine once (after the age of 18), then Tetanus/Diphtheria (Td) or Tdap vaccine every 10 years  Shingles (Shingrix) vaccine after age 50 and get a 2nd dose after 2-6 months  RSV (respiratory syncytial virus) vaccine after age 60  Pneumonia vaccine (Prevnar-20) at age 65       6. Advanced Directive/End of life care planning  You should consider having a signed document which informs physicians and family of your end of life care wishes.  You can go to [employer]/DNR/[place]. Under Step 1 click download AdobePDF and print.  This is the [place] physician order for scope of Treatment ([employer]) form.  You can also request a blank copy of the [employer] form from my office.  Bring a copy of the signed document to my office so we can scan it in your medical chart.

## 2024-12-13 ENCOUNTER — LAB VISIT (OUTPATIENT)
Dept: LAB | Facility: HOSPITAL | Age: 69
End: 2024-12-13
Attending: INTERNAL MEDICINE
Payer: MEDICARE

## 2024-12-13 DIAGNOSIS — D50.8 OTHER IRON DEFICIENCY ANEMIA: ICD-10-CM

## 2024-12-13 DIAGNOSIS — E78.9 DISORDER OF LIPID METABOLISM: ICD-10-CM

## 2024-12-13 DIAGNOSIS — R79.89 OTHER SPECIFIED ABNORMAL FINDINGS OF BLOOD CHEMISTRY: ICD-10-CM

## 2024-12-13 DIAGNOSIS — E11.51 TYPE 2 DIABETES MELLITUS WITH DIABETIC PERIPHERAL ANGIOPATHY WITHOUT GANGRENE, WITHOUT LONG-TERM CURRENT USE OF INSULIN: ICD-10-CM

## 2024-12-13 DIAGNOSIS — E11.51 TYPE 2 DIABETES MELLITUS WITH DIABETIC PERIPHERAL ANGIOPATHY WITHOUT GANGRENE, WITHOUT LONG-TERM CURRENT USE OF INSULIN: Primary | ICD-10-CM

## 2024-12-13 LAB
ALBUMIN SERPL BCP-MCNC: 3.9 G/DL (ref 3.5–5.2)
ALBUMIN/CREAT UR: 5.6 UG/MG (ref 0–30)
ALP SERPL-CCNC: 39 U/L (ref 40–150)
ALT SERPL W/O P-5'-P-CCNC: 14 U/L (ref 10–44)
ANION GAP SERPL CALC-SCNC: 12 MMOL/L (ref 8–16)
AST SERPL-CCNC: 21 U/L (ref 10–40)
BASOPHILS # BLD AUTO: 0.06 K/UL (ref 0–0.2)
BASOPHILS NFR BLD: 0.7 % (ref 0–1.9)
BILIRUB SERPL-MCNC: 0.4 MG/DL (ref 0.1–1)
BUN SERPL-MCNC: 19 MG/DL (ref 8–23)
CALCIUM SERPL-MCNC: 9.8 MG/DL (ref 8.7–10.5)
CHLORIDE SERPL-SCNC: 103 MMOL/L (ref 95–110)
CHOLEST SERPL-MCNC: 95 MG/DL (ref 120–199)
CHOLEST/HDLC SERPL: 2 {RATIO} (ref 2–5)
CK SERPL-CCNC: 57 U/L (ref 20–200)
CO2 SERPL-SCNC: 25 MMOL/L (ref 23–29)
CREAT SERPL-MCNC: 0.9 MG/DL (ref 0.5–1.4)
CREAT UR-MCNC: 144 MG/DL (ref 23–375)
DIFFERENTIAL METHOD BLD: ABNORMAL
EOSINOPHIL # BLD AUTO: 0.2 K/UL (ref 0–0.5)
EOSINOPHIL NFR BLD: 2 % (ref 0–8)
ERYTHROCYTE [DISTWIDTH] IN BLOOD BY AUTOMATED COUNT: 12.2 % (ref 11.5–14.5)
EST. GFR  (NO RACE VARIABLE): >60 ML/MIN/1.73 M^2
ESTIMATED AVG GLUCOSE: 151 MG/DL (ref 68–131)
GLUCOSE SERPL-MCNC: 102 MG/DL (ref 70–110)
HBA1C MFR BLD: 6.9 % (ref 4–5.6)
HCT VFR BLD AUTO: 49.3 % (ref 40–54)
HDLC SERPL-MCNC: 47 MG/DL (ref 40–75)
HDLC SERPL: 49.5 % (ref 20–50)
HGB BLD-MCNC: 16.2 G/DL (ref 14–18)
IMM GRANULOCYTES # BLD AUTO: 0.02 K/UL (ref 0–0.04)
IMM GRANULOCYTES NFR BLD AUTO: 0.2 % (ref 0–0.5)
LDLC SERPL CALC-MCNC: 36.2 MG/DL (ref 63–159)
LYMPHOCYTES # BLD AUTO: 1.7 K/UL (ref 1–4.8)
LYMPHOCYTES NFR BLD: 21.6 % (ref 18–48)
MCH RBC QN AUTO: 32.1 PG (ref 27–31)
MCHC RBC AUTO-ENTMCNC: 32.9 G/DL (ref 32–36)
MCV RBC AUTO: 98 FL (ref 82–98)
MICROALBUMIN UR DL<=1MG/L-MCNC: 8 UG/ML
MONOCYTES # BLD AUTO: 0.6 K/UL (ref 0.3–1)
MONOCYTES NFR BLD: 7.7 % (ref 4–15)
NEUTROPHILS # BLD AUTO: 5.5 K/UL (ref 1.8–7.7)
NEUTROPHILS NFR BLD: 67.8 % (ref 38–73)
NONHDLC SERPL-MCNC: 48 MG/DL
NRBC BLD-RTO: 0 /100 WBC
PLATELET # BLD AUTO: 214 K/UL (ref 150–450)
PMV BLD AUTO: 9.4 FL (ref 9.2–12.9)
POTASSIUM SERPL-SCNC: 4.7 MMOL/L (ref 3.5–5.1)
PROT SERPL-MCNC: 7.3 G/DL (ref 6–8.4)
RBC # BLD AUTO: 5.04 M/UL (ref 4.6–6.2)
SODIUM SERPL-SCNC: 140 MMOL/L (ref 136–145)
TRIGL SERPL-MCNC: 59 MG/DL (ref 30–150)
WBC # BLD AUTO: 8.07 K/UL (ref 3.9–12.7)

## 2024-12-13 PROCEDURE — 82550 ASSAY OF CK (CPK): CPT | Performed by: INTERNAL MEDICINE

## 2024-12-13 PROCEDURE — 80061 LIPID PANEL: CPT | Performed by: INTERNAL MEDICINE

## 2024-12-13 PROCEDURE — 85025 COMPLETE CBC W/AUTO DIFF WBC: CPT | Performed by: INTERNAL MEDICINE

## 2024-12-13 PROCEDURE — 83036 HEMOGLOBIN GLYCOSYLATED A1C: CPT | Performed by: INTERNAL MEDICINE

## 2024-12-13 PROCEDURE — 80053 COMPREHEN METABOLIC PANEL: CPT | Performed by: INTERNAL MEDICINE

## 2024-12-13 PROCEDURE — 36415 COLL VENOUS BLD VENIPUNCTURE: CPT | Performed by: INTERNAL MEDICINE

## 2024-12-13 RX ORDER — ISOPROPYL ALCOHOL 70 ML/100ML
1 SWAB TOPICAL
Qty: 100 EACH | Refills: 3 | Status: SHIPPED | OUTPATIENT
Start: 2024-12-13

## 2024-12-13 RX ORDER — BLOOD-GLUCOSE CONTROL, NORMAL
1 EACH MISCELLANEOUS DAILY
Qty: 200 EACH | Refills: 3 | Status: SHIPPED | OUTPATIENT
Start: 2024-12-13 | End: 2025-12-13

## 2025-01-01 ENCOUNTER — LAB VISIT (OUTPATIENT)
Dept: LAB | Facility: HOSPITAL | Age: 70
End: 2025-01-01
Attending: INTERNAL MEDICINE
Payer: MEDICARE

## 2025-01-01 DIAGNOSIS — C25.8 OVERLAPPING MALIGNANT NEOPLASM OF PANCREAS: ICD-10-CM

## 2025-01-01 LAB
ABSOLUTE EOSINOPHIL (OHS): 0.14 K/UL
ABSOLUTE MONOCYTE (OHS): 1.4 K/UL (ref 0.3–1)
ABSOLUTE NEUTROPHIL COUNT (OHS): 24.09 K/UL (ref 1.8–7.7)
ALBUMIN SERPL BCP-MCNC: 2.7 G/DL (ref 3.5–5.2)
ALP SERPL-CCNC: 411 UNIT/L (ref 40–150)
ALT SERPL W/O P-5'-P-CCNC: 33 UNIT/L (ref 10–44)
ANION GAP (OHS): 11 MMOL/L (ref 8–16)
AST SERPL-CCNC: 41 UNIT/L (ref 11–45)
BASOPHILS # BLD AUTO: 0.11 K/UL
BASOPHILS NFR BLD AUTO: 0.4 %
BILIRUB SERPL-MCNC: 1.4 MG/DL (ref 0.1–1)
BUN SERPL-MCNC: 25 MG/DL (ref 8–23)
CALCIUM SERPL-MCNC: 9.3 MG/DL (ref 8.7–10.5)
CANCER AG19-9 SERPL-ACNC: 122.3 U/ML
CHLORIDE SERPL-SCNC: 99 MMOL/L (ref 95–110)
CO2 SERPL-SCNC: 27 MMOL/L (ref 23–29)
CREAT SERPL-MCNC: 0.5 MG/DL (ref 0.5–1.4)
ERYTHROCYTE [DISTWIDTH] IN BLOOD BY AUTOMATED COUNT: 18.5 % (ref 11.5–14.5)
GFR SERPLBLD CREATININE-BSD FMLA CKD-EPI: >60 ML/MIN/1.73/M2
GLUCOSE SERPL-MCNC: 179 MG/DL (ref 70–110)
HCT VFR BLD AUTO: 30.4 % (ref 40–54)
HGB BLD-MCNC: 9.9 GM/DL (ref 14–18)
IMM GRANULOCYTES # BLD AUTO: 0.24 K/UL (ref 0–0.04)
IMM GRANULOCYTES NFR BLD AUTO: 0.9 % (ref 0–0.5)
LYMPHOCYTES # BLD AUTO: 0.91 K/UL (ref 1–4.8)
MCH RBC QN AUTO: 33.4 PG (ref 27–31)
MCHC RBC AUTO-ENTMCNC: 32.6 G/DL (ref 32–36)
MCV RBC AUTO: 103 FL (ref 82–98)
NUCLEATED RBC (/100WBC) (OHS): 0 /100 WBC
PLATELET # BLD AUTO: 405 K/UL (ref 150–450)
PLATELET BLD QL SMEAR: NORMAL
PMV BLD AUTO: 10.9 FL (ref 9.2–12.9)
POTASSIUM SERPL-SCNC: 3.2 MMOL/L (ref 3.5–5.1)
PROT SERPL-MCNC: 5.8 GM/DL (ref 6–8.4)
RBC # BLD AUTO: 2.96 M/UL (ref 4.6–6.2)
RELATIVE EOSINOPHIL (OHS): 0.5 %
RELATIVE LYMPHOCYTE (OHS): 3.4 % (ref 18–48)
RELATIVE MONOCYTE (OHS): 5.2 % (ref 4–15)
RELATIVE NEUTROPHIL (OHS): 89.6 % (ref 38–73)
SODIUM SERPL-SCNC: 137 MMOL/L (ref 136–145)
WBC # BLD AUTO: 26.89 K/UL (ref 3.9–12.7)

## 2025-01-01 PROCEDURE — 84075 ASSAY ALKALINE PHOSPHATASE: CPT

## 2025-01-01 PROCEDURE — 36415 COLL VENOUS BLD VENIPUNCTURE: CPT

## 2025-01-01 PROCEDURE — 85025 COMPLETE CBC W/AUTO DIFF WBC: CPT

## 2025-01-01 PROCEDURE — 86301 IMMUNOASSAY TUMOR CA 19-9: CPT

## 2025-02-03 ENCOUNTER — TELEPHONE (OUTPATIENT)
Dept: CARDIOLOGY | Facility: CLINIC | Age: 70
End: 2025-02-03

## 2025-02-03 ENCOUNTER — OFFICE VISIT (OUTPATIENT)
Dept: CARDIOLOGY | Facility: CLINIC | Age: 70
End: 2025-02-03
Attending: INTERNAL MEDICINE
Payer: MEDICARE

## 2025-02-03 VITALS
WEIGHT: 160.69 LBS | DIASTOLIC BLOOD PRESSURE: 70 MMHG | OXYGEN SATURATION: 97 % | HEIGHT: 68 IN | BODY MASS INDEX: 24.35 KG/M2 | HEART RATE: 74 BPM | SYSTOLIC BLOOD PRESSURE: 128 MMHG

## 2025-02-03 DIAGNOSIS — E66.3 OVERWEIGHT: ICD-10-CM

## 2025-02-03 DIAGNOSIS — I77.1 STENOSIS OF LEFT SUBCLAVIAN ARTERY: ICD-10-CM

## 2025-02-03 DIAGNOSIS — E11.51 TYPE 2 DIABETES MELLITUS WITH DIABETIC PERIPHERAL ANGIOPATHY WITHOUT GANGRENE, WITHOUT LONG-TERM CURRENT USE OF INSULIN: ICD-10-CM

## 2025-02-03 DIAGNOSIS — I10 PRIMARY HYPERTENSION: ICD-10-CM

## 2025-02-03 DIAGNOSIS — I65.21 STENOSIS OF RIGHT CAROTID ARTERY: ICD-10-CM

## 2025-02-03 DIAGNOSIS — E78.00 HIGH CHOLESTEROL: ICD-10-CM

## 2025-02-03 PROCEDURE — 1159F MED LIST DOCD IN RCRD: CPT | Mod: CPTII,S$GLB,, | Performed by: INTERNAL MEDICINE

## 2025-02-03 PROCEDURE — 1125F AMNT PAIN NOTED PAIN PRSNT: CPT | Mod: CPTII,S$GLB,, | Performed by: INTERNAL MEDICINE

## 2025-02-03 PROCEDURE — 3288F FALL RISK ASSESSMENT DOCD: CPT | Mod: CPTII,S$GLB,, | Performed by: INTERNAL MEDICINE

## 2025-02-03 PROCEDURE — 1101F PT FALLS ASSESS-DOCD LE1/YR: CPT | Mod: CPTII,S$GLB,, | Performed by: INTERNAL MEDICINE

## 2025-02-03 PROCEDURE — 99999 PR PBB SHADOW E&M-EST. PATIENT-LVL III: CPT | Mod: PBBFAC,,, | Performed by: INTERNAL MEDICINE

## 2025-02-03 PROCEDURE — 99214 OFFICE O/P EST MOD 30 MIN: CPT | Mod: S$GLB,,, | Performed by: INTERNAL MEDICINE

## 2025-02-03 PROCEDURE — 3008F BODY MASS INDEX DOCD: CPT | Mod: CPTII,S$GLB,, | Performed by: INTERNAL MEDICINE

## 2025-02-03 PROCEDURE — 3078F DIAST BP <80 MM HG: CPT | Mod: CPTII,S$GLB,, | Performed by: INTERNAL MEDICINE

## 2025-02-03 PROCEDURE — 3074F SYST BP LT 130 MM HG: CPT | Mod: CPTII,S$GLB,, | Performed by: INTERNAL MEDICINE

## 2025-02-03 RX ORDER — NEBIVOLOL 5 MG/1
5 TABLET ORAL DAILY
Qty: 90 TABLET | Refills: 3 | Status: SHIPPED | OUTPATIENT
Start: 2025-02-03

## 2025-02-03 RX ORDER — ROSUVASTATIN CALCIUM 10 MG/1
10 TABLET, COATED ORAL DAILY
Qty: 90 TABLET | Refills: 3 | Status: SHIPPED | OUTPATIENT
Start: 2025-02-03

## 2025-02-03 RX ORDER — VALSARTAN 80 MG/1
80 TABLET ORAL DAILY
Qty: 90 TABLET | Refills: 3 | Status: SHIPPED | OUTPATIENT
Start: 2025-02-03

## 2025-02-03 RX ORDER — ASPIRIN 81 MG/1
81 TABLET ORAL DAILY
Qty: 90 TABLET | Refills: 3 | Status: SHIPPED | OUTPATIENT
Start: 2025-02-03

## 2025-02-03 RX ORDER — EZETIMIBE 10 MG/1
10 TABLET ORAL DAILY
Qty: 90 TABLET | Refills: 3 | Status: SHIPPED | OUTPATIENT
Start: 2025-02-03

## 2025-02-03 NOTE — TELEPHONE ENCOUNTER
Left message on voice mail.    ----- Message from Destinee sent at 2/3/2025 11:22 AM CST -----  Type:  Needs Medical Advice    Who Called: Pt  Would the patient rather a call back or a response via [facility]? Call  Best Call Back Number:  [phone]  Additional Information:  Pt would like to inform office that documents will be sent to office from his insurance company to be completed related to care provided.  Pt would like to speak to nurse in office directly.

## 2025-02-03 NOTE — PROGRESS NOTES
Subjective:     Gael Pedroza is a 69 y.o. male with hypertension, hypercholesterolemia and diabetes mellitus type 2. He is mildly obese. He has known carotid disease. A carotid duplex study on 4/17/2018 revealed the right internal carotid artery to have a velocity of 2.4 m/s. He had a magnetic resonance angiographic imaging study that confirmed a 70% stenosis. On 6/27/2008 he had a four vessel cerebral angiogram at [facility] that revealed the right internal carotid artery to have a 40% stenosis. The left subclavian had a 30% lesion. He was concerned about the different findings on the tests. On 10/7/2019 he had a carotid duplex which revealed the right internal carotid artery to have severe plaquing with a peak velocity of 2.0 m/s correlating with a stenosis in the 60-70% range. The left internal carotid artery had moderate plaquing with a velocity of 1.1 m/s suggesting a less than 50% narrowing. On 10/6/2020 he had a carotid duplex that revealed the right internal carotid artery to have moderate plaquing with a peak velocity of 1.5 m/s correlating with a stenosis in the 50-60% range. The left internal carotid artery had moderate plaquing with a velocity of 1.1 m/s correlating with a <50% stenosis. In 9/2021 he had a mild case of COVID-19 being fully vaccinated. Some arthritis. No exertional chest pain or exertional dyspnea. No palpitations or weak spells. No bleeding. No issues with any of his prescribed medications. Feeling well overall.       Hypertension  This is a chronic problem. The current episode started more than 1 year ago. The problem is unchanged. The problem is controlled (usually 120-130/70-80 mmHg at home). Pertinent negatives include no anxiety, blurred vision, chest pain, headaches, malaise/fatigue, neck pain, orthopnea, palpitations, peripheral edema, PND, shortness of breath or sweats. There is no history of chronic renal disease.   Hyperlipidemia  This is a chronic problem. The  current episode started more than 1 year ago. The problem is controlled. Recent lipid tests were reviewed and are normal. Exacerbating diseases include diabetes and obesity. He has no history of chronic renal disease, hypothyroidism, liver disease or nephrotic syndrome. Pertinent negatives include no chest pain, focal sensory loss, focal weakness, leg pain, myalgias or shortness of breath.       Review of Systems   Constitutional: Negative for chills, fever and malaise/fatigue.   HENT:  Negative for nosebleeds.    Eyes:  Negative for blurred vision, double vision, photophobia, vision loss in left eye and vision loss in right eye.   Cardiovascular:  Negative for chest pain, claudication, dyspnea on exertion, irregular heartbeat, leg swelling, near-syncope, orthopnea, palpitations, paroxysmal nocturnal dyspnea and syncope.   Respiratory:  Negative for cough, hemoptysis, shortness of breath and wheezing.    Endocrine: Negative for cold intolerance and heat intolerance.   Hematologic/Lymphatic: Negative for bleeding problem. Does not bruise/bleed easily.   Skin:  Negative for color change and rash.   Musculoskeletal:  Positive for arthritis, back pain and joint pain. Negative for falls, muscle weakness, myalgias and neck pain.   Gastrointestinal:  Negative for heartburn, hematemesis, hematochezia, hemorrhoids, jaundice, melena, nausea and vomiting.   Genitourinary:  Negative for dysuria and hematuria.   Neurological:  Negative for dizziness, focal weakness, headaches, light-headedness, loss of balance, numbness, tremors, vertigo and weakness.   Psychiatric/Behavioral:  Negative for altered mental status, depression and memory loss. The patient is not nervous/anxious.    Allergic/Immunologic: Negative for hives and persistent infections.       Current Outpatient Medications on File Prior to Visit   Medication Sig Dispense Refill    alcohol swabs PadM Apply 1 each topically as needed. 100 each 3    ascorbic acid, vitamin  "C, (VITAMIN C) 500 MG tablet Take 500 mg by mouth once daily.      aspirin (ECOTRIN) 81 MG EC tablet Take 1 tablet (81 mg total) by mouth once daily. 90 tablet 3    blood sugar diagnostic (TRUE METRIX GLUCOSE TEST STRIP) Strp 200 each by Misc.(Non-Drug; Combo Route) route Daily. 200 each 0    cholecalciferol, vitamin D3, 1,000 unit capsule Take 1,000 Units by mouth once daily.      cyclobenzaprine (FLEXERIL) 10 MG tablet 1/2-1 tab nightly as needed 20 tablet 0    ezetimibe (ZETIA) 10 mg tablet Take 1 tablet (10 mg total) by mouth once daily. 90 tablet 3    lancets 30 gauge Misc 1 lancet  by Misc.(Non-Drug; Combo Route) route Daily. 200 each 3    multivitamin with minerals tablet Take 1 tablet by mouth once daily.      nebivoloL (BYSTOLIC) 5 MG Tab Take 1 tablet (5 mg total) by mouth once daily. 90 tablet 3    rosuvastatin (CRESTOR) 10 MG tablet Take 1 tablet (10 mg total) by mouth once daily. 90 tablet 3    tamsulosin (FLOMAX) 0.4 mg Cap TAKE ONE CAPSULE BY MOUTH EVERY DAY 90 capsule 1    valsartan (DIOVAN) 80 MG tablet Take 1 tablet (80 mg total) by mouth once daily. 90 tablet 3    XIGDUO XR 5-1,000 mg TAKE TWO TABLETS BY MOUTH EVERY  tablet 1     No current facility-administered medications on file prior to visit.       /74   Pulse 74   Ht 5' 8" (1.727 m)   Wt 72.9 kg (160 lb 11.5 oz)   SpO2 97%   BMI 24.44 kg/m²     Objective:     Physical Exam  Constitutional:       General: He is not in acute distress.     Appearance: Normal appearance. He is well-developed. He is not toxic-appearing or diaphoretic.   HENT:      Head: Normocephalic and atraumatic.      Nose: Nose normal.   Eyes:      General:         Right eye: No discharge.         Left eye: No discharge.      Conjunctiva/sclera:      Right eye: Right conjunctiva is not injected.      Left eye: Left conjunctiva is not injected.      Pupils: Pupils are equal.      Right eye: Pupil is round.      Left eye: Pupil is round.   Neck:      Thyroid: " No thyromegaly.      Vascular: Carotid bruit (soft bruit on the right side) present. No JVD.      Comments: Bruit over left subclavian artery.   Cardiovascular:      Rate and Rhythm: Normal rate and regular rhythm. No extrasystoles are present.     Chest Wall: PMI is not displaced.      Pulses:           Radial pulses are 2+ on the right side and 2+ on the left side.        Femoral pulses are 2+ on the right side and 2+ on the left side.       Dorsalis pedis pulses are 2+ on the right side and 2+ on the left side.        Posterior tibial pulses are 2+ on the right side and 2+ on the left side.      Heart sounds: S1 normal and S2 normal.      No gallop.   Pulmonary:      Effort: Pulmonary effort is normal.      Breath sounds: Normal breath sounds.   Abdominal:      Palpations: Abdomen is soft.      Tenderness: There is no abdominal tenderness.   Musculoskeletal:      Cervical back: Neck supple.      Right ankle: No swelling, deformity or ecchymosis.      Left ankle: No swelling, deformity or ecchymosis.   Lymphadenopathy:      Head:      Right side of head: No submandibular adenopathy.      Left side of head: No submandibular adenopathy.      Cervical: No cervical adenopathy.   Skin:     General: Skin is warm and dry.      Findings: No rash.   Neurological:      General: No focal deficit present.      Mental Status: He is alert and oriented to person, place, and time. He is not disoriented.      Cranial Nerves: No cranial nerve deficit.   Psychiatric:         Attention and Perception: Attention and perception normal.         Mood and Affect: Mood and affect normal.         Speech: Speech normal.         Behavior: Behavior normal.         Thought Content: Thought content normal.         Cognition and Memory: Cognition and memory normal.         Judgment: Judgment normal.         Assessment:     1. Stenosis of right carotid artery    2. Stenosis of left subclavian artery    3. Primary hypertension    4. High  cholesterol    5. Type 2 diabetes mellitus with diabetic peripheral angiopathy without gangrene, without long-term current use of insulin    6. Overweight        Plan:     1. Carotid Artery Stenosis   4/17/2018: Carotid Duplex: SUZANNE: 2.4 m/s - 70-80%. LICA: 1.3 m/s - <50%.   6/1/2018: MRA: SUZANNE: About 70%.   6/27/2018: [facility]: AA, 4V: SUZANNE: 40%. LICA: Mild. Left Subclavian: 30%.   10/7/2019: Carotid Duplex: SUZANNE: Severe plaquing - 2.0 m/s - 60-70%. LICA: Moderate plaquing - 1.1 m/s - <50%.   10/6/2020: Carotid Duplex: SUZANNE: Moderate plaquing - 1.5 m/s - 50-60%. LICA: Moderate plaquing - 1.1 m/s - <50%.   10/1/2021: Carotid Duplex: SUZANNE: Severe plaquing - 1.9 m/s - 60-70%. LICA: Severe plaquing - 1.1 m/s - <50%.   10/20/2022: Carotid Duplex: SUZANNE: Moderate plaquing - 1.5 m/s - 50-60%. LICA: Moderate plaquing - 1.1 m/s - <50%.    2/26/2024: Carotid Duplex: SUZANNE: Severe plaquing - 1.3 m/s - 50-60%. LICA: Severe plaquing - 1.4 m/s - 50-60%.   On aspirin 81 mg Q24.   2/2025: Plan next Carotid Duplex. Then yearly.    2. Subclavian Artery Disease   6/27/2018: [facility]: KB, 4V: Left Subclavian: 30%.   Addressing all risk factors.   Asymptomatic.    3. Hypertension   2012: Diagnosed.   On nebivolol 5 mg Q24 and valsartan 160 mg Q24.   Keeping log at home.   Well controlled.    4. Hypercholesterolemia   2012: Began statin.   Felt he had muscle and joint pain on rosuvastatin 20 mg Q24.   On rosuvastatin 10 mg Q24.   6/11/2019: Chol 131. HDL 54. LDL 59. TG 93.   12/11/2019: Chol 151. HDL 52. LDL 82. TG 87.   6/30/2020: Chol 149. HDL 52. LDL 80. TG 85.   On rosuvastatin 10 mg Q24.   10/13/2020: Ezetimibe 10 mg Q24 was begun in addition to rosuvastatin 10 mg Q24 with quite favorable lipid panel but JERICA.    On rosuvastatin 10 mg Q24 and ezetimibe 10 mg Q24.   12/18/2020: Chol 115. HDL 51. LDL 49. TG 77.   12/15/2021: Chol 101. HDL 43. LDL 43. TG 74.   7/1/2022: Chol 106. HDL 49. LDL 45. TG 58.   1/10/2023: Chol 80. HDL 42. LDL 30. TG  38.   12/14/2023: Chol 93. HDL 45. LDL 39. TG 45.   6/13/2024: Chol 98. HDL 51. LDL 37. TG 52.   12/13/2024: Chol 95. HDL 47. LDL 36. TG 59.   On rosuvastatin 10 mg Q24 and ezetimibe 10 mg Q24.   Very favorable lipid panel.    5. Diabetes Mellitus, Type 2   2019: Diagnosed. Complications: JERICA & PAD. Medications: Oral agent.   On dapagliflozin 5 mg/metformin 1,000 mg 2 tabs Q24.   12/15/2021: HgbA1C 8.0%.   7/1/2022: HbgA1C 9.1%.   1/10/2023: HgbA1C 6.0%.   6/13/2024: HgbA1C 6.4%.   12/13/2024: HgbAC 6.9%.    6. Overweight   9/9/2019: Weight 88 kg. BMI 31.   10/14/2021: Weight 85 kg. BMI 28.   10/18/2022: Weight 79 kg. BMI 26.   2/12/2024: Weight 75 kg. BMI 25.    7. History of Severe Acute Respiratory Syndrome - Corona Virus 2 Infection   9/2021: Mild course. Fully vaccinated. Received antibody.    8. Primary Care   Dr. Lino Steele.    F/u 6 months.    Raquel Elise M.D.      2/19/2025 5:09 PM, Addendum:    2/19/2025: Carotid Duplex: SUZANNE: Severe plaquing - 2.2 m/s - 60-70%. LICA: Severe plaquing - 1.6 m/s - 50-60%.    I discussed the above test result and the implications of the findings over the phone.    Raquel Elise M.D.

## 2025-02-04 RX ORDER — TAMSULOSIN HYDROCHLORIDE 0.4 MG/1
1 CAPSULE ORAL
Qty: 90 CAPSULE | Refills: 1 | Status: SHIPPED | OUTPATIENT
Start: 2025-02-04

## 2025-02-19 ENCOUNTER — RESULTS FOLLOW-UP (OUTPATIENT)
Dept: CARDIOLOGY | Facility: CLINIC | Age: 70
End: 2025-02-19

## 2025-02-19 ENCOUNTER — HOSPITAL ENCOUNTER (OUTPATIENT)
Dept: CARDIOLOGY | Facility: OTHER | Age: 70
Discharge: HOME | End: 2025-02-19
Attending: INTERNAL MEDICINE
Payer: MEDICARE

## 2025-02-19 DIAGNOSIS — I77.1 STENOSIS OF LEFT SUBCLAVIAN ARTERY: ICD-10-CM

## 2025-02-19 DIAGNOSIS — I65.21 STENOSIS OF RIGHT CAROTID ARTERY: ICD-10-CM

## 2025-02-19 LAB
LEFT ARM DIASTOLIC BLOOD PRESSURE: 71 MMHG
LEFT ARM SYSTOLIC BLOOD PRESSURE: 130 MMHG
LEFT CBA DIAS: 16 CM/S
LEFT CBA SYS: 83 CM/S
LEFT CCA DIST DIAS: 18 CM/S
LEFT CCA DIST SYS: 93 CM/S
LEFT CCA MID DIAS: 17 CM/S
LEFT CCA MID SYS: 95 CM/S
LEFT CCA PROX DIAS: 17 CM/S
LEFT CCA PROX SYS: 125 CM/S
LEFT ECA DIAS: 11 CM/S
LEFT ECA SYS: 158 CM/S
LEFT ICA DIST DIAS: 26 CM/S
LEFT ICA DIST SYS: 86 CM/S
LEFT ICA MID DIAS: 27 CM/S
LEFT ICA MID SYS: 159 CM/S
LEFT ICA PROX DIAS: 25 CM/S
LEFT ICA PROX SYS: 109 CM/S
LEFT VERTEBRAL DIAS: 70 CM/S
LEFT VERTEBRAL SYS: 52 CM/S
OHS CV CAROTID RIGHT ICA EDV HIGHEST: 52
OHS CV CAROTID ULTRASOUND LEFT ICA/CCA RATIO: 1.71
OHS CV CAROTID ULTRASOUND RIGHT ICA/CCA RATIO: 3.01
OHS CV PV CAROTID LEFT HIGHEST CCA: 125
OHS CV PV CAROTID LEFT HIGHEST ICA: 159
OHS CV PV CAROTID RIGHT HIGHEST CCA: 120
OHS CV PV CAROTID RIGHT HIGHEST ICA: 223
OHS CV US CAROTID LEFT HIGHEST EDV: 27
RIGHT ARM DIASTOLIC BLOOD PRESSURE: 73 MMHG
RIGHT ARM SYSTOLIC BLOOD PRESSURE: 137 MMHG
RIGHT CBA DIAS: 15 CM/S
RIGHT CBA SYS: 74 CM/S
RIGHT CCA DIST DIAS: 13 CM/S
RIGHT CCA DIST SYS: 74 CM/S
RIGHT CCA MID DIAS: 17 CM/S
RIGHT CCA MID SYS: 120 CM/S
RIGHT CCA PROX DIAS: 14 CM/S
RIGHT CCA PROX SYS: 97 CM/S
RIGHT ECA DIAS: 7 CM/S
RIGHT ECA SYS: 243 CM/S
RIGHT ICA DIST DIAS: 27 CM/S
RIGHT ICA DIST SYS: 117 CM/S
RIGHT ICA MID DIAS: 52 CM/S
RIGHT ICA MID SYS: 223 CM/S
RIGHT ICA PROX DIAS: 34 CM/S
RIGHT ICA PROX SYS: 187 CM/S
RIGHT VERTEBRAL DIAS: 9 CM/S
RIGHT VERTEBRAL SYS: 52 CM/S

## 2025-02-19 PROCEDURE — 93880 EXTRACRANIAL BILAT STUDY: CPT

## 2025-04-01 ENCOUNTER — OFFICE VISIT (OUTPATIENT)
Dept: INTERNAL MEDICINE | Facility: CLINIC | Age: 70
End: 2025-04-01
Attending: INTERNAL MEDICINE
Payer: MEDICARE

## 2025-04-01 VITALS
DIASTOLIC BLOOD PRESSURE: 78 MMHG | HEIGHT: 68 IN | HEART RATE: 88 BPM | OXYGEN SATURATION: 99 % | WEIGHT: 147 LBS | BODY MASS INDEX: 22.28 KG/M2 | SYSTOLIC BLOOD PRESSURE: 126 MMHG

## 2025-04-01 DIAGNOSIS — Z13.39 SCREENING FOR ALCOHOLISM: ICD-10-CM

## 2025-04-01 DIAGNOSIS — E11.51 TYPE 2 DIABETES MELLITUS WITH DIABETIC PERIPHERAL ANGIOPATHY WITHOUT GANGRENE, WITHOUT LONG-TERM CURRENT USE OF INSULIN: ICD-10-CM

## 2025-04-01 DIAGNOSIS — I10 PRIMARY HYPERTENSION: ICD-10-CM

## 2025-04-01 DIAGNOSIS — G89.29 CHRONIC LOW BACK PAIN WITHOUT SCIATICA, UNSPECIFIED BACK PAIN LATERALITY: ICD-10-CM

## 2025-04-01 DIAGNOSIS — Z13.31 SCREENING FOR DEPRESSION: ICD-10-CM

## 2025-04-01 DIAGNOSIS — M70.61 TROCHANTERIC BURSITIS OF RIGHT HIP: ICD-10-CM

## 2025-04-01 DIAGNOSIS — M54.6 MIDLINE THORACIC BACK PAIN, UNSPECIFIED CHRONICITY: ICD-10-CM

## 2025-04-01 DIAGNOSIS — M54.50 CHRONIC LOW BACK PAIN WITHOUT SCIATICA, UNSPECIFIED BACK PAIN LATERALITY: ICD-10-CM

## 2025-04-01 DIAGNOSIS — E78.00 HIGH CHOLESTEROL: Primary | ICD-10-CM

## 2025-04-01 PROCEDURE — 4010F ACE/ARB THERAPY RXD/TAKEN: CPT | Mod: CPTII,S$GLB,, | Performed by: INTERNAL MEDICINE

## 2025-04-01 PROCEDURE — 1159F MED LIST DOCD IN RCRD: CPT | Mod: CPTII,S$GLB,, | Performed by: INTERNAL MEDICINE

## 2025-04-01 PROCEDURE — 3074F SYST BP LT 130 MM HG: CPT | Mod: CPTII,S$GLB,, | Performed by: INTERNAL MEDICINE

## 2025-04-01 PROCEDURE — G0444 DEPRESSION SCREEN ANNUAL: HCPCS | Mod: 59,S$GLB,, | Performed by: INTERNAL MEDICINE

## 2025-04-01 PROCEDURE — 1160F RVW MEDS BY RX/DR IN RCRD: CPT | Mod: CPTII,S$GLB,, | Performed by: INTERNAL MEDICINE

## 2025-04-01 PROCEDURE — 3078F DIAST BP <80 MM HG: CPT | Mod: CPTII,S$GLB,, | Performed by: INTERNAL MEDICINE

## 2025-04-01 PROCEDURE — 99214 OFFICE O/P EST MOD 30 MIN: CPT | Mod: 25,S$GLB,, | Performed by: INTERNAL MEDICINE

## 2025-04-01 PROCEDURE — G0442 ANNUAL ALCOHOL SCREEN 15 MIN: HCPCS | Mod: 59,S$GLB,, | Performed by: INTERNAL MEDICINE

## 2025-04-01 PROCEDURE — 3008F BODY MASS INDEX DOCD: CPT | Mod: CPTII,S$GLB,, | Performed by: INTERNAL MEDICINE

## 2025-04-01 RX ORDER — IBUPROFEN 800 MG/1
800 TABLET ORAL 2 TIMES DAILY PRN
Qty: 60 TABLET | Refills: 0 | Status: SHIPPED | OUTPATIENT
Start: 2025-04-01

## 2025-04-01 RX ORDER — TRAMADOL HYDROCHLORIDE 50 MG/1
50 TABLET ORAL EVERY 6 HOURS PRN
COMMUNITY
Start: 2025-03-13

## 2025-04-01 RX ORDER — PANTOPRAZOLE SODIUM 40 MG/1
40 TABLET, DELAYED RELEASE ORAL DAILY
Qty: 30 TABLET | Refills: 2 | Status: SHIPPED | OUTPATIENT
Start: 2025-04-01 | End: 2026-04-01

## 2025-04-01 RX ORDER — METHOCARBAMOL 500 MG/1
500 TABLET, FILM COATED ORAL 3 TIMES DAILY
COMMUNITY
Start: 2025-03-07

## 2025-04-01 RX ORDER — HYDROCODONE BITARTRATE AND ACETAMINOPHEN 5; 325 MG/1; MG/1
TABLET ORAL
COMMUNITY
Start: 2025-01-27

## 2025-04-01 RX ORDER — CELECOXIB 100 MG/1
100 CAPSULE ORAL 2 TIMES DAILY
COMMUNITY
Start: 2025-03-28

## 2025-04-01 NOTE — PROGRESS NOTES
Subjective     Patient ID: Gael Pedroza is a 70 y.o. male.    Chief Complaint: Follow-up (Still in a lot of pain which is causing him to get only 1 1/2 hours of sleep, going to PT and doing dry needling which helps in the moment but is not enough)    He saw Dr. Mcwilliams for pain in his right hip and was diagnosed with bursitis.  He has been doing physical therapy.  Recently saw Dr. Correia for his low back pain, then he got an injection by Dr. Farah, now he is having midback pain.  He is getting dry needling for his low back pain.  He had an upset stomach from taking Advil.  He started taking OTC Nexium daily with significant improvement.      Hypertension  This is a chronic problem. The current episode started more than 1 year ago. The problem is controlled.             Review of Systems   Constitutional: Negative.    Respiratory: Negative.     Cardiovascular: Negative.    Musculoskeletal:  Positive for arthralgias and back pain.          Objective     Physical Exam  Vitals and nursing note reviewed.   HENT:      Head: Normocephalic and atraumatic.   Eyes:      Pupils: Pupils are equal, round, and reactive to light.   Neurological:      Mental Status: He is alert.            Assessment and Plan     1. High cholesterol    2. Type 2 diabetes mellitus with diabetic peripheral angiopathy without gangrene, without long-term current use of insulin  Overview:  2015      3. Chronic low back pain without sciatica, unspecified back pain laterality  Overview:  MRI 1/11  [facility] x 3 - 2013  Dr. Mayorga      4. Primary hypertension  Overview:  2013      5. Midline thoracic back pain, unspecified chronicity    6. Trochanteric bursitis of right hip    7. Screening for alcoholism    8. Screening for depression    Other orders  -     pantoprazole (PROTONIX) 40 MG tablet; Take 1 tablet (40 mg total) by mouth once daily.  Dispense: 30 tablet; Refill: 2  -     ibuprofen (ADVIL,MOTRIN) 800 MG tablet; Take 1 tablet (800 mg total) by  mouth 2 (two) times daily as needed for Pain. Take with food  Dispense: 60 tablet; Refill: 0        PLAN:  Per orders and D/C instructions.  Continue diet and/or meds for DM, HTN, and high cholesterol, which are stable.  Follow-up with ortho for right hip pain, low back pain, and thoracic back pain.  Pantoprazole for GERD, probably NSAID induced.    Screening: The patient was screened for depression with the PHQ2 questionnaire and possible health consequences were discussed with the patient, who understands (15 minutes spent).       The patient was screened for the misuse of alcohol, by asking the number of drinks per average week, and if pt has had more than 4 drinks (more than 3 for women and elderly) in 1 day within the past year. The health and legal consequences of misuse were discussed (15 minutes spent).        Follow up in 10 weeks (on 6/10/2025).

## 2025-04-11 ENCOUNTER — OFFICE VISIT (OUTPATIENT)
Dept: INTERNAL MEDICINE | Facility: CLINIC | Age: 70
End: 2025-04-11
Attending: INTERNAL MEDICINE
Payer: MEDICARE

## 2025-04-11 VITALS
BODY MASS INDEX: 21.22 KG/M2 | HEART RATE: 112 BPM | SYSTOLIC BLOOD PRESSURE: 152 MMHG | DIASTOLIC BLOOD PRESSURE: 78 MMHG | WEIGHT: 140 LBS | OXYGEN SATURATION: 98 % | HEIGHT: 68 IN

## 2025-04-11 DIAGNOSIS — I10 PRIMARY HYPERTENSION: ICD-10-CM

## 2025-04-11 DIAGNOSIS — M45.9 ANKYLOSING SPONDYLITIS, UNSPECIFIED SITE OF SPINE: ICD-10-CM

## 2025-04-11 DIAGNOSIS — R63.0 ANOREXIA: ICD-10-CM

## 2025-04-11 DIAGNOSIS — E11.51 TYPE 2 DIABETES MELLITUS WITH DIABETIC PERIPHERAL ANGIOPATHY WITHOUT GANGRENE, WITHOUT LONG-TERM CURRENT USE OF INSULIN: ICD-10-CM

## 2025-04-11 DIAGNOSIS — M54.9 MID BACK PAIN: ICD-10-CM

## 2025-04-11 DIAGNOSIS — R63.4 WEIGHT LOSS: ICD-10-CM

## 2025-04-11 DIAGNOSIS — E78.00 HIGH CHOLESTEROL: Primary | ICD-10-CM

## 2025-04-11 DIAGNOSIS — K59.03 DRUG-INDUCED CONSTIPATION: ICD-10-CM

## 2025-04-11 DIAGNOSIS — F41.9 ANXIETY: ICD-10-CM

## 2025-04-11 RX ORDER — GABAPENTIN 300 MG/1
300 CAPSULE ORAL NIGHTLY
Qty: 30 CAPSULE | Refills: 1 | Status: SHIPPED | OUTPATIENT
Start: 2025-04-11 | End: 2026-04-11

## 2025-04-11 RX ORDER — METHYLPREDNISOLONE 4 MG/1
TABLET ORAL
COMMUNITY
Start: 2025-04-08

## 2025-04-11 RX ORDER — TIZANIDINE 2 MG/1
2 TABLET ORAL 3 TIMES DAILY
COMMUNITY
Start: 2025-04-03 | End: 2025-04-11

## 2025-04-11 RX ORDER — POLYETHYLENE GLYCOL 3350 17 G/17G
17 POWDER, FOR SOLUTION ORAL NIGHTLY
COMMUNITY

## 2025-04-11 RX ORDER — CYCLOBENZAPRINE HCL 5 MG
10 TABLET ORAL 3 TIMES DAILY
COMMUNITY

## 2025-04-11 NOTE — PROGRESS NOTES
Subjective     Patient ID: Gael Pedroza is a 70 y.o. male.    Chief Complaint: Follow-up (Still in pain with back and stomach(when eating) cannot sleep at all due to this, has not had a bowel movement since Monday, not eating and taking pain meds for back, getting CT with contrast in 2 weeks then will be getting injections on May 6 going on vacation 2 days later )    He is with his wife for the 1st time today.  He tells me he has ankylosing spondylitis for the 1st time today.  He says his mid back pain is severe.  He is seeing Dr. Almanza.  He is going to get a CT scan with contrast followed by an injection in 2 weeks.  He has 2 prescriptions for anti-inflammatories, 2 prescriptions for muscle relaxants, and to prescriptions for pain meds.  He is confused as to what he should be taking.  He just takes the meds intermittently.  He says he has trouble sleeping at night, but is wife is with him and says he sleeps fine when he is on the recliner.  He seems unusually anxious today.  He is on day 3 of a Medrol Dosepak.  He has lost 7 pounds over the past 10 days.  He has not eating much due to pain.  He has not had a BM in 4 days.    Hypertension  This is a chronic problem. The current episode started more than 1 year ago. The problem is controlled.         Diabetes  The patient presents for a follow-up diabetic visit for type 2 diabetes mellitus. The disease course has been stable.       Review of Systems   Constitutional: Negative.    Respiratory: Negative.     Cardiovascular: Negative.    Musculoskeletal:  Positive for back pain.   Psychiatric/Behavioral:  Positive for sleep disturbance. The patient is nervous/anxious.           Objective     Physical Exam  Vitals and nursing note reviewed.   Constitutional:       Appearance: He is well-developed.   HENT:      Head: Normocephalic and atraumatic.   Eyes:      Pupils: Pupils are equal, round, and reactive to light.   Cardiovascular:      Rate and Rhythm: Normal rate  and regular rhythm.      Heart sounds: Normal heart sounds.   Pulmonary:      Effort: Pulmonary effort is normal.   Musculoskeletal:      Thoracic back: No bony tenderness.   Neurological:      Mental Status: He is alert.            Assessment and Plan     1. High cholesterol    2. Type 2 diabetes mellitus with diabetic peripheral angiopathy without gangrene, without long-term current use of insulin  Overview:  2015      3. Chronic low back pain without sciatica, unspecified back pain laterality  Overview:  MRI 1/11  [facility] x 3 - 2013  Dr. Mayorga  2025 - [facility]      4. Primary hypertension  Overview:  2013      5. Right hip pain  Overview:  Dr. Mcwilliams      6. Mid back pain    7. Anxiety    Other orders  -     gabapentin (NEURONTIN) 300 MG capsule; Take 1 capsule (300 mg total) by mouth every evening.  Dispense: 30 capsule; Refill: 1        PLAN:  Per orders and D/C instructions.  Continue diet and/or meds for DM, HTN, and high cholesterol, which are stable.  His anxiety may be partially from the steroid Dosepak.  I have clearly written out how I want him to take his pain meds on a scheduled basis for at least 5 days or unless he gets significant side effects.  I have gone over the med list with he and his wife.  I have discontinued the duplicate medications which I do not want him to take (ibuprofen, tizanidine, and hydrocodone).  Continue pantoprazole which may help his weight loss and anorexia (possibly due to GI upset).  He will add MiraLax and gabapentin at night for constipation and sleep/anxiety.  Follow-up with Dr. Almanza for midback pain.  Is unclear to me whether he has ankylosing spondylitis.  We will consider checking HLA B27 with next labs.    Between 40-54 minutes of total time for evaluation and management services were spent on the patient today.  The medical problems and treatment options were discussed, and all questions were answered.           Follow up in 2 months (on 6/10/2025).

## 2025-04-16 DIAGNOSIS — E11.51 TYPE 2 DIABETES MELLITUS WITH DIABETIC PERIPHERAL ANGIOPATHY WITHOUT GANGRENE, WITHOUT LONG-TERM CURRENT USE OF INSULIN: ICD-10-CM

## 2025-04-16 RX ORDER — DAPAGLIFLOZIN AND METFORMIN HYDROCHLORIDE 5; 1000 MG/1; MG/1
2 TABLET, FILM COATED, EXTENDED RELEASE ORAL
Qty: 180 TABLET | Refills: 1 | Status: SHIPPED | OUTPATIENT
Start: 2025-04-16

## 2025-04-25 ENCOUNTER — OFFICE VISIT (OUTPATIENT)
Dept: INTERNAL MEDICINE | Facility: CLINIC | Age: 70
End: 2025-04-25
Attending: INTERNAL MEDICINE
Payer: MEDICARE

## 2025-04-25 VITALS
WEIGHT: 131 LBS | HEIGHT: 68 IN | BODY MASS INDEX: 19.85 KG/M2 | SYSTOLIC BLOOD PRESSURE: 88 MMHG | HEART RATE: 117 BPM | OXYGEN SATURATION: 97 % | DIASTOLIC BLOOD PRESSURE: 56 MMHG

## 2025-04-25 DIAGNOSIS — E78.9 DISORDER OF LIPID METABOLISM: ICD-10-CM

## 2025-04-25 DIAGNOSIS — E03.9 HYPOTHYROIDISM, UNSPECIFIED TYPE: ICD-10-CM

## 2025-04-25 DIAGNOSIS — G89.29 CHRONIC LOW BACK PAIN WITHOUT SCIATICA, UNSPECIFIED BACK PAIN LATERALITY: ICD-10-CM

## 2025-04-25 DIAGNOSIS — D51.0 PERNICIOUS ANEMIA: ICD-10-CM

## 2025-04-25 DIAGNOSIS — C25.8 OVERLAPPING MALIGNANT NEOPLASM OF PANCREAS: Primary | ICD-10-CM

## 2025-04-25 DIAGNOSIS — E11.51 TYPE 2 DIABETES MELLITUS WITH DIABETIC PERIPHERAL ANGIOPATHY WITHOUT GANGRENE, WITHOUT LONG-TERM CURRENT USE OF INSULIN: ICD-10-CM

## 2025-04-25 DIAGNOSIS — M54.50 CHRONIC LOW BACK PAIN WITHOUT SCIATICA, UNSPECIFIED BACK PAIN LATERALITY: ICD-10-CM

## 2025-04-25 DIAGNOSIS — R79.1 ABNORMAL COAGULATION PROFILE: ICD-10-CM

## 2025-04-25 DIAGNOSIS — E55.9 VITAMIN D DEFICIENCY: ICD-10-CM

## 2025-04-25 DIAGNOSIS — Z12.5 SCREENING FOR PROSTATE CANCER: ICD-10-CM

## 2025-04-25 DIAGNOSIS — E78.00 HIGH CHOLESTEROL: ICD-10-CM

## 2025-04-25 DIAGNOSIS — D50.8 OTHER IRON DEFICIENCY ANEMIA: ICD-10-CM

## 2025-04-25 DIAGNOSIS — R63.4 WEIGHT LOSS, UNINTENTIONAL: ICD-10-CM

## 2025-04-25 DIAGNOSIS — R52 PAIN: ICD-10-CM

## 2025-04-25 DIAGNOSIS — R79.89 OTHER SPECIFIED ABNORMAL FINDINGS OF BLOOD CHEMISTRY: ICD-10-CM

## 2025-04-25 DIAGNOSIS — I10 PRIMARY HYPERTENSION: ICD-10-CM

## 2025-04-25 RX ORDER — HYDROCODONE BITARTRATE AND ACETAMINOPHEN 5; 325 MG/1; MG/1
TABLET ORAL
Qty: 30 TABLET | Refills: 0 | Status: SHIPPED | OUTPATIENT
Start: 2025-04-25

## 2025-04-25 NOTE — PROGRESS NOTES
Subjective     Patient ID: Gael Pedroza is a 70 y.o. male.    Chief Complaint: Follow-up (Discuss recent results on imaging and how to proceed with referrals )    He is here with his wife today about the results of his CT scan which was done yesterday.    He had a CT scan of his spine done yesterday to evaluate worsening back pain.  Unfortunately the scan revealed 9.6 cm mass in the pancreas, multiple lesions throughout the liver, and multiple enlarged lymph nodes consistent with metastatic pancreatic cancer.  He has been having significant amount of pain in his spine which has been unrelieved with Celebrex, tramadol, and Flexeril.  After walking about 1 block he has to stop due to pain in his spine.  He has had a poor appetite and has lost 16 pounds over the past 2 months.  He has lost 30 pounds over the past 4 months.    He and his wife currently have plans to go on an [employer] cruise from May 7th through May 16th.      Hypertension  This is a chronic problem. The current episode started more than 1 year ago. The problem is controlled.           Follow-up  Associated symptoms include weakness.     Review of Systems   Constitutional:  Positive for unexpected weight change.   Respiratory: Negative.     Cardiovascular: Negative.    Musculoskeletal:  Positive for back pain.   Neurological:  Positive for weakness.          Objective     Physical Exam  Vitals and nursing note reviewed.   Constitutional:       Appearance: He is well-developed.   HENT:      Head: Normocephalic and atraumatic.   Eyes:      Pupils: Pupils are equal, round, and reactive to light.   Cardiovascular:      Rate and Rhythm: Normal rate and regular rhythm.      Heart sounds: Normal heart sounds.   Pulmonary:      Effort: Pulmonary effort is normal.   Musculoskeletal:      Thoracic back: No bony tenderness.   Neurological:      Mental Status: He is alert.            Assessment and Plan     1. Overlapping malignant neoplasm of  pancreas  Overview:  CT 4/25 - 9.6 cm mass in pancreas with multiple Mets to liver and enlarged lymph nodes.    Orders:  -     Ambulatory referral/consult to Oncology; Future; Expected date: 05/02/2025  -     Ambulatory referral/consult to Interventional Radiology; Future; Expected date: 05/02/2025  -     HYDROcodone-acetaminophen (NORCO) 5-325 mg per tablet; Take 1/2-1 tablet every 6 hours as needed.  Take with food.  Dispense: 30 tablet; Refill: 0    2. High cholesterol    3. Type 2 diabetes mellitus with diabetic peripheral angiopathy without gangrene, without long-term current use of insulin  Overview:  2015      4. Chronic low back pain without sciatica, unspecified back pain laterality  Overview:  MRI 1/11  [facility] x 3 - 2013  Dr. Mayorga  2025 - [facility]/Sof[facility]      5. Primary hypertension  Overview:  2013      6. Weight loss, unintentional    7. Pain        PLAN:  Per orders and D/C instructions.  Continue diet and/or meds for DM, and high cholesterol, which are stable.  Discontinue valsartan for hypertension since his BP is slightly low today.  Continue Bystolic for now.  He will monitor his blood pressure at home.  Check routine labs today.  If his diabetes is well-controlled we may cut back his Xigduo.  I have given him a prescription for hydrocodone to help relieve his pain.  He will try to go on his upcoming [employer] cruise if he can get his pain under control.  I have given them a letter in case he is not able to go on the cruise.  Urgent referral to Oncology and Interventional Radiology for metastatic pancreatic cancer.    Between 40-54 minutes of total time for evaluation and management services were spent on the patient today.  The medical problems and treatment options were discussed, and all questions were answered.           Follow up in 7 weeks (on 6/10/2025).

## 2025-04-28 ENCOUNTER — TELEPHONE (OUTPATIENT)
Dept: HEMATOLOGY/ONCOLOGY | Facility: CLINIC | Age: 70
End: 2025-04-28
Payer: MEDICARE

## 2025-04-28 DIAGNOSIS — C25.8 OVERLAPPING MALIGNANT NEOPLASM OF PANCREAS: Primary | ICD-10-CM

## 2025-04-28 RX ORDER — OXYCODONE AND ACETAMINOPHEN 5; 325 MG/1; MG/1
1 TABLET ORAL EVERY 6 HOURS PRN
Qty: 30 TABLET | Refills: 0 | Status: SHIPPED | OUTPATIENT
Start: 2025-04-28

## 2025-04-28 NOTE — TELEPHONE ENCOUNTER
----- Message from Fam sent at 4/28/2025  1:05 PM CDT -----  Regarding: FW: Scheduling Request  Contact: :Tanesha alamo    ----- Message -----  From: Alexandria Sarmiento  Sent: 4/28/2025  12:17 PM CDT  To: [facility]  Pool  Subject: Scheduling Request                               Scheduling Request   Appt Type:  NP   C25.8 (ICD-10-CM) - Overlapping malignant neoplasm of pancreas Date/Time Preference:ASAP Treating Provider:Cedric Caller Name:Tanesha alamo Contact Preference:[phone] Comments/notes:Daughter is calling to schedule ASAP. Requesting a call back

## 2025-04-29 ENCOUNTER — HOSPITAL ENCOUNTER (INPATIENT)
Facility: HOSPITAL | Age: 70
LOS: 3 days | Discharge: HOME | DRG: 435 | End: 2025-05-03
Attending: EMERGENCY MEDICINE | Admitting: STUDENT IN AN ORGANIZED HEALTH CARE EDUCATION/TRAINING PROGRAM
Payer: MEDICARE

## 2025-04-29 ENCOUNTER — TELEPHONE (OUTPATIENT)
Dept: HEMATOLOGY/ONCOLOGY | Facility: CLINIC | Age: 70
End: 2025-04-29
Payer: MEDICARE

## 2025-04-29 ENCOUNTER — TELEPHONE (OUTPATIENT)
Dept: ENDOSCOPY | Facility: HOSPITAL | Age: 70
End: 2025-04-29
Payer: MEDICARE

## 2025-04-29 DIAGNOSIS — K86.89 PANCREATIC MASS: Primary | ICD-10-CM

## 2025-04-29 DIAGNOSIS — R00.0 TACHYCARDIA: ICD-10-CM

## 2025-04-29 DIAGNOSIS — E43 SEVERE MALNUTRITION: Chronic | ICD-10-CM

## 2025-04-29 DIAGNOSIS — K86.89 MASS OF PANCREAS: Primary | ICD-10-CM

## 2025-04-29 DIAGNOSIS — C78.7 METASTASIS TO LIVER: ICD-10-CM

## 2025-04-29 DIAGNOSIS — Z51.5 ENCOUNTER FOR PALLIATIVE CARE: ICD-10-CM

## 2025-04-29 DIAGNOSIS — R07.9 CHEST PAIN: ICD-10-CM

## 2025-04-29 DIAGNOSIS — C25.8 OVERLAPPING MALIGNANT NEOPLASM OF PANCREAS: Primary | ICD-10-CM

## 2025-04-29 PROBLEM — I81 PORTAL VEIN THROMBOSIS: Status: ACTIVE | Noted: 2025-04-29

## 2025-04-29 LAB
ABSOLUTE EOSINOPHIL (OHS): 0.11 K/UL
ABSOLUTE EOSINOPHIL (OHS): 0.13 K/UL
ABSOLUTE MONOCYTE (OHS): 0.81 K/UL (ref 0.3–1)
ABSOLUTE MONOCYTE (OHS): 0.86 K/UL (ref 0.3–1)
ABSOLUTE NEUTROPHIL COUNT (OHS): 12.05 K/UL (ref 1.8–7.7)
ABSOLUTE NEUTROPHIL COUNT (OHS): 9.15 K/UL (ref 1.8–7.7)
ALBUMIN SERPL BCP-MCNC: 3.2 G/DL (ref 3.5–5.2)
ALP SERPL-CCNC: 237 UNIT/L (ref 40–150)
ALT SERPL W/O P-5'-P-CCNC: 43 UNIT/L (ref 10–44)
ANION GAP (OHS): 14 MMOL/L (ref 8–16)
APTT PPP: 25.1 SECONDS (ref 21–32)
AST SERPL-CCNC: 32 UNIT/L (ref 11–45)
BACTERIA #/AREA URNS AUTO: NORMAL /HPF
BASOPHILS # BLD AUTO: 0.06 K/UL
BASOPHILS # BLD AUTO: 0.06 K/UL
BASOPHILS NFR BLD AUTO: 0.4 %
BASOPHILS NFR BLD AUTO: 0.5 %
BILIRUB SERPL-MCNC: 0.5 MG/DL (ref 0.1–1)
BILIRUB UR QL STRIP.AUTO: NEGATIVE
BUN SERPL-MCNC: 18 MG/DL (ref 8–23)
CALCIUM SERPL-MCNC: 9.9 MG/DL (ref 8.7–10.5)
CANCER AG19-9 SERPL-ACNC: 33.8 U/ML
CHLORIDE SERPL-SCNC: 93 MMOL/L (ref 95–110)
CLARITY UR: CLEAR
CO2 SERPL-SCNC: 26 MMOL/L (ref 23–29)
COLOR UR AUTO: YELLOW
CREAT SERPL-MCNC: 0.7 MG/DL (ref 0.5–1.4)
ERYTHROCYTE [DISTWIDTH] IN BLOOD BY AUTOMATED COUNT: 13 % (ref 11.5–14.5)
ERYTHROCYTE [DISTWIDTH] IN BLOOD BY AUTOMATED COUNT: 13 % (ref 11.5–14.5)
GFR SERPLBLD CREATININE-BSD FMLA CKD-EPI: >60 ML/MIN/1.73/M2
GLUCOSE SERPL-MCNC: 216 MG/DL (ref 70–110)
GLUCOSE UR QL STRIP: ABNORMAL
HCT VFR BLD AUTO: 38.3 % (ref 40–54)
HCT VFR BLD AUTO: 42.1 % (ref 40–54)
HCV AB SERPL QL IA: NORMAL
HGB BLD-MCNC: 12.7 GM/DL (ref 14–18)
HGB BLD-MCNC: 13.9 GM/DL (ref 14–18)
HGB UR QL STRIP: NEGATIVE
HIV 1+2 AB+HIV1 P24 AG SERPL QL IA: NORMAL
HOLD SPECIMEN: NORMAL
IMM GRANULOCYTES # BLD AUTO: 0.06 K/UL (ref 0–0.04)
IMM GRANULOCYTES # BLD AUTO: 0.06 K/UL (ref 0–0.04)
IMM GRANULOCYTES NFR BLD AUTO: 0.4 % (ref 0–0.5)
IMM GRANULOCYTES NFR BLD AUTO: 0.5 % (ref 0–0.5)
INR PPP: 1.1 (ref 0.8–1.2)
KETONES UR QL STRIP: ABNORMAL
LEUKOCYTE ESTERASE UR QL STRIP: NEGATIVE
LIPASE SERPL-CCNC: 12 U/L (ref 4–60)
LYMPHOCYTES # BLD AUTO: 1.02 K/UL (ref 1–4.8)
LYMPHOCYTES # BLD AUTO: 1.24 K/UL (ref 1–4.8)
MCH RBC QN AUTO: 31.2 PG (ref 27–31)
MCH RBC QN AUTO: 31.3 PG (ref 27–31)
MCHC RBC AUTO-ENTMCNC: 33 G/DL (ref 32–36)
MCHC RBC AUTO-ENTMCNC: 33.2 G/DL (ref 32–36)
MCV RBC AUTO: 94 FL (ref 82–98)
MCV RBC AUTO: 94 FL (ref 82–98)
MICROSCOPIC COMMENT: NORMAL
NITRITE UR QL STRIP: NEGATIVE
NUCLEATED RBC (/100WBC) (OHS): 0 /100 WBC
NUCLEATED RBC (/100WBC) (OHS): 0 /100 WBC
OHS QRS DURATION: 92 MS
OHS QTC CALCULATION: 441 MS
PH UR STRIP: 6 [PH]
PLATELET # BLD AUTO: 266 K/UL (ref 150–450)
PLATELET # BLD AUTO: 346 K/UL (ref 150–450)
PMV BLD AUTO: 9.1 FL (ref 9.2–12.9)
PMV BLD AUTO: 9.2 FL (ref 9.2–12.9)
POCT GLUCOSE: 118 MG/DL (ref 70–110)
POCT GLUCOSE: 161 MG/DL (ref 70–110)
POTASSIUM SERPL-SCNC: 4.2 MMOL/L (ref 3.5–5.1)
PROT SERPL-MCNC: 7.3 GM/DL (ref 6–8.4)
PROT UR QL STRIP: NEGATIVE
PROTHROMBIN TIME: 11.9 SECONDS (ref 9–12.5)
RBC # BLD AUTO: 4.06 M/UL (ref 4.6–6.2)
RBC # BLD AUTO: 4.46 M/UL (ref 4.6–6.2)
RBC #/AREA URNS AUTO: 1 /HPF (ref 0–4)
RELATIVE EOSINOPHIL (OHS): 0.8 %
RELATIVE EOSINOPHIL (OHS): 1.1 %
RELATIVE LYMPHOCYTE (OHS): 10.8 % (ref 18–48)
RELATIVE LYMPHOCYTE (OHS): 7.2 % (ref 18–48)
RELATIVE MONOCYTE (OHS): 6.1 % (ref 4–15)
RELATIVE MONOCYTE (OHS): 7.1 % (ref 4–15)
RELATIVE NEUTROPHIL (OHS): 80 % (ref 38–73)
RELATIVE NEUTROPHIL (OHS): 85.1 % (ref 38–73)
SODIUM SERPL-SCNC: 133 MMOL/L (ref 136–145)
SP GR UR STRIP: >=1.03
UROBILINOGEN UR STRIP-ACNC: NEGATIVE EU/DL
WBC # BLD AUTO: 11.45 K/UL (ref 3.9–12.7)
WBC # BLD AUTO: 14.16 K/UL (ref 3.9–12.7)
WBC #/AREA URNS AUTO: 1 /HPF (ref 0–5)
YEAST UR QL AUTO: NORMAL /HPF

## 2025-04-29 PROCEDURE — 86803 HEPATITIS C AB TEST: CPT | Performed by: PHYSICIAN ASSISTANT

## 2025-04-29 PROCEDURE — 25500020 PHARM REV CODE 255: Performed by: STUDENT IN AN ORGANIZED HEALTH CARE EDUCATION/TRAINING PROGRAM

## 2025-04-29 PROCEDURE — 25000003 PHARM REV CODE 250: Performed by: PHYSICIAN ASSISTANT

## 2025-04-29 PROCEDURE — 96361 HYDRATE IV INFUSION ADD-ON: CPT

## 2025-04-29 PROCEDURE — 36415 COLL VENOUS BLD VENIPUNCTURE: CPT | Performed by: STUDENT IN AN ORGANIZED HEALTH CARE EDUCATION/TRAINING PROGRAM

## 2025-04-29 PROCEDURE — 96374 THER/PROPH/DIAG INJ IV PUSH: CPT | Mod: 59

## 2025-04-29 PROCEDURE — 85730 THROMBOPLASTIN TIME PARTIAL: CPT | Mod: 59 | Performed by: PHYSICIAN ASSISTANT

## 2025-04-29 PROCEDURE — 96366 THER/PROPH/DIAG IV INF ADDON: CPT

## 2025-04-29 PROCEDURE — 99285 EMERGENCY DEPT VISIT HI MDM: CPT | Mod: 25

## 2025-04-29 PROCEDURE — G0378 HOSPITAL OBSERVATION PER HR: HCPCS

## 2025-04-29 PROCEDURE — 85610 PROTHROMBIN TIME: CPT | Performed by: PHYSICIAN ASSISTANT

## 2025-04-29 PROCEDURE — 93005 ELECTROCARDIOGRAM TRACING: CPT

## 2025-04-29 PROCEDURE — 85025 COMPLETE CBC W/AUTO DIFF WBC: CPT | Mod: 91 | Performed by: PHYSICIAN ASSISTANT

## 2025-04-29 PROCEDURE — 85730 THROMBOPLASTIN TIME PARTIAL: CPT | Mod: 91 | Performed by: STUDENT IN AN ORGANIZED HEALTH CARE EDUCATION/TRAINING PROGRAM

## 2025-04-29 PROCEDURE — 86301 IMMUNOASSAY TUMOR CA 19-9: CPT | Performed by: PHYSICIAN ASSISTANT

## 2025-04-29 PROCEDURE — 63600175 PHARM REV CODE 636 W HCPCS: Performed by: PHYSICIAN ASSISTANT

## 2025-04-29 PROCEDURE — 99223 1ST HOSP IP/OBS HIGH 75: CPT | Mod: 25,GC,, | Performed by: INTERNAL MEDICINE

## 2025-04-29 PROCEDURE — 93010 ELECTROCARDIOGRAM REPORT: CPT | Mod: ,,, | Performed by: INTERNAL MEDICINE

## 2025-04-29 PROCEDURE — 83690 ASSAY OF LIPASE: CPT | Performed by: EMERGENCY MEDICINE

## 2025-04-29 PROCEDURE — 96375 TX/PRO/DX INJ NEW DRUG ADDON: CPT

## 2025-04-29 PROCEDURE — 80053 COMPREHEN METABOLIC PANEL: CPT | Performed by: NURSE PRACTITIONER

## 2025-04-29 PROCEDURE — 63600175 PHARM REV CODE 636 W HCPCS: Performed by: NURSE PRACTITIONER

## 2025-04-29 PROCEDURE — 96376 TX/PRO/DX INJ SAME DRUG ADON: CPT

## 2025-04-29 PROCEDURE — 87389 HIV-1 AG W/HIV-1&-2 AB AG IA: CPT | Performed by: PHYSICIAN ASSISTANT

## 2025-04-29 PROCEDURE — 81003 URINALYSIS AUTO W/O SCOPE: CPT | Performed by: NURSE PRACTITIONER

## 2025-04-29 PROCEDURE — 96365 THER/PROPH/DIAG IV INF INIT: CPT

## 2025-04-29 PROCEDURE — 85025 COMPLETE CBC W/AUTO DIFF WBC: CPT | Performed by: NURSE PRACTITIONER

## 2025-04-29 PROCEDURE — A4216 STERILE WATER/SALINE, 10 ML: HCPCS | Performed by: PHYSICIAN ASSISTANT

## 2025-04-29 RX ORDER — ONDANSETRON HYDROCHLORIDE 2 MG/ML
4 INJECTION, SOLUTION INTRAVENOUS
Status: COMPLETED | OUTPATIENT
Start: 2025-04-29 | End: 2025-04-29

## 2025-04-29 RX ORDER — HYDROMORPHONE HYDROCHLORIDE 1 MG/ML
0.5 INJECTION, SOLUTION INTRAMUSCULAR; INTRAVENOUS; SUBCUTANEOUS
Refills: 0 | Status: COMPLETED | OUTPATIENT
Start: 2025-04-29 | End: 2025-04-29

## 2025-04-29 RX ORDER — SODIUM CHLORIDE 0.9 % (FLUSH) 0.9 %
10 SYRINGE (ML) INJECTION EVERY 8 HOURS
Status: DISCONTINUED | OUTPATIENT
Start: 2025-04-29 | End: 2025-05-03 | Stop reason: HOSPADM

## 2025-04-29 RX ORDER — ONDANSETRON 8 MG/1
8 TABLET, ORALLY DISINTEGRATING ORAL EVERY 8 HOURS PRN
Status: DISCONTINUED | OUTPATIENT
Start: 2025-04-29 | End: 2025-05-03 | Stop reason: HOSPADM

## 2025-04-29 RX ORDER — TAMSULOSIN HYDROCHLORIDE 0.4 MG/1
1 CAPSULE ORAL DAILY
Status: DISCONTINUED | OUTPATIENT
Start: 2025-04-29 | End: 2025-05-03 | Stop reason: HOSPADM

## 2025-04-29 RX ORDER — IBUPROFEN 200 MG
16 TABLET ORAL
Status: DISCONTINUED | OUTPATIENT
Start: 2025-04-29 | End: 2025-05-03 | Stop reason: HOSPADM

## 2025-04-29 RX ORDER — POLYETHYLENE GLYCOL 3350 17 G/17G
17 POWDER, FOR SOLUTION ORAL DAILY PRN
Status: DISCONTINUED | OUTPATIENT
Start: 2025-04-29 | End: 2025-05-03 | Stop reason: HOSPADM

## 2025-04-29 RX ORDER — HEPARIN SODIUM,PORCINE/D5W 25000/250
0-40 INTRAVENOUS SOLUTION INTRAVENOUS CONTINUOUS
Status: DISCONTINUED | OUTPATIENT
Start: 2025-04-29 | End: 2025-04-30

## 2025-04-29 RX ORDER — NALOXONE HCL 0.4 MG/ML
0.02 VIAL (ML) INJECTION
Status: DISCONTINUED | OUTPATIENT
Start: 2025-04-29 | End: 2025-05-03 | Stop reason: HOSPADM

## 2025-04-29 RX ORDER — HEPARIN SODIUM 5000 [USP'U]/ML
5000 INJECTION, SOLUTION INTRAVENOUS; SUBCUTANEOUS EVERY 8 HOURS
Status: DISCONTINUED | OUTPATIENT
Start: 2025-04-29 | End: 2025-04-29

## 2025-04-29 RX ORDER — OXYCODONE HYDROCHLORIDE 5 MG/1
5 TABLET ORAL EVERY 4 HOURS PRN
Refills: 0 | Status: DISCONTINUED | OUTPATIENT
Start: 2025-04-29 | End: 2025-04-30

## 2025-04-29 RX ORDER — PROCHLORPERAZINE EDISYLATE 5 MG/ML
5 INJECTION INTRAMUSCULAR; INTRAVENOUS EVERY 6 HOURS PRN
Status: DISCONTINUED | OUTPATIENT
Start: 2025-04-29 | End: 2025-05-03 | Stop reason: HOSPADM

## 2025-04-29 RX ORDER — GLUCAGON 1 MG
1 KIT INJECTION
Status: DISCONTINUED | OUTPATIENT
Start: 2025-04-29 | End: 2025-05-03 | Stop reason: HOSPADM

## 2025-04-29 RX ORDER — GABAPENTIN 300 MG/1
300 CAPSULE ORAL NIGHTLY
Status: DISCONTINUED | OUTPATIENT
Start: 2025-04-30 | End: 2025-05-03 | Stop reason: HOSPADM

## 2025-04-29 RX ORDER — TALC
6 POWDER (GRAM) TOPICAL NIGHTLY PRN
Status: DISCONTINUED | OUTPATIENT
Start: 2025-04-29 | End: 2025-05-03 | Stop reason: HOSPADM

## 2025-04-29 RX ORDER — ALUMINUM HYDROXIDE, MAGNESIUM HYDROXIDE, AND SIMETHICONE 1200; 120; 1200 MG/30ML; MG/30ML; MG/30ML
30 SUSPENSION ORAL 4 TIMES DAILY PRN
Status: DISCONTINUED | OUTPATIENT
Start: 2025-04-29 | End: 2025-05-03 | Stop reason: HOSPADM

## 2025-04-29 RX ORDER — ASCORBIC ACID 500 MG
500 TABLET ORAL DAILY
Status: DISCONTINUED | OUTPATIENT
Start: 2025-04-29 | End: 2025-05-03 | Stop reason: HOSPADM

## 2025-04-29 RX ORDER — EZETIMIBE 10 MG/1
10 TABLET ORAL DAILY
Status: DISCONTINUED | OUTPATIENT
Start: 2025-04-29 | End: 2025-05-03 | Stop reason: HOSPADM

## 2025-04-29 RX ORDER — HYDROMORPHONE HYDROCHLORIDE 1 MG/ML
0.5 INJECTION, SOLUTION INTRAMUSCULAR; INTRAVENOUS; SUBCUTANEOUS EVERY 6 HOURS PRN
Refills: 0 | Status: DISCONTINUED | OUTPATIENT
Start: 2025-04-29 | End: 2025-04-29

## 2025-04-29 RX ORDER — ASPIRIN 81 MG/1
81 TABLET ORAL DAILY
Status: DISCONTINUED | OUTPATIENT
Start: 2025-04-30 | End: 2025-05-03 | Stop reason: HOSPADM

## 2025-04-29 RX ORDER — METHOCARBAMOL 500 MG/1
500 TABLET, FILM COATED ORAL 4 TIMES DAILY
Status: DISCONTINUED | OUTPATIENT
Start: 2025-04-29 | End: 2025-05-03 | Stop reason: HOSPADM

## 2025-04-29 RX ORDER — IPRATROPIUM BROMIDE AND ALBUTEROL SULFATE 2.5; .5 MG/3ML; MG/3ML
3 SOLUTION RESPIRATORY (INHALATION) EVERY 6 HOURS PRN
Status: DISCONTINUED | OUTPATIENT
Start: 2025-04-29 | End: 2025-05-03 | Stop reason: HOSPADM

## 2025-04-29 RX ORDER — IBUPROFEN 200 MG
24 TABLET ORAL
Status: DISCONTINUED | OUTPATIENT
Start: 2025-04-29 | End: 2025-05-03 | Stop reason: HOSPADM

## 2025-04-29 RX ORDER — PANTOPRAZOLE SODIUM 40 MG/1
40 TABLET, DELAYED RELEASE ORAL DAILY
Status: DISCONTINUED | OUTPATIENT
Start: 2025-04-30 | End: 2025-05-03 | Stop reason: HOSPADM

## 2025-04-29 RX ORDER — ACETAMINOPHEN 325 MG/1
650 TABLET ORAL EVERY 4 HOURS PRN
Status: DISCONTINUED | OUTPATIENT
Start: 2025-04-29 | End: 2025-05-03 | Stop reason: HOSPADM

## 2025-04-29 RX ORDER — HYDROMORPHONE HYDROCHLORIDE 2 MG/ML
0.5 INJECTION, SOLUTION INTRAMUSCULAR; INTRAVENOUS; SUBCUTANEOUS EVERY 6 HOURS PRN
Status: DISCONTINUED | OUTPATIENT
Start: 2025-04-29 | End: 2025-05-03 | Stop reason: HOSPADM

## 2025-04-29 RX ORDER — METOPROLOL TARTRATE 25 MG/1
25 TABLET, FILM COATED ORAL 2 TIMES DAILY
Status: DISCONTINUED | OUTPATIENT
Start: 2025-04-29 | End: 2025-05-03 | Stop reason: HOSPADM

## 2025-04-29 RX ORDER — INSULIN ASPART 100 [IU]/ML
0-5 INJECTION, SOLUTION INTRAVENOUS; SUBCUTANEOUS
Status: DISCONTINUED | OUTPATIENT
Start: 2025-04-29 | End: 2025-05-03 | Stop reason: HOSPADM

## 2025-04-29 RX ORDER — SIMETHICONE 80 MG
1 TABLET,CHEWABLE ORAL 4 TIMES DAILY PRN
Status: DISCONTINUED | OUTPATIENT
Start: 2025-04-29 | End: 2025-05-03 | Stop reason: HOSPADM

## 2025-04-29 RX ORDER — CHOLECALCIFEROL (VITAMIN D3) 25 MCG
1000 TABLET ORAL DAILY
Status: DISCONTINUED | OUTPATIENT
Start: 2025-04-29 | End: 2025-05-03 | Stop reason: HOSPADM

## 2025-04-29 RX ADMIN — ONDANSETRON 4 MG: 2 INJECTION INTRAMUSCULAR; INTRAVENOUS at 12:04

## 2025-04-29 RX ADMIN — METHOCARBAMOL 500 MG: 500 TABLET ORAL at 08:04

## 2025-04-29 RX ADMIN — METHOCARBAMOL 500 MG: 500 TABLET ORAL at 04:04

## 2025-04-29 RX ADMIN — HYDROMORPHONE HYDROCHLORIDE 0.5 MG: 1 INJECTION, SOLUTION INTRAMUSCULAR; INTRAVENOUS; SUBCUTANEOUS at 12:04

## 2025-04-29 RX ADMIN — HYDROMORPHONE HYDROCHLORIDE 0.5 MG: 2 INJECTION INTRAMUSCULAR; INTRAVENOUS; SUBCUTANEOUS at 11:04

## 2025-04-29 RX ADMIN — IOHEXOL 100 ML: 350 INJECTION, SOLUTION INTRAVENOUS at 02:04

## 2025-04-29 RX ADMIN — HEPARIN SODIUM AND DEXTROSE 18 UNITS/KG/HR: 10000; 5 INJECTION INTRAVENOUS at 04:04

## 2025-04-29 RX ADMIN — SODIUM CHLORIDE 500 ML: 9 INJECTION, SOLUTION INTRAVENOUS at 12:04

## 2025-04-29 RX ADMIN — HYDROMORPHONE HYDROCHLORIDE 0.5 MG: 1 INJECTION, SOLUTION INTRAMUSCULAR; INTRAVENOUS; SUBCUTANEOUS at 04:04

## 2025-04-29 RX ADMIN — Medication 10 ML: at 10:04

## 2025-04-29 RX ADMIN — METOPROLOL TARTRATE 25 MG: 25 TABLET, FILM COATED ORAL at 08:04

## 2025-04-29 NOTE — ED PROVIDER NOTES
Encounter Date: 4/29/2025       History     Chief Complaint   Patient presents with    Back Pain     Onset 1 month, progressively worsening, all over back pain, currently getting a work up to rule out pancreatic cancer     Patient is a 70-year-old male with history of diabetes, hypertension, ankylosing spondylitis, coronary artery disease, degenerative disc disease who presents to the emergency department with severe lower back pain.  Patient reports over the last couple of weeks he has had worsening lower back pain.  Reports he had trouble getting an appointment, so he followed up out of our health network and had CT and MRI.  He was called by [facility] and told that he had a large pancreatic mass.  At that point he followed up with his PCP Dr. Steele who placed urgent referral with [facility].  He has not been able to see oncologist yet.  He has not had biopsy or diagnosis.  Reports over the last couple of days he is losing weight.  Reports decreased appetite.  Reports 10/10 pain in his lower back.    The history is provided by the patient.     Review of patient's allergies indicates:   Allergen Reactions    Bactrim [sulfamethoxazole-trimethoprim] Itching, Rash and Blisters    Lipitor [atorvastatin]      Achy; pt reports no problem with Crestor    Levaquin [levofloxacin] Other (See Comments)     Muscle aches under shoulder blades     Past Medical History:   Diagnosis Date    Carotid artery stenosis 11/15/2012    DDD (degenerative disc disease) 09/26/2012    Diabetes mellitus     DJD (degenerative joint disease) 09/26/2012    High cholesterol 09/26/2012    HTN (hypertension) 09/24/2014    2013    Increased glucose level 09/26/2012    LBP (low back pain) 09/26/2012    Neck pain 09/24/2014    MATEUSZ 2014    Normal stress echocardiogram 09/26/2012     Past Surgical History:   Procedure Laterality Date    INTERPOSITION ARTHROPLASTY OF CARPOMETACARPAL JOINTS Right 8/11/2023    Procedure: INTERPOSITION  ARTHROPLASTY, CMC JOINT;  Surgeon: Williams, Claude S. IV, MD;  Location: [facility];  Service: Orthopedics;  Laterality: Right;    L-spine surgery  12/15    Dr. Correia     Family History   Problem Relation Name Age of Onset    Diabetes Mother      Hypertension Mother      Heart disease Father  62        MI     Social History[1]  Review of Systems   Constitutional:  Positive for activity change, appetite change and fatigue. Negative for chills and fever.   HENT:  Negative for congestion, ear discharge, ear pain, postnasal drip, rhinorrhea and sore throat.    Respiratory:  Negative for shortness of breath and stridor.    Cardiovascular:  Negative for chest pain.   Gastrointestinal:  Negative for abdominal pain, blood in stool, constipation, diarrhea, nausea and vomiting.   Genitourinary:  Negative for difficulty urinating and dysuria.   Musculoskeletal:  Positive for back pain.   Neurological:  Positive for weakness (Generalized). Negative for dizziness, light-headedness and headaches.       Physical Exam     Initial Vitals [04/29/25 1019]   BP Pulse Resp Temp SpO2   125/65 110 16 98.2 °F (36.8 °C) 100 %      MAP       --         Physical Exam    Nursing note and vitals reviewed.  Constitutional: He has a sickly appearance. He appears distressed (secondary to pain).   Ill-appearing, cachectic   HENT:   Head: Normocephalic.   Right Ear: External ear normal.   Left Ear: External ear normal. Mouth/Throat: Oropharynx is clear and moist.   Eyes: Conjunctivae are normal. Pupils are equal, round, and reactive to light.   Neck:   Normal range of motion.  Cardiovascular:  Normal rate and regular rhythm.           Pulmonary/Chest: Breath sounds normal.   Abdominal: Abdomen is soft. Bowel sounds are normal. There is no abdominal tenderness.   Musculoskeletal:      Cervical back: Normal range of motion.      Comments: No midline tenderness.  Diffuse mid and lower back paraspinal tenderness.  Normal strength in upper and lower  extremities.  No saddle anesthesia.     Neurological: He is alert and oriented to person, place, and time. GCS score is 15. GCS eye subscore is 4. GCS verbal subscore is 5. GCS motor subscore is 6.   Skin: Skin is warm and dry. Capillary refill takes less than 2 seconds.   Psychiatric: He has a normal mood and affect.         ED Course   Procedures  Labs Reviewed   COMPREHENSIVE METABOLIC PANEL - Abnormal       Result Value    Sodium 133 (*)     Potassium 4.2      Chloride 93 (*)     CO2 26      Glucose 216 (*)     BUN 18      Creatinine 0.7      Calcium 9.9      Protein Total 7.3      Albumin 3.2 (*)     Bilirubin Total 0.5       (*)     AST 32      ALT 43      Anion Gap 14      eGFR >60     URINALYSIS, REFLEX TO URINE CULTURE - Abnormal    Color, UA Yellow      Appearance, UA Clear      pH, UA 6.0      Spec Grav UA >=1.030 (*)     Protein, UA Negative      Glucose, UA 4+ (*)     Ketones, UA 2+ (*)     Bilirubin, UA Negative      Blood, UA Negative      Nitrites, UA Negative      Urobilinogen, UA Negative      Leukocyte Esterase, UA Negative     CBC WITH DIFFERENTIAL - Abnormal    WBC 14.16 (*)     RBC 4.46 (*)     HGB 13.9 (*)     HCT 42.1      MCV 94      MCH 31.2 (*)     MCHC 33.0      RDW 13.0      Platelet Count 346      MPV 9.1 (*)     Nucleated RBC 0      Neut % 85.1 (*)     Lymph % 7.2 (*)     Mono % 6.1      Eos % 0.8      Basophil % 0.4      Imm Grans % 0.4      Neut # 12.05 (*)     Lymph # 1.02      Mono # 0.86      Eos # 0.11      Baso # 0.06      Imm Grans # 0.06 (*)    HEPATITIS C ANTIBODY - Normal    Hep C Ab Interp Non-Reactive     HIV 1 / 2 ANTIBODY - Normal    HIV 1/2 Ag/Ab Non-Reactive     LIPASE - Normal    Lipase Level 12     CANCER ANTIGEN 19-9 - Normal    CA 19-9 33.8     CBC W/ AUTO DIFFERENTIAL    Narrative:     The following orders were created for panel order CBC auto differential.  Procedure                               Abnormality         Status                     ---------                                -----------         ------                     CBC with Differential[[id number]]       Abnormal            Final result                 Please view results for these tests on the individual orders.   GREY TOP URINE HOLD    Extra Tube Hold for add-ons.     URINALYSIS MICROSCOPIC    RBC, UA 1      WBC, UA 1      Bacteria, UA None      Yeast, UA None      Microscopic Comment       HEP C VIRUS HOLD SPECIMEN   PHARMACOGENOMICS PANEL        ECG Results              EKG 12-lead (Final result)        Collection Time Result Time QRS Duration OHS QTC Calculation    04/29/25 11:54:25 04/29/25 12:00:08 92 [id number]                     Final result by Interface, Lab In [facility] (04/29/25 12:00:11)                   Narrative:    Test Reason : R00.0,    Vent. Rate :  97 BPM     Atrial Rate :  97 BPM     P-R Int : 134 ms          QRS Dur :  92 ms      QT Int : 348 ms       P-R-T Axes :  55  79  65 degrees    QTcB Int : 441 ms    Normal sinus rhythm  Low septal forces  Nonspecific ST abnormality  Abnormal ECG  No previous ECGs available  Confirmed by Jean Nix ([id number]) on 4/29/2025 12:00:06 PM    Referred By: AAAREFERRAL SELF           Confirmed By: Jean Nix                                  Imaging Results              CT Chest Abdomen Pelvis With IV Contrast (XPD) NO Oral Contrast (In process)  Result time 04/29/25 14:30:19                     Medications   sodium chloride 0.9% flush 10 mL (0 mLs Intravenous Hold 4/29/25 1400)   albuterol-ipratropium 2.5 mg-0.5 mg/3 mL nebulizer solution 3 mL (has no administration in time range)   melatonin tablet 6 mg (has no administration in time range)   ondansetron disintegrating tablet 8 mg (has no administration in time range)   prochlorperazine injection Soln 5 mg (has no administration in time range)   polyethylene glycol packet 17 g (has no administration in time range)   simethicone chewable tablet 80 mg (has no administration in time range)    aluminum-magnesium hydroxide-simethicone 200-200-20 mg/5 mL suspension 30 mL (has no administration in time range)   acetaminophen tablet 650 mg (has no administration in time range)   naloxone 0.4 mg/mL injection 0.02 mg (has no administration in time range)   glucose chewable tablet 16 g (has no administration in time range)   glucose chewable tablet 24 g (has no administration in time range)   dextrose 50% injection 12.5 g (has no administration in time range)   dextrose 50% injection 25 g (has no administration in time range)   glucagon (human recombinant) injection 1 mg (has no administration in time range)   heparin (porcine) injection 5,000 Units (has no administration in time range)   ascorbic acid (vitamin C) tablet 500 mg (500 mg Oral Not Given 4/29/25 1500)   aspirin EC tablet 81 mg (has no administration in time range)   vitamin D 1000 units tablet 1,000 Units (1,000 Units Oral Not Given 4/29/25 1500)   ezetimibe tablet 10 mg (10 mg Oral Not Given 4/29/25 1500)   gabapentin capsule 300 mg (has no administration in time range)   multivitamin tablet (1 tablet Oral Not Given 4/29/25 1400)   metoprolol tartrate (LOPRESSOR) tablet 25 mg (has no administration in time range)   pantoprazole EC tablet 40 mg (has no administration in time range)   tamsulosin 24 hr capsule 0.4 mg (0.4 mg Oral Not Given 4/29/25 1500)   insulin aspart U-100 pen 0-5 Units (has no administration in time range)   methocarbamoL tablet 500 mg (has no administration in time range)   HYDROmorphone injection 0.5 mg (has no administration in time range)   HYDROmorphone injection 0.5 mg (0.5 mg Intravenous Given 4/29/25 1217)   ondansetron injection 4 mg (4 mg Intravenous Given 4/29/25 1218)   sodium chloride 0.9% bolus 500 mL 500 mL (0 mLs Intravenous Stopped 4/29/25 1259)   iohexoL (OMNIPAQUE 350) injection 100 mL (100 mLs Intravenous Given 4/29/25 1418)     Medical Decision Making  Urgent evaluation of a 70-year-old male who presents to  the emergency department with known pancreatic mass.  Patient is afebrile and nontoxic appearing.  He does appear very weak and cachectic.  Diffuse back pain with no midline tenderness.  No neurological deficits.  Patient's chart is reviewed showing that patient care advocate RN has attempted to get appt sooner for him.  Dr. Granados and Dr. Carson are aware of patient and have scheduled outpatient labs and imaging with plans for EUS biopsy.  Will attempt to reach out to have patient admitted for intractable pain and possibly expedite diagnosis.    2:43 PM  Pt has been placed under Hospital Medicine with AES and Heme-Onc consult.  Radiology sees portal vein thrombus.  Alerted Hospital Medicine team.    Amount and/or Complexity of Data Reviewed  Labs: ordered.  Radiology: ordered.    Risk  Prescription drug management.                                      Clinical Impression:  Final diagnoses:  [R00.0] Tachycardia  [K86.89] Pancreatic mass (Primary)          ED Disposition Condition    Observation                   [1]   Social History  Tobacco Use    Smoking status: Former     Current packs/day: 0.00     Types: Cigarettes     Quit date: 2010     Years since quittin.8    Smokeless tobacco: Never   Substance Use Topics    Alcohol use: Yes     Alcohol/week: 8.0 standard drinks of alcohol     Types: 5 Cans of beer, 3 Standard drinks or equivalent per week    Drug use: No        Sierra Paulino PA-C  [phone]

## 2025-04-29 NOTE — ASSESSMENT & PLAN NOTE
"Patient's FSGs are uncontrolled due to hyperglycemia on current medication regimen.  Last A1c reviewed-   Lab Results   Component Value Date    LABA1C 6.6 (H) 01/18/2017    HGBA1C 6.9 (H) 12/13/2024     Most recent fingerstick glucose reviewed- No results for input(s): "POCTGLUCOSE" in the last 24 hours., Continue to monitor glucose closely for hypo/hyperglycemia.  Current correctional scale  Low  Maintain anti-hyperglycemic dose as follows-   Antihyperglycemics (From admission, onward)      Start     Stop Route Frequency Ordered    04/29/25 1451  insulin aspart U-100 pen 0-5 Units         -- SubQ Before meals & nightly PRN 04/29/25 1352          Home antihyperglycemics reviewed, will hold oral hypoglycemics while patient is in the hospital.  "

## 2025-04-29 NOTE — PLAN OF CARE
Plan of Care Note    70-year-old male with history of diabetes, hypertension, ankylosing spondylitis, CAD admitted for worsening lower back pain. A large peripancreatic mass was seen on CT and MRI done for back pain on 4/24. Pt currently scheduled for outpatient EUS biopsy of the pancreatic mass with AES on 5/2/25. CA 19-9 is normal.    Plan  -CT CAP pending pending to r/o metastatic disease  -We will follow up on biopsy result  -If biopsy is positive for malignancy, recommend CT Abdomen pancreatic protocol.    Discussed with Dr Ibrahima Sy M.D  Hematology and Oncology Fellow

## 2025-04-29 NOTE — SUBJECTIVE & OBJECTIVE
Past Medical History:   Diagnosis Date    Carotid artery stenosis 11/15/2012    DDD (degenerative disc disease) 09/26/2012    Diabetes mellitus     DJD (degenerative joint disease) 09/26/2012    High cholesterol 09/26/2012    HTN (hypertension) 09/24/2014    2013    Increased glucose level 09/26/2012    LBP (low back pain) 09/26/2012    Neck pain 09/24/2014    MATEUSZ 2014    Normal stress echocardiogram 09/26/2012       Past Surgical History:   Procedure Laterality Date    INTERPOSITION ARTHROPLASTY OF CARPOMETACARPAL JOINTS Right 8/11/2023    Procedure: INTERPOSITION ARTHROPLASTY, CMC JOINT;  Surgeon: Williams, Claude S. IV, MD;  Location: [facility];  Service: Orthopedics;  Laterality: Right;    L-spine surgery  12/15    Dr. Correia       Review of patient's allergies indicates:   Allergen Reactions    Bactrim [sulfamethoxazole-trimethoprim] Itching, Rash and Blisters    Lipitor [atorvastatin]      Achy; pt reports no problem with Crestor    Levaquin [levofloxacin] Other (See Comments)     Muscle aches under shoulder blades       No current facility-administered medications on file prior to encounter.     Current Outpatient Medications on File Prior to Encounter   Medication Sig    aspirin (ECOTRIN) 81 MG EC tablet Take 1 tablet (81 mg total) by mouth once daily.    celecoxib (CELEBREX) 100 MG capsule Take 100 mg by mouth 2 (two) times daily.    gabapentin (NEURONTIN) 300 MG capsule Take 1 capsule (300 mg total) by mouth every evening.    nebivoloL (BYSTOLIC) 5 MG Tab Take 1 tablet (5 mg total) by mouth once daily.    oxyCODONE-acetaminophen (PERCOCET) 5-325 mg per tablet Take 1 tablet by mouth every 6 (six) hours as needed for Pain. Take with food    pantoprazole (PROTONIX) 40 MG tablet Take 1 tablet (40 mg total) by mouth once daily.    XIGDUO XR 5-1,000 mg TAKE TWO TABLETS BY MOUTH EVERY DAY    alcohol swabs PadM Apply 1 each topically as needed.    ascorbic acid, vitamin C, (VITAMIN C) 500 MG tablet Take 500 mg by  mouth once daily.    blood sugar diagnostic (TRUE METRIX GLUCOSE TEST STRIP) Strp 200 each by Misc.(Non-Drug; Combo Route) route Daily.    cholecalciferol, vitamin D3, 1,000 unit capsule Take 1,000 Units by mouth once daily.    ezetimibe (ZETIA) 10 mg tablet Take 1 tablet (10 mg total) by mouth once daily.    lancets 30 gauge Misc 1 lancet  by Misc.(Non-Drug; Combo Route) route Daily.    multivitamin with minerals tablet Take 1 tablet by mouth once daily.    polyethylene glycol (GLYCOLAX) 17 gram PwPk Take 17 g by mouth nightly.    rosuvastatin (CRESTOR) 10 MG tablet Take 1 tablet (10 mg total) by mouth once daily.    tamsulosin (FLOMAX) 0.4 mg Cap TAKE ONE CAPSULE BY MOUTH EVERY DAY     Family History       Problem Relation (Age of Onset)    Diabetes Mother    Heart disease Father (62)    Hypertension Mother          Tobacco Use    Smoking status: Former     Current packs/day: 0.00     Types: Cigarettes     Quit date: 2010     Years since quittin.8    Smokeless tobacco: Never   Substance and Sexual Activity    Alcohol use: Yes     Alcohol/week: 8.0 standard drinks of alcohol     Types: 5 Cans of beer, 3 Standard drinks or equivalent per week    Drug use: No    Sexual activity: Yes     Partners: Female     Review of Systems   Constitutional:  Positive for activity change and appetite change. Negative for chills, diaphoresis, fatigue and fever.   HENT:  Negative for congestion, rhinorrhea and sore throat.    Respiratory:  Negative for cough, chest tightness, shortness of breath and wheezing.    Cardiovascular:  Negative for chest pain, palpitations and leg swelling.   Gastrointestinal:  Positive for diarrhea. Negative for abdominal distention, abdominal pain, blood in stool, constipation, nausea and vomiting.   Genitourinary:  Negative for difficulty urinating, dysuria, frequency, hematuria and urgency.   Musculoskeletal:  Positive for back pain. Negative for arthralgias and neck stiffness.   Neurological:   Negative for dizziness, tremors, seizures, syncope, weakness, light-headedness, numbness and headaches.   Psychiatric/Behavioral:  Negative for agitation and confusion.      Objective:     Vital Signs (Most Recent):  Temp: 98.2 °F (36.8 °C) (04/29/25 1221)  Pulse: 98 (04/29/25 1214)  Resp: 20 (04/29/25 1217)  BP: 130/69 (04/29/25 1214)  SpO2: 97 % (04/29/25 1214) Vital Signs (24h Range):  Temp:  [98.2 °F (36.8 °C)] 98.2 °F (36.8 °C)  Pulse:  [] 98  Resp:  [16-20] 20  SpO2:  [97 %-100 %] 97 %  BP: (125-130)/(65-69) 130/69     Weight: 55.3 kg (122 lb)  Body mass index is 19.69 kg/m².     Physical Exam  Vitals and nursing note reviewed.   Constitutional:       General: He is not in acute distress.     Appearance: He is well-developed. He is not diaphoretic.   HENT:      Head: Normocephalic and atraumatic.      Right Ear: External ear normal.      Left Ear: External ear normal.      Nose: Nose normal. No congestion.      Mouth/Throat:      Pharynx: Oropharynx is clear.   Eyes:      General: No scleral icterus.     Extraocular Movements: Extraocular movements intact.   Cardiovascular:      Rate and Rhythm: Normal rate and regular rhythm.      Pulses: Normal pulses.      Heart sounds: Normal heart sounds. No murmur heard.  Pulmonary:      Effort: Pulmonary effort is normal. No respiratory distress.      Breath sounds: Normal breath sounds. No wheezing or rales.   Abdominal:      General: Bowel sounds are normal. There is no distension.      Palpations: Abdomen is soft.      Tenderness: There is no abdominal tenderness. There is no guarding or rebound.   Musculoskeletal:         General: Tenderness (paraspinal lumbar ttp) present.      Cervical back: Normal range of motion.      Right lower leg: No edema.      Left lower leg: No edema.   Skin:     General: Skin is warm and dry.      Capillary Refill: Capillary refill takes less than 2 seconds.   Neurological:      General: No focal deficit present.      Mental  Status: He is alert and oriented to person, place, and time. Mental status is at baseline.   Psychiatric:         Mood and Affect: Mood normal.         Behavior: Behavior normal.         Thought Content: Thought content normal.                Significant Labs: All pertinent labs within the past 24 hours have been reviewed.  CBC:   Recent Labs   Lab 04/29/25  1052   WBC 14.16*   HGB 13.9*   HCT 42.1        CMP:   Recent Labs   Lab 04/29/25  1052   *   K 4.2   CL 93*   CO2 26   *   BUN 18   CREATININE 0.7   CALCIUM 9.9   PROT 7.3   ALBUMIN 3.2*   BILITOT 0.5   ALKPHOS 237*   AST 32   ALT 43   ANIONGAP 14     Urine Studies:   Recent Labs   Lab 04/29/25  1217   COLORU Yellow   APPEARANCEUA Clear   PHUR 6.0   SPECGRAV >=1.030*   PROTEINUA Negative   GLUCUA 4+*   BILIRUBINUA Negative   OCCULTUA Negative   NITRITE Negative   UROBILINOGEN Negative   LEUKOCYTESUR Negative   RBCUA 1   WBCUA 1   BACTERIA None       Significant Imaging: I have reviewed all pertinent imaging results/findings within the past 24 hours.  Imaging Results    None

## 2025-04-29 NOTE — NURSING
Oncology Navigation   Intake  Date of Diagnosis: -- (biopsy not yet scheduled)  Type of Referral: Internal  Date of Referral: 04/25/25  Initial Nurse Navigator Contact: 04/28/25  Referral to Initial Contact Timeline (days): 3  First Appointment Available: 05/01/25  Appointment Date: 05/01/25  First Available Date vs. Scheduled Date (days): 0  Reason if booked > 7 days after scheduling: Waiting for surgery / transplant / biopsy; Additional tests/procedures     Treatment  Current Status: Staging work-up    Surgery: N/A          Procedures: CT  CT Schedule Date: 05/01/25             Support Systems: Spouse/significant other; Family members     Acuity      Follow Up  No follow-ups on file.

## 2025-04-29 NOTE — CONSULTS
69 y/o M with PMHx newly discovered pancreatic mass, T2DM, HTN, chronic back pain who presents to the ED with severe, progressively worsening back pain. IR consult received for biopsy of pt's newly discovered pancreatic mass per CT TL spine on 4/25. CT c/a/p pending. Pt currently scheduled for outpatient EUS biopsy of the pancreatic mass with AES on 5/2/25. Will defer biopsy to [facility]. Discussed with ED provider via secure chat.     No plans for IR intervention- defer biopsy to [facility]    Jesenia Payne PA-C  Interventional Radiology   Spectra: [phone]

## 2025-04-29 NOTE — ED NOTES
Telemetry Verification   Patient placed on Telemetry Box  Verified by Yanet  Box # [id number]   Monitor Tech    Rate 95   Rhythm NS

## 2025-04-29 NOTE — ED NOTES
Patient identifiers verified and correct for  Mr Bangura  C/C:  Back pain SEE NN  APPEARANCE: awake and alert in NAD. PAIN  10/10  SKIN: warm, dry and intact. No breakdown or bruising.  MUSCULOSKELETAL: Patient moving all extremities spontaneously, no obvious swelling or deformities noted. Ambulates independently.  RESPIRATORY: Denies shortness of breath.Respirations unlabored.   CARDIAC: Denies CP, 2+ distal pulses; no peripheral edema  ABDOMEN: S/ND/NT, Denies nausea  : voids spontaneously, denies difficulty  Neurologic: AAO x 4; follows commands equal strength in all extremities; denies numbness/tingling. Denies dizziness  Denies new weknaess

## 2025-04-29 NOTE — HPI
Mr. Gael Pedroza 70y M w/ h/o HTN, DM2, LBP, pancreatic mass, ankylosing spondylitis Carotid artery stenosis, DDD, HLD presents w/ worsening low back pain x1 week. Pt reports chronic LBP that has been acutely worsening over the last week. He denies any trauma. The pain begins in his lower back and radiates b/l and up to his rib cage. Its constant and nagging. Reports he was seen by a physician who has been managing his pain w/ percocet, but recently he's required the pain med more frequently and feels like he's chasing the pain. Reports pain has been contributed to his pancreatic mass which he believes is cancerous. It has been associated w/ decreased appetite and weight loss of up to 1 lb per day. Endorses a h/o abdominal pain associated w/ meals but he no longer experiences this. His BM are loose and in small quantity. He is compliant w/ his home medications and reports he has not yet started treatment for his pancreatic mass as he does not yet have a formal diagnosis of cancer. Denies any fevers, chills, chest pain, SOB, nausea or vomiting, dysuria, hematochezia or tarry stools.    In ED, Pt AFVSS on RA. WBC 14.16. . UA noninfectious. ECG w/ NSR, nonspecific ST abnormality, grossly nonischemic.Given IV dilaudid, antiemetics and 500 ml NS bolus. Heme/onc and AES consulted by ED.

## 2025-04-29 NOTE — FIRST PROVIDER EVALUATION
Emergency Department TeleTriage Encounter Note      CHIEF COMPLAINT    Chief Complaint   Patient presents with    Back Pain     Onset 1 month, progressively worsening, all over back pain, currently getting a work up to rule out pancreatic cancer       VITAL SIGNS   Initial Vitals [04/29/25 1019]   BP Pulse Resp Temp SpO2   125/65 110 16 98.2 °F (36.8 °C) 100 %      MAP       --            ALLERGIES    Review of patient's allergies indicates:   Allergen Reactions    Bactrim [sulfamethoxazole-trimethoprim] Itching, Rash and Blisters    Lipitor [atorvastatin]      Achy; pt reports no problem with Crestor    Levaquin [levofloxacin] Other (See Comments)     Muscle aches under shoulder blades       PROVIDER TRIAGE NOTE  Verbal consent for the teletriage evaluation was given by the patient at the start of the evaluation.  All efforts will be made to maintain patient's privacy during the evaluation.      This is a teletriage evaluation of a 70 y.o. male presenting to the ED with c/o extreme generalized back pain; recent pancreatic cancer diagnosis with biopsy scheduled for 5/2/2025. Limited physical exam via telehealth: The patient is awake, alert, answering questions appropriately and is not in respiratory distress.  As the Teletriage provider, I performed an initial assessment and ordered appropriate labs and imaging studies, if any, to facilitate the patient's care once placed in the ED. Once a room is available, care and a full evaluation will be completed by an alternate ED provider.  Any additional orders and the final disposition will be determined by that provider.  All imaging and labs will not be followed-up by the Teletriage Team, including myself.          ORDERS  Labs Reviewed   HEPATITIS C ANTIBODY   HEP C VIRUS HOLD SPECIMEN   HIV 1 / 2 ANTIBODY       ED Orders (720h ago, onward)      Start Ordered     Status Ordering Provider    04/29/25 1046 04/29/25 1046  Saline lock IV  Once         Ordered MICHAEL AGUILLON  "   04/29/25 1046 04/29/25 1046  Pulse Oximetry Continuous  Continuous         Ordered MICHAEL AGUILLON    04/29/25 1046 04/29/25 1046  Cardiac Monitoring - Adult  Continuous        Comments: Notify Physician If:    Ordered MICHAEL AGUILLON    04/29/25 1046 04/29/25 1046  EKG 12-lead  Once         Ordered MICHAEL AGUILLON    04/29/25 1046 04/29/25 1046  Comprehensive metabolic panel  STAT         Ordered MICHAEL AGUILLON    04/29/25 1046 04/29/25 1046  CBC auto differential  STAT         Ordered MICHAEL AGUILLON    04/29/25 1046 04/29/25 1046  Urinalysis, Reflex to Urine Culture Urine, Clean Catch  STAT         Ordered MICHAEL AGUILLON    04/29/25 1021 04/29/25 1021  Hepatitis C Antibody  STAT        Placed in "And" Linked Group    Ordered SOLIS BLACKWELL    04/29/25 1021 04/29/25 1021  HCV Virus Hold Specimen  STAT        Placed in "And" Linked Group    Ordered SOLIS BLACKWELL    04/29/25 1021 04/29/25 1021  HIV 1/2 Ag/Ab (4th Gen)  STAT         Ordered SOLIS BLACKWELL              Virtual Visit Note: The provider triage portion of this emergency department evaluation and documentation was performed via [employer], a HIPAA-compliant telemedicine application, in concert with a tele-presenter in the room. A face to face patient evaluation with one of my colleagues will occur once the patient is placed in an emergency department room.      DISCLAIMER: This note was prepared with [employer] voice recognition transcription software. Garbled syntax, mangled pronouns, and other bizarre constructions may be attributed to that software system.    "

## 2025-04-29 NOTE — TELEPHONE ENCOUNTER
Spoke to patient and wife Shira to schedule procedure(s) Upper Endoscopy Ultrasound (EUS)       Physician to perform procedure(s) Dr. HONG Curtis  Date of Procedure (s) 5/2/25  Arrival Time 10:00 AM  Time of Procedure(s) 11:00 AM   Location of Procedure(s) [address]  Type of Rx Prep sent to patient: Other  Instructions provided to patient via [facility]    Patient was informed on the following information and verbalized understanding. Screening questionnaire reviewed with patient and complete. If procedure requires anesthesia, a responsible adult needs to be present to accompany the patient home, patient cannot drive after receiving anesthesia. Appointment details are tentative, especially check-in time. Patient will receive a prep-op call 7 days prior to confirm check-in time for procedure. If applicable the patient should contact their pharmacy to verify Rx for procedure prep is ready for pick-up. Patient was advised to call the scheduling department at [phone] if pharmacy states no Rx is available. Patient was advised to call the endoscopy scheduling department if any questions or concerns arise.      [facility] Scheduling Department

## 2025-04-29 NOTE — CONSULTS
[facility]  AES  Consult Note    Patient Name: Gael Pedroza  MRN: [id number]  Admission Date: 4/29/2025  Hospital Length of Stay: 0 days  Code Status: Full Code   Attending Provider: Kellee Gomze MD   Consulting Provider: Edwin Umanzor MD  Primary Care Physician: HARLAN Steele MD  Principal Problem:Pancreatic mass    Inpatient consult to [facility] ([facility])  Consult performed by: Edwin Umanzor MD  Consult ordered by: Sierra Paulino PA-C        Subjective:     HPI: Gael Pedroza is a 70 y.o. male with history of DM, HTN, ankylosing spondylitis, CAD who presents to ED for severe low back pain. Presented to ED due to intractable ongoing pain. Pending referral to heme-onc after recent large peripancreatic mass was seen on CT and MRI done for back pain on 4/24.. Has not yet undergone biopsy. Poor appetite over past few months, has lost 30 lbs over that time.     Repeat CT was done on admission which shows 9.4 cm mass in pancreatic body. Occludes splenic vein and SMV with nonocclusive thrombus extending into main portal vein. Started on heparin gtt overnight.         Objective:     Vitals:    04/29/25 1415   BP: 128/74   Pulse: 99   Resp: 16   Temp: 98.4 °F (36.9 °C)         Constitutional:  not in acute distress and well developed  HENT: Head: Normal, normocephalic.  Eyes: conjunctiva clear and sclera nonicteric  GI: soft, non-tender, without masses or organomegaly  Musculoskeletal: no muscular tenderness noted  Skin: normal color  Neurological: alert, oriented x3    Significant Labs:  Reviewed the following pertinent laboratory tests:   CA 19-9 33  TB 0.4     Significant Imaging:  Imaging reviewed: CT chest/abdomen/pelvis. Interpretation:    9.4 cm mass involving the pancreatic body and tail highly concerning for adenocarcinoma.  It abuts the stomach, duodenum, proximal jejunum, and left adrenal gland without convincing invasion.     It  encases the celiac, common hepatic, and superior mesenteric arteries which remain patent.  It encases the splenic artery which is moderately narrowed.  It abuts the aorta and left renal artery which remain patent.     Occludes the splenic and superior mesenteric veins, with nonocclusive thrombus extending into the main portal vein.  There are portosystemic collaterals in the upper abdomen.     Enlarged periportal lymph node concerning for metastasis.     Multiple liver metastases measuring up to 2.8 cm.     Few solid pulmonary nodules measuring up to 0.7 cm concerning for metastases.    Assessment/Plan:     Gael Pedroza is a 70 y.o. male with history of CAD, ankylosing spondylitis, HTN, DM who is admitted for newly found pancreatic mass in setting of intractable back pain and weight loss. No evidence of biliary obstruction on imaging. Oncology has requested inpatient biopsy. With liver metastases present, ideally we would recommend an IR biopsy of the metastatic lesions as this is typically less invasive than EUS biopsy and provides staging, but IR biopsy cannot be done as inpatient unless receiving inpatient chemo. Mr. Pedroza much prefers to get a biopsy done while inpatient and after discussing risks/benefits of EUS with him, he and his wife do agree to proceed with EUS-guided biopsy of the pancreatic lesion.    Problem List:  Pancreatic mass    Recommendations:  - NPO  - Hold heparin. Needs four hour hold prior to procedure.  - EUS-guided biopsy today 4/30    Thank you for involving us in the care of Gael Pedroza. Please call with any additional questions, concerns or changes in the patient's clinical status. We will continue to follow.     Edwin Umanzor MD  Gastroenterology Fellow PGY-6  [facility]

## 2025-04-29 NOTE — H&P
[facility] - Emergency Dept  [facility] Medicine  History & Physical    Patient Name: Gael Pedroza  MRN: [id number]  Patient Class: OP- Observation  Admission Date: 4/29/2025  Attending Physician: Kellee Gomez MD   Primary Care Provider: HARLAN Steele MD         Patient information was obtained from patient, past medical records, and ER records.     Subjective:     Principal Problem:Pancreatic mass    Chief Complaint:   Chief Complaint   Patient presents with    Back Pain     Onset 1 month, progressively worsening, all over back pain, currently getting a work up to rule out pancreatic cancer        HPI: Mr. Gael Pedroza 70y M w/ h/o HTN, DM2, LBP, pancreatic mass, ankylosing spondylitis Carotid artery stenosis, DDD, HLD presents w/ worsening low back pain x1 week. Pt reports chronic LBP that has been acutely worsening over the last week. He denies any trauma. The pain begins in his lower back and radiates b/l and up to his rib cage. Its constant and nagging. Reports he was seen by a physician who has been managing his pain w/ percocet, but recently he's required the pain med more frequently and feels like he's chasing the pain. Reports pain has been contributed to his pancreatic mass which he believes is cancerous. It has been associated w/ decreased appetite and weight loss of up to 1 lb per day. Endorses a h/o abdominal pain associated w/ meals but he no longer experiences this. His BM are loose and in small quantity. He is compliant w/ his home medications and reports he has not yet started treatment for his pancreatic mass as he does not yet have a formal diagnosis of cancer. Denies any fevers, chills, chest pain, SOB, nausea or vomiting, dysuria, hematochezia or tarry stools.    In ED, Pt AFVSS on RA. WBC 14.16. . UA noninfectious. ECG w/ NSR, nonspecific ST abnormality, grossly nonischemic.Given IV dilaudid, antiemetics and 500 ml NS bolus. Heme/onc and AES consulted by ED.     Past Medical  History:   Diagnosis Date    Carotid artery stenosis 11/15/2012    DDD (degenerative disc disease) 09/26/2012    Diabetes mellitus     DJD (degenerative joint disease) 09/26/2012    High cholesterol 09/26/2012    HTN (hypertension) 09/24/2014    2013    Increased glucose level 09/26/2012    LBP (low back pain) 09/26/2012    Neck pain 09/24/2014    MATEUSZ 2014    Normal stress echocardiogram 09/26/2012       Past Surgical History:   Procedure Laterality Date    INTERPOSITION ARTHROPLASTY OF CARPOMETACARPAL JOINTS Right 8/11/2023    Procedure: INTERPOSITION ARTHROPLASTY, [facility] JOINT;  Surgeon: Williams, Claude S. IV, MD;  Location: [facility];  Service: Orthopedics;  Laterality: Right;    L-spine surgery  12/15    Dr. Correia       Review of patient's allergies indicates:   Allergen Reactions    Bactrim [sulfamethoxazole-trimethoprim] Itching, Rash and Blisters    Lipitor [atorvastatin]      Achy; pt reports no problem with Crestor    Levaquin [levofloxacin] Other (See Comments)     Muscle aches under shoulder blades       No current facility-administered medications on file prior to encounter.     Current Outpatient Medications on File Prior to Encounter   Medication Sig    aspirin (ECOTRIN) 81 MG EC tablet Take 1 tablet (81 mg total) by mouth once daily.    celecoxib (CELEBREX) 100 MG capsule Take 100 mg by mouth 2 (two) times daily.    gabapentin (NEURONTIN) 300 MG capsule Take 1 capsule (300 mg total) by mouth every evening.    nebivoloL (BYSTOLIC) 5 MG Tab Take 1 tablet (5 mg total) by mouth once daily.    oxyCODONE-acetaminophen (PERCOCET) 5-325 mg per tablet Take 1 tablet by mouth every 6 (six) hours as needed for Pain. Take with food    pantoprazole (PROTONIX) 40 MG tablet Take 1 tablet (40 mg total) by mouth once daily.    XIGDUO XR 5-1,000 mg TAKE TWO TABLETS BY MOUTH EVERY DAY    alcohol swabs PadM Apply 1 each topically as needed.    ascorbic acid, vitamin C, (VITAMIN C) 500 MG tablet Take 500 mg by mouth once daily.     blood sugar diagnostic (TRUE METRIX GLUCOSE TEST STRIP) Strp 200 each by Misc.(Non-Drug; Combo Route) route Daily.    cholecalciferol, vitamin D3, 1,000 unit capsule Take 1,000 Units by mouth once daily.    ezetimibe (ZETIA) 10 mg tablet Take 1 tablet (10 mg total) by mouth once daily.    lancets 30 gauge Misc 1 lancet  by Misc.(Non-Drug; Combo Route) route Daily.    multivitamin with minerals tablet Take 1 tablet by mouth once daily.    polyethylene glycol (GLYCOLAX) 17 gram PwPk Take 17 g by mouth nightly.    rosuvastatin (CRESTOR) 10 MG tablet Take 1 tablet (10 mg total) by mouth once daily.    tamsulosin (FLOMAX) 0.4 mg Cap TAKE ONE CAPSULE BY MOUTH EVERY DAY     Family History       Problem Relation (Age of Onset)    Diabetes Mother    Heart disease Father (62)    Hypertension Mother          Tobacco Use    Smoking status: Former     Current packs/day: 0.00     Types: Cigarettes     Quit date: 2010     Years since quittin.8    Smokeless tobacco: Never   Substance and Sexual Activity    Alcohol use: Yes     Alcohol/week: 8.0 standard drinks of alcohol     Types: 5 Cans of beer, 3 Standard drinks or equivalent per week    Drug use: No    Sexual activity: Yes     Partners: Female     Review of Systems   Constitutional:  Positive for activity change and appetite change. Negative for chills, diaphoresis, fatigue and fever.   HENT:  Negative for congestion, rhinorrhea and sore throat.    Respiratory:  Negative for cough, chest tightness, shortness of breath and wheezing.    Cardiovascular:  Negative for chest pain, palpitations and leg swelling.   Gastrointestinal:  Positive for diarrhea. Negative for abdominal distention, abdominal pain, blood in stool, constipation, nausea and vomiting.   Genitourinary:  Negative for difficulty urinating, dysuria, frequency, hematuria and urgency.   Musculoskeletal:  Positive for back pain. Negative for arthralgias and neck stiffness.   Neurological:  Negative for  dizziness, tremors, seizures, syncope, weakness, light-headedness, numbness and headaches.   Psychiatric/Behavioral:  Negative for agitation and confusion.      Objective:     Vital Signs (Most Recent):  Temp: 98.2 °F (36.8 °C) (04/29/25 1221)  Pulse: 98 (04/29/25 1214)  Resp: 20 (04/29/25 1217)  BP: 130/69 (04/29/25 1214)  SpO2: 97 % (04/29/25 1214) Vital Signs (24h Range):  Temp:  [98.2 °F (36.8 °C)] 98.2 °F (36.8 °C)  Pulse:  [] 98  Resp:  [16-20] 20  SpO2:  [97 %-100 %] 97 %  BP: (125-130)/(65-69) 130/69     Weight: 55.3 kg (122 lb)  Body mass index is 19.69 kg/m².     Physical Exam  Vitals and nursing note reviewed.   Constitutional:       General: He is not in acute distress.     Appearance: He is well-developed. He is not diaphoretic.   HENT:      Head: Normocephalic and atraumatic.      Right Ear: External ear normal.      Left Ear: External ear normal.      Nose: Nose normal. No congestion.      Mouth/Throat:      Pharynx: Oropharynx is clear.   Eyes:      General: No scleral icterus.     Extraocular Movements: Extraocular movements intact.   Cardiovascular:      Rate and Rhythm: Normal rate and regular rhythm.      Pulses: Normal pulses.      Heart sounds: Normal heart sounds. No murmur heard.  Pulmonary:      Effort: Pulmonary effort is normal. No respiratory distress.      Breath sounds: Normal breath sounds. No wheezing or rales.   Abdominal:      General: Bowel sounds are normal. There is no distension.      Palpations: Abdomen is soft.      Tenderness: There is no abdominal tenderness. There is no guarding or rebound.   Musculoskeletal:         General: Tenderness (paraspinal lumbar ttp) present.      Cervical back: Normal range of motion.      Right lower leg: No edema.      Left lower leg: No edema.   Skin:     General: Skin is warm and dry.      Capillary Refill: Capillary refill takes less than 2 seconds.   Neurological:      General: No focal deficit present.      Mental Status: He is alert  and oriented to person, place, and time. Mental status is at baseline.   Psychiatric:         Mood and Affect: Mood normal.         Behavior: Behavior normal.         Thought Content: Thought content normal.                Significant Labs: All pertinent labs within the past 24 hours have been reviewed.  CBC:   Recent Labs   Lab 04/29/25  1052   WBC 14.16*   HGB 13.9*   HCT 42.1        CMP:   Recent Labs   Lab 04/29/25  1052   *   K 4.2   CL 93*   CO2 26   *   BUN 18   CREATININE 0.7   CALCIUM 9.9   PROT 7.3   ALBUMIN 3.2*   BILITOT 0.5   ALKPHOS 237*   AST 32   ALT 43   ANIONGAP 14     Urine Studies:   Recent Labs   Lab 04/29/25  1217   COLORU Yellow   APPEARANCEUA Clear   PHUR 6.0   SPECGRAV >=1.030*   PROTEINUA Negative   GLUCUA 4+*   BILIRUBINUA Negative   OCCULTUA Negative   NITRITE Negative   UROBILINOGEN Negative   LEUKOCYTESUR Negative   RBCUA 1   WBCUA 1   BACTERIA None       Significant Imaging: I have reviewed all pertinent imaging results/findings within the past 24 hours.  Imaging Results    None          Assessment/Plan:     Assessment & Plan  Pancreatic mass  Low back pain  - AFVSS on RA  - WBC 14.16  - CT chest/abd/pelvis pending  - MM pain control, prn oxy, tylenol, robaxin, dilaudid  - IR consulted>>IR consult received for biopsy of pt's newly discovered pancreatic mass per CT TL spine on 4/25. CT c/a/p pending. Pt currently scheduled for outpatient EUS biopsy of the pancreatic mass with AES on 5/2/25. Will defer biopsy to AES.   - AES consulted  - heme/onc consulted  - CLD, NPO at [place].    Portal vein thrombosis  - CT chest a/p c/f portal vein thrombus  - started heparin drip  Type 2 diabetes mellitus with circulatory disorder, without long-term current use of insulin  Patient's FSGs are uncontrolled due to hyperglycemia on current medication regimen.  Last A1c reviewed-   Lab Results   Component Value Date    LABA1C 6.6 (H) 01/18/2017    HGBA1C 6.9 (H) 12/13/2024     Most recent  "fingerstick glucose reviewed- No results for input(s): "POCTGLUCOSE" in the last 24 hours., Continue to monitor glucose closely for hypo/hyperglycemia.  Current correctional scale  Low  Maintain anti-hyperglycemic dose as follows-   Antihyperglycemics (From admission, onward)      Start     Stop Route Frequency Ordered    04/29/25 1451  insulin aspart U-100 pen 0-5 Units         -- SubQ Before meals & nightly PRN 04/29/25 1352          Home antihyperglycemics reviewed, will hold oral hypoglycemics while patient is in the hospital.  Carotid artery stenosis  - continue statin and asa  Benign prostatic hyperplasia with urinary frequency  - continue flomax    VTE Risk Mitigation (From admission, onward)           Ordered     heparin 25,000 units in dextrose 5% (100 units/ml) IV bolus from bag HIGH INTENSITY nomogram - [facility]  As needed (PRN)        Question:  Heparin Infusion Adjustment (DO NOT MODIFY ANSWER)  Answer:  \\[email or web address]\epic\Images\Pharmacy\HeparinInfusions\heparin HIGH INTENSITY nomogram for [facility] [id number]    04/29/25 1444     heparin 25,000 units in dextrose 5% (100 units/ml) IV bolus from bag HIGH INTENSITY nomogram - [facility]  As needed (PRN)        Question:  Heparin Infusion Adjustment (DO NOT MODIFY ANSWER)  Answer:  \[email or web address]\epic\Images\Pharmacy\HeparinInfusions\heparin HIGH INTENSITY nomogram for [facility] [id number]    04/29/25 1444     heparin 25,000 units in dextrose 5% (100 units/ml) IV bolus from bag HIGH INTENSITY nomogram - [facility]  Once        Question:  Heparin Infusion Adjustment (DO NOT MODIFY ANSWER)  Answer:  \[email or web address]\epic\Images\Pharmacy\HeparinInfusions\heparin HIGH INTENSITY nomogram for [facility] [id number].pdf    04/29/25 1444     heparin 25,000 units in dextrose 5% 250 mL (100 units/mL) infusion HIGH INTENSITY nomogram - [facility]  Continuous        Question:  Begin at (units/kg/hr)  Answer:  18    04/29/25 1444     IP VTE HIGH RISK PATIENT  Once         04/29/25 1351     Place sequential compression " device  Until discontinued         04/29/25 1351                         On 04/29/2025, patient should be placed in hospital observation services under my care in collaboration with Dr. Gomez.           Emi Parrish PA-C  Department of Hospital Medicine  [facility] - Emergency Dept

## 2025-04-29 NOTE — ED NOTES
Patient states back pain x 1 month with recent diagnosis of pancreatic cancer Percocet this am, in ED for fiollow up

## 2025-04-29 NOTE — ASSESSMENT & PLAN NOTE
- AFVSS on RA  - WBC 14.16  - CT chest/abd/pelvis pending  - MM pain control, prn oxy, tylenol, robaxin, dilaudid  - IR consulted>>IR consult received for biopsy of pt's newly discovered pancreatic mass per CT TL spine on 4/25. CT c/a/p pending. Pt currently scheduled for outpatient EUS biopsy of the pancreatic mass with AES on 5/2/25. Will defer biopsy to AES.   - AES consulted  - heme/onc consulted  - CLD, NPO at [place]n.

## 2025-04-30 ENCOUNTER — ANESTHESIA (OUTPATIENT)
Dept: ENDOSCOPY | Facility: HOSPITAL | Age: 70
End: 2025-04-30
Payer: MEDICARE

## 2025-04-30 ENCOUNTER — ANESTHESIA EVENT (OUTPATIENT)
Dept: ENDOSCOPY | Facility: HOSPITAL | Age: 70
End: 2025-04-30
Payer: MEDICARE

## 2025-04-30 PROBLEM — E43 SEVERE MALNUTRITION: Chronic | Status: ACTIVE | Noted: 2025-04-30

## 2025-04-30 LAB
ABSOLUTE EOSINOPHIL (OHS): 0.1 K/UL
ABSOLUTE MONOCYTE (OHS): 0.89 K/UL (ref 0.3–1)
ABSOLUTE NEUTROPHIL COUNT (OHS): 9.2 K/UL (ref 1.8–7.7)
ALBUMIN SERPL BCP-MCNC: 3 G/DL (ref 3.5–5.2)
ALP SERPL-CCNC: 217 UNIT/L (ref 40–150)
ALT SERPL W/O P-5'-P-CCNC: 40 UNIT/L (ref 10–44)
ANION GAP (OHS): 13 MMOL/L (ref 8–16)
APTT PPP: 38.6 SECONDS (ref 21–32)
APTT PPP: 40.7 SECONDS (ref 21–32)
AST SERPL-CCNC: 30 UNIT/L (ref 11–45)
BASOPHILS # BLD AUTO: 0.08 K/UL
BASOPHILS NFR BLD AUTO: 0.7 %
BILIRUB SERPL-MCNC: 0.4 MG/DL (ref 0.1–1)
BUN SERPL-MCNC: 12 MG/DL (ref 8–23)
CALCIUM SERPL-MCNC: 9.9 MG/DL (ref 8.7–10.5)
CHLORIDE SERPL-SCNC: 97 MMOL/L (ref 95–110)
CO2 SERPL-SCNC: 25 MMOL/L (ref 23–29)
CREAT SERPL-MCNC: 0.6 MG/DL (ref 0.5–1.4)
ERYTHROCYTE [DISTWIDTH] IN BLOOD BY AUTOMATED COUNT: 13.2 % (ref 11.5–14.5)
GFR SERPLBLD CREATININE-BSD FMLA CKD-EPI: >60 ML/MIN/1.73/M2
GLUCOSE SERPL-MCNC: 134 MG/DL (ref 70–110)
HCT VFR BLD AUTO: 39.5 % (ref 40–54)
HGB BLD-MCNC: 13.1 GM/DL (ref 14–18)
IMM GRANULOCYTES # BLD AUTO: 0.04 K/UL (ref 0–0.04)
IMM GRANULOCYTES NFR BLD AUTO: 0.3 % (ref 0–0.5)
LYMPHOCYTES # BLD AUTO: 1.49 K/UL (ref 1–4.8)
MAGNESIUM SERPL-MCNC: 1.7 MG/DL (ref 1.6–2.6)
MCH RBC QN AUTO: 31.6 PG (ref 27–31)
MCHC RBC AUTO-ENTMCNC: 33.2 G/DL (ref 32–36)
MCV RBC AUTO: 95 FL (ref 82–98)
NUCLEATED RBC (/100WBC) (OHS): 0 /100 WBC
PLATELET # BLD AUTO: 283 K/UL (ref 150–450)
PMV BLD AUTO: 9.1 FL (ref 9.2–12.9)
POCT GLUCOSE: 109 MG/DL (ref 70–110)
POCT GLUCOSE: 147 MG/DL (ref 70–110)
POCT GLUCOSE: 95 MG/DL (ref 70–110)
POTASSIUM SERPL-SCNC: 4.9 MMOL/L (ref 3.5–5.1)
POTASSIUM SERPL-SCNC: 6.2 MMOL/L (ref 3.5–5.1)
PROT SERPL-MCNC: 6.7 GM/DL (ref 6–8.4)
RBC # BLD AUTO: 4.14 M/UL (ref 4.6–6.2)
RELATIVE EOSINOPHIL (OHS): 0.8 %
RELATIVE LYMPHOCYTE (OHS): 12.6 % (ref 18–48)
RELATIVE MONOCYTE (OHS): 7.5 % (ref 4–15)
RELATIVE NEUTROPHIL (OHS): 78.1 % (ref 38–73)
SODIUM SERPL-SCNC: 135 MMOL/L (ref 136–145)
WBC # BLD AUTO: 11.8 K/UL (ref 3.9–12.7)

## 2025-04-30 PROCEDURE — A4216 STERILE WATER/SALINE, 10 ML: HCPCS | Performed by: PHYSICIAN ASSISTANT

## 2025-04-30 PROCEDURE — 43242 EGD US FINE NEEDLE BX/ASPIR: CPT | Performed by: INTERNAL MEDICINE

## 2025-04-30 PROCEDURE — 80053 COMPREHEN METABOLIC PANEL: CPT | Performed by: PHYSICIAN ASSISTANT

## 2025-04-30 PROCEDURE — 36415 COLL VENOUS BLD VENIPUNCTURE: CPT | Performed by: STUDENT IN AN ORGANIZED HEALTH CARE EDUCATION/TRAINING PROGRAM

## 2025-04-30 PROCEDURE — 25000003 PHARM REV CODE 250: Performed by: NURSE PRACTITIONER

## 2025-04-30 PROCEDURE — 63600175 PHARM REV CODE 636 W HCPCS: Performed by: PHYSICIAN ASSISTANT

## 2025-04-30 PROCEDURE — BD47ZZZ ULTRASONOGRAPHY OF GASTROINTESTINAL TRACT: ICD-10-PCS | Performed by: INTERNAL MEDICINE

## 2025-04-30 PROCEDURE — 37000009 HC ANESTHESIA EA ADD 15 MINS: Performed by: INTERNAL MEDICINE

## 2025-04-30 PROCEDURE — 25000003 PHARM REV CODE 250: Performed by: PHYSICIAN ASSISTANT

## 2025-04-30 PROCEDURE — 63600175 PHARM REV CODE 636 W HCPCS: Performed by: NURSE PRACTITIONER

## 2025-04-30 PROCEDURE — 37000008 HC ANESTHESIA 1ST 15 MINUTES: Performed by: INTERNAL MEDICINE

## 2025-04-30 PROCEDURE — 25000003 PHARM REV CODE 250

## 2025-04-30 PROCEDURE — 96376 TX/PRO/DX INJ SAME DRUG ADON: CPT

## 2025-04-30 PROCEDURE — 63600175 PHARM REV CODE 636 W HCPCS

## 2025-04-30 PROCEDURE — 85025 COMPLETE CBC W/AUTO DIFF WBC: CPT | Performed by: PHYSICIAN ASSISTANT

## 2025-04-30 PROCEDURE — 84132 ASSAY OF SERUM POTASSIUM: CPT | Performed by: STUDENT IN AN ORGANIZED HEALTH CARE EDUCATION/TRAINING PROGRAM

## 2025-04-30 PROCEDURE — 82962 GLUCOSE BLOOD TEST: CPT | Performed by: INTERNAL MEDICINE

## 2025-04-30 PROCEDURE — 21400001 HC TELEMETRY ROOM

## 2025-04-30 PROCEDURE — 83735 ASSAY OF MAGNESIUM: CPT | Performed by: PHYSICIAN ASSISTANT

## 2025-04-30 PROCEDURE — 36415 COLL VENOUS BLD VENIPUNCTURE: CPT | Performed by: PHYSICIAN ASSISTANT

## 2025-04-30 PROCEDURE — 96366 THER/PROPH/DIAG IV INF ADDON: CPT

## 2025-04-30 PROCEDURE — 63600175 PHARM REV CODE 636 W HCPCS: Performed by: ANESTHESIOLOGY

## 2025-04-30 PROCEDURE — 85730 THROMBOPLASTIN TIME PARTIAL: CPT | Performed by: STUDENT IN AN ORGANIZED HEALTH CARE EDUCATION/TRAINING PROGRAM

## 2025-04-30 PROCEDURE — 0FBG8ZX EXCISION OF PANCREAS, VIA NATURAL OR ARTIFICIAL OPENING ENDOSCOPIC, DIAGNOSTIC: ICD-10-PCS | Performed by: INTERNAL MEDICINE

## 2025-04-30 PROCEDURE — 88341 IMHCHEM/IMCYTCHM EA ADD ANTB: CPT | Mod: TC | Performed by: INTERNAL MEDICINE

## 2025-04-30 PROCEDURE — 43242 EGD US FINE NEEDLE BX/ASPIR: CPT | Mod: ,,, | Performed by: INTERNAL MEDICINE

## 2025-04-30 RX ORDER — LIDOCAINE HYDROCHLORIDE 20 MG/ML
INJECTION, SOLUTION EPIDURAL; INFILTRATION; INTRACAUDAL; PERINEURAL
Status: DISCONTINUED | OUTPATIENT
Start: 2025-04-30 | End: 2025-04-30

## 2025-04-30 RX ORDER — PROPOFOL 10 MG/ML
VIAL (ML) INTRAVENOUS
Status: DISCONTINUED | OUTPATIENT
Start: 2025-04-30 | End: 2025-04-30

## 2025-04-30 RX ORDER — ONDANSETRON HYDROCHLORIDE 2 MG/ML
4 INJECTION, SOLUTION INTRAVENOUS DAILY PRN
Status: DISCONTINUED | OUTPATIENT
Start: 2025-04-30 | End: 2025-05-03 | Stop reason: HOSPADM

## 2025-04-30 RX ORDER — FENTANYL CITRATE 50 UG/ML
25 INJECTION, SOLUTION INTRAMUSCULAR; INTRAVENOUS EVERY 5 MIN PRN
Status: COMPLETED | OUTPATIENT
Start: 2025-04-30 | End: 2025-04-30

## 2025-04-30 RX ORDER — GLUCAGON 1 MG
1 KIT INJECTION
Status: DISCONTINUED | OUTPATIENT
Start: 2025-04-30 | End: 2025-05-03 | Stop reason: HOSPADM

## 2025-04-30 RX ORDER — OXYCODONE HYDROCHLORIDE 10 MG/1
10 TABLET ORAL EVERY 4 HOURS PRN
Refills: 0 | Status: DISCONTINUED | OUTPATIENT
Start: 2025-04-30 | End: 2025-05-02

## 2025-04-30 RX ADMIN — SODIUM ZIRCONIUM CYCLOSILICATE 5 G: 10 POWDER, FOR SUSPENSION ORAL at 08:04

## 2025-04-30 RX ADMIN — Medication 10 ML: at 02:04

## 2025-04-30 RX ADMIN — METHOCARBAMOL 500 MG: 500 TABLET ORAL at 08:04

## 2025-04-30 RX ADMIN — SODIUM CHLORIDE, SODIUM ACETATE ANHYDROUS, SODIUM GLUCONATE, POTASSIUM CHLORIDE, AND MAGNESIUM CHLORIDE: 526; 222; 502; 37; 30 INJECTION, SOLUTION INTRAVENOUS at 03:04

## 2025-04-30 RX ADMIN — FENTANYL CITRATE 25 MCG: 50 INJECTION INTRAMUSCULAR; INTRAVENOUS at 03:04

## 2025-04-30 RX ADMIN — ONDANSETRON 8 MG: 8 TABLET, ORALLY DISINTEGRATING ORAL at 06:04

## 2025-04-30 RX ADMIN — HEPARIN SODIUM AND DEXTROSE 20 UNITS/KG/HR: 10000; 5 INJECTION INTRAVENOUS at 06:04

## 2025-04-30 RX ADMIN — Medication 10 ML: at 10:04

## 2025-04-30 RX ADMIN — TAMSULOSIN HYDROCHLORIDE 0.4 MG: 0.4 CAPSULE ORAL at 08:04

## 2025-04-30 RX ADMIN — OXYCODONE HYDROCHLORIDE AND ACETAMINOPHEN 500 MG: 500 TABLET ORAL at 08:04

## 2025-04-30 RX ADMIN — THERA TABS 1 TABLET: TAB at 08:04

## 2025-04-30 RX ADMIN — OXYCODONE 5 MG: 5 TABLET ORAL at 03:04

## 2025-04-30 RX ADMIN — PANTOPRAZOLE SODIUM 40 MG: 40 TABLET, DELAYED RELEASE ORAL at 08:04

## 2025-04-30 RX ADMIN — GABAPENTIN 300 MG: 300 CAPSULE ORAL at 08:04

## 2025-04-30 RX ADMIN — METHOCARBAMOL 500 MG: 500 TABLET ORAL at 05:04

## 2025-04-30 RX ADMIN — OXYCODONE 5 MG: 5 TABLET ORAL at 01:04

## 2025-04-30 RX ADMIN — Medication 1000 UNITS: at 08:04

## 2025-04-30 RX ADMIN — PROPOFOL 100 MG: 10 INJECTION, EMULSION INTRAVENOUS at 03:04

## 2025-04-30 RX ADMIN — METOPROLOL TARTRATE 25 MG: 25 TABLET, FILM COATED ORAL at 08:04

## 2025-04-30 RX ADMIN — EZETIMIBE 10 MG: 10 TABLET ORAL at 08:04

## 2025-04-30 RX ADMIN — METHOCARBAMOL 500 MG: 500 TABLET ORAL at 01:04

## 2025-04-30 RX ADMIN — HYDROMORPHONE HYDROCHLORIDE 0.5 MG: 2 INJECTION INTRAMUSCULAR; INTRAVENOUS; SUBCUTANEOUS at 06:04

## 2025-04-30 RX ADMIN — LIDOCAINE HYDROCHLORIDE 60 MG: 20 INJECTION, SOLUTION EPIDURAL; INFILTRATION; INTRACAUDAL; PERINEURAL at 03:04

## 2025-04-30 RX ADMIN — HYDROMORPHONE HYDROCHLORIDE 0.5 MG: 2 INJECTION INTRAMUSCULAR; INTRAVENOUS; SUBCUTANEOUS at 04:04

## 2025-04-30 RX ADMIN — Medication 10 ML: at 06:04

## 2025-04-30 RX ADMIN — PROPOFOL 150 MCG/KG/MIN: 10 INJECTION, EMULSION INTRAVENOUS at 03:04

## 2025-04-30 RX ADMIN — OXYCODONE 5 MG: 5 TABLET ORAL at 08:04

## 2025-04-30 RX ADMIN — ASPIRIN 81 MG: 81 TABLET, COATED ORAL at 08:04

## 2025-04-30 RX ADMIN — HYDROMORPHONE HYDROCHLORIDE 0.5 MG: 2 INJECTION INTRAMUSCULAR; INTRAVENOUS; SUBCUTANEOUS at 10:04

## 2025-04-30 RX ADMIN — OXYCODONE 5 MG: 5 TABLET ORAL at 05:04

## 2025-04-30 NOTE — PROVATION PATIENT INSTRUCTIONS
Discharge Summary/Instructions after an Endoscopic Procedure  Patient Name: Gael Pedroza  Patient MRN: [id number]  Patient YOB: 1955  Wednesday, April 30, 2025  Fish Curtis MD  Dear patient,  As a result of recent federal legislation (The Federal Cures Act), you may   receive lab or pathology results from your procedure in your [facility]   account before your physician is able to contact you. Your physician or   their representative will relay the results to you with their   recommendations at their soonest availability.  Thank you,  RESTRICTIONS:  During your procedure today, you received medications for sedation.  These   medications may affect your judgment, balance and coordination.  Therefore,   for 24 hours, you have the following restrictions:   - DO NOT drive a car, operate machinery, make legal/financial decisions,   sign important papers or drink alcohol.    ACTIVITY:  Today: no heavy lifting, straining or running due to procedural   sedation/anesthesia.  The following day: return to full activity including work.  DIET:  Eat and drink normally unless instructed otherwise.     TREATMENT FOR COMMON SIDE EFFECTS:  - Mild abdominal pain, nausea, belching, bloating or excessive gas:  rest,   eat lightly and use a heating pad.  - Sore Throat: treat with throat lozenges and/or gargle with warm salt   water.  - Because air was used during the procedure, expelling large amounts of air   from your rectum or belching is normal.  - If a bowel prep was taken, you may not have a bowel movement for 1-3 days.    This is normal.  SYMPTOMS TO WATCH FOR AND REPORT TO YOUR PHYSICIAN:  1. Abdominal pain or bloating, other than gas cramps.  2. Chest pain.  3. Back pain.  4. Signs of infection such as: chills or fever occurring within 24 hours   after the procedure.  5. Rectal bleeding, which would show as bright red, maroon, or black stools.   (A tablespoon of blood from the rectum is not serious, especially  if   hemorrhoids are present.)  6. Vomiting.  7. Weakness or dizziness.  GO DIRECTLY TO THE NEAREST EMERGENCY ROOM IF YOU HAVE ANY OF THE FOLLOWING:      Difficulty breathing              Chills and/or fever over 101 F   Persistent vomiting and/or vomiting blood   Severe abdominal pain   Severe chest pain   Black, tarry stools   Bleeding- more than one tablespoon   Any other symptom or condition that you feel may need urgent attention  Your doctor recommends these additional instructions:  If any biopsies were taken, your doctors clinic will contact you in 1 to 2   weeks with any results.  - [facility].   - Await path results.   - Follow-up with oncology service.  - may restart heparin in 6 hours, if needed.  For questions, problems or results please call your physician - Fish Curtis MD at Work:  [phone].  [facility] [place], EMERGENCY ROOM PHONE NUMBER: [phone]  IF A COMPLICATION OR EMERGENCY SITUATION ARISES AND YOU ARE UNABLE TO REACH   YOUR PHYSICIAN - GO DIRECTLY TO THE EMERGENCY ROOM.  Fish Curtis MD  4/30/2025 3:30:25 PM  This report has been verified and signed electronically.  Dear patient,  As a result of recent federal legislation (The Federal Cures Act), you may   receive lab or pathology results from your procedure in your [facility]   account before your physician is able to contact you. Your physician or   their representative will relay the results to you with their   recommendations at their soonest availability.  Thank you,  PROVATION

## 2025-04-30 NOTE — PLAN OF CARE
Problem: Adult Inpatient Plan of Care  Goal: Plan of Care Review  4/30/2025 1837 by Tanesha Navarrete LPN  Reactivated  4/30/2025 1835 by Tanesha Navarrete LPN  Outcome: Met  Goal: Patient-Specific Goal (Individualized)  4/30/2025 1837 by Tanesha Navarrete LPN  Reactivated  4/30/2025 1835 by Tanesha Navarrete LPN  Outcome: Met  Goal: Absence of Hospital-Acquired Illness or Injury  4/30/2025 1837 by Tanesha Navarrete LPN  Reactivated  4/30/2025 1835 by Tanesha Navarrete LPN  Outcome: Met  Goal: Optimal Comfort and Wellbeing  4/30/2025 1837 by Tanesha Navarrete LPN  Reactivated  4/30/2025 1835 by Tanesha Navarrete LPN  Outcome: Met  Goal: Readiness for Transition of Care  4/30/2025 1837 by Tanesha Navarrete LPN  Reactivated  4/30/2025 1835 by Tanesha Navarrete LPN  Outcome: Met     Problem: Infection  Goal: Absence of Infection Signs and Symptoms  4/30/2025 1837 by Tanesha Navarrete LPN  Reactivated  4/30/2025 1835 by Tanesha Navarrete LPN  Outcome: Met     Problem: Diabetes Comorbidity  Goal: Blood Glucose Level Within Targeted Range  4/30/2025 1837 by Tanesha Navarrete LPN  Reactivated  4/30/2025 1835 by Tanesha Navarrete LPN  Outcome: Met     Problem: Wound  Goal: Optimal Coping  4/30/2025 1837 by Tanesha Navarrete LPN  Reactivated  4/30/2025 1835 by Tanesha Navarrete LPN  Outcome: Met  Goal: Optimal Functional Ability  4/30/2025 1837 by Tanesha Navarrete LPN  Reactivated  4/30/2025 1835 by Tanesha Navarrete LPN  Outcome: Met  Goal: Absence of Infection Signs and Symptoms  4/30/2025 1837 by Tanesha Navarrete LPN  Reactivated  4/30/2025 1835 by Tanesha Navarrete LPN  Outcome: Met  Goal: Improved Oral Intake  4/30/2025 1837 by Tanesha Navarrete LPN  Reactivated  4/30/2025 1835 by Tanesha Navarrete LPN  Outcome: Met  Goal: Optimal Pain Control and Function  4/30/2025 1837 by Tanesha Navarrete LPN  Reactivated  4/30/2025 1835 by Tanesha Navarrete LPN  Outcome: Met  Goal: Skin Health and  Integrity  4/30/2025 1837 by Tanesha Navarrete LPN  Reactivated  4/30/2025 1835 by Tanesha Navarrete LPN  Outcome: Met  Goal: Optimal Wound Healing  4/30/2025 1837 by Tanesha Navarrete LPN  Reactivated  4/30/2025 1835 by Tanesha Naavrrete LPN  Outcome: Met     Problem: Pain Acute  Goal: Optimal Pain Control and Function  4/30/2025 1837 by Tanesha Navarrete LPN  Reactivated  4/30/2025 1835 by Tanesha Navarrete LPN  Outcome: Met     Pt progressing towards goals. No distress notice. No falls or injuries during shift. Bed in lowest position, call light within reach, belonging at bedside. Safety precaution maintain.Plan of Care reviewed. Pt verbalized understanding.

## 2025-04-30 NOTE — ANESTHESIA POSTPROCEDURE EVALUATION
Anesthesia Post Evaluation    Patient: Gael Pedroza    Procedure(s) Performed: Procedure(s) (LRB):  ULTRASOUND, UPPER GI TRACT, ENDOSCOPIC (N/A)    Final Anesthesia Type: general      Patient location during evaluation: PACU  Patient participation: Yes- Able to Participate  Level of consciousness: awake and alert  Post-procedure vital signs: reviewed and stable  Pain management: adequate  Airway patency: patent    PONV status at discharge: No PONV  Anesthetic complications: no      Cardiovascular status: hemodynamically stable  Respiratory status: spontaneous ventilation  Follow-up not needed.              Vitals Value Taken Time   /65 04/30/25 15:28   Temp 98.0 04/30/25 15:31   Pulse 105 04/30/25 15:29   Resp 20 04/30/25 15:29   SpO2 100 % 04/30/25 15:29   Vitals shown include unfiled device data.      No case tracking events are documented in the log.      Pain/Tre Score: Pain Rating Prior to Med Admin: 8 (4/30/2025  1:44 PM)  Pain Rating Post Med Admin: 0 (4/30/2025 11:13 AM)

## 2025-04-30 NOTE — PLAN OF CARE
[facility] - [facility]  Discharge Assessment    Primary Care Provider: HARLAN Steele MD     Discharge Assessment (most recent)       BRIEF DISCHARGE ASSESSMENT - 04/30/25 1215          Discharge Planning    Assessment Type Discharge Planning Brief Assessment     Resource/Environmental Concerns none     Support Systems Spouse/significant other     Equipment Currently Used at Home none     Current Living Arrangements home     Patient/Family Anticipates Transition to home     Patient/Family Anticipated Services at Transition none     DME Needed Upon Discharge  none     Discharge Plan A Home     Discharge Plan B Home                   Pt is a 70 y.o. male admitted with pancreatic mass and has a PMH of HTN and DM2. He lives with his wife in a single story house with no steps to enter. He has not required DME in the past and is independent with his ADLs and iADLs. His will will provide transportation home. [facility] Discharge Packet given to patient and/or family with understanding verbalized.   name and number and estimated discharge date written on white board in patient's room with request to call for any questions or concerns.  Will continue to follow for needs.  Discharge Plan A and Plan B have been determined by review of patient's clinical status, future medical and therapeutic needs, and coverage/benefits for post-acute care in coordination with multidisciplinary team members.  Jason Olsen RN,BSN

## 2025-04-30 NOTE — TREATMENT PLAN
AES Post-Procedure Treatment Plan    EUS    Impression  - A mass was identified in the pancreatic body and pancreatic tail. Fine needle biopsy performed.     Recommendation  - [facility].   - Await path results.   - Follow-up with oncology service.   - May restart heparin in 6 hours, if needed.     Edwin Umanzor  Gastroenterology Fellow, PGY-6

## 2025-04-30 NOTE — ASSESSMENT & PLAN NOTE
Patient's FSGs are uncontrolled due to hyperglycemia on current medication regimen.  Last A1c reviewed-   Lab Results   Component Value Date    LABA1C 6.6 (H) 01/18/2017    HGBA1C 6.9 (H) 12/13/2024     Most recent fingerstick glucose reviewed-   Recent Labs   Lab 04/29/25  1727 04/29/25  2104 04/30/25  0724 04/30/25  1136   POCTGLUCOSE 118* 161* 147* 95   , Continue to monitor glucose closely for hypo/hyperglycemia.  Current correctional scale  Low  Maintain anti-hyperglycemic dose as follows-   Antihyperglycemics (From admission, onward)      Start     Stop Route Frequency Ordered    04/29/25 1451  insulin aspart U-100 pen 0-5 Units         -- SubQ Before meals & nightly PRN 04/29/25 1352          Home antihyperglycemics reviewed, will hold oral hypoglycemics while patient is in the hospital.

## 2025-04-30 NOTE — TRANSFER OF CARE
"Anesthesia Transfer of Care Note    Patient: Gael Pedroza    Procedure(s) Performed: Procedure(s) (LRB):  ULTRASOUND, UPPER GI TRACT, ENDOSCOPIC (N/A)    Patient location: PACU    Anesthesia Type: general    Transport from OR: Transported from OR on 2-3 L/min O2 by NC with adequate spontaneous ventilation    Post pain: adequate analgesia    Post assessment: tolerated procedure well and no apparent anesthetic complications    Post vital signs: stable    Level of consciousness: awake and alert    Nausea/Vomiting: no nausea/vomiting    Complications: none    Transfer of care protocol was followed      Last vitals: Visit Vitals  /64 (Patient Position: Lying)   Pulse 108   Temp 36.6 °C (97.9 °F) (Temporal)   Resp 16   Ht 5' 6" (1.676 m)   Wt 55.6 kg (122 lb 9.2 oz)   SpO2 99%   BMI 19.78 kg/m²     "

## 2025-04-30 NOTE — PLAN OF CARE
Problem: Adult Inpatient Plan of Care  Goal: Optimal Comfort and Wellbeing  Outcome: Progressing     Problem: Adult Inpatient Plan of Care  Goal: Absence of Hospital-Acquired Illness or Injury  Outcome: Progressing     Problem: Infection  Goal: Absence of Infection Signs and Symptoms  Outcome: Progressing     Problem: Diabetes Comorbidity  Goal: Blood Glucose Level Within Targeted Range  Outcome: Progressing     Problem: Pain Acute  Goal: Optimal Pain Control and Function  Outcome: Progressing

## 2025-04-30 NOTE — CONSULTS
"[facility] - [facility]    Wound Care     Patient Name:  Gael Pedroza  MRN:  [id number]  Date: 4/30/2025  Diagnosis: Pancreatic mass     History:  Past Medical History:   Diagnosis Date    Carotid artery stenosis 11/15/2012    DDD (degenerative disc disease) 09/26/2012    Diabetes mellitus     DJD (degenerative joint disease) 09/26/2012    High cholesterol 09/26/2012    HTN (hypertension) 09/24/2014    2013    Increased glucose level 09/26/2012    LBP (low back pain) 09/26/2012    Neck pain 09/24/2014    MATEUSZ 2014    Normal stress echocardiogram 09/26/2012     Social History[1]  Precautions:  Allergies as of 04/29/2025 - Reviewed 04/29/2025   Allergen Reaction Noted    Bactrim [sulfamethoxazole-trimethoprim] Itching, Rash, and Blisters 09/26/2012    Lipitor [atorvastatin]  11/15/2012    Levaquin [levofloxacin] Other (See Comments) 11/11/2015       [facility] Assessment Details / Treatment:    Patient seen for wound care: Follow-up   Chart reviewed for this encounter.   Labs:   WBC (K/uL)   Date Value   04/30/2025 11.80   04/29/2025 11.45     Glucose (mg/dL)   Date Value   04/30/2025 134 (H)   04/29/2025 216 (H)   12/13/2024 102   06/13/2024 116 (H)     Albumin (g/dL)   Date Value   04/30/2025 3.0 (L)   04/29/2025 3.2 (L)   12/13/2024 3.9   06/13/2024 4.1       Israel Score: 21    Narrative:  Pt seen for WC consultation / follow-up and agreed to assessment  Chart reviewed for this encounter. Patient lying prone in bed due to "pain" Able to reposition independently. Family member at bedside. Coccyx with superficial dry area  with calloused edges. Prominent coccyx due to recent significant weight loss secondary to diagnosis. Area cleansed with no rinse foam, Z barrier applied and covered with mepilex border foam for protection and comfort. Continent of bowel and bladder. Waffle overly was removed by staff per pt request, he didn't like it.  Bedside nursing to continue care & monitoring.  Bedside nursing to maintain " pressure injury prevention interventions, (PIP). WC to follow.    See Flow Sheet for additional documentation and media.       25 1404   WOCN Assessment   WOCN Total Time (mins) 30   Visit Date 25   Visit Time 1404   Consult Type New   WOCN Speciality Wound   Intervention assessed;changed;applied;chart review;coordination of care   Teaching on-going   Positioning   Body Position prone        Wound 25 Shearing midline Coccyx   Date First Assessed: 25   Present on Original Admission: Yes  Primary Wound Type: Shearing  Orientation: midline  Location: Coccyx  Removal Indication and Assessment: (c)    Wound Image     Dressing Appearance Open to air;No dressing   Drainage Amount None   Drainage Characteristics/Odor No odor   Appearance Downey   Periwound Area Pink;Intact   Wound Edges Callused   Wound Length (cm) 0.4 cm   Wound Width (cm) 0.3 cm   Wound Surface Area (cm^2) 0.09 cm^2   Care Cleansed with:;Soap and water;Applied:;Skin Barrier   Dressing Foam;Silicone                      [1]   Social History  Socioeconomic History    Marital status:     Number of children: 2   Occupational History    Occupation:      Comment: [employer]   Tobacco Use    Smoking status: Former     Current packs/day: 0.00     Types: Cigarettes     Quit date: 2010     Years since quittin.8    Smokeless tobacco: Never   Substance and Sexual Activity    Alcohol use: Yes     Alcohol/week: 8.0 standard drinks of alcohol     Types: 5 Cans of beer, 3 Standard drinks or equivalent per week    Drug use: No    Sexual activity: Yes     Partners: Female   Social History Narrative    Does [employer].    Retired in 10/18.     Social Drivers of Health     Financial Resource Strain: Patient Declined (2025)    Overall Financial Resource Strain (CARDIA)     Difficulty of Paying Living Expenses: Patient declined   Food Insecurity: Patient Declined (2025)    Hunger Vital Sign     Worried About Running Out of  Food in the Last Year: Patient declined     Ran Out of Food in the Last Year: Patient declined   Transportation Needs: Patient Declined (4/29/2025)    PRAPARE - Transportation     Lack of Transportation (Medical): Patient declined     Lack of Transportation (Non-Medical): Patient declined   Stress: Patient Declined (4/29/2025)    [facility] - Occupational Stress Questionnaire     Feeling of Stress : Patient declined   Housing Stability: Patient Declined (4/29/2025)    Housing Stability Vital Sign     Unable to Pay for Housing in the Last Year: Patient declined     Homeless in the Last Year: Patient declined

## 2025-04-30 NOTE — ANESTHESIA PREPROCEDURE EVALUATION
04/30/2025  Gael Pedroza is a 70 y.o., male.  Pre-operative evaluation for Procedure(s) (LRB):  ULTRASOUND, UPPER GI TRACT, ENDOSCOPIC (N/A)    Gael Pedroza is a 70 y.o. male   Bilateral carotid stenosis >50%  Ankylosing spondylitis- pt denies cervical neck pain; has full neck extension  Pancreatic mass, ?mets    Patient Active Problem List   Diagnosis    High cholesterol    Type 2 diabetes mellitus with circulatory disorder, without long-term current use of insulin    Low back pain    DDD (degenerative disc disease)    DJD (degenerative joint disease)    Normal cardiac stress test    Carotid artery stenosis    Stenosis of left subclavian artery    Neck pain    Primary hypertension    Left ventricular diastolic dysfunction, NYHA class 2    Overweight    Benign prostatic hyperplasia with urinary frequency    Chronic right shoulder pain    Right hip pain    Overlapping malignant neoplasm of pancreas    Pancreatic mass    Portal vein thrombosis       Past Surgical History:   Procedure Laterality Date    INTERPOSITION ARTHROPLASTY OF CARPOMETACARPAL JOINTS Right 8/11/2023    Procedure: INTERPOSITION ARTHROPLASTY, [facility] JOINT;  Surgeon: Williams, Claude S. IV, MD;  Location: [facility];  Service: Orthopedics;  Laterality: Right;    L-spine surgery  12/15    Dr. Correia       Social History[1]    Medications Ordered Prior to Encounter[2]    Review of patient's allergies indicates:   Allergen Reactions    Bactrim [sulfamethoxazole-trimethoprim] Itching, Rash and Blisters    Lipitor [atorvastatin]      Achy; pt reports no problem with Crestor    Levaquin [levofloxacin] Other (See Comments)     Muscle aches under shoulder blades         CBC:   Recent Labs     04/29/25  1512 04/30/25  0423   WBC 11.45 11.80   RBC 4.06* 4.14*   HGB 12.7* 13.1*   HCT 38.3* 39.5*    283   MCV 94 95   MCH 31.3* 31.6*   MCHC 33.2  "33.2       CMP:   Recent Labs     04/29/25  1052 04/30/25  0423 04/30/25  1219   * 135*  --    K 4.2 6.2* 4.9   CL 93* 97  --    CO2 26 25  --    BUN 18 12  --    CREATININE 0.7 0.6  --    * 134*  --    MG  --  1.7  --    CALCIUM 9.9 9.9  --    ALBUMIN 3.2* 3.0*  --    PROT 7.3 6.7  --    ALKPHOS 237* 217*  --    ALT 43 40  --    AST 32 30  --    BILITOT 0.5 0.4  --        INR  Recent Labs     04/29/25  1512 04/29/25  2339 04/30/25  0941   INR 1.1  --   --    PROTIME 11.9  --   --    APTT 25.1 38.6* 40.7*         Diagnostic Studies:    EKG:   Results for orders placed or performed during the hospital encounter of 04/29/25   EKG 12-lead    Collection Time: 04/29/25 11:54 AM   Result Value Ref Range    QRS Duration 92 ms    OHS QTC Calculation 441 ms    Narrative    Test Reason : R00.0,    Vent. Rate :  97 BPM     Atrial Rate :  97 BPM     P-R Int : 134 ms          QRS Dur :  92 ms      QT Int : 348 ms       P-R-T Axes :  55  79  65 degrees    QTcB Int : 441 ms    Normal sinus rhythm  Low septal forces  Nonspecific ST abnormality  Abnormal ECG  No previous ECGs available  Confirmed by Jean Nix ([id number]) on 4/29/2025 12:00:06 PM    Referred By: AAAREFERRAL SELF           Confirmed By: Jean Nix       TTE:  No results found for this or any previous visit.  No results found for: "EF"   Results for orders placed or performed in visit on 01/19/17   2D Echo w/ Color Flow Doppler    Collection Time: 01/19/17  3:38 PM   Result Value Ref Range    Right Vent (Diastole)  0.8 - 2.6    Septum (Diastole)  0.6 - 1.2    Left Ventricle (Diastole)  3.5 - 5.5    Posterior Wall (Diastole)  0.6 - 1.2    Aortic Valve (Diastole)  1.5 - 2.6    Aortic Root (Diastole)  1.3 - 3.7    Left Atrium (Diastole)  2.5 - 4    Septum (Systole)  0.5 - 1.1    Left Ventricle (Systole)  2.2 - 4    Posteriol Wall (Systole)  0.5 - 1.2    EF + QEF  %    Aortic Valve Area  2.5 - 3.5 cm2    Aortic Peak Gradient  0 - 9.9 mm Hg    Aortic Mean " "Gradient  0 - 9.9 mm Hg    Aortic Peak Velocity  0 - 1.9 m/s    Aortic Pres. Half Time  599 - 999 m/sec    Mitral Valve Area  4 - 6 cm2    Mitral Pres. Half Time  30 - 60 m/sec    Mitral Valve E-A Ratio  0.75 - 999    Mitral Valve E-E prime  0 - 7    Pulmonary Artery Pressure  0 - 29 mm Hg       KRISTY:  No results found for this or any previous visit.    Stress Test:  No results found for this or any previous visit.       LHC:  Results for orders placed in visit on 12/21/18    Angiogram, Carotid       PFT:  No results found for: "FEV1", "FVC", "BNU2AQD", "TLC", "DLCO"     ALLERGIES:     Review of patient's allergies indicates:   Allergen Reactions    Bactrim [sulfamethoxazole-trimethoprim] Itching, Rash and Blisters    Lipitor [atorvastatin]      Achy; pt reports no problem with Crestor    Levaquin [levofloxacin] Other (See Comments)     Muscle aches under shoulder blades     LDA:          Lines/Drains/Airways       Peripheral Intravenous Line  Duration                  Peripheral IV - Single Lumen 04/29/25 1052 20 G Right Antecubital 1 day         Peripheral IV - Single Lumen 04/29/25 1510 20 G Left Antecubital <1 day                   Anesthesia Evaluation      Airway   Mallampati: II  TM distance: > 6 cm  Neck ROM: Normal ROM  Dental    (+) Intact    Pulmonary    Cardiovascular   (+) hypertension    Rate: Normal    Neuro/Psych      GI/Hepatic/Renal      Endo/Other    (+) diabetes mellitus  Abdominal                           Pre-op Assessment    I have reviewed the Patient Summary Reports.     I have reviewed the Nursing Notes. I have reviewed the NPO Status.   I have reviewed the Medications.     Review of Systems  Anesthesia Hx:  No problems with previous Anesthesia             Denies Family Hx of Anesthesia complications.    Denies Personal Hx of Anesthesia complications.                    Cardiovascular:     Hypertension               Carotid stenosis >50% bilaterally                         "   Pulmonary:  Pulmonary Normal                       Hepatic/GI:        Pancreatic mass             Musculoskeletal:     Ankylosing spondylitis            Endocrine:  Diabetes               Physical Exam  General: Well nourished    Airway:  Mallampati: II / II  Mouth Opening: Normal  TM Distance: > 6 cm  Tongue: Normal  Neck ROM: Normal ROM    Dental:  Intact    Chest/Lungs:  Normal Respiratory Rate    Heart:  Rate: Normal        Anesthesia Plan  Type of Anesthesia, risks & benefits discussed:    Anesthesia Type: Gen Natural Airway, MAC  Intra-op Monitoring Plan: Standard ASA Monitors  Post Op Pain Control Plan: multimodal analgesia and IV/PO Opioids PRN  Induction:  IV  Airway Plan: Direct, Post-Induction  Informed Consent: Informed consent signed with the Patient and all parties understand the risks and agree with anesthesia plan.  All questions answered. Patient consented to blood products? Yes  ASA Score: 4  Day of Surgery Review of History & Physical: H&P Update referred to the surgeon/provider.    Ready For Surgery From Anesthesia Perspective.     .           [1]   Social History  Socioeconomic History    Marital status:     Number of children: 2   Occupational History    Occupation:      Comment: [employer]   Tobacco Use    Smoking status: Former     Current packs/day: 0.00     Types: Cigarettes     Quit date: 2010     Years since quittin.8    Smokeless tobacco: Never   Substance and Sexual Activity    Alcohol use: Yes     Alcohol/week: 8.0 standard drinks of alcohol     Types: 5 Cans of beer, 3 Standard drinks or equivalent per week    Drug use: No    Sexual activity: Yes     Partners: Female   Social History Narrative    Does [employer].    Retired in 10/18.     Social Drivers of Health     Financial Resource Strain: Patient Declined (2025)    Overall Financial Resource Strain (CARDIA)     Difficulty of Paying Living Expenses: Patient declined   Food Insecurity: Patient Declined  (4/29/2025)    Hunger Vital Sign     Worried About Running Out of Food in the Last Year: Patient declined     Ran Out of Food in the Last Year: Patient declined   Transportation Needs: Patient Declined (4/29/2025)    PRAPARE - Transportation     Lack of Transportation (Medical): Patient declined     Lack of Transportation (Non-Medical): Patient declined   Stress: Patient Declined (4/29/2025)    [employer] [place] [employer] - Occupational Stress Questionnaire     Feeling of Stress : Patient declined   Housing Stability: Patient Declined (4/29/2025)    Housing Stability Vital Sign     Unable to Pay for Housing in the Last Year: Patient declined     Homeless in the Last Year: Patient declined   [2]   No current facility-administered medications on file prior to encounter.     Current Outpatient Medications on File Prior to Encounter   Medication Sig Dispense Refill    ascorbic acid, vitamin C, (VITAMIN C) 500 MG tablet Take 500 mg by mouth once daily.      aspirin (ECOTRIN) 81 MG EC tablet Take 1 tablet (81 mg total) by mouth once daily. 90 tablet 3    cholecalciferol, vitamin D3, 1,000 unit capsule Take 1,000 Units by mouth once daily.      ezetimibe (ZETIA) 10 mg tablet Take 1 tablet (10 mg total) by mouth once daily. 90 tablet 3    multivitamin with minerals tablet Take 1 tablet by mouth once daily.      nebivoloL (BYSTOLIC) 5 MG Tab Take 1 tablet (5 mg total) by mouth once daily. 90 tablet 3    oxyCODONE-acetaminophen (PERCOCET) 5-325 mg per tablet Take 1 tablet by mouth every 6 (six) hours as needed for Pain. Take with food 30 tablet 0    rosuvastatin (CRESTOR) 10 MG tablet Take 1 tablet (10 mg total) by mouth once daily. 90 tablet 3    tamsulosin (FLOMAX) 0.4 mg Cap TAKE ONE CAPSULE BY MOUTH EVERY DAY 90 capsule 1    XIGDUO XR 5-1,000 mg TAKE TWO TABLETS BY MOUTH EVERY  tablet 1    alcohol swabs PadM Apply 1 each topically as needed. 100 each 3    blood sugar diagnostic (TRUE METRIX GLUCOSE  TEST STRIP) Strp 200 each by Misc.(Non-Drug; Combo Route) route Daily. 200 each 0    lancets 30 gauge Misc 1 lancet  by Misc.(Non-Drug; Combo Route) route Daily. 200 each 3

## 2025-04-30 NOTE — ASSESSMENT & PLAN NOTE
- AFVSS on RA  - WBC 14.16  - CT chest/abd/pelvis pending  - MM pain control, prn oxy, tylenol, robaxin, dilaudid  - IR consulted>>IR consult received for biopsy of pt's newly discovered pancreatic mass per CT TL spine on 4/25. CT c/a/p pending. Pt currently scheduled for outpatient EUS biopsy of the pancreatic mass with AES on 5/2/25. Will defer biopsy to AES.   - AES consulted   - Hold heparin. Needs four hour hold prior to procedure.  - EUS-guided biopsy today 4/30   - heme/onc consulted   -CT CAP pending pending to r/o metastatic disease  -We will follow up on biopsy result  -If biopsy is positive for malignancy, recommend CT Abdomen pancreatic protocol.

## 2025-04-30 NOTE — PROGRESS NOTES
[facility] [address] [place] [address] Medicine  Progress Note    Patient Name: Gael Pedroza  MRN: [id number]  Patient Class: IP- Inpatient   Admission Date: 4/29/2025  Length of Stay: 0 days  Attending Physician: Kellee Gomez MD  Primary Care Provider: HARLAN Steele MD        Subjective     Principal Problem:Pancreatic mass        HPI:  Mr. Gael Pedroza 70y M w/ h/o HTN, DM2, LBP, pancreatic mass, ankylosing spondylitis Carotid artery stenosis, DDD, HLD presents w/ worsening low back pain x1 week. Pt reports chronic LBP that has been acutely worsening over the last week. He denies any trauma. The pain begins in his lower back and radiates b/l and up to his rib cage. Its constant and nagging. Reports he was seen by a physician who has been managing his pain w/ percocet, but recently he's required the pain med more frequently and feels like he's chasing the pain. Reports pain has been contributed to his pancreatic mass which he believes is cancerous. It has been associated w/ decreased appetite and weight loss of up to 1 lb per day. Endorses a h/o abdominal pain associated w/ meals but he no longer experiences this. His BM are loose and in small quantity. He is compliant w/ his home medications and reports he has not yet started treatment for his pancreatic mass as he does not yet have a formal diagnosis of cancer. Denies any fevers, chills, chest pain, SOB, nausea or vomiting, dysuria, hematochezia or tarry stools.    In ED, Pt AFVSS on RA. WBC 14.16. . UA noninfectious. ECG w/ NSR, nonspecific ST abnormality, grossly nonischemic.Given IV dilaudid, antiemetics and 500 ml NS bolus. Heme/onc and AES consulted by ED.     Overview/Hospital Course:  Pt admitted for evaluation of pancreatic mass. Pt afebrile and HDS. Pain well controlled on MM pain management regimen. Heme/onc following. IR consult in ED who deferred biopsy of mass to AES. AES consulted and agreeable to biopsy. CT imaging showed 9.4 cm  mass involving the pancreatic body and tail highly concerning for adenocarcinoma.   Occludes the splenic and superior mesenteric veins, with nonocclusive thrombus extending into the main portal vein.  There are portosystemic collaterals in the upper abdomen. Enlarged periportal lymph node concerning for metastasis. Multiple liver metastases measuring up to 2.8 cm. Few solid pulmonary nodules measuring up to 0.7 cm concerning for metastases. Pt was informed and started on heparin for portal vein thrombus. AES performed pancreatic mass biopsy 4/30.     Interval History: NAEON. Pt is doing well today. NPO since midnight pending pancreatic mass biopsy per AES. Heparin stopped prior to procedure. Pt informed of portal thrombus seen on yesterdays imaging. Will have an oral transition to eliquis following his procedure with AES when appropriate.     Review of Systems   Constitutional:  Positive for activity change and appetite change. Negative for chills, diaphoresis, fatigue and fever.   HENT:  Negative for congestion, rhinorrhea and sore throat.    Respiratory:  Negative for cough, chest tightness, shortness of breath and wheezing.    Cardiovascular:  Negative for chest pain, palpitations and leg swelling.   Gastrointestinal:  Positive for diarrhea. Negative for abdominal distention, abdominal pain, blood in stool, constipation, nausea and vomiting.   Genitourinary:  Negative for difficulty urinating, dysuria, frequency, hematuria and urgency.   Musculoskeletal:  Positive for back pain. Negative for arthralgias and neck stiffness.   Neurological:  Negative for dizziness, tremors, seizures, syncope, weakness, light-headedness, numbness and headaches.   Psychiatric/Behavioral:  Negative for agitation and confusion.      Objective:     Vital Signs (Most Recent):  Temp: 97.9 °F (36.6 °C) (04/30/25 1135)  Pulse: 97 (04/30/25 1135)  Resp: 18 (04/30/25 1135)  BP: (!) 149/77 (04/30/25 1135)  SpO2: 97 % (04/30/25 1135) Vital  Signs (24h Range):  Temp:  [97.8 °F (36.6 °C)-98.4 °F (36.9 °C)] 97.9 °F (36.6 °C)  Pulse:  [] 97  Resp:  [16-20] 18  SpO2:  [97 %-99 %] 97 %  BP: (117-153)/(58-77) 149/77     Weight: 55.6 kg (122 lb 9.2 oz)  Body mass index is 19.78 kg/m².  No intake or output data in the 24 hours ending 04/30/25 1259      Physical Exam  Vitals and nursing note reviewed.   Constitutional:       General: He is not in acute distress.     Appearance: He is well-developed. He is not diaphoretic.   HENT:      Head: Normocephalic and atraumatic.      Right Ear: External ear normal.      Left Ear: External ear normal.      Nose: Nose normal. No congestion.      Mouth/Throat:      Pharynx: Oropharynx is clear.   Eyes:      General: No scleral icterus.     Extraocular Movements: Extraocular movements intact.   Cardiovascular:      Rate and Rhythm: Normal rate and regular rhythm.      Pulses: Normal pulses.      Heart sounds: Normal heart sounds. No murmur heard.  Pulmonary:      Effort: Pulmonary effort is normal. No respiratory distress.      Breath sounds: Normal breath sounds. No wheezing or rales.   Abdominal:      General: Bowel sounds are normal. There is no distension.      Palpations: Abdomen is soft.      Tenderness: There is no abdominal tenderness. There is no guarding or rebound.   Musculoskeletal:         General: Tenderness (paraspinal lumbar ttp) present.      Cervical back: Normal range of motion.      Right lower leg: No edema.      Left lower leg: No edema.   Skin:     General: Skin is warm and dry.      Capillary Refill: Capillary refill takes less than 2 seconds.   Neurological:      General: No focal deficit present.      Mental Status: He is alert and oriented to person, place, and time. Mental status is at baseline.   Psychiatric:         Mood and Affect: Mood normal.         Behavior: Behavior normal.         Thought Content: Thought content normal.               Significant Labs: All pertinent labs within the  past 24 hours have been reviewed.    Significant Imaging: I have reviewed all pertinent imaging results/findings within the past 24 hours.      Assessment & Plan  Pancreatic mass  Low back pain  - AFVSS on RA  - WBC 14.16  - CT chest/abd/pelvis pending  - MM pain control, prn oxy, tylenol, robaxin, dilaudid  - IR consulted>>IR consult received for biopsy of pt's newly discovered pancreatic mass per CT TL spine on 4/25. CT c/a/p pending. Pt currently scheduled for outpatient EUS biopsy of the pancreatic mass with AES on 5/2/25. Will defer biopsy to AES.   - AES consulted   - Hold heparin. Needs four hour hold prior to procedure.  - EUS-guided biopsy today 4/30   - heme/onc consulted   -CT CAP pending pending to r/o metastatic disease  -We will follow up on biopsy result  -If biopsy is positive for malignancy, recommend CT Abdomen pancreatic protocol.    Portal vein thrombosis  - CT chest a/p c/f portal vein thrombus  - started heparin drip  - oral AC at discharge  Type 2 diabetes mellitus with circulatory disorder, without long-term current use of insulin  Patient's FSGs are uncontrolled due to hyperglycemia on current medication regimen.  Last A1c reviewed-   Lab Results   Component Value Date    LABA1C 6.6 (H) 01/18/2017    HGBA1C 6.9 (H) 12/13/2024     Most recent fingerstick glucose reviewed-   Recent Labs   Lab 04/29/25  1727 04/29/25  2104 04/30/25  0724 04/30/25  1136   POCTGLUCOSE 118* 161* 147* 95   , Continue to monitor glucose closely for hypo/hyperglycemia.  Current correctional scale  Low  Maintain anti-hyperglycemic dose as follows-   Antihyperglycemics (From admission, onward)      Start     Stop Route Frequency Ordered    04/29/25 1451  insulin aspart U-100 pen 0-5 Units         -- SubQ Before meals & nightly PRN 04/29/25 1352          Home antihyperglycemics reviewed, will hold oral hypoglycemics while patient is in the hospital.  Carotid artery stenosis  - continue statin and asa  Benign prostatic  hyperplasia with urinary frequency  - continue flomax  VTE Risk Mitigation (From admission, onward)           Ordered     IP VTE HIGH RISK PATIENT  Once         04/29/25 1351     Place sequential compression device  Until discontinued         04/29/25 1351                    Discharge Planning   RAMIRO: 5/1/2025     Code Status: Full Code   Medical Readiness for Discharge Date:   Discharge Plan A: Home                        Emi Parrish PA-C  Department of Hospital Medicine   [facility] - Observation 11H

## 2025-04-30 NOTE — NURSING
Called phlebotomy concerning Ptt collection. Informed that there was an issue with the printing of the labels. Called phlebotomist Christine who collected lab and was informed that the collection was put in the system and collection time can be verified.

## 2025-04-30 NOTE — HOSPITAL COURSE
Pt admitted for evaluation of pancreatic mass. Pt afebrile and HDS. Pain well controlled on MM pain management regimen. Heme/onc following. IR consult in ED who deferred biopsy of mass to AES. AES consulted and agreeable to biopsy. CT imaging showed 9.4 cm mass involving the pancreatic body and tail highly concerning for adenocarcinoma.   Occludes the splenic and superior mesenteric veins, with nonocclusive thrombus extending into the main portal vein.  There are portosystemic collaterals in the upper abdomen. Enlarged periportal lymph node concerning for metastasis. Multiple liver metastases measuring up to 2.8 cm. Few solid pulmonary nodules measuring up to 0.7 cm concerning for metastases. Pt was informed and started on heparin for portal vein thrombus. AES performed pancreatic mass biopsy 4/30. Pathology pending. Discussed with heme/onc they will follow up with patient as OP appointment on 5/8 to discuss results of pathology and long term planning. Patient still needs to have pain management optimized. Adjusted pain regimen. Palliative consulted, appreciate recs. Patient pain tolerable on new oral regimen. Patient is okay for discharge.    Pt was seen and evaluated by me this morning, reports feeling well, and is eager to discharge home. All questions were answered. Patient acknowledged understanding of discharge instructions and feels safe to discharge home. Patient was discharged on 5/4/2025 in stable condition with Heme/onc, PCP, and palliative follow-up. Education regarding condition provided and return precautions given.     Physical Exam  Gen: in NAD, appears stated age  Neuro: AAOx3, motor, sensory, and strength grossly intact BL  HEENT: EOMI, PERRLA; no JVD appreciated  CVS: RRR, no m/r/g  Resp: lungs CTAB, no w/r/r; no belabored breathing or accessory muscle use appreciated   Abd: NTND, soft to palpation  Extrem: no UE or LE edema BL

## 2025-04-30 NOTE — SUBJECTIVE & OBJECTIVE
Interval History: NAEON. Pt is doing well today. NPO since midnight pending pancreatic mass biopsy per AES. Heparin stopped prior to procedure. Pt informed of portal thrombus seen on yesterdays imaging. Will have an oral transition to eliquis following his procedure with AES when appropriate.     Review of Systems   Constitutional:  Positive for activity change and appetite change. Negative for chills, diaphoresis, fatigue and fever.   HENT:  Negative for congestion, rhinorrhea and sore throat.    Respiratory:  Negative for cough, chest tightness, shortness of breath and wheezing.    Cardiovascular:  Negative for chest pain, palpitations and leg swelling.   Gastrointestinal:  Positive for diarrhea. Negative for abdominal distention, abdominal pain, blood in stool, constipation, nausea and vomiting.   Genitourinary:  Negative for difficulty urinating, dysuria, frequency, hematuria and urgency.   Musculoskeletal:  Positive for back pain. Negative for arthralgias and neck stiffness.   Neurological:  Negative for dizziness, tremors, seizures, syncope, weakness, light-headedness, numbness and headaches.   Psychiatric/Behavioral:  Negative for agitation and confusion.      Objective:     Vital Signs (Most Recent):  Temp: 97.9 °F (36.6 °C) (04/30/25 1135)  Pulse: 97 (04/30/25 1135)  Resp: 18 (04/30/25 1135)  BP: (!) 149/77 (04/30/25 1135)  SpO2: 97 % (04/30/25 1135) Vital Signs (24h Range):  Temp:  [97.8 °F (36.6 °C)-98.4 °F (36.9 °C)] 97.9 °F (36.6 °C)  Pulse:  [] 97  Resp:  [16-20] 18  SpO2:  [97 %-99 %] 97 %  BP: (117-153)/(58-77) 149/77     Weight: 55.6 kg (122 lb 9.2 oz)  Body mass index is 19.78 kg/m².  No intake or output data in the 24 hours ending 04/30/25 1259      Physical Exam  Vitals and nursing note reviewed.   Constitutional:       General: He is not in acute distress.     Appearance: He is well-developed. He is not diaphoretic.   HENT:      Head: Normocephalic and atraumatic.      Right Ear: External  ear normal.      Left Ear: External ear normal.      Nose: Nose normal. No congestion.      Mouth/Throat:      Pharynx: Oropharynx is clear.   Eyes:      General: No scleral icterus.     Extraocular Movements: Extraocular movements intact.   Cardiovascular:      Rate and Rhythm: Normal rate and regular rhythm.      Pulses: Normal pulses.      Heart sounds: Normal heart sounds. No murmur heard.  Pulmonary:      Effort: Pulmonary effort is normal. No respiratory distress.      Breath sounds: Normal breath sounds. No wheezing or rales.   Abdominal:      General: Bowel sounds are normal. There is no distension.      Palpations: Abdomen is soft.      Tenderness: There is no abdominal tenderness. There is no guarding or rebound.   Musculoskeletal:         General: Tenderness (paraspinal lumbar ttp) present.      Cervical back: Normal range of motion.      Right lower leg: No edema.      Left lower leg: No edema.   Skin:     General: Skin is warm and dry.      Capillary Refill: Capillary refill takes less than 2 seconds.   Neurological:      General: No focal deficit present.      Mental Status: He is alert and oriented to person, place, and time. Mental status is at baseline.   Psychiatric:         Mood and Affect: Mood normal.         Behavior: Behavior normal.         Thought Content: Thought content normal.               Significant Labs: All pertinent labs within the past 24 hours have been reviewed.    Significant Imaging: I have reviewed all pertinent imaging results/findings within the past 24 hours.

## 2025-04-30 NOTE — PHARMACY MED REC
"      Admission Medication History     The home medication history was taken by Kera De Los Santos.    You may go to "Admission" then "Reconcile Home Medications" tabs to review and/or act upon these items.     The home medication list has been updated by the Pharmacy department.   Please read ALL comments highlighted in yellow.   Please address this information as you see fit.    Feel free to contact us if you have any questions or require assistance.      The medications listed below were removed from the home medication list. Please reorder if appropriate:  Patient reports no longer taking the following medication(s):  Celebrex   Neurontin   Protonix   glycolax     Medications listed below were obtained from: Patient/family and Analytic software- [employer]  PTA Medications   Medication Sig    ascorbic acid, vitamin C, (VITAMIN C) 500 MG tablet Take 500 mg by mouth once daily.      aspirin (ECOTRIN) 81 MG EC tablet Take 1 tablet (81 mg total) by mouth once daily.      cholecalciferol, vitamin D3, 1,000 unit capsule Take 1,000 Units by mouth once daily.      ezetimibe (ZETIA) 10 mg tablet Take 1 tablet (10 mg total) by mouth once daily.      multivitamin with minerals tablet Take 1 tablet by mouth once daily.      nebivoloL (BYSTOLIC) 5 MG Tab Take 1 tablet (5 mg total) by mouth once daily.      oxyCODONE-acetaminophen (PERCOCET) 5-325 mg per tablet Take 1 tablet by mouth every 6 (six) hours as needed for Pain. Take with food      rosuvastatin (CRESTOR) 10 MG tablet Take 1 tablet (10 mg total) by mouth once daily.      tamsulosin (FLOMAX) 0.4 mg Cap TAKE ONE CAPSULE BY MOUTH EVERY DAY      XIGDUO XR 5-1,000 mg TAKE TWO TABLETS BY MOUTH EVERY DAY         Kera De Los Santos  [id number]  .        .          "

## 2025-05-01 LAB
ABSOLUTE EOSINOPHIL (OHS): 0.16 K/UL
ABSOLUTE EOSINOPHIL (OHS): 0.25 K/UL
ABSOLUTE MONOCYTE (OHS): 1.19 K/UL (ref 0.3–1)
ABSOLUTE MONOCYTE (OHS): 1.34 K/UL (ref 0.3–1)
ABSOLUTE NEUTROPHIL COUNT (OHS): 11.24 K/UL (ref 1.8–7.7)
ABSOLUTE NEUTROPHIL COUNT (OHS): 11.49 K/UL (ref 1.8–7.7)
ALBUMIN SERPL BCP-MCNC: 3.1 G/DL (ref 3.5–5.2)
ALP SERPL-CCNC: 221 UNIT/L (ref 40–150)
ALT SERPL W/O P-5'-P-CCNC: 35 UNIT/L (ref 10–44)
ANION GAP (OHS): 15 MMOL/L (ref 8–16)
APTT PPP: 26.2 SECONDS (ref 21–32)
APTT PPP: 26.3 SECONDS (ref 21–32)
APTT PPP: 42.3 SECONDS (ref 21–32)
AST SERPL-CCNC: 28 UNIT/L (ref 11–45)
BASOPHILS # BLD AUTO: 0.08 K/UL
BASOPHILS # BLD AUTO: 0.12 K/UL
BASOPHILS NFR BLD AUTO: 0.6 %
BASOPHILS NFR BLD AUTO: 0.8 %
BILIRUB SERPL-MCNC: 0.5 MG/DL (ref 0.1–1)
BUN SERPL-MCNC: 10 MG/DL (ref 8–23)
CALCIUM SERPL-MCNC: 9.7 MG/DL (ref 8.7–10.5)
CHLORIDE SERPL-SCNC: 97 MMOL/L (ref 95–110)
CO2 SERPL-SCNC: 24 MMOL/L (ref 23–29)
CREAT SERPL-MCNC: 0.7 MG/DL (ref 0.5–1.4)
ERYTHROCYTE [DISTWIDTH] IN BLOOD BY AUTOMATED COUNT: 13.2 % (ref 11.5–14.5)
ERYTHROCYTE [DISTWIDTH] IN BLOOD BY AUTOMATED COUNT: 13.4 % (ref 11.5–14.5)
GFR SERPLBLD CREATININE-BSD FMLA CKD-EPI: >60 ML/MIN/1.73/M2
GLUCOSE SERPL-MCNC: 119 MG/DL (ref 70–110)
HCT VFR BLD AUTO: 39.7 % (ref 40–54)
HCT VFR BLD AUTO: 45.3 % (ref 40–54)
HGB BLD-MCNC: 13.1 GM/DL (ref 14–18)
HGB BLD-MCNC: 14.6 GM/DL (ref 14–18)
IMM GRANULOCYTES # BLD AUTO: 0.07 K/UL (ref 0–0.04)
IMM GRANULOCYTES # BLD AUTO: 0.11 K/UL (ref 0–0.04)
IMM GRANULOCYTES NFR BLD AUTO: 0.5 % (ref 0–0.5)
IMM GRANULOCYTES NFR BLD AUTO: 0.7 % (ref 0–0.5)
INR PPP: 1.1 (ref 0.8–1.2)
LYMPHOCYTES # BLD AUTO: 1.31 K/UL (ref 1–4.8)
LYMPHOCYTES # BLD AUTO: 1.91 K/UL (ref 1–4.8)
MAGNESIUM SERPL-MCNC: 1.6 MG/DL (ref 1.6–2.6)
MCH RBC QN AUTO: 31.5 PG (ref 27–31)
MCH RBC QN AUTO: 31.9 PG (ref 27–31)
MCHC RBC AUTO-ENTMCNC: 32.2 G/DL (ref 32–36)
MCHC RBC AUTO-ENTMCNC: 33 G/DL (ref 32–36)
MCV RBC AUTO: 95 FL (ref 82–98)
MCV RBC AUTO: 99 FL (ref 82–98)
NUCLEATED RBC (/100WBC) (OHS): 0 /100 WBC
NUCLEATED RBC (/100WBC) (OHS): 0 /100 WBC
PLATELET # BLD AUTO: 293 K/UL (ref 150–450)
PLATELET # BLD AUTO: 374 K/UL (ref 150–450)
PMV BLD AUTO: 9.1 FL (ref 9.2–12.9)
PMV BLD AUTO: 9.5 FL (ref 9.2–12.9)
POCT GLUCOSE: 123 MG/DL (ref 70–110)
POCT GLUCOSE: 136 MG/DL (ref 70–110)
POCT GLUCOSE: 160 MG/DL (ref 70–110)
POCT GLUCOSE: 185 MG/DL (ref 70–110)
POTASSIUM SERPL-SCNC: 5 MMOL/L (ref 3.5–5.1)
PROT SERPL-MCNC: 6.9 GM/DL (ref 6–8.4)
PROTHROMBIN TIME: 12.4 SECONDS (ref 9–12.5)
RBC # BLD AUTO: 4.16 M/UL (ref 4.6–6.2)
RBC # BLD AUTO: 4.58 M/UL (ref 4.6–6.2)
RELATIVE EOSINOPHIL (OHS): 1.1 %
RELATIVE EOSINOPHIL (OHS): 1.6 %
RELATIVE LYMPHOCYTE (OHS): 12.5 % (ref 18–48)
RELATIVE LYMPHOCYTE (OHS): 9.3 % (ref 18–48)
RELATIVE MONOCYTE (OHS): 8.5 % (ref 4–15)
RELATIVE MONOCYTE (OHS): 8.8 % (ref 4–15)
RELATIVE NEUTROPHIL (OHS): 75.6 % (ref 38–73)
RELATIVE NEUTROPHIL (OHS): 80 % (ref 38–73)
SODIUM SERPL-SCNC: 136 MMOL/L (ref 136–145)
WBC # BLD AUTO: 14.05 K/UL (ref 3.9–12.7)
WBC # BLD AUTO: 15.22 K/UL (ref 3.9–12.7)

## 2025-05-01 PROCEDURE — 36415 COLL VENOUS BLD VENIPUNCTURE: CPT

## 2025-05-01 PROCEDURE — 85730 THROMBOPLASTIN TIME PARTIAL: CPT | Performed by: INTERNAL MEDICINE

## 2025-05-01 PROCEDURE — 25000003 PHARM REV CODE 250

## 2025-05-01 PROCEDURE — A4216 STERILE WATER/SALINE, 10 ML: HCPCS | Performed by: PHYSICIAN ASSISTANT

## 2025-05-01 PROCEDURE — 83735 ASSAY OF MAGNESIUM: CPT | Performed by: PHYSICIAN ASSISTANT

## 2025-05-01 PROCEDURE — 36415 COLL VENOUS BLD VENIPUNCTURE: CPT | Performed by: PHYSICIAN ASSISTANT

## 2025-05-01 PROCEDURE — 63600175 PHARM REV CODE 636 W HCPCS

## 2025-05-01 PROCEDURE — 80053 COMPREHEN METABOLIC PANEL: CPT | Performed by: PHYSICIAN ASSISTANT

## 2025-05-01 PROCEDURE — 99232 SBSQ HOSP IP/OBS MODERATE 35: CPT | Mod: ,,,

## 2025-05-01 PROCEDURE — 63600175 PHARM REV CODE 636 W HCPCS: Performed by: NURSE PRACTITIONER

## 2025-05-01 PROCEDURE — 36415 COLL VENOUS BLD VENIPUNCTURE: CPT | Performed by: INTERNAL MEDICINE

## 2025-05-01 PROCEDURE — 25000003 PHARM REV CODE 250: Performed by: PHYSICIAN ASSISTANT

## 2025-05-01 PROCEDURE — 21400001 HC TELEMETRY ROOM

## 2025-05-01 PROCEDURE — 85025 COMPLETE CBC W/AUTO DIFF WBC: CPT

## 2025-05-01 PROCEDURE — 85610 PROTHROMBIN TIME: CPT

## 2025-05-01 PROCEDURE — 85025 COMPLETE CBC W/AUTO DIFF WBC: CPT | Performed by: PHYSICIAN ASSISTANT

## 2025-05-01 PROCEDURE — 25000003 PHARM REV CODE 250: Performed by: NURSE PRACTITIONER

## 2025-05-01 PROCEDURE — 85730 THROMBOPLASTIN TIME PARTIAL: CPT

## 2025-05-01 RX ORDER — ALUMINUM HYDROXIDE, MAGNESIUM HYDROXIDE, AND SIMETHICONE 2400; 240; 2400 MG/30ML; MG/30ML; MG/30ML
30 SUSPENSION ORAL EVERY 4 HOURS
Status: DISCONTINUED | OUTPATIENT
Start: 2025-05-01 | End: 2025-05-03 | Stop reason: HOSPADM

## 2025-05-01 RX ORDER — HEPARIN SODIUM 5000 [USP'U]/ML
5000 INJECTION, SOLUTION INTRAVENOUS; SUBCUTANEOUS EVERY 8 HOURS
Status: DISCONTINUED | OUTPATIENT
Start: 2025-05-01 | End: 2025-05-01

## 2025-05-01 RX ORDER — METOCLOPRAMIDE HYDROCHLORIDE 5 MG/ML
5 INJECTION INTRAMUSCULAR; INTRAVENOUS EVERY 6 HOURS
Status: DISCONTINUED | OUTPATIENT
Start: 2025-05-01 | End: 2025-05-03 | Stop reason: HOSPADM

## 2025-05-01 RX ORDER — AMOXICILLIN 250 MG
1 CAPSULE ORAL DAILY
Status: DISCONTINUED | OUTPATIENT
Start: 2025-05-01 | End: 2025-05-03 | Stop reason: HOSPADM

## 2025-05-01 RX ORDER — DICYCLOMINE HYDROCHLORIDE 10 MG/1
10 CAPSULE ORAL 3 TIMES DAILY
Status: DISCONTINUED | OUTPATIENT
Start: 2025-05-01 | End: 2025-05-03 | Stop reason: HOSPADM

## 2025-05-01 RX ORDER — HEPARIN SODIUM,PORCINE/D5W 25000/250
0-40 INTRAVENOUS SOLUTION INTRAVENOUS CONTINUOUS
Status: DISCONTINUED | OUTPATIENT
Start: 2025-05-01 | End: 2025-05-03 | Stop reason: HOSPADM

## 2025-05-01 RX ADMIN — Medication 1000 UNITS: at 09:05

## 2025-05-01 RX ADMIN — TAMSULOSIN HYDROCHLORIDE 0.4 MG: 0.4 CAPSULE ORAL at 09:05

## 2025-05-01 RX ADMIN — THERA TABS 1 TABLET: TAB at 09:05

## 2025-05-01 RX ADMIN — DICYCLOMINE HYDROCHLORIDE 10 MG: 10 CAPSULE ORAL at 11:05

## 2025-05-01 RX ADMIN — HYDROMORPHONE HYDROCHLORIDE 0.5 MG: 2 INJECTION INTRAMUSCULAR; INTRAVENOUS; SUBCUTANEOUS at 12:05

## 2025-05-01 RX ADMIN — SENNOSIDES AND DOCUSATE SODIUM 1 TABLET: 50; 8.6 TABLET ORAL at 05:05

## 2025-05-01 RX ADMIN — OXYCODONE HYDROCHLORIDE 10 MG: 10 TABLET ORAL at 12:05

## 2025-05-01 RX ADMIN — METOCLOPRAMIDE HYDROCHLORIDE 5 MG: 10 INJECTION, SOLUTION INTRAMUSCULAR; INTRAVENOUS at 11:05

## 2025-05-01 RX ADMIN — DICYCLOMINE HYDROCHLORIDE 10 MG: 10 CAPSULE ORAL at 03:05

## 2025-05-01 RX ADMIN — HYDROMORPHONE HYDROCHLORIDE 0.5 MG: 2 INJECTION INTRAMUSCULAR; INTRAVENOUS; SUBCUTANEOUS at 05:05

## 2025-05-01 RX ADMIN — OXYCODONE HYDROCHLORIDE 10 MG: 10 TABLET ORAL at 07:05

## 2025-05-01 RX ADMIN — OXYCODONE HYDROCHLORIDE 10 MG: 10 TABLET ORAL at 08:05

## 2025-05-01 RX ADMIN — METOPROLOL TARTRATE 25 MG: 25 TABLET, FILM COATED ORAL at 09:05

## 2025-05-01 RX ADMIN — ALUMINUM HYDROXIDE, MAGNESIUM HYDROXIDE, AND DIMETHICONE 30 ML: 400; 400; 40 SUSPENSION ORAL at 05:05

## 2025-05-01 RX ADMIN — Medication 10 ML: at 06:05

## 2025-05-01 RX ADMIN — OXYCODONE HYDROCHLORIDE AND ACETAMINOPHEN 500 MG: 500 TABLET ORAL at 09:05

## 2025-05-01 RX ADMIN — EZETIMIBE 10 MG: 10 TABLET ORAL at 09:05

## 2025-05-01 RX ADMIN — HEPARIN SODIUM AND DEXTROSE 18 UNITS/KG/HR: 10000; 5 INJECTION INTRAVENOUS at 09:05

## 2025-05-01 RX ADMIN — GABAPENTIN 300 MG: 300 CAPSULE ORAL at 09:05

## 2025-05-01 RX ADMIN — METHOCARBAMOL 500 MG: 500 TABLET ORAL at 09:05

## 2025-05-01 RX ADMIN — OXYCODONE HYDROCHLORIDE 10 MG: 10 TABLET ORAL at 11:05

## 2025-05-01 RX ADMIN — HYDROMORPHONE HYDROCHLORIDE 0.5 MG: 2 INJECTION INTRAMUSCULAR; INTRAVENOUS; SUBCUTANEOUS at 09:05

## 2025-05-01 RX ADMIN — OXYCODONE HYDROCHLORIDE 10 MG: 10 TABLET ORAL at 02:05

## 2025-05-01 RX ADMIN — ASPIRIN 81 MG: 81 TABLET, COATED ORAL at 09:05

## 2025-05-01 RX ADMIN — METOCLOPRAMIDE HYDROCHLORIDE 5 MG: 10 INJECTION, SOLUTION INTRAMUSCULAR; INTRAVENOUS at 06:05

## 2025-05-01 RX ADMIN — DICYCLOMINE HYDROCHLORIDE 10 MG: 10 CAPSULE ORAL at 09:05

## 2025-05-01 RX ADMIN — Medication 10 ML: at 10:05

## 2025-05-01 NOTE — ASSESSMENT & PLAN NOTE
EUS 4/30- An irregular mass was identified in the pancreatic body and pancreatic tail. The mass measured 65 mm in maximal cross-sectional diameter. Fine needle biopsy was performed.      -Pancreatic mass biopsied. Path pending.   -Patient endorses ongoing postprandial abdominal/back pain. No pain currently.  Denies NV, fever, chills.   -LFTs with Tbili 0.5, , AST 28 and ALT 35. WBC 15.22 (increased from 11.8). H/H wnl. Afebrile   -Recommend close follow up with oncology pending path results- Outpatient oncology appointment scheduled 5/8 with Dr. Granados  -Rest of patients care per primary team  -AES will sign off; please reach out with any further questions/concerns

## 2025-05-01 NOTE — ASSESSMENT & PLAN NOTE
Patient's FSGs are uncontrolled due to hyperglycemia on current medication regimen.  Last A1c reviewed-   Lab Results   Component Value Date    LABA1C 6.6 (H) 01/18/2017    HGBA1C 6.9 (H) 12/13/2024     Most recent fingerstick glucose reviewed-   Recent Labs   Lab 04/30/25  1529 05/01/25  0735 05/01/25  1113   POCTGLUCOSE 109 123* 160*   , Continue to monitor glucose closely for hypo/hyperglycemia.  Current correctional scale  Low  Maintain anti-hyperglycemic dose as follows-   Antihyperglycemics (From admission, onward)      Start     Stop Route Frequency Ordered    04/29/25 1451  insulin aspart U-100 pen 0-5 Units         -- SubQ Before meals & nightly PRN 04/29/25 1352          Home antihyperglycemics reviewed, will hold oral hypoglycemics while patient is in the hospital.

## 2025-05-01 NOTE — PLAN OF CARE
Problem: Adult Inpatient Plan of Care  Goal: Plan of Care Review  Outcome: Progressing  Goal: Patient-Specific Goal (Individualized)  Outcome: Progressing  Goal: Absence of Hospital-Acquired Illness or Injury  Outcome: Progressing  Goal: Optimal Comfort and Wellbeing  Outcome: Progressing  Goal: Readiness for Transition of Care  Outcome: Progressing     Problem: Infection  Goal: Absence of Infection Signs and Symptoms  Outcome: Progressing     Problem: Diabetes Comorbidity  Goal: Blood Glucose Level Within Targeted Range  Outcome: Progressing     Problem: Wound  Goal: Optimal Coping  Outcome: Progressing  Goal: Optimal Functional Ability  Outcome: Progressing  Goal: Absence of Infection Signs and Symptoms  Outcome: Progressing  Goal: Improved Oral Intake  Outcome: Progressing  Goal: Optimal Pain Control and Function  Outcome: Progressing  Goal: Skin Health and Integrity  Outcome: Progressing  Goal: Optimal Wound Healing  Outcome: Progressing     Problem: Pain Acute  Goal: Optimal Pain Control and Function  Outcome: Progressing     Problem: Malnutrition  Goal: Improved Nutritional Intake  Outcome: Progressing     Pt progressing towards goals. No distress notice. No falls or injuries during shift. Bed in lowest position, call light within reach, belonging at bedside. Safety precaution maintain.Plan of Care reviewed. Pt verbalized understanding.

## 2025-05-01 NOTE — PROGRESS NOTES
[facility] [address] [place] [address] Medicine  Progress Note    Patient Name: Gael Pedroza  MRN: [id number]  Patient Class: IP- Inpatient   Admission Date: 4/29/2025  Length of Stay: 1 days  Attending Physician: Leandro Benjamin MD  Primary Care Provider: HARLAN Steele MD        Subjective     Principal Problem:Pancreatic mass        HPI:  Mr. Gael Pedroza 70y M w/ h/o HTN, DM2, LBP, pancreatic mass, ankylosing spondylitis Carotid artery stenosis, DDD, HLD presents w/ worsening low back pain x1 week. Pt reports chronic LBP that has been acutely worsening over the last week. He denies any trauma. The pain begins in his lower back and radiates b/l and up to his rib cage. Its constant and nagging. Reports he was seen by a physician who has been managing his pain w/ percocet, but recently he's required the pain med more frequently and feels like he's chasing the pain. Reports pain has been contributed to his pancreatic mass which he believes is cancerous. It has been associated w/ decreased appetite and weight loss of up to 1 lb per day. Endorses a h/o abdominal pain associated w/ meals but he no longer experiences this. His BM are loose and in small quantity. He is compliant w/ his home medications and reports he has not yet started treatment for his pancreatic mass as he does not yet have a formal diagnosis of cancer. Denies any fevers, chills, chest pain, SOB, nausea or vomiting, dysuria, hematochezia or tarry stools.    In ED, Pt AFVSS on RA. WBC 14.16. . UA noninfectious. ECG w/ NSR, nonspecific ST abnormality, grossly nonischemic.Given IV dilaudid, antiemetics and 500 ml NS bolus. Heme/onc and AES consulted by ED.     Overview/Hospital Course:  Pt admitted for evaluation of pancreatic mass. Pt afebrile and HDS. Pain well controlled on MM pain management regimen. Heme/onc following. IR consult in ED who deferred biopsy of mass to AES. AES consulted and agreeable to biopsy. CT imaging showed 9.4 cm  "mass involving the pancreatic body and tail highly concerning for adenocarcinoma.   Occludes the splenic and superior mesenteric veins, with nonocclusive thrombus extending into the main portal vein.  There are portosystemic collaterals in the upper abdomen. Enlarged periportal lymph node concerning for metastasis. Multiple liver metastases measuring up to 2.8 cm. Few solid pulmonary nodules measuring up to 0.7 cm concerning for metastases. Pt was informed and started on heparin for portal vein thrombus. AES performed pancreatic mass biopsy 4/30. Pathology pending.    Interval History: Patient examined at bedside who is resting comfortably. VSS, no acute distress. No overnight events reported. Patient reports mild pain but otherwise feels "fine." Pathology from biopsy pending. Restarted heparin for now for poral thrombus, plan to transition to eliquis on DC.       NAEON. Pt is doing well today. NPO since midnight pending pancreatic mass biopsy per AES. Heparin stopped prior to procedure. Pt informed of portal thrombus seen on yesterdays imaging. Will have an oral transition to eliquis following his procedure with AES when appropriate.     Review of Systems   Constitutional:  Positive for activity change and appetite change. Negative for chills, diaphoresis, fatigue and fever.   HENT:  Negative for congestion, rhinorrhea and sore throat.    Respiratory:  Negative for cough, chest tightness, shortness of breath and wheezing.    Cardiovascular:  Negative for chest pain, palpitations and leg swelling.   Gastrointestinal:  Positive for diarrhea. Negative for abdominal distention, abdominal pain, blood in stool, constipation, nausea and vomiting.   Genitourinary:  Negative for difficulty urinating, dysuria, frequency, hematuria and urgency.   Musculoskeletal:  Positive for back pain. Negative for arthralgias and neck stiffness.   Neurological:  Negative for dizziness, tremors, seizures, syncope, weakness, " light-headedness, numbness and headaches.   Psychiatric/Behavioral:  Negative for agitation and confusion.      Objective:     Vital Signs (Most Recent):  Temp: 98.3 °F (36.8 °C) (05/01/25 1103)  Pulse: 95 (05/01/25 1103)  Resp: 16 (05/01/25 1230)  BP: 118/61 (05/01/25 1103)  SpO2: 98 % (05/01/25 1103) Vital Signs (24h Range):  Temp:  [97.5 °F (36.4 °C)-98.3 °F (36.8 °C)] 98.3 °F (36.8 °C)  Pulse:  [] 95  Resp:  [16-28] 16  SpO2:  [96 %-100 %] 98 %  BP: (105-154)/(55-75) 118/61     Weight: 55.6 kg (122 lb 9.2 oz)  Body mass index is 19.78 kg/m².  No intake or output data in the 24 hours ending 05/01/25 1259      Physical Exam  Vitals and nursing note reviewed.   Constitutional:       General: He is not in acute distress.     Appearance: He is well-developed. He is not diaphoretic.   HENT:      Head: Normocephalic and atraumatic.      Right Ear: External ear normal.      Left Ear: External ear normal.      Nose: Nose normal. No congestion.      Mouth/Throat:      Pharynx: Oropharynx is clear.   Eyes:      General: No scleral icterus.     Extraocular Movements: Extraocular movements intact.   Cardiovascular:      Rate and Rhythm: Normal rate and regular rhythm.      Pulses: Normal pulses.      Heart sounds: Normal heart sounds. No murmur heard.  Pulmonary:      Effort: Pulmonary effort is normal. No respiratory distress.      Breath sounds: Normal breath sounds. No wheezing or rales.   Abdominal:      General: Bowel sounds are normal. There is no distension.      Palpations: Abdomen is soft.      Tenderness: There is no abdominal tenderness. There is no guarding or rebound.   Musculoskeletal:         General: Tenderness (paraspinal lumbar ttp) present.      Cervical back: Normal range of motion.      Right lower leg: No edema.      Left lower leg: No edema.   Skin:     General: Skin is warm and dry.      Capillary Refill: Capillary refill takes less than 2 seconds.   Neurological:      General: No focal deficit  present.      Mental Status: He is alert and oriented to person, place, and time. Mental status is at baseline.   Psychiatric:         Mood and Affect: Mood normal.         Behavior: Behavior normal.         Thought Content: Thought content normal.               Significant Labs: All pertinent labs within the past 24 hours have been reviewed.    Significant Imaging: I have reviewed all pertinent imaging results/findings within the past 24 hours.      Assessment & Plan  Pancreatic mass  Low back pain  - AFVSS on RA  - WBC 14.16  - CT chest/abd/pelvis pending  - MM pain control, prn oxy, tylenol, robaxin, dilaudid  - IR consulted>>IR consult received for biopsy of pt's newly discovered pancreatic mass per CT TL spine on 4/25. CT c/a/p pending. Pt currently scheduled for outpatient EUS biopsy of the pancreatic mass with AES on 5/2/25. Will defer biopsy to AES.   - AES consulted   - Hold heparin. Needs four hour hold prior to procedure.  - EUS-guided biopsy 4/30   - heme/onc consulted   -CT CAP pending pending to r/o metastatic disease  -We will follow up on biopsy result  -If biopsy is positive for malignancy, recommend CT Abdomen pancreatic protocol.  - pathology pending    Portal vein thrombosis  - CT chest a/p c/f portal vein thrombus  - continue heparin drip per protocol  - oral AC at discharge  Type 2 diabetes mellitus with circulatory disorder, without long-term current use of insulin  Patient's FSGs are uncontrolled due to hyperglycemia on current medication regimen.  Last A1c reviewed-   Lab Results   Component Value Date    LABA1C 6.6 (H) 01/18/2017    HGBA1C 6.9 (H) 12/13/2024     Most recent fingerstick glucose reviewed-   Recent Labs   Lab 04/30/25  1529 05/01/25  0735 05/01/25  1113   POCTGLUCOSE 109 123* 160*   , Continue to monitor glucose closely for hypo/hyperglycemia.  Current correctional scale  Low  Maintain anti-hyperglycemic dose as follows-   Antihyperglycemics (From admission, onward)      Start      Stop Route Frequency Ordered    04/29/25 1451  insulin aspart U-100 pen 0-5 Units         -- SubQ Before meals & nightly PRN 04/29/25 1352          Home antihyperglycemics reviewed, will hold oral hypoglycemics while patient is in the hospital.  Carotid artery stenosis  - continue statin and asa  Benign prostatic hyperplasia with urinary frequency  - continue flomax  Severe malnutrition  Nutrition consulted. Most recent weight and BMI monitored-     Measurements:  Wt Readings from Last 1 Encounters:   04/30/25 55.6 kg (122 lb 9.2 oz)   Body mass index is 19.78 kg/m².    Patient has been screened and assessed by RD.    Malnutrition Type:  Context: chronic illness  Level: severe    Malnutrition Characteristic Summary:  Weight Loss (Malnutrition): greater than 20% in 1 year (25 lb weight loss x 1 month (17%-significant), 36 lb weight loss x 3 months (22.8%-significant), 43 lb weight loss x 12 months (26%-significant).)  Energy Intake (Malnutrition): less than or equal to 50% for greater than or equal to 1 month  Subcutaneous Fat (Malnutrition): moderate depletion  Muscle Mass (Malnutrition): moderate depletion    Interventions/Recommendations (treatment strategy):  --Diet advance to goal Consistent CHO with low K+ modifier if clinically indicated  --Recommend repeat A1C- Last level 6.9% 12/13/2024  --Recommend weekly weights  --Nursing: please continue to document % meal eaten on flowsheets  --Encourage good intakes and provide feeding assistance as needed    VTE Risk Mitigation (From admission, onward)           Ordered     heparin 25,000 units in dextrose 5% (100 units/ml) IV bolus from bag HIGH INTENSITY nomogram - [facility]  As needed (PRN)        Question:  Heparin Infusion Adjustment (DO NOT MODIFY ANSWER)  Answer:  [email or web address]\epic\Images\Pharmacy\HeparinInfusions\heparin HIGH INTENSITY nomogram for [facility] [id number]    05/01/25 0822     heparin 25,000 units in dextrose 5% (100 units/ml) IV bolus from bag HIGH  INTENSITY nomogram - [facility]  As needed (PRN)        Question:  Heparin Infusion Adjustment (DO NOT MODIFY ANSWER)  Answer:  [email or web address]\epic\Images\Pharmacy\HeparinInfusions\heparin HIGH INTENSITY nomogram for [facility] [id number]    05/01/25 0822     heparin 25,000 units in dextrose 5% 250 mL (100 units/mL) infusion HIGH INTENSITY nomogram - [facility]  Continuous        Question:  Begin at (units/kg/hr)  Answer:  18    05/01/25 0822     IP VTE HIGH RISK PATIENT  Once         04/29/25 1351     Place sequential compression device  Until discontinued         04/29/25 1351                    Discharge Planning   RAMIRO: 5/2/2025     Code Status: Full Code   Medical Readiness for Discharge Date:   Discharge Plan A: Home                        Adán Sinclair PA-C  Department of Hospital Medicine   [facility]

## 2025-05-01 NOTE — ASSESSMENT & PLAN NOTE
- AFVSS on RA  - WBC 14.16  - CT chest/abd/pelvis pending  - MM pain control, prn oxy, tylenol, robaxin, dilaudid  - IR consulted>>IR consult received for biopsy of pt's newly discovered pancreatic mass per CT TL spine on 4/25. CT c/a/p pending. Pt currently scheduled for outpatient EUS biopsy of the pancreatic mass with AES on 5/2/25. Will defer biopsy to AES.   - AES consulted   - Hold heparin. Needs four hour hold prior to procedure.  - EUS-guided biopsy 4/30   - heme/onc consulted   -CT CAP pending pending to r/o metastatic disease  -We will follow up on biopsy result  -If biopsy is positive for malignancy, recommend CT Abdomen pancreatic protocol.  - pathology pending

## 2025-05-01 NOTE — SUBJECTIVE & OBJECTIVE
Subjective:   HPI: Gael Pedroza is a 70 y.o. male with history of DM, HTN, ankylosing spondylitis, CAD who presents to ED for severe low back pain. Presented to ED due to intractable ongoing pain. Pending referral to heme-onc after recent large peripancreatic mass was seen on CT and MRI done for back pain on 4/24.. Has not yet undergone biopsy. Poor appetite over past few months, has lost 30 lbs over that time.      Repeat CT was done on admission which shows 9.4 cm mass in pancreatic body. Occludes splenic vein and SMV with nonocclusive thrombus extending into main portal vein. Started on heparin gtt overnight.     EUS 4/30- An irregular mass was identified in the pancreatic body and pancreatic tail. The mass measured 65 mm in maximal cross-sectional diameter. Fine needle biopsy was performed.      Interval History: Pancreatic mass biopsied. Path pending. Patient endorses ongoing postprandial abdominal/back pain. No pain currently.  Denies NV, fever, chills. LFTs with Tbili 0.5, , AST 28 and ALT 35. WBC 15.22 (increased from 11.8). H/H wnl. Afebrile     Review of Systems   Constitutional:  Negative for chills and fever.   Gastrointestinal:  Positive for abdominal pain. Negative for abdominal distention, anal bleeding, blood in stool, constipation, diarrhea, nausea, rectal pain and vomiting.   Musculoskeletal:  Positive for back pain.     Objective:     Vital Signs (Most Recent):  Temp: 98.3 °F (36.8 °C) (05/01/25 1103)  Pulse: 95 (05/01/25 1103)  Resp: 16 (05/01/25 1230)  BP: 118/61 (05/01/25 1103)  SpO2: 98 % (05/01/25 1103) Vital Signs (24h Range):  Temp:  [97.5 °F (36.4 °C)-98.3 °F (36.8 °C)] 98.3 °F (36.8 °C)  Pulse:  [] 95  Resp:  [16-28] 16  SpO2:  [96 %-100 %] 98 %  BP: (105-154)/(55-75) 118/61     Weight: 55.6 kg (122 lb 9.2 oz) (04/30/25 0921)  Body mass index is 19.78 kg/m².    No intake or output data in the 24 hours ending 05/01/25 1300    Lines/Drains/Airways       Peripheral  "Intravenous Line  Duration                  Peripheral IV - Single Lumen 04/29/25 1052 20 G Right Antecubital 2 days         Peripheral IV - Single Lumen 04/29/25 1510 20 G Left Antecubital 1 day                     Physical Exam  Constitutional:       General: He is not in acute distress.     Appearance: Normal appearance. He is not ill-appearing.   Eyes:      General: No scleral icterus.  Abdominal:      General: Abdomen is flat. Bowel sounds are normal. There is no distension.      Palpations: Abdomen is soft. There is no mass.      Tenderness: There is no abdominal tenderness. There is no guarding or rebound.      Hernia: No hernia is present.   Skin:     General: Skin is warm and dry.      Coloration: Skin is not jaundiced or pale.   Neurological:      Mental Status: He is alert and oriented to person, place, and time. Mental status is at baseline.          Significant Labs:  CBC:   Recent Labs   Lab 04/30/25  0423 05/01/25  0441 05/01/25  0848   WBC 11.80 15.22* 14.05*   HGB 13.1* 14.6 13.1*   HCT 39.5* 45.3 39.7*    374 293     CMP:   Recent Labs   Lab 05/01/25  0441   *   CALCIUM 9.7   ALBUMIN 3.1*   PROT 6.9      K 5.0   CO2 24   CL 97   BUN 10   CREATININE 0.7   ALKPHOS 221*   ALT 35   AST 28   BILITOT 0.5     Lipase: No results for input(s): "LIPASE" in the last 48 hours.  Liver Function Test:   Recent Labs   Lab 04/30/25  0423 05/01/25  0441   ALT 40 35   AST 30 28   ALKPHOS 217* 221*   BILITOT 0.4 0.5   PROT 6.7 6.9   ALBUMIN 3.0* 3.1*         Significant Imaging:  Imaging results within the past 24 hours have been reviewed.  "

## 2025-05-01 NOTE — PROGRESS NOTES
[facility]  Gastroenterology  Progress Note    Patient Name: Gael Pedroza  MRN: [id number]  Admission Date: 4/29/2025  Hospital Length of Stay: 1 days  Code Status: Full Code   Attending Provider: Leandro Benjamin MD  Consulting Provider: Adán Varela PA-C  Primary Care Physician: HARLAN Steele MD  Principal Problem: Pancreatic mass        Subjective:   HPI: Gael Pedroza is a 70 y.o. male with history of DM, HTN, ankylosing spondylitis, CAD who presents to ED for severe low back pain. Presented to ED due to intractable ongoing pain. Pending referral to heme-onc after recent large peripancreatic mass was seen on CT and MRI done for back pain on 4/24.. Has not yet undergone biopsy. Poor appetite over past few months, has lost 30 lbs over that time.      Repeat CT was done on admission which shows 9.4 cm mass in pancreatic body. Occludes splenic vein and SMV with nonocclusive thrombus extending into main portal vein. Started on heparin gtt overnight.     EUS 4/30- An irregular mass was identified in the pancreatic body and pancreatic tail. The mass measured 65 mm in maximal cross-sectional diameter. Fine needle biopsy was performed.      Interval History: Pancreatic mass biopsied. Path pending. Patient endorses ongoing postprandial abdominal/back pain. No pain currently.  Denies NV, fever, chills. LFTs with Tbili 0.5, , AST 28 and ALT 35. WBC 15.22 (increased from 11.8). H/H wnl. Afebrile     Review of Systems   Constitutional:  Negative for chills and fever.   Gastrointestinal:  Positive for abdominal pain. Negative for abdominal distention, anal bleeding, blood in stool, constipation, diarrhea, nausea, rectal pain and vomiting.   Musculoskeletal:  Positive for back pain.     Objective:     Vital Signs (Most Recent):  Temp: 98.3 °F (36.8 °C) (05/01/25 1103)  Pulse: 95 (05/01/25 1103)  Resp: 16 (05/01/25 1230)  BP: 118/61 (05/01/25 1103)  SpO2: 98 % (05/01/25 1103) Vital Signs (24h  "Range):  Temp:  [97.5 °F (36.4 °C)-98.3 °F (36.8 °C)] 98.3 °F (36.8 °C)  Pulse:  [] 95  Resp:  [16-28] 16  SpO2:  [96 %-100 %] 98 %  BP: (105-154)/(55-75) 118/61     Weight: 55.6 kg (122 lb 9.2 oz) (04/30/25 0921)  Body mass index is 19.78 kg/m².    No intake or output data in the 24 hours ending 05/01/25 1300    Lines/Drains/Airways       Peripheral Intravenous Line  Duration                  Peripheral IV - Single Lumen 04/29/25 1052 20 G Right Antecubital 2 days         Peripheral IV - Single Lumen 04/29/25 1510 20 G Left Antecubital 1 day                     Physical Exam  Constitutional:       General: He is not in acute distress.     Appearance: Normal appearance. He is not ill-appearing.   Eyes:      General: No scleral icterus.  Abdominal:      General: Abdomen is flat. Bowel sounds are normal. There is no distension.      Palpations: Abdomen is soft. There is no mass.      Tenderness: There is no abdominal tenderness. There is no guarding or rebound.      Hernia: No hernia is present.   Skin:     General: Skin is warm and dry.      Coloration: Skin is not jaundiced or pale.   Neurological:      Mental Status: He is alert and oriented to person, place, and time. Mental status is at baseline.          Significant Labs:  CBC:   Recent Labs   Lab 04/30/25 0423 05/01/25  0441 05/01/25  0848   WBC 11.80 15.22* 14.05*   HGB 13.1* 14.6 13.1*   HCT 39.5* 45.3 39.7*    374 293     CMP:   Recent Labs   Lab 05/01/25  0441   *   CALCIUM 9.7   ALBUMIN 3.1*   PROT 6.9      K 5.0   CO2 24   CL 97   BUN 10   CREATININE 0.7   ALKPHOS 221*   ALT 35   AST 28   BILITOT 0.5     Lipase: No results for input(s): "LIPASE" in the last 48 hours.  Liver Function Test:   Recent Labs   Lab 04/30/25  0423 05/01/25  0441   ALT 40 35   AST 30 28   ALKPHOS 217* 221*   BILITOT 0.4 0.5   PROT 6.7 6.9   ALBUMIN 3.0* 3.1*         Significant Imaging:  Imaging results within the past 24 hours have been " reviewed.  Assessment/Plan:     GI  * Pancreatic mass  EUS 4/30- An irregular mass was identified in the pancreatic body and pancreatic tail. The mass measured 65 mm in maximal cross-sectional diameter. Fine needle biopsy was performed.      -Pancreatic mass biopsied. Path pending.   -Patient endorses ongoing postprandial abdominal/back pain. No pain currently.  Denies NV, fever, chills.   -LFTs with Tbili 0.5, , AST 28 and ALT 35. WBC 15.22 (increased from 11.8). H/H wnl. Afebrile   -Recommend close follow up with oncology pending path results- Outpatient oncology appointment scheduled 5/8 with Dr. Granados  -Rest of patients care per primary team  -[facility] will sign off; please reach out with any further questions/concerns           Thank you for your consult. I will sign off. Please contact us if you have any additional questions.    Adán Varela PA-C  Gastroenterology  [facility]

## 2025-05-01 NOTE — ASSESSMENT & PLAN NOTE
Nutrition consulted. Most recent weight and BMI monitored-     Measurements:  Wt Readings from Last 1 Encounters:   04/30/25 55.6 kg (122 lb 9.2 oz)   Body mass index is 19.78 kg/m².    Patient has been screened and assessed by RD.    Malnutrition Type:  Context: chronic illness  Level: severe    Malnutrition Characteristic Summary:  Weight Loss (Malnutrition): greater than 20% in 1 year (25 lb weight loss x 1 month (17%-significant), 36 lb weight loss x 3 months (22.8%-significant), 43 lb weight loss x 12 months (26%-significant).)  Energy Intake (Malnutrition): less than or equal to 50% for greater than or equal to 1 month  Subcutaneous Fat (Malnutrition): moderate depletion  Muscle Mass (Malnutrition): moderate depletion    Interventions/Recommendations (treatment strategy):  --Diet advance to goal Consistent CHO with low K+ modifier if clinically indicated  --Recommend repeat A1C- Last level 6.9% 12/13/2024  --Recommend weekly weights  --Nursing: please continue to document % meal eaten on flowsheets  --Encourage good intakes and provide feeding assistance as needed

## 2025-05-01 NOTE — SUBJECTIVE & OBJECTIVE
"Interval History: Patient examined at bedside who is resting comfortably. VSS, no acute distress. No overnight events reported. Patient reports mild pain but otherwise feels "fine." Pathology from biopsy pending. Restarted heparin for now for poral thrombus, plan to transition to eliquis on DC.       NAEON. Pt is doing well today. NPO since midnight pending pancreatic mass biopsy per AES. Heparin stopped prior to procedure. Pt informed of portal thrombus seen on yesterdays imaging. Will have an oral transition to eliquis following his procedure with [facility] when appropriate.     Review of Systems   Constitutional:  Positive for activity change and appetite change. Negative for chills, diaphoresis, fatigue and fever.   HENT:  Negative for congestion, rhinorrhea and sore throat.    Respiratory:  Negative for cough, chest tightness, shortness of breath and wheezing.    Cardiovascular:  Negative for chest pain, palpitations and leg swelling.   Gastrointestinal:  Positive for diarrhea. Negative for abdominal distention, abdominal pain, blood in stool, constipation, nausea and vomiting.   Genitourinary:  Negative for difficulty urinating, dysuria, frequency, hematuria and urgency.   Musculoskeletal:  Positive for back pain. Negative for arthralgias and neck stiffness.   Neurological:  Negative for dizziness, tremors, seizures, syncope, weakness, light-headedness, numbness and headaches.   Psychiatric/Behavioral:  Negative for agitation and confusion.      Objective:     Vital Signs (Most Recent):  Temp: 98.3 °F (36.8 °C) (05/01/25 1103)  Pulse: 95 (05/01/25 1103)  Resp: 16 (05/01/25 1230)  BP: 118/61 (05/01/25 1103)  SpO2: 98 % (05/01/25 1103) Vital Signs (24h Range):  Temp:  [97.5 °F (36.4 °C)-98.3 °F (36.8 °C)] 98.3 °F (36.8 °C)  Pulse:  [] 95  Resp:  [16-28] 16  SpO2:  [96 %-100 %] 98 %  BP: (105-154)/(55-75) 118/61     Weight: 55.6 kg (122 lb 9.2 oz)  Body mass index is 19.78 kg/m².  No intake or output data in the " 24 hours ending 05/01/25 1259      Physical Exam  Vitals and nursing note reviewed.   Constitutional:       General: He is not in acute distress.     Appearance: He is well-developed. He is not diaphoretic.   HENT:      Head: Normocephalic and atraumatic.      Right Ear: External ear normal.      Left Ear: External ear normal.      Nose: Nose normal. No congestion.      Mouth/Throat:      Pharynx: Oropharynx is clear.   Eyes:      General: No scleral icterus.     Extraocular Movements: Extraocular movements intact.   Cardiovascular:      Rate and Rhythm: Normal rate and regular rhythm.      Pulses: Normal pulses.      Heart sounds: Normal heart sounds. No murmur heard.  Pulmonary:      Effort: Pulmonary effort is normal. No respiratory distress.      Breath sounds: Normal breath sounds. No wheezing or rales.   Abdominal:      General: Bowel sounds are normal. There is no distension.      Palpations: Abdomen is soft.      Tenderness: There is no abdominal tenderness. There is no guarding or rebound.   Musculoskeletal:         General: Tenderness (paraspinal lumbar ttp) present.      Cervical back: Normal range of motion.      Right lower leg: No edema.      Left lower leg: No edema.   Skin:     General: Skin is warm and dry.      Capillary Refill: Capillary refill takes less than 2 seconds.   Neurological:      General: No focal deficit present.      Mental Status: He is alert and oriented to person, place, and time. Mental status is at baseline.   Psychiatric:         Mood and Affect: Mood normal.         Behavior: Behavior normal.         Thought Content: Thought content normal.               Significant Labs: All pertinent labs within the past 24 hours have been reviewed.    Significant Imaging: I have reviewed all pertinent imaging results/findings within the past 24 hours.

## 2025-05-01 NOTE — PLAN OF CARE
Recommendations    --Diet advance to goal Consistent CHO with low K+ modifier if clinically indicated    --Recommend repeat A1C- Last level 6.9% 12/13/2024    --Recommend weekly weights    --Nursing: please continue to document % meal eaten on flowsheets    --Encourage good intakes and provide feeding assistance as needed    Goals:   1.  75% nutritional needs met with diet/EN/PN during admission    2. Maintain dry weight during admission    Nutrition Goal Status: new  Communication of RD Recs: other (comment) (POC)    Nutrition Discharge Planning    Nutrition Discharge Planning: Therapeutic diet (comments)  Therapeutic diet (comments): Consistent CHO    Assessment and Plan    Endocrine  Severe malnutrition  Malnutrition Type:  Context: chronic illness  Level: severe    Related to (etiology):   Change in GI function    Signs and Symptoms (as evidenced by):   Early satiety, decreased appetite, moderate muscle and fat wasting, less than 50% EEN x 2 months, significant weight loss x 12 months    Malnutrition Characteristic Summary:  Weight Loss (Malnutrition): greater than 20% in 1 year (25 lb weight loss x 1 month (17%-significant), 36 lb weight loss x 3 months (22.8%-significant), 43 lb weight loss x 12 months (26%-significant).)  Energy Intake (Malnutrition): less than or equal to 50% for greater than or equal to 1 month  Subcutaneous Fat (Malnutrition): moderate depletion  Muscle Mass (Malnutrition): moderate depletion      Interventions/Recommendations (treatment strategy):  Collaboration with care team    Nutrition Diagnosis Status:   New

## 2025-05-01 NOTE — ASSESSMENT & PLAN NOTE
Malnutrition Type:  Context: chronic illness  Level: severe    Related to (etiology):   Change in GI function    Signs and Symptoms (as evidenced by):   Early satiety, decreased appetite, moderate muscle and fat wasting, less than 50% EEN x 2 months, significant weight loss x 12 months    Malnutrition Characteristic Summary:  Weight Loss (Malnutrition): greater than 20% in 1 year (25 lb weight loss x 1 month (17%-significant), 36 lb weight loss x 3 months (22.8%-significant), 43 lb weight loss x 12 months (26%-significant).)  Energy Intake (Malnutrition): less than or equal to 50% for greater than or equal to 1 month  Subcutaneous Fat (Malnutrition): moderate depletion  Muscle Mass (Malnutrition): moderate depletion    Interventions/Recommendations (treatment strategy):  Collaboration with care team    Nutrition Diagnosis Status:   continues

## 2025-05-01 NOTE — ASSESSMENT & PLAN NOTE
- CT chest a/p c/f portal vein thrombus  - continue heparin drip per protocol  - oral AC at discharge

## 2025-05-01 NOTE — PLAN OF CARE
Problem: Adult Inpatient Plan of Care  Goal: Optimal Comfort and Wellbeing  Outcome: Progressing     Problem: Adult Inpatient Plan of Care  Goal: Absence of Hospital-Acquired Illness or Injury  Outcome: Progressing     Problem: Diabetes Comorbidity  Goal: Blood Glucose Level Within Targeted Range  Outcome: Progressing     Problem: Pain Acute  Goal: Optimal Pain Control and Function  Outcome: Progressing     Problem: Malnutrition  Goal: Improved Nutritional Intake  Outcome: Progressing

## 2025-05-01 NOTE — PROGRESS NOTES
[facility]  Adult Nutrition  Progress Note    SUMMARY       Recommendations    --Diet advance to goal Consistent CHO with low K+ modifier if clinically indicated    --Recommend repeat A1C- Last level 6.9% 12/13/2024    --Recommend weekly weights    --Nursing: please continue to document % meal eaten on flowsheets    --Encourage good intakes and provide feeding assistance as needed    Goals:   1.  75% nutritional needs met with diet/EN/PN during admission    2. Maintain dry weight during admission    Nutrition Goal Status: new  Communication of RD Recs: other (comment) (POC)    Nutrition Discharge Planning    Nutrition Discharge Planning: Therapeutic diet (comments)  Therapeutic diet (comments): Consistent CHO    Assessment and Plan    Endocrine  Severe malnutrition  Malnutrition Type:  Context: chronic illness  Level: severe    Related to (etiology):   Change in GI function    Signs and Symptoms (as evidenced by):   Early satiety, decreased appetite, moderate muscle and fat wasting, less than 50% EEN x 2 months, significant weight loss x 12 months    Malnutrition Characteristic Summary:  Weight Loss (Malnutrition): greater than 20% in 1 year (25 lb weight loss x 1 month (17%-significant), 36 lb weight loss x 3 months (22.8%-significant), 43 lb weight loss x 12 months (26%-significant).)  Energy Intake (Malnutrition): less than or equal to 50% for greater than or equal to 1 month  Subcutaneous Fat (Malnutrition): moderate depletion  Muscle Mass (Malnutrition): moderate depletion      Interventions/Recommendations (treatment strategy):  Collaboration with care team    Nutrition Diagnosis Status:   New         Malnutrition Assessment  Malnutrition Context: chronic illness  Malnutrition Level: severe          Weight Loss (Malnutrition): greater than 20% in 1 year (25 lb weight loss x 1 month (17%-significant), 36 lb weight loss x 3 months (22.8%-significant), 43 lb weight loss x 12 months  (26%-significant).)  Energy Intake (Malnutrition): less than or equal to 50% for greater than or equal to 1 month  Subcutaneous Fat (Malnutrition): moderate depletion  Muscle Mass (Malnutrition): moderate depletion   Orbital Region (Subcutaneous Fat Loss): moderate depletion  Upper Arm Region (Subcutaneous Fat Loss): moderate depletion  Thoracic and Lumbar Region: moderate depletion   [place] Region (Muscle Loss): moderate depletion  Clavicle Bone Region (Muscle Loss): moderate depletion  Clavicle and Acromion Bone Region (Muscle Loss): moderate depletion  Dorsal Hand (Muscle Loss): moderate depletion  Patellar Region (Muscle Loss): moderate depletion  Anterior Thigh Region (Muscle Loss): moderate depletion  Posterior Calf Region (Muscle Loss): moderate depletion                 Reason for Assessment    Reason For Assessment: identified at risk by screening criteria (MST 3)  Diagnosis: cancer diagnosis/related complications  General Information Comments: Patient assessed today for MST 3.  Patient admitted for pancreatic mass with pmhx: T2DM, CAD, pancreatic CA, HTN.  Patient currently NPO. Reports over the last couple of days he is losing weight.  Reports decreased appetite.  Reports 10/10 pain in his lower back.  Weight hx: 25 lb weight loss x 1 month (17%-significant), 36 lb weight loss x 3 months (22.8%-significant), 43 lb weight loss x 12 months (26%-significant).  No BM or po intake documented.  DTR met with pt and pts spouse at bedside. Upon assessment, pt reports poor appetite x 2 months. Pt endorses current feelings of hunger, but reports early satiety resulting in decreased PO intake. Pt reports appetite also influenced by radiating abdominal pain d/t pancreatic mass. Endorses no n/v, constipation or diarrhea. NFPE performed, see malnutrition assessment for details. No questions at this time.    Nutrition/Diet History    Spiritual, Cultural Beliefs, Baptism Practices, Values that Affect Care: no  Food  "Allergies: NKFA  Factors Affecting Nutritional Intake: pain, decreased appetite, early satiety  Nutrition Related Social Determinants of Health: SDOH: Adequate food in home environment    Anthropometrics    Height: 5' 6" (167.6 cm)  Height (inches): 66 in  Height Method: Stated  Weight: 55.6 kg (122 lb 9.2 oz)  Weight (lb): 122.58 lb  Weight Method: Standard Scale  Ideal Body Weight (IBW), Male: 142 lb  % Ideal Body Weight, Male (lb): 86.32 %  BMI (Calculated): 19.8  BMI Grade: less than 23 (older than 65 years) - underweight  Usual Body Weight (UBW), k.45 kg  % Usual Body Weight: 79.09  % Weight Change From Usual Weight: -21.08 %       Lab/Procedures/Meds    Pertinent Labs Reviewed: reviewed  Pertinent Labs Comments: Na 135 (L-trending up), K+ 6.2 (H), -216 x 24 hours, A1C 6.9% 2024  Pertinent Medications Reviewed: reviewed  Pertinent Medications Comments: Vit C, aspirin, MVI, Vit D, Nacl, heparin    Estimated/Assessed Needs    Weight Used For Calorie Calculations: 55.6 kg (122 lb 9.2 oz)  Energy Calorie Requirements (kcal): 3907-6794 kcal (25-30 kcal/kg)  Energy Need Method: Kcal/kg  Protein Requirements: 56-67 g (1.0-1.2 g/kg)  Weight Used For Protein Calculations: 55.6 kg (122 lb 9.2 oz)     Estimated Fluid Requirement Method: RDA Method  RDA Method (mL): 1390  CHO Requirement: 174 g      Nutrition Prescription Ordered    Current Diet Order: NPO    Evaluation of Received Nutrient/Fluid Intake    Energy Calories Required: not meeting needs  Protein Required: not meeting needs  Fluid Required: not meeting needs  Tolerance: other (see comments) (NPO)  % Intake of Estimated Energy Needs: 0 - 25 %  % Meal Intake: NPO    Nutrition Risk    Level of Risk/Frequency of Follow-up: high     Monitor and Evaluation    Monitor and Evaluation: Energy intake, Weight, Nutrition focused physical findings, Protein intake, Carbohydrate intake, Diet order, Food and nutrition knowledge, Food and beverage intake, " Electrolyte and renal panel, Gastrointestinal profile, Glucose/endocrine profile, Inflammatory profile, Lipid profile     Nutrition Follow-Up

## 2025-05-02 ENCOUNTER — TELEPHONE (OUTPATIENT)
Dept: HEMATOLOGY/ONCOLOGY | Facility: CLINIC | Age: 70
End: 2025-05-02
Payer: MEDICARE

## 2025-05-02 PROBLEM — Z51.5 ENCOUNTER FOR PALLIATIVE CARE: Status: ACTIVE | Noted: 2025-05-02

## 2025-05-02 LAB
ABSOLUTE EOSINOPHIL (OHS): 0.16 K/UL
ABSOLUTE MONOCYTE (OHS): 0.78 K/UL (ref 0.3–1)
ABSOLUTE NEUTROPHIL COUNT (OHS): 7.33 K/UL (ref 1.8–7.7)
ALBUMIN SERPL BCP-MCNC: 2.7 G/DL (ref 3.5–5.2)
ALP SERPL-CCNC: 208 UNIT/L (ref 40–150)
ALT SERPL W/O P-5'-P-CCNC: 34 UNIT/L (ref 10–44)
ANION GAP (OHS): 14 MMOL/L (ref 8–16)
APTT PPP: 21.8 SECONDS (ref 21–32)
APTT PPP: 44.7 SECONDS (ref 21–32)
APTT PPP: 49.4 SECONDS (ref 21–32)
AST SERPL-CCNC: 31 UNIT/L (ref 11–45)
BASOPHILS # BLD AUTO: 0.04 K/UL
BASOPHILS NFR BLD AUTO: 0.4 %
BILIRUB SERPL-MCNC: 0.4 MG/DL (ref 0.1–1)
BUN SERPL-MCNC: 15 MG/DL (ref 8–23)
CALCIUM SERPL-MCNC: 9.1 MG/DL (ref 8.7–10.5)
CHLORIDE SERPL-SCNC: 96 MMOL/L (ref 95–110)
CO2 SERPL-SCNC: 24 MMOL/L (ref 23–29)
CREAT SERPL-MCNC: 0.6 MG/DL (ref 0.5–1.4)
ERYTHROCYTE [DISTWIDTH] IN BLOOD BY AUTOMATED COUNT: 13.2 % (ref 11.5–14.5)
GFR SERPLBLD CREATININE-BSD FMLA CKD-EPI: >60 ML/MIN/1.73/M2
GLUCOSE SERPL-MCNC: 139 MG/DL (ref 70–110)
HCT VFR BLD AUTO: 35.6 % (ref 40–54)
HGB BLD-MCNC: 12.6 GM/DL (ref 14–18)
IMM GRANULOCYTES # BLD AUTO: 0.04 K/UL (ref 0–0.04)
IMM GRANULOCYTES NFR BLD AUTO: 0.4 % (ref 0–0.5)
LYMPHOCYTES # BLD AUTO: 1.13 K/UL (ref 1–4.8)
MAGNESIUM SERPL-MCNC: 1.6 MG/DL (ref 1.6–2.6)
MCH RBC QN AUTO: 32.8 PG (ref 27–31)
MCHC RBC AUTO-ENTMCNC: 35.4 G/DL (ref 32–36)
MCV RBC AUTO: 93 FL (ref 82–98)
NUCLEATED RBC (/100WBC) (OHS): 0 /100 WBC
PLATELET # BLD AUTO: 229 K/UL (ref 150–450)
PLATELET BLD QL SMEAR: NORMAL
PMV BLD AUTO: 10 FL (ref 9.2–12.9)
POCT GLUCOSE: 142 MG/DL (ref 70–110)
POCT GLUCOSE: 163 MG/DL (ref 70–110)
POCT GLUCOSE: 176 MG/DL (ref 70–110)
POTASSIUM SERPL-SCNC: 3.5 MMOL/L (ref 3.5–5.1)
PROT SERPL-MCNC: 6.3 GM/DL (ref 6–8.4)
RBC # BLD AUTO: 3.84 M/UL (ref 4.6–6.2)
RELATIVE EOSINOPHIL (OHS): 1.7 %
RELATIVE LYMPHOCYTE (OHS): 11.9 % (ref 18–48)
RELATIVE MONOCYTE (OHS): 8.2 % (ref 4–15)
RELATIVE NEUTROPHIL (OHS): 77.4 % (ref 38–73)
SODIUM SERPL-SCNC: 134 MMOL/L (ref 136–145)
WBC # BLD AUTO: 9.48 K/UL (ref 3.9–12.7)

## 2025-05-02 PROCEDURE — A4216 STERILE WATER/SALINE, 10 ML: HCPCS | Performed by: PHYSICIAN ASSISTANT

## 2025-05-02 PROCEDURE — 85730 THROMBOPLASTIN TIME PARTIAL: CPT | Performed by: INTERNAL MEDICINE

## 2025-05-02 PROCEDURE — 83735 ASSAY OF MAGNESIUM: CPT | Performed by: PHYSICIAN ASSISTANT

## 2025-05-02 PROCEDURE — 25000003 PHARM REV CODE 250: Performed by: NURSE PRACTITIONER

## 2025-05-02 PROCEDURE — 85025 COMPLETE CBC W/AUTO DIFF WBC: CPT | Performed by: PHYSICIAN ASSISTANT

## 2025-05-02 PROCEDURE — 63600175 PHARM REV CODE 636 W HCPCS: Performed by: NURSE PRACTITIONER

## 2025-05-02 PROCEDURE — 63600175 PHARM REV CODE 636 W HCPCS

## 2025-05-02 PROCEDURE — 21400001 HC TELEMETRY ROOM

## 2025-05-02 PROCEDURE — 25000003 PHARM REV CODE 250: Performed by: PHYSICIAN ASSISTANT

## 2025-05-02 PROCEDURE — 36415 COLL VENOUS BLD VENIPUNCTURE: CPT | Performed by: INTERNAL MEDICINE

## 2025-05-02 PROCEDURE — 25000003 PHARM REV CODE 250: Performed by: STUDENT IN AN ORGANIZED HEALTH CARE EDUCATION/TRAINING PROGRAM

## 2025-05-02 PROCEDURE — 80053 COMPREHEN METABOLIC PANEL: CPT | Performed by: PHYSICIAN ASSISTANT

## 2025-05-02 PROCEDURE — 36415 COLL VENOUS BLD VENIPUNCTURE: CPT | Performed by: PHYSICIAN ASSISTANT

## 2025-05-02 PROCEDURE — 25000003 PHARM REV CODE 250

## 2025-05-02 PROCEDURE — 99223 1ST HOSP IP/OBS HIGH 75: CPT | Mod: ,,, | Performed by: STUDENT IN AN ORGANIZED HEALTH CARE EDUCATION/TRAINING PROGRAM

## 2025-05-02 RX ORDER — LIDOCAINE 50 MG/G
1 PATCH TOPICAL DAILY
Qty: 14 PATCH | Refills: 0 | Status: SHIPPED | OUTPATIENT
Start: 2025-05-02 | End: 2025-05-03 | Stop reason: HOSPADM

## 2025-05-02 RX ORDER — MORPHINE SULFATE 30 MG/1
30 TABLET, FILM COATED, EXTENDED RELEASE ORAL EVERY 12 HOURS
Refills: 0 | Status: DISCONTINUED | OUTPATIENT
Start: 2025-05-02 | End: 2025-05-03 | Stop reason: HOSPADM

## 2025-05-02 RX ORDER — DICYCLOMINE HYDROCHLORIDE 10 MG/1
10 CAPSULE ORAL 3 TIMES DAILY
Qty: 90 CAPSULE | Refills: 0 | Status: SHIPPED | OUTPATIENT
Start: 2025-05-02 | End: 2025-05-03

## 2025-05-02 RX ORDER — METHOCARBAMOL 500 MG/1
500 TABLET, FILM COATED ORAL 4 TIMES DAILY
Qty: 40 TABLET | Refills: 0 | Status: SHIPPED | OUTPATIENT
Start: 2025-05-02 | End: 2025-05-13

## 2025-05-02 RX ORDER — OXYCODONE HYDROCHLORIDE 10 MG/1
10 TABLET ORAL EVERY 4 HOURS PRN
Qty: 60 TABLET | Refills: 0 | Status: SHIPPED | OUTPATIENT
Start: 2025-05-02 | End: 2025-05-03 | Stop reason: HOSPADM

## 2025-05-02 RX ORDER — MORPHINE SULFATE 15 MG/1
15 TABLET ORAL EVERY 4 HOURS PRN
Refills: 0 | Status: DISCONTINUED | OUTPATIENT
Start: 2025-05-02 | End: 2025-05-03 | Stop reason: HOSPADM

## 2025-05-02 RX ORDER — AMOXICILLIN 250 MG
1 CAPSULE ORAL DAILY PRN
Qty: 10 TABLET | Refills: 0 | Status: SHIPPED | OUTPATIENT
Start: 2025-05-02 | End: 2025-05-13

## 2025-05-02 RX ADMIN — Medication 6 MG: at 10:05

## 2025-05-02 RX ADMIN — MORPHINE SULFATE 30 MG: 30 TABLET, EXTENDED RELEASE ORAL at 12:05

## 2025-05-02 RX ADMIN — METOPROLOL TARTRATE 25 MG: 25 TABLET, FILM COATED ORAL at 08:05

## 2025-05-02 RX ADMIN — METHOCARBAMOL 500 MG: 500 TABLET ORAL at 08:05

## 2025-05-02 RX ADMIN — METOCLOPRAMIDE HYDROCHLORIDE 5 MG: 10 INJECTION, SOLUTION INTRAMUSCULAR; INTRAVENOUS at 12:05

## 2025-05-02 RX ADMIN — Medication 10 ML: at 12:05

## 2025-05-02 RX ADMIN — GABAPENTIN 300 MG: 300 CAPSULE ORAL at 08:05

## 2025-05-02 RX ADMIN — MORPHINE SULFATE 30 MG: 30 TABLET, EXTENDED RELEASE ORAL at 08:05

## 2025-05-02 RX ADMIN — DICYCLOMINE HYDROCHLORIDE 10 MG: 10 CAPSULE ORAL at 04:05

## 2025-05-02 RX ADMIN — Medication 1000 UNITS: at 08:05

## 2025-05-02 RX ADMIN — METOCLOPRAMIDE HYDROCHLORIDE 5 MG: 10 INJECTION, SOLUTION INTRAMUSCULAR; INTRAVENOUS at 05:05

## 2025-05-02 RX ADMIN — OXYCODONE HYDROCHLORIDE AND ACETAMINOPHEN 500 MG: 500 TABLET ORAL at 08:05

## 2025-05-02 RX ADMIN — MORPHINE SULFATE 15 MG: 15 TABLET ORAL at 10:05

## 2025-05-02 RX ADMIN — SENNOSIDES AND DOCUSATE SODIUM 1 TABLET: 50; 8.6 TABLET ORAL at 08:05

## 2025-05-02 RX ADMIN — OXYCODONE HYDROCHLORIDE 10 MG: 10 TABLET ORAL at 04:05

## 2025-05-02 RX ADMIN — PANTOPRAZOLE SODIUM 40 MG: 40 TABLET, DELAYED RELEASE ORAL at 08:05

## 2025-05-02 RX ADMIN — EZETIMIBE 10 MG: 10 TABLET ORAL at 08:05

## 2025-05-02 RX ADMIN — METOCLOPRAMIDE HYDROCHLORIDE 5 MG: 10 INJECTION, SOLUTION INTRAMUSCULAR; INTRAVENOUS at 04:05

## 2025-05-02 RX ADMIN — HYDROMORPHONE HYDROCHLORIDE 0.5 MG: 2 INJECTION INTRAMUSCULAR; INTRAVENOUS; SUBCUTANEOUS at 02:05

## 2025-05-02 RX ADMIN — Medication 10 ML: at 10:05

## 2025-05-02 RX ADMIN — THERA TABS 1 TABLET: TAB at 08:05

## 2025-05-02 RX ADMIN — Medication 10 ML: at 06:05

## 2025-05-02 RX ADMIN — DICYCLOMINE HYDROCHLORIDE 10 MG: 10 CAPSULE ORAL at 08:05

## 2025-05-02 RX ADMIN — TAMSULOSIN HYDROCHLORIDE 0.4 MG: 0.4 CAPSULE ORAL at 08:05

## 2025-05-02 RX ADMIN — MORPHINE SULFATE 15 MG: 15 TABLET ORAL at 05:05

## 2025-05-02 RX ADMIN — HEPARIN SODIUM AND DEXTROSE 18 UNITS/KG/HR: 10000; 5 INJECTION INTRAVENOUS at 07:05

## 2025-05-02 RX ADMIN — ASPIRIN 81 MG: 81 TABLET, COATED ORAL at 08:05

## 2025-05-02 RX ADMIN — OXYCODONE HYDROCHLORIDE 10 MG: 10 TABLET ORAL at 09:05

## 2025-05-02 NOTE — PLAN OF CARE
Recommendations     1.) Recommend continuing with Regular Diet as tolerated, gently encouraging PO intake.                  - RN staff: please continue to document PO intake in the flowsheets      2.) Recommend to discontinue Stalin BID- add Boost Plus TID to help meet needs.      3.) RD to monitor wt, PO intake, skin, labs.       Goals: 1.  75% nutritional needs met with diet/EN/PN during admission  2. Maintain dry weight during admission  Nutrition Goal Status: progressing towards goal  Communication of RD Recs: other (comment) (POC)

## 2025-05-02 NOTE — SUBJECTIVE & OBJECTIVE
Interval History: Patient examined at bedside who is resting comfortably. VSS, no acute distress. No overnight events reported. Patient still requiring IV pain meds for pain control. Need to optimize control before discharge. Adjusted pain regimen and consulted palliative. Plan to DC patient once pain controlled on oral regimen.     Review of Systems   Constitutional:  Positive for activity change and appetite change. Negative for chills, diaphoresis, fatigue and fever.   HENT:  Negative for congestion, rhinorrhea and sore throat.    Respiratory:  Negative for cough, chest tightness, shortness of breath and wheezing.    Cardiovascular:  Negative for chest pain, palpitations and leg swelling.   Gastrointestinal:  Positive for diarrhea. Negative for abdominal distention, abdominal pain, blood in stool, constipation, nausea and vomiting.   Genitourinary:  Negative for difficulty urinating, dysuria, frequency, hematuria and urgency.   Musculoskeletal:  Positive for back pain. Negative for arthralgias and neck stiffness.   Neurological:  Negative for dizziness, tremors, seizures, syncope, weakness, light-headedness, numbness and headaches.   Psychiatric/Behavioral:  Negative for agitation and confusion.      Objective:     Vital Signs (Most Recent):  Temp: 98.1 °F (36.7 °C) (05/02/25 0729)  Pulse: 100 (05/02/25 0809)  Resp: 18 (05/02/25 1228)  BP: 110/62 (05/02/25 0809)  SpO2: 97 % (05/02/25 0729) Vital Signs (24h Range):  Temp:  [97.9 °F (36.6 °C)-98.4 °F (36.9 °C)] 98.1 °F (36.7 °C)  Pulse:  [] 100  Resp:  [16-18] 18  SpO2:  [96 %-99 %] 97 %  BP: (107-144)/(55-82) 110/62     Weight: 55.6 kg (122 lb 9.2 oz)  Body mass index is 19.78 kg/m².  No intake or output data in the 24 hours ending 05/02/25 1317      Physical Exam  Vitals and nursing note reviewed.   Constitutional:       General: He is not in acute distress.     Appearance: He is well-developed. He is not diaphoretic.   HENT:      Head: Normocephalic and  atraumatic.      Right Ear: External ear normal.      Left Ear: External ear normal.      Nose: Nose normal. No congestion.      Mouth/Throat:      Pharynx: Oropharynx is clear.   Eyes:      General: No scleral icterus.     Extraocular Movements: Extraocular movements intact.   Cardiovascular:      Rate and Rhythm: Normal rate and regular rhythm.      Pulses: Normal pulses.      Heart sounds: Normal heart sounds. No murmur heard.  Pulmonary:      Effort: Pulmonary effort is normal. No respiratory distress.      Breath sounds: Normal breath sounds. No wheezing or rales.   Abdominal:      General: Bowel sounds are normal. There is no distension.      Palpations: Abdomen is soft.      Tenderness: There is no abdominal tenderness. There is no guarding or rebound.   Musculoskeletal:         General: Tenderness (paraspinal lumbar ttp) present.      Cervical back: Normal range of motion.      Right lower leg: No edema.      Left lower leg: No edema.   Skin:     General: Skin is warm and dry.      Capillary Refill: Capillary refill takes less than 2 seconds.   Neurological:      General: No focal deficit present.      Mental Status: He is alert and oriented to person, place, and time. Mental status is at baseline.   Psychiatric:         Mood and Affect: Mood normal.         Behavior: Behavior normal.         Thought Content: Thought content normal.               Significant Labs: All pertinent labs within the past 24 hours have been reviewed.    Significant Imaging: I have reviewed all pertinent imaging results/findings within the past 24 hours.

## 2025-05-02 NOTE — ASSESSMENT & PLAN NOTE
- AFVSS on RA  - WBC 14.16  - CT chest/abd/pelvis pending  - MM pain control, prn oxy, tylenol, robaxin, dilaudid  - IR consulted>>IR consult received for biopsy of pt's newly discovered pancreatic mass per CT TL spine on 4/25. CT c/a/p pending. Pt currently scheduled for outpatient EUS biopsy of the pancreatic mass with [facility] on 5/2/25. Will defer biopsy to AES.   - [facility] consulted   - Hold heparin. Needs four hour hold prior to procedure.  - EUS-guided biopsy 4/30   - heme/onc consulted   -CT CAP pending pending to r/o metastatic disease  -We will follow up on biopsy result  -If biopsy is positive for malignancy, recommend CT Abdomen pancreatic protocol.  - pathology pending  - Palliative consulted, appreciate recs   - Start MS Contin 30 mg PO BID  - Start morphine IR 15 mg PO q4h PRN pain in lieu of oxycodone IR 10 mg q4h PRN

## 2025-05-02 NOTE — HPI
Fabiano Pedroza is a 70-year-old man with a history of ankylosing spondylitis s/p back fusion in 2015, NIDDM2, recent unintentional weight loss (30 lbs in 1 month) and 9 cm pancreatic mass who was admitted for intractable pain and rapid weight loss. S/p EUS-guided biopsy (4/30/25) of pancreatic mass. Palliative and Supportive Care was consulted for malignant pain management recommendations.    Pain is located along his ribs in his back. Character is aching and onset started around Christmas 2024, only worsening in the last five months. It is worse with lying flat on his back and better with PRN oxycodone 10 mg. He has a known long history of pain since he was in his late teens to early 20's due to his history of ankylosing spondylitis. Thankfully he has been able to manage his pain without narcotics and after a successful back surgery in 2015, did not utilize aggressive medicines/narcotics to address his chronic pain. However around Christmas time, he noticed that his pain was getting worse, and thought that this might be related to his ankylosing spondylitis. He met with an orthopedic surgeon, who referred him to a pain medicine physician, who then referred him to a rheumatologist who wouldn't be able to see him until 3-4 months later. Thankfully they were able to see a different pain medicine physician who obtained upright images, only to discover the 9 cm pancreatic mass. His PCP was able to place an oncology referral, but they had not heard back after a few business days. As the pain became unbearable and there was no response from the referrals, Mr. Pedroza presented to the ER, which is how we ultimately met during this hospitalization.

## 2025-05-02 NOTE — PLAN OF CARE
Problem: Adult Inpatient Plan of Care  Goal: Plan of Care Review  Outcome: Progressing  Goal: Patient-Specific Goal (Individualized)  Outcome: Progressing  Goal: Absence of Hospital-Acquired Illness or Injury  Outcome: Progressing  Goal: Optimal Comfort and Wellbeing  Outcome: Progressing  Goal: Readiness for Transition of Care  Outcome: Progressing     Problem: Infection  Goal: Absence of Infection Signs and Symptoms  Outcome: Progressing     Problem: Diabetes Comorbidity  Goal: Blood Glucose Level Within Targeted Range  Outcome: Progressing     Problem: Wound  Goal: Optimal Coping  Outcome: Progressing  Goal: Optimal Functional Ability  Outcome: Progressing  Goal: Absence of Infection Signs and Symptoms  Outcome: Progressing  Goal: Improved Oral Intake  Outcome: Progressing  Goal: Optimal Pain Control and Function  Outcome: Progressing  Goal: Skin Health and Integrity  Outcome: Progressing  Goal: Optimal Wound Healing  Outcome: Progressing     Problem: Pain Acute  Goal: Optimal Pain Control and Function  Outcome: Progressing     Problem: Malnutrition  Goal: Improved Nutritional Intake  Outcome: Progressing

## 2025-05-02 NOTE — PROGRESS NOTES
[facility]  Adult Nutrition  Progress Note    SUMMARY       Recommendations    1.) Recommend continuing with Regular Diet as tolerated, gently encouraging PO intake.      - RN staff: please continue to document PO intake in the flowsheets     2.) Recommend to discontinue Stalin BID- add Boost Plus TID to help meet needs.     3.) RD to monitor wt, PO intake, skin, labs.      Goals: 1.  75% nutritional needs met with diet/EN/PN during admission  2. Maintain dry weight during admission  Nutrition Goal Status: progressing towards goal  Communication of RD Recs: other (comment) (POC)    Nutrition Discharge Planning    Nutrition Discharge Planning: Therapeutic diet (comments)  Therapeutic diet (comments): Consistent CHO    Assessment and Plan    Endocrine  Severe malnutrition  Malnutrition Type:  Context: chronic illness  Level: severe    Related to (etiology):   Change in GI function    Signs and Symptoms (as evidenced by):   Early satiety, decreased appetite, moderate muscle and fat wasting, less than 50% EEN x 2 months, significant weight loss x 12 months    Malnutrition Characteristic Summary:  Weight Loss (Malnutrition): greater than 20% in 1 year (25 lb weight loss x 1 month (17%-significant), 36 lb weight loss x 3 months (22.8%-significant), 43 lb weight loss x 12 months (26%-significant).)  Energy Intake (Malnutrition): less than or equal to 50% for greater than or equal to 1 month  Subcutaneous Fat (Malnutrition): moderate depletion  Muscle Mass (Malnutrition): moderate depletion    Interventions/Recommendations (treatment strategy):  Collaboration with care team    Nutrition Diagnosis Status:   continues         Malnutrition Assessment  Malnutrition Context: chronic illness  Malnutrition Level: severe          Weight Loss (Malnutrition): greater than 20% in 1 year (25 lb weight loss x 1 month (17%-significant), 36 lb weight loss x 3 months (22.8%-significant), 43 lb weight loss x 12 months  "(26%-significant).)  Energy Intake (Malnutrition): less than or equal to 50% for greater than or equal to 1 month  Subcutaneous Fat (Malnutrition): moderate depletion  Muscle Mass (Malnutrition): moderate depletion   Orbital Region (Subcutaneous Fat Loss): moderate depletion  Upper Arm Region (Subcutaneous Fat Loss): moderate depletion  Thoracic and Lumbar Region: moderate depletion   Hinduism Region (Muscle Loss): moderate depletion  Clavicle Bone Region (Muscle Loss): moderate depletion  Clavicle and Acromion Bone Region (Muscle Loss): moderate depletion  Dorsal Hand (Muscle Loss): moderate depletion  Patellar Region (Muscle Loss): moderate depletion  Anterior Thigh Region (Muscle Loss): moderate depletion  Posterior Calf Region (Muscle Loss): moderate depletion     Reason for Assessment    Reason For Assessment: RD follow-up  Diagnosis: cancer diagnosis/related complications (Pancreatic mass)    General Information Comments: RD f/u: no noted n/v/d/c. No PO intake documented. RD reached out to RN, via Secure Chat- awaiting response. No significant wt changes since admit. Palliative Care following. Malnutrition dx continues. RD team to monitor and f/u.     Nutrition Related Social Determinants of Health: SDOH: Unable to assess at this time.        Nutrition/Diet History    Spiritual, Cultural Beliefs, Shinto Practices, Values that Affect Care: no  Food Allergies: NKFA  Factors Affecting Nutritional Intake: pain, decreased appetite, early satiety    Anthropometrics    Height: 5' 6" (167.6 cm)  Height (inches): 66 in  Height Method: Stated  Weight: 55.6 kg (122 lb 9.2 oz)  Weight (lb): 122.58 lb  Weight Method: Standard Scale  Ideal Body Weight (IBW), Male: 142 lb  % Ideal Body Weight, Male (lb): 86.32 %  BMI (Calculated): 19.8  BMI Grade: less than 23 (older than 65 years) - underweight  Usual Body Weight (UBW), k.45 kg  % Usual Body Weight: 79.09  % Weight Change From Usual Weight: -21.08 %   "     Lab/Procedures/Meds    Pertinent Labs Reviewed: reviewed  Pertinent Labs Comments: Gluc: 139, ALP: 208  Pertinent Medications Reviewed: reviewed  Pertinent Medications Comments: VitC, dicyclomine, morphine, MVI, pantoprazole, senna-docusate, NaCl, VitD, heparin    Estimated/Assessed Needs    Weight Used For Calorie Calculations: 55.6 kg (122 lb 9.2 oz)  Energy Calorie Requirements (kcal): 7100-9634 kcal (25-30 kcal/kg)  Energy Need Method: Kcal/kg  Protein Requirements: 67- 84g (1.2-1.5g/kg)  Weight Used For Protein Calculations: 55.6 kg (122 lb 9.2 oz)  Fluid Requirements (mL): as per MD or RDA  Estimated Fluid Requirement Method: RDA Method  RDA Method (mL): 1390  CHO Requirement: 174 g    Nutrition Prescription Ordered    Current Diet Order: Regular Diet  Oral Nutrition Supplement: Stalin BID    Evaluation of Received Nutrient/Fluid Intake    I/O: incomplete  Energy Calories Required: not meeting needs  Protein Required: not meeting needs  Fluid Required: not meeting needs  Comments: LBM 5/1  Tolerance: tolerating  % Intake of Estimated Energy Needs: Other: No documented PO intake  % Meal Intake: Other: No documented PO intake    Nutrition Risk    Level of Risk/Frequency of Follow-up: high     Monitor and Evaluation    Monitor and Evaluation: Energy intake, Weight, Nutrition focused physical findings, Protein intake, Carbohydrate intake, Diet order, Food and nutrition knowledge, Food and beverage intake, Electrolyte and renal panel, Gastrointestinal profile, Glucose/endocrine profile, Inflammatory profile, Lipid profile     Nutrition Follow-Up    RD Follow-up?: Yes

## 2025-05-02 NOTE — ASSESSMENT & PLAN NOTE
Patient's FSGs are uncontrolled due to hyperglycemia on current medication regimen.  Last A1c reviewed-   Lab Results   Component Value Date    LABA1C 6.6 (H) 01/18/2017    HGBA1C 6.9 (H) 12/13/2024     Most recent fingerstick glucose reviewed-   Recent Labs   Lab 05/01/25  1504 05/01/25  2105 05/02/25  0731   POCTGLUCOSE 185* 136* 142*   , Continue to monitor glucose closely for hypo/hyperglycemia.  Current correctional scale  Low  Maintain anti-hyperglycemic dose as follows-   Antihyperglycemics (From admission, onward)      Start     Stop Route Frequency Ordered    04/29/25 1451  insulin aspart U-100 pen 0-5 Units         -- SubQ Before meals & nightly PRN 04/29/25 1352          Home antihyperglycemics reviewed, will hold oral hypoglycemics while patient is in the hospital.

## 2025-05-02 NOTE — ASSESSMENT & PLAN NOTE
Fabiano Pedroza is a 70-year-old man with a history of ankylosing spondylitis s/p back fusion in 2015, NIDDM2, recent unintentional weight loss (30 lbs in 1 month) and 9 cm pancreatic mass who was admitted for intractable pain and rapid weight loss. S/p EUS-guided biopsy (4/30/25) of pancreatic mass. Palliative and Supportive Care was consulted for malignant pain management recommendations.    Neoplasm Related Pain  Back Pain  - LA  reviewed, prescribed very short course of Norco 5, but after discovery of pancreatic mass, outpatient pain provider changed order to Percocet 5 (30 tabs, 5 day supply)  - Pain history does seem to be worrisome for neoplasm-related pain - worse with lying flat, new pain that was unlike prior MSK pain from known ankylosing spondylitis, but also with concomitant MSK characteristics - spasms, ache  - Long conversation about pain; goal is to decrease pain level to less than 5/10. Has realistic understanding that he will never have 0/10 pain, but desires to be more functional and less debilitated from pain (which at its greatest intensity, will force him to be in fetal position). Has appropriate worries about addiction. Discussed addiction vs tolerance. He is also appropriately worried about developing tolerance to pain medicine and desires to utilize multimodal pain control to decrease his need for opioids to control his severe pain  - Prior 24 hour MME: 120 MME (six doses of oxycodone 10 mg, three doses of hydromorphone 0.5 mg IV)  - Start MS Contin 30 mg PO BID  - Start morphine IR 15 mg PO q4h PRN pain in lieu of oxycodone IR 10 mg q4h PRN  - Will start with morphine as above as an effective strategy to control malignant pain while also minimizing financial burden  - Continue methocarbamol 500 mg PO QID  - Continue dicyclomine 10 mg PO TID  - Continue home gabapentin 300 mg PO qHS  - Initiated discussion of duloxetine. In anticipation of multiple changes and introductions of medicines,  "will hold off of duloxetine at this time. Shared to expect a conversation about starting an SNRI (or even TCA) in the future in clinic to control total pain    Opioid Induced Constipation  - Last bowel movement 5/1/2025  - Continue senna 8.6 mg PO qHS    Advance Care Planning   Goals of Care  - Code status: Full code, did not discuss  - Next of kin: spouse Shira Pedroza  - ACP documents: none, provided HCPOA paperwork. He will likely assign his HCPOA as his wife Shira, his daughter Tanesha (his "greatest advocate") and his son-in-law Boris  - Patient has decision making capacity  - Prognosis: pending biopsy results  - Goals: improvement in condition, life prolongation, minimize symptom burden  - Plans: awaiting biopsy results (done 4/30/25) and [facility] available [facility]-directed therapies. Will place referral to [facility] for malignant symptom management       "

## 2025-05-02 NOTE — PROGRESS NOTES
[facility] [address] [place] [address] Medicine  Progress Note    Patient Name: Gael Pedroza  MRN: [id number]  Patient Class: IP- Inpatient   Admission Date: 4/29/2025  Length of Stay: 2 days  Attending Physician: Leandro Benjamin MD  Primary Care Provider: HARLAN Steele MD        Subjective     Principal Problem:Pancreatic mass        HPI:  Mr. Gael Pedroza 70y M w/ h/o HTN, DM2, LBP, pancreatic mass, ankylosing spondylitis Carotid artery stenosis, DDD, HLD presents w/ worsening low back pain x1 week. Pt reports chronic LBP that has been acutely worsening over the last week. He denies any trauma. The pain begins in his lower back and radiates b/l and up to his rib cage. Its constant and nagging. Reports he was seen by a physician who has been managing his pain w/ percocet, but recently he's required the pain med more frequently and feels like he's chasing the pain. Reports pain has been contributed to his pancreatic mass which he believes is cancerous. It has been associated w/ decreased appetite and weight loss of up to 1 lb per day. Endorses a h/o abdominal pain associated w/ meals but he no longer experiences this. His BM are loose and in small quantity. He is compliant w/ his home medications and reports he has not yet started treatment for his pancreatic mass as he does not yet have a formal diagnosis of cancer. Denies any fevers, chills, chest pain, SOB, nausea or vomiting, dysuria, hematochezia or tarry stools.    In ED, Pt AFVSS on RA. WBC 14.16. . UA noninfectious. ECG w/ NSR, nonspecific ST abnormality, grossly nonischemic.Given IV dilaudid, antiemetics and 500 ml NS bolus. Heme/onc and AES consulted by ED.     Overview/Hospital Course:  Pt admitted for evaluation of pancreatic mass. Pt afebrile and HDS. Pain well controlled on MM pain management regimen. Heme/onc following. IR consult in ED who deferred biopsy of mass to AES. AES consulted and agreeable to biopsy. CT imaging showed 9.4 cm  mass involving the pancreatic body and tail highly concerning for adenocarcinoma.   Occludes the splenic and superior mesenteric veins, with nonocclusive thrombus extending into the main portal vein.  There are portosystemic collaterals in the upper abdomen. Enlarged periportal lymph node concerning for metastasis. Multiple liver metastases measuring up to 2.8 cm. Few solid pulmonary nodules measuring up to 0.7 cm concerning for metastases. Pt was informed and started on heparin for portal vein thrombus. AES performed pancreatic mass biopsy 4/30. Pathology pending. Discussed with heme/onc they will follow up with patient as OP appointment on 5/8 to discuss results of pathology and long term planning. Patient still needs to have pain management optimized. Adjusted pain regimen. Palliative consulted, appreciate recs.     Interval History: Patient examined at bedside who is resting comfortably. VSS, no acute distress. No overnight events reported. Patient still requiring IV pain meds for pain control. Need to optimize control before discharge. Adjusted pain regimen and consulted palliative. Plan to DC patient once pain controlled on oral regimen.     Review of Systems   Constitutional:  Positive for activity change and appetite change. Negative for chills, diaphoresis, fatigue and fever.   HENT:  Negative for congestion, rhinorrhea and sore throat.    Respiratory:  Negative for cough, chest tightness, shortness of breath and wheezing.    Cardiovascular:  Negative for chest pain, palpitations and leg swelling.   Gastrointestinal:  Positive for diarrhea. Negative for abdominal distention, abdominal pain, blood in stool, constipation, nausea and vomiting.   Genitourinary:  Negative for difficulty urinating, dysuria, frequency, hematuria and urgency.   Musculoskeletal:  Positive for back pain. Negative for arthralgias and neck stiffness.   Neurological:  Negative for dizziness, tremors, seizures, syncope, weakness,  light-headedness, numbness and headaches.   Psychiatric/Behavioral:  Negative for agitation and confusion.      Objective:     Vital Signs (Most Recent):  Temp: 98.1 °F (36.7 °C) (05/02/25 0729)  Pulse: 100 (05/02/25 0809)  Resp: 18 (05/02/25 1228)  BP: 110/62 (05/02/25 0809)  SpO2: 97 % (05/02/25 0729) Vital Signs (24h Range):  Temp:  [97.9 °F (36.6 °C)-98.4 °F (36.9 °C)] 98.1 °F (36.7 °C)  Pulse:  [] 100  Resp:  [16-18] 18  SpO2:  [96 %-99 %] 97 %  BP: (107-144)/(55-82) 110/62     Weight: 55.6 kg (122 lb 9.2 oz)  Body mass index is 19.78 kg/m².  No intake or output data in the 24 hours ending 05/02/25 1317      Physical Exam  Vitals and nursing note reviewed.   Constitutional:       General: He is not in acute distress.     Appearance: He is well-developed. He is not diaphoretic.   HENT:      Head: Normocephalic and atraumatic.      Right Ear: External ear normal.      Left Ear: External ear normal.      Nose: Nose normal. No congestion.      Mouth/Throat:      Pharynx: Oropharynx is clear.   Eyes:      General: No scleral icterus.     Extraocular Movements: Extraocular movements intact.   Cardiovascular:      Rate and Rhythm: Normal rate and regular rhythm.      Pulses: Normal pulses.      Heart sounds: Normal heart sounds. No murmur heard.  Pulmonary:      Effort: Pulmonary effort is normal. No respiratory distress.      Breath sounds: Normal breath sounds. No wheezing or rales.   Abdominal:      General: Bowel sounds are normal. There is no distension.      Palpations: Abdomen is soft.      Tenderness: There is no abdominal tenderness. There is no guarding or rebound.   Musculoskeletal:         General: Tenderness (paraspinal lumbar ttp) present.      Cervical back: Normal range of motion.      Right lower leg: No edema.      Left lower leg: No edema.   Skin:     General: Skin is warm and dry.      Capillary Refill: Capillary refill takes less than 2 seconds.   Neurological:      General: No focal deficit  present.      Mental Status: He is alert and oriented to person, place, and time. Mental status is at baseline.   Psychiatric:         Mood and Affect: Mood normal.         Behavior: Behavior normal.         Thought Content: Thought content normal.               Significant Labs: All pertinent labs within the past 24 hours have been reviewed.    Significant Imaging: I have reviewed all pertinent imaging results/findings within the past 24 hours.      Assessment & Plan  Pancreatic mass  Low back pain  - AFVSS on RA  - WBC 14.16  - CT chest/abd/pelvis pending  - MM pain control, prn oxy, tylenol, robaxin, dilaudid  - IR consulted>>IR consult received for biopsy of pt's newly discovered pancreatic mass per CT TL spine on 4/25. CT c/a/p pending. Pt currently scheduled for outpatient EUS biopsy of the pancreatic mass with AES on 5/2/25. Will defer biopsy to AES.   - AES consulted   - Hold heparin. Needs four hour hold prior to procedure.  - EUS-guided biopsy 4/30   - heme/onc consulted   -CT CAP pending pending to r/o metastatic disease  -We will follow up on biopsy result  -If biopsy is positive for malignancy, recommend CT Abdomen pancreatic protocol.  - pathology pending  - Palliative consulted, appreciate recs   - Start MS Contin 30 mg PO BID  - Start morphine IR 15 mg PO q4h PRN pain in lieu of oxycodone IR 10 mg q4h PRN    Portal vein thrombosis  - CT chest a/p c/f portal vein thrombus  - continue heparin drip per protocol  - oral AC at discharge  Type 2 diabetes mellitus with circulatory disorder, without long-term current use of insulin  Patient's FSGs are uncontrolled due to hyperglycemia on current medication regimen.  Last A1c reviewed-   Lab Results   Component Value Date    LABA1C 6.6 (H) 01/18/2017    HGBA1C 6.9 (H) 12/13/2024     Most recent fingerstick glucose reviewed-   Recent Labs   Lab 05/01/25  1504 05/01/25  2105 05/02/25  0731   POCTGLUCOSE 185* 136* 142*   , Continue to monitor glucose closely for  hypo/hyperglycemia.  Current correctional scale  Low  Maintain anti-hyperglycemic dose as follows-   Antihyperglycemics (From admission, onward)      Start     Stop Route Frequency Ordered    04/29/25 1451  insulin aspart U-100 pen 0-5 Units         -- SubQ Before meals & nightly PRN 04/29/25 1352          Home antihyperglycemics reviewed, will hold oral hypoglycemics while patient is in the hospital.  Carotid artery stenosis  - continue statin and asa  Benign prostatic hyperplasia with urinary frequency  - continue flomax  Severe malnutrition  Nutrition consulted. Most recent weight and BMI monitored-     Measurements:  Wt Readings from Last 1 Encounters:   04/30/25 55.6 kg (122 lb 9.2 oz)   Body mass index is 19.78 kg/m².    Patient has been screened and assessed by RD.    Malnutrition Type:  Context: chronic illness  Level: severe    Malnutrition Characteristic Summary:  Weight Loss (Malnutrition): greater than 20% in 1 year (25 lb weight loss x 1 month (17%-significant), 36 lb weight loss x 3 months (22.8%-significant), 43 lb weight loss x 12 months (26%-significant).)  Energy Intake (Malnutrition): less than or equal to 50% for greater than or equal to 1 month  Subcutaneous Fat (Malnutrition): moderate depletion  Muscle Mass (Malnutrition): moderate depletion    Interventions/Recommendations (treatment strategy):  --Diet advance to goal Consistent CHO with low K+ modifier if clinically indicated  --Recommend repeat A1C- Last level 6.9% 12/13/2024  --Recommend weekly weights  --Nursing: please continue to document % meal eaten on flowsheets  --Encourage good intakes and provide feeding assistance as needed    Encounter for palliative care      VTE Risk Mitigation (From admission, onward)           Ordered     heparin 25,000 units in dextrose 5% (100 units/ml) IV bolus from bag HIGH INTENSITY nomogram - [facility]  As needed (PRN)        Question:  Heparin Infusion Adjustment (DO NOT MODIFY ANSWER)  Answer:   [email or web address]\Images\Pharmacy\HeparinInfusions\heparin HIGH INTENSITY nomogram for [facility] [id number]    05/01/25 0822     heparin 25,000 units in dextrose 5% (100 units/ml) IV bolus from bag HIGH INTENSITY nomogram - [facility]  As needed (PRN)        Question:  Heparin Infusion Adjustment (DO NOT MODIFY ANSWER)  Answer:  [email or web address]\[email or web address]\Images\Pharmacy\HeparinInfusions\heparin HIGH INTENSITY nomogram for [facility] [id number]    05/01/25 0822     heparin 25,000 units in dextrose 5% 250 mL (100 units/mL) infusion HIGH INTENSITY nomogram - [facility]  Continuous        Question:  Begin at (units/kg/hr)  Answer:  18    05/01/25 0822     IP VTE HIGH RISK PATIENT  Once         04/29/25 1351     Place sequential compression device  Until discontinued         04/29/25 1351                    Discharge Planning   RAMIRO: 5/2/2025     Code Status: Full Code   Medical Readiness for Discharge Date: 5/2/2025  Discharge Plan A: Home                        Adán Sinclair PA-C  Department of Hospital Medicine   [facility]

## 2025-05-02 NOTE — PLAN OF CARE
Problem: Adult Inpatient Plan of Care  Goal: Plan of Care Review  5/2/2025 1700 by Yun Locke RN  Outcome: Progressing  5/2/2025 1658 by Yun Locke RN  Outcome: Progressing  Goal: Patient-Specific Goal (Individualized)  5/2/2025 1700 by Yun Locke RN  Outcome: Progressing  5/2/2025 1658 by Yun Locke RN  Outcome: Progressing  Goal: Absence of Hospital-Acquired Illness or Injury  5/2/2025 1700 by Yun Locke RN  Outcome: Progressing  5/2/2025 1658 by Yun Locke RN  Outcome: Progressing  Goal: Optimal Comfort and Wellbeing  5/2/2025 1700 by Yun Locke RN  Outcome: Progressing  5/2/2025 1658 by Yun Locke RN  Outcome: Progressing  Goal: Readiness for Transition of Care  5/2/2025 1700 by Yun Locke RN  Outcome: Progressing  5/2/2025 1658 by Yun Locke RN  Outcome: Progressing     Problem: Infection  Goal: Absence of Infection Signs and Symptoms  5/2/2025 1700 by Yun Locke RN  Outcome: Progressing  5/2/2025 1658 by Yun Locke RN  Outcome: Progressing     Problem: Diabetes Comorbidity  Goal: Blood Glucose Level Within Targeted Range  5/2/2025 1700 by Yun Locke RN  Outcome: Progressing  5/2/2025 1658 by Yun Locke RN  Outcome: Progressing

## 2025-05-02 NOTE — PLAN OF CARE
SW assigned to pt today.   CM dept will continue following along w/ pt treatment team for recommendation/needs.Plan remain the same home w/ NN.     Hospital Med Note:  Interval History: Patient examined at bedside who is resting comfortably. VSS, no acute distress. No overnight events reported. Patient still requiring IV pain meds for pain control. Need to optimize control before discharge. Adjusted pain regimen and consulted palliative. Plan to DC patient once pain controlled on oral regimen.     Discharge Plan A and Plan B have been determined by review of patient's clinical status, future medical and therapeutic needs, and coverage/benefits for post-acute care in coordination with multidisciplinary team members.    ANNIE Duran   [facility]    Case Management Dept  [phone]

## 2025-05-02 NOTE — NURSING
Pt requested to have tele removed.  States needs a break, irritating his skin.  Tele monitoring off for now.  Provider on call notified.

## 2025-05-02 NOTE — SUBJECTIVE & OBJECTIVE
Interval History: Please see HPI for pain history    Past Medical History:   Diagnosis Date    Carotid artery stenosis 11/15/2012    DDD (degenerative disc disease) 09/26/2012    Diabetes mellitus     DJD (degenerative joint disease) 09/26/2012    High cholesterol 09/26/2012    HTN (hypertension) 09/24/2014    2013    Increased glucose level 09/26/2012    LBP (low back pain) 09/26/2012    Neck pain 09/24/2014    MATEUSZ 2014    Normal stress echocardiogram 09/26/2012       Past Surgical History:   Procedure Laterality Date    ENDOSCOPIC ULTRASOUND OF UPPER GASTROINTESTINAL TRACT N/A 4/30/2025    Procedure: ULTRASOUND, UPPER GI TRACT, ENDOSCOPIC;  Surgeon: Fish Curtis MD;  Location: [address]);  Service: Endoscopy;  Laterality: N/A;    INTERPOSITION ARTHROPLASTY OF CARPOMETACARPAL JOINTS Right 8/11/2023    Procedure: INTERPOSITION ARTHROPLASTY, CMC JOINT;  Surgeon: Williams, Claude S. IV, MD;  Location: [facility];  Service: Orthopedics;  Laterality: Right;    L-spine surgery  12/15    Dr. Correia       Review of patient's allergies indicates:   Allergen Reactions    Bactrim [sulfamethoxazole-trimethoprim] Itching, Rash and Blisters    Lipitor [atorvastatin]      Achy; pt reports no problem with Crestor    Levaquin [levofloxacin] Other (See Comments)     Muscle aches under shoulder blades       Medications:  Continuous Infusions:   heparin (porcine) in D5W  0-40 Units/kg/hr Intravenous Continuous 10 mL/hr at 05/02/25 0714 18 Units/kg/hr at 05/02/25 0714     Scheduled Meds:   aluminum & magnesium hydroxide-simethicone  30 mL Oral Q4H    ascorbic acid (vitamin C)  500 mg Oral Daily    aspirin  81 mg Oral Daily    dicyclomine  10 mg Oral TID    ezetimibe  10 mg Oral Daily    gabapentin  300 mg Oral QHS    methocarbamoL  500 mg Oral QID    metoclopramide  5 mg Intravenous Q6H    metoprolol tartrate  25 mg Oral BID    morphine  30 mg Oral Q12H    multivitamin  1 tablet Oral Daily    pantoprazole  40 mg Oral Daily     senna-docusate  1 tablet Oral Daily    sodium chloride 0.9%  10 mL Intravenous Q8H    tamsulosin  1 capsule Oral Daily    vitamin D  1,000 Units Oral Daily     PRN Meds:  Current Facility-Administered Medications:     acetaminophen, 650 mg, Oral, Q4H PRN    albuterol-ipratropium, 3 mL, Nebulization, Q6H PRN    aluminum-magnesium hydroxide-simethicone, 30 mL, Oral, QID PRN    dextrose 50%, 12.5 g, Intravenous, PRN    dextrose 50%, 25 g, Intravenous, PRN    glucagon (human recombinant), 1 mg, Intramuscular, PRN    glucagon (human recombinant), 1 mg, Intramuscular, PRN    glucose, 16 g, Oral, PRN    glucose, 24 g, Oral, PRN    heparin (PORCINE), 60 Units/kg, Intravenous, PRN    heparin (PORCINE), 30 Units/kg, Intravenous, PRN    HYDROmorphone, 0.5 mg, Intravenous, Q6H PRN    insulin aspart U-100, 0-5 Units, Subcutaneous, QID (AC + HS) PRN    melatonin, 6 mg, Oral, Nightly PRN    morphine, 15 mg, Oral, Q4H PRN    naloxone, 0.02 mg, Intravenous, PRN    ondansetron, 8 mg, Oral, Q8H PRN    ondansetron, 4 mg, Intravenous, Daily PRN    polyethylene glycol, 17 g, Oral, Daily PRN    prochlorperazine, 5 mg, Intravenous, Q6H PRN    simethicone, 1 tablet, Oral, QID PRN    Family History       Problem Relation (Age of Onset)    Diabetes Mother    Heart disease Father (62)    Hypertension Mother          Tobacco Use    Smoking status: Former     Current packs/day: 0.00     Types: Cigarettes     Quit date: 2010     Years since quittin.8    Smokeless tobacco: Never   Substance and Sexual Activity    Alcohol use: Yes     Alcohol/week: 8.0 standard drinks of alcohol     Types: 5 Cans of beer, 3 Standard drinks or equivalent per week    Drug use: No    Sexual activity: Yes     Partners: Female       Review of Systems   Constitutional:  Positive for activity change and unexpected weight change.   Musculoskeletal:  Positive for back pain.   Psychiatric/Behavioral:  The patient is nervous/anxious.      Objective:     Vital Signs  (Most Recent):  Temp: 98.1 °F (36.7 °C) (05/02/25 0729)  Pulse: 100 (05/02/25 0809)  Resp: 18 (05/02/25 1228)  BP: 110/62 (05/02/25 0809)  SpO2: 97 % (05/02/25 0729) Vital Signs (24h Range):  Temp:  [97.9 °F (36.6 °C)-98.4 °F (36.9 °C)] 98.1 °F (36.7 °C)  Pulse:  [] 100  Resp:  [16-18] 18  SpO2:  [96 %-99 %] 97 %  BP: (107-144)/(55-82) 110/62     Weight: 55.6 kg (122 lb 9.2 oz)  Body mass index is 19.78 kg/m².       Physical Exam  Constitutional:       General: He is not in acute distress.  HENT:      Head: Normocephalic and atraumatic.      Right Ear: External ear normal.      Left Ear: External ear normal.      Nose: Nose normal.      Mouth/Throat:      Mouth: Mucous membranes are dry.   Eyes:      Extraocular Movements: Extraocular movements intact.      Conjunctiva/sclera: Conjunctivae normal.   Cardiovascular:      Rate and Rhythm: Normal rate.   Pulmonary:      Effort: Pulmonary effort is normal.      Breath sounds: Normal breath sounds.   Abdominal:      General: Bowel sounds are normal.      Palpations: Abdomen is soft.   Musculoskeletal:         General: No swelling.   Skin:     General: Skin is warm and dry.   Neurological:      Mental Status: He is alert and oriented to person, place, and time. Mental status is at baseline.   Psychiatric:         Mood and Affect: Mood normal.         Behavior: Behavior normal.            Review of Symptoms      Symptom Assessment (ESAS 0-10 Scale)  Pain:  10  Dyspnea:  0  Anxiety:  0  Nausea:  0  Depression:  0  Anorexia:  0  Fatigue:  0  Insomnia:  0  Restlessness:  0  Agitation:  0       Anxiety:  Is nervous/anxious    Pain Assessment:    Location(s): back    Back       Location: bilateral and upper        Quality: Aching        Quantity: 10/10 in intensity        Chronicity: Onset 5 month(s) ago, gradually worsening        Aggravating Factors: Recumbency and pressure        Alleviating Factors: Opiates       Associated Symptoms: None    Living Arrangements:   Lives with spouse    Psychosocial/Cultural:   See Palliative Psychosocial Note: No  , has one daughter Tanesha. 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  **Primary  to Follow**  Palliative Care  Consult: No        Advance Care Planning   Advance Directives:   Living Will: No    LaPOST: No    Do Not Resuscitate Status: No    Medical Power of : No      Decision Making:  Patient answered questions  Goals of Care: The patient endorses that what is most important right now is to focus on curative/life-prolongation (regardless of treatment burdens)    Accordingly, we have decided that the best plan to meet the patient's goals includes continuing with treatment         Significant Labs: CBC:   Recent Labs   Lab 05/01/25 0441 05/01/25 0848 05/02/25 0318   WBC 15.22* 14.05* 9.48   HGB 14.6 13.1* 12.6*   HCT 45.3 39.7* 35.6*    293 229     CMP:   Recent Labs   Lab 05/01/25 0441 05/02/25 0318    134*   K 5.0 3.5   CL 97 96   CO2 24 24   * 139*   BUN 10 15   CREATININE 0.7 0.6   CALCIUM 9.7 9.1   PROT 6.9 6.3   ALBUMIN 3.1* 2.7*   BILITOT 0.5 0.4   ALKPHOS 221* 208*   AST 28 31   ALT 35 34   ANIONGAP 15 14     CBC:   Recent Labs   Lab 05/02/25 0318   WBC 9.48   HGB 12.6*   HCT 35.6*   MCV 93        BMP:  Recent Labs   Lab 05/02/25 0318   *   *   K 3.5   CL 96   CO2 24   BUN 15   CREATININE 0.6   CALCIUM 9.1   MG 1.6     LFT:  Lab Results   Component Value Date    AST 31 05/02/2025    ALKPHOS 208 (H) 05/02/2025    BILITOT 0.4 05/02/2025     Albumin:   Albumin   Date Value Ref Range Status   05/02/2025 2.7 (L) 3.5 - 5.2 g/dL Final   12/13/2024 3.9 3.5 - 5.2 g/dL Final     Protein:   Protein Total   Date Value Ref Range Status   05/02/2025 6.3 6.0 - 8.4 gm/dL Final     Total Protein  "  Date Value Ref Range Status   12/13/2024 7.3 6.0 - 8.4 g/dL Final     Lactic acid:   No results found for: "LACTATE"    Significant Imaging: I have reviewed all pertinent imaging results/findings within the past 24 hours.    "

## 2025-05-02 NOTE — TELEPHONE ENCOUNTER
Advised to keep the OP appt with Dr Granados on 5/8 with labs prior.  Also, discuss pain control with his hospital MD to get him adequate coverage prior to discharge until he meets with Dr Granados on 5/8.

## 2025-05-02 NOTE — CONSULTS
[facility]  Palliative Medicine  Consult Note    Patient Name: Gael Pedroza  MRN: [id number]  Admission Date: 4/29/2025  Hospital Length of Stay: 2 days  Code Status: Full Code   Attending Provider: Leandro Benjamin MD  Consulting Provider: Elida Corral MD  Primary Care Physician: HARLAN Steele MD  Principal Problem:Pancreatic mass    Patient information was obtained from patient, spouse/SO, relative(s), and primary team.      Inpatient consult to Palliative Care  Consult performed by: Elida Corral MD  Consult ordered by: Adán Sinclair PA-C        Assessment/Plan:     Palliative Care  Encounter for palliative care  Fabiano Pedroza is a 70-year-old man with a history of ankylosing spondylitis s/p back fusion in 2015, NIDDM2, recent unintentional weight loss (30 lbs in 1 month) and 9 cm pancreatic mass who was admitted for intractable pain and rapid weight loss. S/p EUS-guided biopsy (4/30/25) of pancreatic mass. Palliative and Supportive Care was consulted for malignant pain management recommendations.    Neoplasm Related Pain  Back Pain  - [place]  reviewed, prescribed very short course of Norco 5, but after discovery of pancreatic mass, outpatient pain provider changed order to Percocet 5 (30 tabs, 5 day supply)  - Pain history does seem to be worrisome for neoplasm-related pain - worse with lying flat, new pain that was unlike prior MSK pain from known ankylosing spondylitis, but also with concomitant MSK characteristics - spasms, ache  - Long conversation about pain; goal is to decrease pain level to less than 5/10. Has realistic understanding that he will never have 0/10 pain, but desires to be more functional and less debilitated from pain (which at its greatest intensity, will force him to be in fetal position). Has appropriate worries about addiction. Discussed addiction vs tolerance. He is also appropriately worried about developing tolerance to pain medicine and desires to utilize  "multimodal pain control to decrease his need for opioids to control his severe pain  - Prior 24 hour MME: 120 MME (six doses of oxycodone 10 mg, three doses of hydromorphone 0.5 mg IV)  - Start MS Contin 30 mg PO BID  - Start morphine IR 15 mg PO q4h PRN pain in lieu of oxycodone IR 10 mg q4h PRN  - Will start with morphine as above as an effective strategy to control malignant pain while also minimizing financial burden  - Continue methocarbamol 500 mg PO QID  - Continue dicyclomine 10 mg PO TID  - Continue home gabapentin 300 mg PO qHS  - Initiated discussion of duloxetine. In anticipation of multiple changes and introductions of medicines, will hold off of duloxetine at this time. Shared to expect a conversation about starting an SNRI (or even TCA) in the future in clinic to control total pain    Opioid Induced Constipation  - Last bowel movement 5/1/2025  - Continue senna 8.6 mg PO qHS    Advance Care Planning  Goals of Care  - Code status: Full code, did not discuss  - Next of kin: spouse Shira Pedroza  - ACP documents: none, provided HCPOA paperwork. He will likely assign his HCPOA as his wife Shira, his daughter Tanesha (his "greatest advocate") and his son-in-law Boris  - Patient has decision making capacity  - Prognosis: pending biopsy results  - Goals: improvement in condition, life prolongation, minimize symptom burden  - Plans: awaiting biopsy results (done 4/30/25) and will establish care with Dr. Granados in [facility] to explore available cancer-directed therapies. Will place referral to [facility] palliative care clinic for malignant symptom management             Thank you for your consult. Will not see over the weekend. If still here for whatever reason after the weekend, will follow up. Will place outpatient referral to palliative medicine clinic in [facility] campus.    Subjective:     HPI:   Fabiano Pedroza is a 70-year-old man with a history of ankylosing spondylitis s/p back fusion in " 2015, NIDDM2, recent unintentional weight loss (30 lbs in 1 month) and 9 cm pancreatic mass who was admitted for intractable pain and rapid weight loss. S/p EUS-guided biopsy (4/30/25) of pancreatic mass. Palliative and Supportive Care was consulted for malignant pain management recommendations.    Pain is located along his ribs in his back. Character is aching and onset started around Christmas 2024, only worsening in the last five months. It is worse with lying flat on his back and better with PRN oxycodone 10 mg. He has a known long history of pain since he was in his late teens to early 20's due to his history of ankylosing spondylitis. Thankfully he has been able to manage his pain without narcotics and after a successful back surgery in 2015, did not utilize aggressive medicines/narcotics to address his chronic pain. However around Tian time, he noticed that his pain was getting worse, and thought that this might be related to his ankylosing spondylitis. He met with an orthopedic surgeon, who referred him to a pain medicine physician, who then referred him to a rheumatologist who wouldn't be able to see him until 3-4 months later. Thankfully they were able to see a different pain medicine physician who obtained upright images, only to discover the 9 cm pancreatic mass. His PCP was able to place an oncology referral, but they had not heard back after a few business days. As the pain became unbearable and there was no response from the referrals, Mr. Pedroza presented to the ER, which is how we ultimately met during this hospitalization.    Hospital Course:  No notes on file    Interval History: Please see HPI for pain history    Past Medical History:   Diagnosis Date    Carotid artery stenosis 11/15/2012    DDD (degenerative disc disease) 09/26/2012    Diabetes mellitus     DJD (degenerative joint disease) 09/26/2012    High cholesterol 09/26/2012    HTN (hypertension) 09/24/2014 2013    Increased  glucose level 09/26/2012    LBP (low back pain) 09/26/2012    Neck pain 09/24/2014    MATEUSZ 2014    Normal stress echocardiogram 09/26/2012       Past Surgical History:   Procedure Laterality Date    ENDOSCOPIC ULTRASOUND OF UPPER GASTROINTESTINAL TRACT N/A 4/30/2025    Procedure: ULTRASOUND, UPPER GI TRACT, ENDOSCOPIC;  Surgeon: Fish Curtis MD;  [facility] ([address] [place] [address]);  Service: Endoscopy;  Laterality: N/A;    INTERPOSITION ARTHROPLASTY OF CARPOMETACARPAL JOINTS Right 8/11/2023    Procedure: INTERPOSITION ARTHROPLASTY, CMC JOINT;  Surgeon: Williams, Claude S. IV, MD;  Location: [facility];  Service: Orthopedics;  Laterality: Right;    L-spine surgery  12/15    Dr. Correia       Review of patient's allergies indicates:   Allergen Reactions    Bactrim [sulfamethoxazole-trimethoprim] Itching, Rash and Blisters    Lipitor [atorvastatin]      Achy; pt reports no problem with Crestor    Levaquin [levofloxacin] Other (See Comments)     Muscle aches under shoulder blades       Medications:  Continuous Infusions:   heparin (porcine) in D5W  0-40 Units/kg/hr Intravenous Continuous 10 mL/hr at 05/02/25 0714 18 Units/kg/hr at 05/02/25 0714     Scheduled Meds:   aluminum & magnesium hydroxide-simethicone  30 mL Oral Q4H    ascorbic acid (vitamin C)  500 mg Oral Daily    aspirin  81 mg Oral Daily    dicyclomine  10 mg Oral TID    ezetimibe  10 mg Oral Daily    gabapentin  300 mg Oral QHS    methocarbamoL  500 mg Oral QID    metoclopramide  5 mg Intravenous Q6H    metoprolol tartrate  25 mg Oral BID    morphine  30 mg Oral Q12H    multivitamin  1 tablet Oral Daily    pantoprazole  40 mg Oral Daily    senna-docusate  1 tablet Oral Daily    sodium chloride 0.9%  10 mL Intravenous Q8H    tamsulosin  1 capsule Oral Daily    vitamin D  1,000 Units Oral Daily     PRN Meds:  Current Facility-Administered Medications:     acetaminophen, 650 mg, Oral, Q4H PRN    albuterol-ipratropium, 3 mL, Nebulization, Q6H PRN    aluminum-magnesium  hydroxide-simethicone, 30 mL, Oral, QID PRN    dextrose 50%, 12.5 g, Intravenous, PRN    dextrose 50%, 25 g, Intravenous, PRN    glucagon (human recombinant), 1 mg, Intramuscular, PRN    glucagon (human recombinant), 1 mg, Intramuscular, PRN    glucose, 16 g, Oral, PRN    glucose, 24 g, Oral, PRN    heparin (PORCINE), 60 Units/kg, Intravenous, PRN    heparin (PORCINE), 30 Units/kg, Intravenous, PRN    HYDROmorphone, 0.5 mg, Intravenous, Q6H PRN    insulin aspart U-100, 0-5 Units, Subcutaneous, QID (AC + HS) PRN    melatonin, 6 mg, Oral, Nightly PRN    morphine, 15 mg, Oral, Q4H PRN    naloxone, 0.02 mg, Intravenous, PRN    ondansetron, 8 mg, Oral, Q8H PRN    ondansetron, 4 mg, Intravenous, Daily PRN    polyethylene glycol, 17 g, Oral, Daily PRN    prochlorperazine, 5 mg, Intravenous, Q6H PRN    simethicone, 1 tablet, Oral, QID PRN    Family History       Problem Relation (Age of Onset)    Diabetes Mother    Heart disease Father (62)    Hypertension Mother          Tobacco Use    Smoking status: Former     Current packs/day: 0.00     Types: Cigarettes     Quit date: 2010     Years since quittin.8    Smokeless tobacco: Never   Substance and Sexual Activity    Alcohol use: Yes     Alcohol/week: 8.0 standard drinks of alcohol     Types: 5 Cans of beer, 3 Standard drinks or equivalent per week    Drug use: No    Sexual activity: Yes     Partners: Female       Review of Systems   Constitutional:  Positive for activity change and unexpected weight change.   Musculoskeletal:  Positive for back pain.   Psychiatric/Behavioral:  The patient is nervous/anxious.      Objective:     Vital Signs (Most Recent):  Temp: 98.1 °F (36.7 °C) (25 0729)  Pulse: 100 (25 0809)  Resp: 18 (25 1228)  BP: 110/62 (25 0809)  SpO2: 97 % (25 0729) Vital Signs (24h Range):  Temp:  [97.9 °F (36.6 °C)-98.4 °F (36.9 °C)] 98.1 °F (36.7 °C)  Pulse:  [] 100  Resp:  [16-18] 18  SpO2:  [96 %-99 %] 97 %  BP:  (107-144)/(55-82) 110/62     Weight: 55.6 kg (122 lb 9.2 oz)  Body mass index is 19.78 kg/m².       Physical Exam  Constitutional:       General: He is not in acute distress.  HENT:      Head: Normocephalic and atraumatic.      Right Ear: External ear normal.      Left Ear: External ear normal.      Nose: Nose normal.      Mouth/Throat:      Mouth: Mucous membranes are dry.   Eyes:      Extraocular Movements: Extraocular movements intact.      Conjunctiva/sclera: Conjunctivae normal.   Cardiovascular:      Rate and Rhythm: Normal rate.   Pulmonary:      Effort: Pulmonary effort is normal.      Breath sounds: Normal breath sounds.   Abdominal:      General: Bowel sounds are normal.      Palpations: Abdomen is soft.   Musculoskeletal:         General: No swelling.   Skin:     General: Skin is warm and dry.   Neurological:      Mental Status: He is alert and oriented to person, place, and time. Mental status is at baseline.   Psychiatric:         Mood and Affect: Mood normal.         Behavior: Behavior normal.            Review of Symptoms      Symptom Assessment (ESAS 0-10 Scale)  Pain:  10  Dyspnea:  0  Anxiety:  0  Nausea:  0  Depression:  0  Anorexia:  0  Fatigue:  0  Insomnia:  0  Restlessness:  0  Agitation:  0       Anxiety:  Is nervous/anxious    Pain Assessment:    Location(s): back    Back       Location: bilateral and upper        Quality: Aching        Quantity: 10/10 in intensity        Chronicity: Onset 5 month(s) ago, gradually worsening        Aggravating Factors: Recumbency and pressure        Alleviating Factors: Opiates       Associated Symptoms: None    Living Arrangements:  Lives with spouse    Psychosocial/Cultural:   See Palliative Psychosocial Note: No  , has one daughter Tanesha. 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  **Primary  to Follow**  Palliative Care  Consult: No        Advance Care Planning  Advance Directives:   Living Will: No    LaPOST: No    Do Not Resuscitate Status: No    Medical Power of : No      Decision Making:  Patient answered questions  Goals of Care: The patient endorses that what is most important right now is to focus on curative/life-prolongation (regardless of treatment burdens)    Accordingly, we have decided that the best plan to meet the patient's goals includes continuing with treatment         Significant Labs: CBC:   Recent Labs   Lab 05/01/25 0441 05/01/25  0848 05/02/25 0318   WBC 15.22* 14.05* 9.48   HGB 14.6 13.1* 12.6*   HCT 45.3 39.7* 35.6*    293 229     CMP:   Recent Labs   Lab 05/01/25 0441 05/02/25 0318    134*   K 5.0 3.5   CL 97 96   CO2 24 24   * 139*   BUN 10 15   CREATININE 0.7 0.6   CALCIUM 9.7 9.1   PROT 6.9 6.3   ALBUMIN 3.1* 2.7*   BILITOT 0.5 0.4   ALKPHOS 221* 208*   AST 28 31   ALT 35 34   ANIONGAP 15 14     CBC:   Recent Labs   Lab 05/02/25 0318   WBC 9.48   HGB 12.6*   HCT 35.6*   MCV 93        BMP:  Recent Labs   Lab 05/02/25 0318   *   *   K 3.5   CL 96   CO2 24   BUN 15   CREATININE 0.6   CALCIUM 9.1   MG 1.6     LFT:  Lab Results   Component Value Date    AST 31 05/02/2025    ALKPHOS 208 (H) 05/02/2025    BILITOT 0.4 05/02/2025     Albumin:   Albumin   Date Value Ref Range Status   05/02/2025 2.7 (L) 3.5 - 5.2 g/dL Final   12/13/2024 3.9 3.5 - 5.2 g/dL Final     Protein:   Protein Total   Date Value Ref Range Status   05/02/2025 6.3 6.0 - 8.4 gm/dL Final     Total Protein   Date Value Ref Range Status   12/13/2024 7.3 6.0 - 8.4 g/dL Final     Lactic acid:   No results found for: "LACTATE"    Significant Imaging: I have reviewed all pertinent imaging results/findings within the past 24 hours.      I spent a total of 85 minutes on the day of the visit. This includes face to face time in " discussion of goals of care, symptom assessment, coordination of care and emotional support.  This also includes non-face to face time preparing to see the patient (eg, review of tests/imaging), obtaining and/or reviewing separately obtained history, documenting clinical information in the electronic or other health record, independently interpreting results and communicating results to the patient/family/caregiver, or care coordinator.    Elida Corral MD  Palliative Medicine  [facility]

## 2025-05-03 VITALS
BODY MASS INDEX: 19.7 KG/M2 | OXYGEN SATURATION: 98 % | TEMPERATURE: 98 F | HEIGHT: 66 IN | WEIGHT: 122.56 LBS | SYSTOLIC BLOOD PRESSURE: 120 MMHG | HEART RATE: 96 BPM | DIASTOLIC BLOOD PRESSURE: 59 MMHG | RESPIRATION RATE: 18 BRPM

## 2025-05-03 LAB
ABSOLUTE EOSINOPHIL (OHS): 0.13 K/UL
ABSOLUTE MONOCYTE (OHS): 0.72 K/UL (ref 0.3–1)
ABSOLUTE NEUTROPHIL COUNT (OHS): 5.97 K/UL (ref 1.8–7.7)
APTT PPP: 50.1 SECONDS (ref 21–32)
BASOPHILS # BLD AUTO: 0.04 K/UL
BASOPHILS NFR BLD AUTO: 0.5 %
ERYTHROCYTE [DISTWIDTH] IN BLOOD BY AUTOMATED COUNT: 13 % (ref 11.5–14.5)
HCT VFR BLD AUTO: 34.6 % (ref 40–54)
HGB BLD-MCNC: 11.6 GM/DL (ref 14–18)
IMM GRANULOCYTES # BLD AUTO: 0.02 K/UL (ref 0–0.04)
IMM GRANULOCYTES NFR BLD AUTO: 0.3 % (ref 0–0.5)
LYMPHOCYTES # BLD AUTO: 1.03 K/UL (ref 1–4.8)
MCH RBC QN AUTO: 31.7 PG (ref 27–31)
MCHC RBC AUTO-ENTMCNC: 33.5 G/DL (ref 32–36)
MCV RBC AUTO: 95 FL (ref 82–98)
NUCLEATED RBC (/100WBC) (OHS): 0 /100 WBC
PLATELET # BLD AUTO: 245 K/UL (ref 150–450)
PMV BLD AUTO: 9 FL (ref 9.2–12.9)
POCT GLUCOSE: 140 MG/DL (ref 70–110)
POCT GLUCOSE: 188 MG/DL (ref 70–110)
RBC # BLD AUTO: 3.66 M/UL (ref 4.6–6.2)
RELATIVE EOSINOPHIL (OHS): 1.6 %
RELATIVE LYMPHOCYTE (OHS): 13 % (ref 18–48)
RELATIVE MONOCYTE (OHS): 9.1 % (ref 4–15)
RELATIVE NEUTROPHIL (OHS): 75.5 % (ref 38–73)
WBC # BLD AUTO: 7.91 K/UL (ref 3.9–12.7)

## 2025-05-03 PROCEDURE — 94761 N-INVAS EAR/PLS OXIMETRY MLT: CPT

## 2025-05-03 PROCEDURE — 25000003 PHARM REV CODE 250: Performed by: PHYSICIAN ASSISTANT

## 2025-05-03 PROCEDURE — 85730 THROMBOPLASTIN TIME PARTIAL: CPT | Performed by: INTERNAL MEDICINE

## 2025-05-03 PROCEDURE — 25000003 PHARM REV CODE 250: Performed by: STUDENT IN AN ORGANIZED HEALTH CARE EDUCATION/TRAINING PROGRAM

## 2025-05-03 PROCEDURE — 36415 COLL VENOUS BLD VENIPUNCTURE: CPT | Performed by: INTERNAL MEDICINE

## 2025-05-03 PROCEDURE — 85025 COMPLETE CBC W/AUTO DIFF WBC: CPT

## 2025-05-03 PROCEDURE — 25000003 PHARM REV CODE 250

## 2025-05-03 RX ORDER — MORPHINE SULFATE 15 MG/1
15 TABLET ORAL EVERY 4 HOURS PRN
Qty: 126 TABLET | Refills: 0 | Status: SHIPPED | OUTPATIENT
Start: 2025-05-03 | End: 2025-05-06 | Stop reason: SDUPTHER

## 2025-05-03 RX ORDER — DICYCLOMINE HYDROCHLORIDE 10 MG/1
10 CAPSULE ORAL 2 TIMES DAILY
Qty: 60 CAPSULE | Refills: 0 | Status: SHIPPED | OUTPATIENT
Start: 2025-05-03 | End: 2025-06-02

## 2025-05-03 RX ORDER — OXYCODONE 18 MG/1
18 CAPSULE, EXTENDED RELEASE ORAL EVERY 12 HOURS
Qty: 42 CAPSULE | Refills: 0 | Status: SHIPPED | OUTPATIENT
Start: 2025-05-03 | End: 2025-05-06 | Stop reason: SDUPTHER

## 2025-05-03 RX ORDER — MORPHINE SULFATE 30 MG/1
30 TABLET, FILM COATED, EXTENDED RELEASE ORAL EVERY 12 HOURS
Qty: 42 TABLET | Refills: 0 | Status: SHIPPED | OUTPATIENT
Start: 2025-05-03 | End: 2025-05-03

## 2025-05-03 RX ORDER — MORPHINE SULFATE 15 MG/1
15 TABLET ORAL EVERY 4 HOURS PRN
Qty: 126 TABLET | Refills: 0 | Status: SHIPPED | OUTPATIENT
Start: 2025-05-03 | End: 2025-05-03

## 2025-05-03 RX ORDER — GABAPENTIN 300 MG/1
300 CAPSULE ORAL NIGHTLY
Start: 2025-05-03 | End: 2025-05-06 | Stop reason: SDUPTHER

## 2025-05-03 RX ORDER — OXYCODONE HCL 20 MG/1
20 TABLET, FILM COATED, EXTENDED RELEASE ORAL EVERY 12 HOURS
Qty: 42 TABLET | Refills: 0 | Status: SHIPPED | OUTPATIENT
Start: 2025-05-03 | End: 2025-05-03

## 2025-05-03 RX ORDER — MORPHINE SULFATE 30 MG/1
30 TABLET, FILM COATED, EXTENDED RELEASE ORAL EVERY 12 HOURS
Qty: 42 TABLET | Refills: 0 | Status: SHIPPED | OUTPATIENT
Start: 2025-05-03 | End: 2025-05-03 | Stop reason: HOSPADM

## 2025-05-03 RX ADMIN — Medication 1000 UNITS: at 08:05

## 2025-05-03 RX ADMIN — PANTOPRAZOLE SODIUM 40 MG: 40 TABLET, DELAYED RELEASE ORAL at 08:05

## 2025-05-03 RX ADMIN — METOPROLOL TARTRATE 25 MG: 25 TABLET, FILM COATED ORAL at 08:05

## 2025-05-03 RX ADMIN — SENNOSIDES AND DOCUSATE SODIUM 1 TABLET: 50; 8.6 TABLET ORAL at 08:05

## 2025-05-03 RX ADMIN — MORPHINE SULFATE 15 MG: 15 TABLET ORAL at 02:05

## 2025-05-03 RX ADMIN — DICYCLOMINE HYDROCHLORIDE 10 MG: 10 CAPSULE ORAL at 03:05

## 2025-05-03 RX ADMIN — THERA TABS 1 TABLET: TAB at 08:05

## 2025-05-03 RX ADMIN — MORPHINE SULFATE 15 MG: 15 TABLET ORAL at 10:05

## 2025-05-03 RX ADMIN — OXYCODONE HYDROCHLORIDE AND ACETAMINOPHEN 500 MG: 500 TABLET ORAL at 08:05

## 2025-05-03 RX ADMIN — DICYCLOMINE HYDROCHLORIDE 10 MG: 10 CAPSULE ORAL at 08:05

## 2025-05-03 RX ADMIN — TAMSULOSIN HYDROCHLORIDE 0.4 MG: 0.4 CAPSULE ORAL at 08:05

## 2025-05-03 RX ADMIN — MORPHINE SULFATE 15 MG: 15 TABLET ORAL at 06:05

## 2025-05-03 RX ADMIN — EZETIMIBE 10 MG: 10 TABLET ORAL at 08:05

## 2025-05-03 RX ADMIN — MORPHINE SULFATE 15 MG: 15 TABLET ORAL at 04:05

## 2025-05-03 RX ADMIN — MORPHINE SULFATE 30 MG: 30 TABLET, EXTENDED RELEASE ORAL at 08:05

## 2025-05-03 RX ADMIN — ASPIRIN 81 MG: 81 TABLET, COATED ORAL at 08:05

## 2025-05-03 NOTE — PLAN OF CARE
Problem: Adult Inpatient Plan of Care  Goal: Plan of Care Review  Outcome: Progressing  Goal: Patient-Specific Goal (Individualized)  Outcome: Progressing  Goal: Absence of Hospital-Acquired Illness or Injury  Outcome: Progressing  Goal: Optimal Comfort and Wellbeing  Outcome: Progressing  Goal: Readiness for Transition of Care  Outcome: Progressing     Problem: Infection  Goal: Absence of Infection Signs and Symptoms  Outcome: Progressing     Problem: Diabetes Comorbidity  Goal: Blood Glucose Level Within Targeted Range  Outcome: Progressing     Problem: Wound  Goal: Optimal Coping  Outcome: Progressing  Goal: Optimal Functional Ability  Outcome: Progressing  Goal: Absence of Infection Signs and Symptoms  Outcome: Progressing  Goal: Improved Oral Intake  Outcome: Progressing  Goal: Optimal Pain Control and Function  Outcome: Progressing  Goal: Skin Health and Integrity  Outcome: Progressing  Goal: Optimal Wound Healing  Outcome: Progressing     Problem: Pain Acute  Goal: Optimal Pain Control and Function  Outcome: Progressing     Problem: Malnutrition  Goal: Improved Nutritional Intake  Outcome: Progressing     Problem: Coping Ineffective  Goal: Effective Coping  Outcome: Progressing     Problem: Fall Injury Risk  Goal: Absence of Fall and Fall-Related Injury  Outcome: Progressing

## 2025-05-03 NOTE — PLAN OF CARE
Problem: Adult Inpatient Plan of Care  Goal: Plan of Care Review  Outcome: Met  Goal: Patient-Specific Goal (Individualized)  Outcome: Met  Goal: Absence of Hospital-Acquired Illness or Injury  Outcome: Met  Goal: Optimal Comfort and Wellbeing  Outcome: Met  Goal: Readiness for Transition of Care  Outcome: Met     Problem: Infection  Goal: Absence of Infection Signs and Symptoms  Outcome: Met     Problem: Diabetes Comorbidity  Goal: Blood Glucose Level Within Targeted Range  Outcome: Met     Problem: Wound  Goal: Optimal Coping  Outcome: Met  Goal: Optimal Functional Ability  Outcome: Met  Goal: Absence of Infection Signs and Symptoms  Outcome: Met  Goal: Improved Oral Intake  Outcome: Met  Goal: Optimal Pain Control and Function  Outcome: Met  Goal: Skin Health and Integrity  Outcome: Met  Goal: Optimal Wound Healing  Outcome: Met     Problem: Pain Acute  Goal: Optimal Pain Control and Function  Outcome: Met     Problem: Coping Ineffective  Goal: Effective Coping  Outcome: Met

## 2025-05-03 NOTE — CHAPLAIN
Patient: Gael Pedroza  MRN: [id number]  : 1955  Age: 70 y.o.  Legal sex: male   Hospital Length of Stay: 3 days  Code Status: Full Code   Attending Provider: Leandro Benjamin MD  Principal Problem: Pancreatic mass  Patient's Jainism: Congregation  Length of my visit: 15 min  Purpose of visit:   PallMed     Patient sitting in chair, sharing about his recent diagnosis and subsequent shock. Patient still processing his new reality, and stated 'I know God has a plan, even if I don't know yet what it is.' Patient has an extensive social support system and Congregation community, and has been anointed. He will be discharged later today and continue out-patient treatment.     assessed living with uncertainty; patient is coping appropriately. provided space for patient to voice concerns and fears, and rosary.         Rev. Mo Patel, [id number]  board certified , christian ([place]+)     support is available and on-site . Please call the on-call  for any emergent spiritual care needs, [phone].

## 2025-05-03 NOTE — NURSING
Pt is discharge and AVS is given. Pt is educated on new medications and follow up appointments. Pt IV is removed. Pt is transported down by transport via wheelchair with medications, personal belongings and family.

## 2025-05-04 NOTE — DISCHARGE SUMMARY
[facility] - [facility]ation [address] [place] [address] Medicine  Discharge Summary      Patient Name: Gael Pedroza  MRN: [id number]  REGINA: [id number]  Patient Class: IP- Inpatient  Admission Date: 4/29/2025  Hospital Length of Stay: 3 days  Discharge Date and Time: 5/3/2025  5:20 PM  Attending Physician: Amber att. providers found   Discharging Provider: Adán Sinclair PA-C  Primary Care Provider: HARLAN Steele MD  [facility] Medicine Team: [facility] Adán Sinclair PA-C  Primary Care Team: [facility]    HPI:   Mr. Gael Pedroza 70y M w/ h/o HTN, DM2, LBP, pancreatic mass, ankylosing spondylitis Carotid artery stenosis, DDD, HLD presents w/ worsening low back pain x1 week. Pt reports chronic LBP that has been acutely worsening over the last week. He denies any trauma. The pain begins in his lower back and radiates b/l and up to his rib cage. Its constant and nagging. Reports he was seen by a physician who has been managing his pain w/ percocet, but recently he's required the pain med more frequently and feels like he's chasing the pain. Reports pain has been contributed to his pancreatic mass which he believes is cancerous. It has been associated w/ decreased appetite and weight loss of up to 1 lb per day. Endorses a h/o abdominal pain associated w/ meals but he no longer experiences this. His BM are loose and in small quantity. He is compliant w/ his home medications and reports he has not yet started treatment for his pancreatic mass as he does not yet have a formal diagnosis of cancer. Denies any fevers, chills, chest pain, SOB, nausea or vomiting, dysuria, hematochezia or tarry stools.    In ED, Pt AFVSS on RA. WBC 14.16. . UA noninfectious. ECG w/ NSR, nonspecific ST abnormality, grossly nonischemic.Given IV dilaudid, antiemetics and 500 ml NS bolus. Heme/onc and AES consulted by ED.     Procedure(s) (LRB):  ULTRASOUND, UPPER GI TRACT, ENDOSCOPIC (N/A)      Hospital Course:   Pt admitted for evaluation of  pancreatic mass. Pt afebrile and HDS. Pain well controlled on MM pain management regimen. Heme/onc following. IR consult in ED who deferred biopsy of mass to AES. AES consulted and agreeable to biopsy. CT imaging showed 9.4 cm mass involving the pancreatic body and tail highly concerning for adenocarcinoma.   Occludes the splenic and superior mesenteric veins, with nonocclusive thrombus extending into the main portal vein.  There are portosystemic collaterals in the upper abdomen. Enlarged periportal lymph node concerning for metastasis. Multiple liver metastases measuring up to 2.8 cm. Few solid pulmonary nodules measuring up to 0.7 cm concerning for metastases. Pt was informed and started on heparin for portal vein thrombus. AES performed pancreatic mass biopsy 4/30. Pathology pending. Discussed with heme/onc they will follow up with patient as OP appointment on 5/8 to discuss results of pathology and long term planning. Patient still needs to have pain management optimized. Adjusted pain regimen. Palliative consulted, appreciate recs. Patient pain tolerable on new oral regimen. Patient is okay for discharge.    Pt was seen and evaluated by me this morning, reports feeling well, and is eager to discharge home. All questions were answered. Patient acknowledged understanding of discharge instructions and feels safe to discharge home. Patient was discharged on 5/4/2025 in stable condition with Heme/onc, PCP, and palliative follow-up. Education regarding condition provided and return precautions given.     Physical Exam  Gen: in NAD, appears stated age  Neuro: AAOx3, motor, sensory, and strength grossly intact BL  HEENT: EOMI, PERRLA; no JVD appreciated  CVS: RRR, no m/r/g  Resp: lungs CTAB, no w/r/r; no belabored breathing or accessory muscle use appreciated   Abd: NTND, soft to palpation  Extrem: no UE or LE edema BL       Goals of Care Treatment Preferences:  Code Status: Full Code          What is most important  right now is to focus on curative/life-prolongation (regardless of treatment burdens).  Accordingly, we have decided that the best plan to meet the patient's goals includes continuing with treatment.      SDOH Screening:  The patient declined to be screened for utility difficulties, food insecurity, transport difficulties, housing insecurity, and interpersonal safety, so no concerns could be identified this admission.     Consults:   Consults (From admission, onward)          Status Ordering Provider     Inpatient consult to Palliative Care  Once        Provider:  (Not yet assigned)    Completed DARIUS AGUILLON     Inpatient consult to [facility] (AES)  Once        Provider:  (Not yet assigned)    Completed LESLEY RICHARDS     Inpatient consult to Interventional Radiology  Once        Provider:  (Not yet assigned)    Completed LESLEY RICHARDS     Inpatient consult to Hematology/Oncology  Once        Provider:  (Not yet assigned)    Completed LESLEY RICHARDS            Assessment & Plan  Pancreatic mass  Low back pain  - AFVSS on RA  - WBC 14.16  - CT chest/abd/pelvis pending  - MM pain control, prn oxy, tylenol, robaxin, dilaudid  - IR consulted>>IR consult received for biopsy of pt's newly discovered pancreatic mass per CT TL spine on 4/25. CT c/a/p pending. Pt currently scheduled for outpatient EUS biopsy of the pancreatic mass with AES on 5/2/25. Will defer biopsy to AES.   - AES consulted   - Hold heparin. Needs four hour hold prior to procedure.  - EUS-guided biopsy 4/30   - heme/onc consulted   -CT CAP pending pending to r/o metastatic disease  -We will follow up on biopsy result  -If biopsy is positive for malignancy, recommend CT Abdomen pancreatic protocol.  - pathology pending  - Palliative consulted, appreciate recs   - Start MS Contin 30 mg PO BID  - Start morphine IR 15 mg PO q4h PRN pain in lieu of oxycodone IR 10 mg q4h PRN    Portal vein  "thrombosis  - CT chest a/p c/f portal vein thrombus  - continue heparin drip per protocol  - oral AC at discharge  Type 2 diabetes mellitus with circulatory disorder, without long-term current use of insulin  Patient's FSGs are uncontrolled due to hyperglycemia on current medication regimen.  Last A1c reviewed-   Lab Results   Component Value Date    LABA1C 6.6 (H) 01/18/2017    HGBA1C 6.9 (H) 12/13/2024     Most recent fingerstick glucose reviewed-   No results for input(s): "POCTGLUCOSE" in the last 24 hours.  , Continue to monitor glucose closely for hypo/hyperglycemia.  Current correctional scale  Low  Maintain anti-hyperglycemic dose as follows-   Antihyperglycemics (From admission, onward)      None          Home antihyperglycemics reviewed, will hold oral hypoglycemics while patient is in the hospital.  Carotid artery stenosis  - continue statin and asa  Benign prostatic hyperplasia with urinary frequency  - continue flomax  Severe malnutrition  Nutrition consulted. Most recent weight and BMI monitored-     Measurements:  Wt Readings from Last 1 Encounters:   05/03/25 55.6 kg (122 lb 9.2 oz)   Body mass index is 19.78 kg/m².    Patient has been screened and assessed by RD.    Malnutrition Type:  Context: chronic illness  Level: severe    Malnutrition Characteristic Summary:  Weight Loss (Malnutrition): greater than 20% in 1 year (25 lb weight loss x 1 month (17%-significant), 36 lb weight loss x 3 months (22.8%-significant), 43 lb weight loss x 12 months (26%-significant).)  Energy Intake (Malnutrition): less than or equal to 50% for greater than or equal to 1 month  Subcutaneous Fat (Malnutrition): moderate depletion  Muscle Mass (Malnutrition): moderate depletion    Interventions/Recommendations (treatment strategy):  --Diet advance to goal Consistent CHO with low K+ modifier if clinically indicated  --Recommend repeat A1C- Last level 6.9% 12/13/2024  --Recommend weekly weights  --Nursing: please continue " to document % meal eaten on flowsheets  --Encourage good intakes and provide feeding assistance as needed    Encounter for palliative care      Final Active Diagnoses:    Diagnosis Date Noted POA    PRINCIPAL PROBLEM:  Pancreatic mass [K86.89] 04/29/2025 Yes    Encounter for palliative care [Z51.5] 05/02/2025 Not Applicable    Severe malnutrition [E43] 04/30/2025 Yes     Chronic    Portal vein thrombosis [I81] 04/29/2025 Yes    Benign prostatic hyperplasia with urinary frequency [N40.1, R35.0] 06/05/2023 Yes    Carotid artery stenosis [I65.29] 11/15/2012 Yes    Low back pain [M54.50] 09/26/2012 Yes    Type 2 diabetes mellitus with circulatory disorder, without long-term current use of insulin [E11.59] 09/26/2012 Yes      Problems Resolved During this Admission:       Discharged Condition: good    Disposition: Home or Self Care    Follow Up:   Follow-up Information       HARLAN Steele MD Follow up on 5/6/2025.    Specialty: Internal Medicine  Why: CH[facility] schedule a 1 week hospital follow up for 5/6 at 1:45 am  Contact information:  [address]  [facility] [address]  [phone]                           Patient Instructions:      Ambulatory referral/consult to Internal Medicine   Standing Status: Future   Referral Priority: Routine Referral Type: Consultation   Referral Reason: Specialty Services Required   Requested Specialty: Internal Medicine   Number of Visits Requested: 1       Significant Diagnostic Studies: N/A    Pending Diagnostic Studies:       Procedure Component Value Units Date/Time    HCV Virus Hold Specimen [[id number]] Collected: 04/29/25 1052    Order Status: Sent Lab Status: In process Updated: 04/29/25 1057    Specimen: Blood     Pharmacogenomics Panel [[id number]] Collected: 04/29/25 1259    Order Status: Sent Lab Status: In process Updated: 04/29/25 1304    Specimen: Blood            Medications:  Reconciled Home Medications:      Medication List        START taking these  medications      dicyclomine 10 MG capsule  Commonly known as: BENTYL  Take 1 capsule (10 mg total) by mouth 2 (two) times daily.     ELIQUIS 5 mg Tab  Generic drug: apixaban  Take 2 tablets (10 mg total) by mouth 2 (two) times daily for 2 days, THEN 1 tablet (5 mg total) 2 (two) times daily.  Start taking on: May 3, 2025     methocarbamoL 500 MG Tab  Commonly known as: Robaxin  Take 1 tablet (500 mg total) by mouth 4 (four) times daily. for 10 days     morphine 15 MG tablet  Commonly known as: MSIR  Take 1 tablet (15 mg total) by mouth every 4 (four) hours as needed for Pain.     STOOL SOFTENER-LAXATIVE 8.6-50 mg per tablet  Generic drug: senna-docusate  Take 1 tablet by mouth daily as needed for Constipation.     XTAMPZA ER 18 mg Cspt  Generic drug: oxyCODONE myristate  Take 1 capsule (18 mg total) by mouth every 12 (twelve) hours. for 21 days            CONTINUE taking these medications      alcohol swabs Padm  Apply 1 each topically as needed.     ascorbic acid (vitamin C) 500 MG tablet  Commonly known as: VITAMIN C  Take 500 mg by mouth once daily.     aspirin 81 MG EC tablet  Commonly known as: ECOTRIN  Take 1 tablet (81 mg total) by mouth once daily.     blood sugar diagnostic Strp  Commonly known as: TRUE METRIX GLUCOSE TEST STRIP  200 each by Misc.(Non-Drug; Combo Route) route Daily.     cholecalciferol (vitamin D3) 25 mcg (1,000 unit) capsule  Commonly known as: VITAMIN D3  Take 1,000 Units by mouth once daily.     ezetimibe 10 mg tablet  Commonly known as: ZETIA  Take 1 tablet (10 mg total) by mouth once daily.     gabapentin 300 MG capsule  Commonly known as: NEURONTIN  Take 1 capsule (300 mg total) by mouth every evening.     lancets 30 gauge Misc  1 lancet  by Misc.(Non-Drug; Combo Route) route Daily.     multivitamin with minerals tablet  Take 1 tablet by mouth once daily.     nebivoloL 5 MG Tab  Commonly known as: BYSTOLIC  Take 1 tablet (5 mg total) by mouth once daily.     oxyCODONE-acetaminophen  5-325 mg per tablet  Commonly known as: PERCOCET  Take 1 tablet by mouth every 6 (six) hours as needed for Pain. Take with food     rosuvastatin 10 MG tablet  Commonly known as: CRESTOR  Take 1 tablet (10 mg total) by mouth once daily.     tamsulosin 0.4 mg Cap  Commonly known as: FLOMAX  TAKE ONE CAPSULE BY MOUTH EVERY DAY     XIGDUO XR 5-1,000 mg  Generic drug: dapaglifloz propaned-metformin  TAKE TWO TABLETS BY MOUTH EVERY DAY              Indwelling Lines/Drains at time of discharge:   Lines/Drains/Airways       None                   Time spent on the discharge of patient: 45 minutes         Adán Sinclair PA-C  Department of Hospital Medicine  [facility] - Observation 11[facility]

## 2025-05-04 NOTE — PLAN OF CARE
[facility]  Discharge Final Note    Primary Care Provider: HARLAN Steele MD    Expected Discharge Date: 5/3/2025    Final Discharge Note (most recent)       Final Note - 05/03/25 1705          Final Note    Assessment Type Final Discharge Note     Anticipated Discharge Disposition Home or Self Care     What phone number can be called within the next 1-3 days to see how you are doing after discharge? [phone]     Hospital Resources/Appts/Education Provided Provided patient/caregiver with written discharge plan information;Appointments scheduled and added to AVS        Post-Acute Status    Discharge Delays None known at this time                   Contact Info       HARLAN Steele MD   Specialty: Internal Medicine   Relationship: PCP - General    [facility] [address]  [address]  [place] [id number]   Phone: [phone]       Next Steps: Follow up on 5/6/2025    Instructions: CHW schedule a 1 week hospital follow up for 5/6 at 1:45 am          Future Appointments   Date Time Provider Department Center   5/6/2025  1:45 PM HARLAN Steele MD [facility]   5/8/2025  9:30 AM APPOINTMENT LAB, [facility]   5/8/2025 10:00 AM Dar Granados MD [facility] HEM ONC [facility]   6/10/2025 10:30 AM HARLAN Steele MD [facility]   8/4/2025 10:15 AM Raquel Elise MD [id number]       Lani Laboy RN  Weekend  - [facility]  [phone]

## 2025-05-04 NOTE — ASSESSMENT & PLAN NOTE
"Patient's FSGs are uncontrolled due to hyperglycemia on current medication regimen.  Last A1c reviewed-   Lab Results   Component Value Date    LABA1C 6.6 (H) 01/18/2017    HGBA1C 6.9 (H) 12/13/2024     Most recent fingerstick glucose reviewed-   No results for input(s): "POCTGLUCOSE" in the last 24 hours.  , Continue to monitor glucose closely for hypo/hyperglycemia.  Current correctional scale  Low  Maintain anti-hyperglycemic dose as follows-   Antihyperglycemics (From admission, onward)      None          Home antihyperglycemics reviewed, will hold oral hypoglycemics while patient is in the hospital.  "

## 2025-05-04 NOTE — ASSESSMENT & PLAN NOTE
Nutrition consulted. Most recent weight and BMI monitored-     Measurements:  Wt Readings from Last 1 Encounters:   05/03/25 55.6 kg (122 lb 9.2 oz)   Body mass index is 19.78 kg/m².    Patient has been screened and assessed by RD.    Malnutrition Type:  Context: chronic illness  Level: severe    Malnutrition Characteristic Summary:  Weight Loss (Malnutrition): greater than 20% in 1 year (25 lb weight loss x 1 month (17%-significant), 36 lb weight loss x 3 months (22.8%-significant), 43 lb weight loss x 12 months (26%-significant).)  Energy Intake (Malnutrition): less than or equal to 50% for greater than or equal to 1 month  Subcutaneous Fat (Malnutrition): moderate depletion  Muscle Mass (Malnutrition): moderate depletion    Interventions/Recommendations (treatment strategy):  --Diet advance to goal Consistent CHO with low K+ modifier if clinically indicated  --Recommend repeat A1C- Last level 6.9% 12/13/2024  --Recommend weekly weights  --Nursing: please continue to document % meal eaten on flowsheets  --Encourage good intakes and provide feeding assistance as needed

## 2025-05-05 ENCOUNTER — TELEPHONE (OUTPATIENT)
Dept: PALLIATIVE MEDICINE | Facility: CLINIC | Age: 70
End: 2025-05-05
Payer: MEDICARE

## 2025-05-05 ENCOUNTER — TELEPHONE (OUTPATIENT)
Dept: HEMATOLOGY/ONCOLOGY | Facility: CLINIC | Age: 70
End: 2025-05-05
Payer: MEDICARE

## 2025-05-05 NOTE — TELEPHONE ENCOUNTER
Spoke to patient wife and informed her that I could put the patient down tomorrow with Dr. Whiteside

## 2025-05-05 NOTE — TELEPHONE ENCOUNTER
RN returned spouse's phone call about patient's biopsy results. RN informed spouse that biopsy was done on 4/30. Biopsy results can take up to two to three weeks to come back. Pt has appt scheduled with Dr. Granados on 5/8 which patient/spouse have been encouraged to keep. Rn will f/u on 5/7 for biopsy results and notify patient/spouse with next steps.

## 2025-05-05 NOTE — TELEPHONE ENCOUNTER
----- Message from Elida Corral MD sent at 5/5/2025 12:51 PM CDT -----  Regarding: New Patient Referral for Malignant Pain/Symptom Management  Dear Ms. Parish:Dajuan! My name is Elida Corral, one of the palliative care consultants at [facility]! I met Mr. Pedroza and his dedicated family in the hospital for malignant pain management. I would be so grateful if I could get an appointment for him in the palliative medicine clinic at [facility] to continue malignant pain/symptom management. His wife and daughter are extremely dedicated and understandably anxious to get him the necessary follow up, and would be excellent folks to reach out to in order to confirm dates. They are aware of the referral!Gratefully,Elida

## 2025-05-05 NOTE — TELEPHONE ENCOUNTER
----- Message from Tanesha sent at 5/5/2025  9:00 AM CDT -----  Type:  Test ResultsWho Called: Pt's wife, Shira Name of Test (Lab/Mammo/Etc): Biopsy Where the test was performed: [facility] Would the patient rather a call back or a response via [facility]? Call back Best Call Back Number: [phone] Information:  Please be advised, caller stated that it has been more than a week now and pt hasn't heard anything regarding results nor is able to see results on pt portal, caller states that pt would like a call back as soon as possible prior to upcoming appt on 05/08

## 2025-05-06 ENCOUNTER — OFFICE VISIT (OUTPATIENT)
Dept: PALLIATIVE MEDICINE | Facility: CLINIC | Age: 70
End: 2025-05-06
Payer: MEDICARE

## 2025-05-06 ENCOUNTER — PATIENT OUTREACH (OUTPATIENT)
Dept: ADMINISTRATIVE | Facility: CLINIC | Age: 70
End: 2025-05-06
Payer: MEDICARE

## 2025-05-06 ENCOUNTER — TELEPHONE (OUTPATIENT)
Dept: HEMATOLOGY/ONCOLOGY | Facility: CLINIC | Age: 70
End: 2025-05-06
Payer: MEDICARE

## 2025-05-06 VITALS
HEIGHT: 66 IN | RESPIRATION RATE: 16 BRPM | BODY MASS INDEX: 20.44 KG/M2 | TEMPERATURE: 98 F | HEART RATE: 99 BPM | WEIGHT: 127.19 LBS | OXYGEN SATURATION: 95 % | SYSTOLIC BLOOD PRESSURE: 111 MMHG | DIASTOLIC BLOOD PRESSURE: 75 MMHG

## 2025-05-06 DIAGNOSIS — G89.3 CANCER RELATED PAIN: ICD-10-CM

## 2025-05-06 DIAGNOSIS — K86.89 PANCREATIC MASS: ICD-10-CM

## 2025-05-06 DIAGNOSIS — T40.2X5A CONSTIPATION DUE TO OPIOID THERAPY: Primary | ICD-10-CM

## 2025-05-06 DIAGNOSIS — K59.03 CONSTIPATION DUE TO OPIOID THERAPY: Primary | ICD-10-CM

## 2025-05-06 PROCEDURE — 99999 PR PBB SHADOW E&M-EST. PATIENT-LVL V: CPT | Mod: PBBFAC,,, | Performed by: STUDENT IN AN ORGANIZED HEALTH CARE EDUCATION/TRAINING PROGRAM

## 2025-05-06 RX ORDER — MORPHINE SULFATE 15 MG/1
15 TABLET ORAL EVERY 4 HOURS PRN
Qty: 168 TABLET | Refills: 0 | Status: SHIPPED | OUTPATIENT
Start: 2025-05-23 | End: 2025-06-20

## 2025-05-06 RX ORDER — GABAPENTIN 300 MG/1
300 CAPSULE ORAL NIGHTLY
Qty: 30 CAPSULE | Refills: 2 | Status: SHIPPED | OUTPATIENT
Start: 2025-05-06 | End: 2025-08-04

## 2025-05-06 RX ORDER — OXYCODONE 18 MG/1
18 CAPSULE, EXTENDED RELEASE ORAL EVERY 12 HOURS
Qty: 56 CAPSULE | Refills: 0 | Status: SHIPPED | OUTPATIENT
Start: 2025-05-23 | End: 2025-06-20

## 2025-05-06 RX ORDER — NALOXEGOL OXALATE 12.5 MG/1
12.5 TABLET, FILM COATED ORAL DAILY
Qty: 30 TABLET | Refills: 2 | Status: SHIPPED | OUTPATIENT
Start: 2025-05-06 | End: 2025-08-04

## 2025-05-06 NOTE — PROGRESS NOTES
C3 nurse spoke with patient Gael Pedroza and spouse. TCC call complete. Patient declined a HOSFU appointment.

## 2025-05-06 NOTE — PROGRESS NOTES
C3 nurse spoke with patient who is unable to complete the call at this time. Patient Requested a Callback. Patient has a HOSFU with HARLAN Steele MD on 05/06/25 @ 2193

## 2025-05-06 NOTE — TELEPHONE ENCOUNTER
"----- Message from Marshall sent at 5/6/2025 12:55 PM CDT -----  Contact: Tanesha  Type: Requesting to speak with Leandro Called: Tanesha "Daughter"Regarding:discuss  biopsy results Would the patient rather a call back or a response via [facility]? Call Adrianaest Call Back Number:  [phone] Information:  "

## 2025-05-06 NOTE — TELEPHONE ENCOUNTER
RN informed patient's daughter that biopsy results takes up to two weeks to come back as biopsy was completed on 4/30.Daughter verbalized understanding

## 2025-05-06 NOTE — PROGRESS NOTES
Palliative Medicine Clinic Note        Consult Requested By: Dr. Elida Corral      Reason for Consult: Establish care    Chief Complaint: Pancreatic cancer        ASSESSMENT/PLAN:      Plan/Recommendations:    Gael was seen today for new patient.    Diagnoses and all orders for this visit:    Constipation due to opioid therapy  -     naloxegoL (MOVANTIK) 12.5 mg Tab; Take 1 tablet (12.5 mg total) by mouth once daily.    Pancreatic mass  -     gabapentin (NEURONTIN) 300 MG capsule; Take 1 capsule (300 mg total) by mouth every evening.  -     oxyCODONE myristate (XTAMPZA ER) 18 mg CSpT; Take 1 capsule (18 mg total) by mouth every 12 (twelve) hours.  -     morphine (MSIR) 15 MG tablet; Take 1 tablet (15 mg total) by mouth every 4 (four) hours as needed for Pain.  -     Ambulatory referral/consult to Interventional Radiology; Future  -     Dnr (do not resuscitate)    Cancer related pain  -     Ambulatory referral/consult to Interventional Radiology; Future            Advance Care Planning   Advance Directives:   [place]: Yes    Medical Power of : Yes    Goals of Care: What is most important right now is to focus on curative/life-prolongation (regardless of treatment burdens). Accordingly, we have decided that the best plan to meet the patient's goals includes continuing with treatment.                Follow up: 1 month     Plan discussed with: spouse       SUBJECTIVE:      History of Present Illness / Interval History:  Gael Pedroza is 70 y.o. male with PMH of HTN, T2DM, HLD and very recent (4/24/25) dx of metastatic pancreatic cancer following a CT of the lumbar spine to workup acute on chronic back pain unexpectedly revealed an unresectable 9.6cm pancreatic body/tail mass with multiple liver mets and extensive lymphadenopathy. Pt has had a 30 lb unintended weight loss over the past 4 months and is now s/p EUS biopsy of the mass on 4/30/25, final path pending.    5/6/25  History obtained from: patient  and spouse.    Pt had been planning to go on a 9-day cruise in [place] tomorrow that is now cancelled as pt recognizes he has active medical needs that cannot be met on a cruise ship.    They have little insight into palliative clinic and I explained the philosophy of prioritizing comfort and quality of life while managing a life-limiting illness.    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    Final path results should be forthcoming within 48 hours and pt does wish to meet with oncology to discuss palliative treatment options. We discussed that there is a well-established palliative chemo regimen for this cancer (gemcitabine+abraxane) which is generally tolerated and optimistically meant to decrease tumor mass and disease burden for a time period on the order of months. Pt's overall prognosis is felt < 6 months and hospice qualified at any point given pt does have cancer related pain and cachexia.    Pt and spouse are grateful for this insight and accept the near-certainty that pt's pathology results will confirm a cancer dx. I reassured them that pt's anticoagulant should protect him against hypercoagulability of malignancy and whether or not pt chooses to pursue palliative chemo he warrants close symptomatic mgmt. Discussed legacy building and recommended pt plan for a family reunion at his home given he is currently mobile with tolerably controlled pain and overall remains well appearing despite the extent of his illness. He has very young grandchildren that he would like to see again while he is well.    Pt's chief pain source is referred pain to the thoracic back between the shoulder blades, described as paroxysmal and 10/10  with a squeezing or stabbing quality. This pain does improve to 6/10 with PRN short acting morphine and his baseline pain is about 4/10 with OxyContin. Secondarily pt reports less frequent right flank pain radiating along the right lower costal margin, described as burning or tingling and convincingly neuropathic in etiology.    Referred to IR to evaluate for celiac block while awaiting initial onc visit. Approach may be technically complicated or even prohibitive given the size and location of the tumor. Defer to IR expertise.    Added Movantik to treat prominent opioid induced constipation.    Refilled current combination opioid regimen of oxycodone-ER 18mg bid and MSIR 15mg q4h prn. Pt reports using about 3-4 doses of MSIR daily thus has some flexibility to deal with worsening pain scores. Will adjust subsequent opioid orders PRN reported pain pattern.    [place] electronically filed and new DNR/DNI order placed.         ROS:  Review of Systems   Constitutional:  Positive for activity change, appetite change, fatigue and unexpected weight change.   Respiratory:  Negative for chest tightness and shortness of breath.    Cardiovascular:  Negative for chest pain.   Gastrointestinal:  Positive for abdominal pain, constipation and nausea. Negative for vomiting.   Genitourinary:  Negative for difficulty urinating.   Musculoskeletal:  Positive for back pain and myalgias.   Neurological:  Negative for weakness, numbness and memory loss.   Psychiatric/Behavioral:  Positive for depressed mood and sleep disturbance. Negative for behavioral problems, confusion, dysphoric mood and suicidal ideas. The patient is nervous/anxious.        Review of Symptoms      Symptom Assessment (ESAS 0-10 Scale)  Pain:  4  Dyspnea:  0  Anxiety:  0  Nausea:  0  Depression:  0  Anorexia:  0  Fatigue:  0  Insomnia:  0  Restlessness:  0  Agitation:  0     CAM / Delirium:  Negative  Constipation:  Positive  Diarrhea:  Negative    Anxiety:  Is  nervous/anxious  Constipation:  Constipation    Pain Assessment:    Location(s): abdomen    Abdomen       Location: right and anterior        Quality: Stabbing and throbbing        Quantity: 4/10 in intensity        Chronicity: Onset 1 month(s) ago, gradually worsening        Aggravating Factors: Eating and movement        Alleviating Factors: Opiates        Associated Symptoms: Myalgias    ECOG Performance Status ndGndrndanddndend:nd nd2nd Living Arrangements:  Lives in home and Lives with spouse    Psychosocial/Cultural:   See Palliative Psychosocial Note: No  Social Issues Identified: Coping deficit pt/family and New Diagnosis/Trauma  Bereavement Risk: Yes: Close or dependent relationship to the  person  Caregiver Needs Discussed. Caregiver Distress: Yes: Need for respite, Issues of guilt, Intensity of family caregiving, Caregiver Burnout Risk, and Caregiver support and community resources discussed.    Cultural: No specific concerns.  **Primary  to Follow**  Palliative Care  Consult: No    Spiritual:  F - Luciana and Belief:  Anglican  I - Importance:  No impact on care  C - Community:  Home [place]arsani  A - Address in Care:  Sacramental anointing PRN     Time-Based Charting:  Yes  Chart Review: 14 minutes  Face to Face: 13 minutes  Symptom Assessment: 11 minutes  Coordination of Care: 10 minutes  Advance Care Plannin minutes  Goals of Care: 12 minutes    Total Time Spent: 74 minutes          Medications:  Current Medications[1]      External  database queried on 2025  by El AWAN :      Review of patient's allergies indicates:   Allergen Reactions    Bactrim [sulfamethoxazole-trimethoprim] Itching, Rash and Blisters    Lipitor [atorvastatin]      Achy; pt reports no problem with Crestor    Levaquin [levofloxacin] Other (See Comments)     Muscle aches under shoulder blades           OBJECTIVE:         Physical Exam:  Vitals: Temp: 97.6 °F (36.4 °C) (25  1003)  Pulse: 99 (05/06/25 1003)  Resp: 16 (05/06/25 1003)  BP: 111/75 (05/06/25 1003)  SpO2: 95 % (05/06/25 1003)    Physical Exam  Constitutional:       General: He is not in acute distress.     Appearance: He is normal weight. He is not ill-appearing or toxic-appearing.   HENT:      Head: Normocephalic and atraumatic.      Comments: Slight temporal wasting     Mouth/Throat:      Mouth: Mucous membranes are moist.      Pharynx: Oropharynx is clear.   Eyes:      Extraocular Movements: Extraocular movements intact.      Pupils: Pupils are equal, round, and reactive to light.   Cardiovascular:      Rate and Rhythm: Normal rate and regular rhythm.      Pulses: Normal pulses.      Heart sounds: No murmur heard.  Pulmonary:      Effort: Pulmonary effort is normal.      Breath sounds: Normal breath sounds. No wheezing or rales.   Abdominal:      General: Abdomen is flat. There is no distension.      Palpations: Abdomen is soft. There is no mass.      Tenderness: There is abdominal tenderness. There is no guarding.      Comments: Redundant abdominal skin folds due to massive weight loss   Musculoskeletal:         General: No swelling, tenderness or deformity. Normal range of motion.   Skin:     General: Skin is warm and dry.      Coloration: Skin is pale. Skin is not jaundiced.      Findings: No bruising.   Neurological:      General: No focal deficit present.      Mental Status: He is alert and oriented to person, place, and time. Mental status is at baseline.      GCS: GCS eye subscore is 4. GCS verbal subscore is 5. GCS motor subscore is 6.      Cranial Nerves: No cranial nerve deficit.      Sensory: No sensory deficit.      Motor: No weakness.      Coordination: Coordination normal.      Gait: Gait normal.      Deep Tendon Reflexes: Reflexes normal.   Psychiatric:         Attention and Perception: Attention and perception normal.         Mood and Affect: Affect normal. Mood is depressed.         Speech: Speech normal.          Behavior: Behavior is not withdrawn. Behavior is cooperative.         Thought Content: Thought content normal.         Cognition and Memory: Cognition and memory normal.         Judgment: Judgment normal.           Labs: noncontributory      Imaging: noncontributory       I spent a total of 25 minutes on the day of the visit. This includes face to face time in discussion of goals of care, symptom assessment, coordination of care and emotional support.  This also includes non-face to face time preparing to see the patient (eg, review of tests/imaging), obtaining and/or reviewing separately obtained history, documenting clinical information in the electronic or other health record, independently interpreting results and communicating results to the patient/family/caregiver, or care coordinator.     Additional 49 min time spent on a voluntary advance care planning and /or goals of care discussion, providing emotional support, formulating and communicating prognosis and exploring burden/benefit of various approaches of treatment.       El Whiteside Jr, MD    Advance Care Planning     Date: 05/08/2025    Living Will  During this visit, I engaged the patient  in the voluntary advance care planning process.  The patient and I reviewed the role for advance directives and their purpose in directing future healthcare if the patient's unable to speak for him/herself.  At this point in time, the patient does have full decision-making capacity.  We discussed different extreme health states that he could experience, and reviewed what kind of medical care he would want in those situations.  The patient communicated that if he were comatose and had little chance of a meaningful recovery, he would not want machines/life-sustaining treatments used. In addition to the above preference, other important end-of-life issues for the patient include n/a. The patient has completed a living will (not on file) and [place]  (electronically filed) to reflect these preferences and The patient has already designated a healthcare power of  to make decisions on his behalf.  I spent a total of 49 minutes engaging the patient in this advance care planning discussion.          Power of   I initiated the process of voluntary advance care planning today and explained the importance of this process to the patient.  I introduced the concept of advance directives to the patient, as well. Then the patient received detailed information about the importance of designating a Health Care Power of  (HCPOA). He was also instructed to communicate with this person about their wishes for future healthcare, should he become sick and lose decision-making capacity. The patient has previously appointed a HCPOA, his significant other, health care agent: Shira Pedroza & health care agent number: [phone] . I encouraged him to communicate with this person about their wishes for future healthcare, should he become sick and lose decision-making capacity.      A total of 49 min was spent on advance care planning, goals of care discussion, emotional support, formulating and communicating prognosis and exploring burden/benefit of various approaches of treatment. This discussion occurred on a fully voluntary basis with the verbal consent of the patient and/or family.    Code Status  In light of the patients advanced and life limiting illness,I engaged the the patient and healthcare power of   in a voluntary conversation about the patient's preferences for care  at the very end of life. The patient wishes to have a natural, peaceful death.  Along those lines, the patient does not wish to have CPR or other invasive treatments performed when his heart and/or breathing stops. I communicated to the patient and healthcare power of   that a DNR order would be placed in his medical record to reflect this preference.    A total of 49  min was spent on advance care planning, goals of care discussion, emotional support, formulating and communicating prognosis and exploring burden/benefit of various approaches of treatment. This discussion occurred on a fully voluntary basis with the verbal consent of the patient and/or family.     [facility]  I engaged the patient and healthcare power of   in a voluntary conversation about advance care planning and we specifically addressed what the goals of care would be moving forward, in light of the patient's change in clinical status, specifically metastatic cancer presumed pancreatic in origin.  We did specifically address the patient's likely prognosis, which is poor.  We explored the patient's values and preferences for future care.  The patient and healthcare power of   endorses that what is most important right now is to focus on spending time at home, avoiding the hospital, remaining as independent as possible, symptom/pain control, and improvement in condition but with limits to invasive therapies    Accordingly, we have decided that the best plan to meet the patient's goals includes continuing with treatment    A total of 49 min was spent on advance care planning, goals of care discussion, emotional support, formulating and communicating prognosis and exploring burden/benefit of various approaches of treatment. This discussion occurred on a fully voluntary basis with the verbal consent of the patient and/or family.     Hospice  I did explain the role for hospice care at this stage of the patient's illness, including its ability to help the patient live with the best quality of life possible.  We will not be making a hospice referral at this time.             [1]   Current Outpatient Medications:     alcohol swabs PadM, Apply 1 each topically as needed., Disp: 100 each, Rfl: 3    apixaban (ELIQUIS) 5 mg Tab, Take 2 tablets (10 mg total) by mouth 2 (two) times daily for 2 days, THEN 1 tablet (5  mg total) 2 (two) times daily., Disp: 188 tablet, Rfl: 0    ascorbic acid, vitamin C, (VITAMIN C) 500 MG tablet, Take 500 mg by mouth once daily., Disp: , Rfl:     aspirin (ECOTRIN) 81 MG EC tablet, Take 1 tablet (81 mg total) by mouth once daily., Disp: 90 tablet, Rfl: 3    blood sugar diagnostic (TRUE METRIX GLUCOSE TEST STRIP) Strp, 200 each by Misc.(Non-Drug; Combo Route) route Daily., Disp: 200 each, Rfl: 0    cholecalciferol, vitamin D3, 1,000 unit capsule, Take 1,000 Units by mouth once daily., Disp: , Rfl:     dicyclomine (BENTYL) 10 MG capsule, Take 1 capsule (10 mg total) by mouth 2 (two) times daily., Disp: 60 capsule, Rfl: 0    ezetimibe (ZETIA) 10 mg tablet, Take 1 tablet (10 mg total) by mouth once daily., Disp: 90 tablet, Rfl: 3    lancets 30 gauge Misc, 1 lancet  by Misc.(Non-Drug; Combo Route) route Daily., Disp: 200 each, Rfl: 3    methocarbamoL (ROBAXIN) 500 MG Tab, Take 1 tablet (500 mg total) by mouth 4 (four) times daily. for 10 days, Disp: 40 tablet, Rfl: 0    morphine (MSIR) 15 MG tablet, Take 1 tablet (15 mg total) by mouth every 4 (four) hours as needed for Pain., Disp: 126 tablet, Rfl: 0    multivitamin with minerals tablet, Take 1 tablet by mouth once daily., Disp: , Rfl:     nebivoloL (BYSTOLIC) 5 MG Tab, Take 1 tablet (5 mg total) by mouth once daily., Disp: 90 tablet, Rfl: 3    rosuvastatin (CRESTOR) 10 MG tablet, Take 1 tablet (10 mg total) by mouth once daily., Disp: 90 tablet, Rfl: 3    senna-docusate (PERICOLACE) 8.6-50 mg per tablet, Take 1 tablet by mouth daily as needed for Constipation., Disp: 10 tablet, Rfl: 0    tamsulosin (FLOMAX) 0.4 mg Cap, TAKE ONE CAPSULE BY MOUTH EVERY DAY, Disp: 90 capsule, Rfl: 1    XIGDUO XR 5-1,000 mg, TAKE TWO TABLETS BY MOUTH EVERY DAY, Disp: 180 tablet, Rfl: 1    gabapentin (NEURONTIN) 300 MG capsule, Take 1 capsule (300 mg total) by mouth every evening. (Patient not taking: Reported on 5/6/2025), Disp: , Rfl:     oxyCODONE myristate (XTAMPZA ER)  18 mg CSpT, Take 1 capsule (18 mg total) by mouth every 12 (twelve) hours. for 21 days (Patient not taking: Reported on 5/6/2025), Disp: 42 capsule, Rfl: 0    oxyCODONE-acetaminophen (PERCOCET) 5-325 mg per tablet, Take 1 tablet by mouth every 6 (six) hours as needed for Pain. Take with food (Patient not taking: Reported on 5/6/2025), Disp: 30 tablet, Rfl: 0

## 2025-05-07 ENCOUNTER — TELEPHONE (OUTPATIENT)
Dept: HEMATOLOGY/ONCOLOGY | Facility: CLINIC | Age: 70
End: 2025-05-07
Payer: MEDICARE

## 2025-05-07 DIAGNOSIS — C78.7 METASTASIS TO LIVER: ICD-10-CM

## 2025-05-07 DIAGNOSIS — C25.8 OVERLAPPING MALIGNANT NEOPLASM OF PANCREAS: Primary | ICD-10-CM

## 2025-05-07 LAB
ESTROGEN SERPL-MCNC: ABNORMAL PG/ML
INSULIN SERPL-ACNC: ABNORMAL U[IU]/ML
LAB AP CLINICAL INFORMATION: ABNORMAL
LAB AP COMMENTS: ABNORMAL
LAB AP GROSS DESCRIPTION: ABNORMAL
LAB AP NON-GYN INTERPRETATION SPECIMEN 1: ABNORMAL
LAB AP PERFORMING LOCATION(S): ABNORMAL
LAB AP REPORT FOOTNOTES: ABNORMAL
ONEOME COMMENT: NORMAL
ONEOME METHOD: NORMAL
T3RU NFR SERPL: ABNORMAL %

## 2025-05-07 NOTE — PROGRESS NOTES
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 He presents with worsening of his chronic back pain in Jan 2025. He was found to have a pancreatic mass on CT lumbar spine 4/24/25. C/A/P on 4/29/25 showed 9.4 cm mass involving the pancreatic body and tail highly concerning for adenocarcinoma. It abuts the stomach, duodenum, proximal jejunum, and left adrenal gland without convincing invasion. It encases the celiac, common hepatic, and superior mesenteric arteries which remain patent. It encases the splenic artery which is moderately narrowed. It abuts the aorta and left renal artery which remain patent. Occludes the splenic and superior mesenteric veins, with nonocclusive thrombus extending into the main portal vein. There are portosystemic collaterals in the upper abdomen. Enlarged periportal lymph node concerning for metastasis. Multiple liver metastases measuring up to 2.8 cm. Few solid pulmonary nodules measuring up to 0.7 cm concerning for metastases. Upper EUS biopsy of the pancreatic tail mass on 4/30/25 showed undifferentiated carcinoma. MMR-proficient, ALY. Comment: Histologic sections reveal pleomorphic tumor cells with bizarre nuclear atypia, moderately abundant amphophilic cytoplasm, and multinucleated forms. Rare cells that may represent osteoclast-like giant cells are noted. Background necrosis is readily apparent. Tumor cells exhibits extensive positivity for pankeratin AE1/3 by immunohistochemistry. BerEP4 is negative. The morphologic features and immunoprofile support the above interpretation. Tempus pending. On 4/29/25, CA 19-9 normal at 33.8. PGX: DPYD normal metabolizer, UGT1A1 intermediate  metabolizer. Patient noted 30 lbs weight loss over the past 3 months, anorexia, pain better on current pain regimen. He has a daughter and a son. Accompanied by wife, brother and sister-in-law today.     ECO     Past Medical History:   Diagnosis Date    Carotid artery stenosis 11/15/2012    DDD (degenerative disc disease) 2012    Diabetes mellitus     DJD (degenerative joint disease) 2012    High cholesterol 2012    HTN (hypertension) 2014    2013    Increased glucose level 2012    LBP (low back pain) 2012    Neck pain 2014    MATEUSZ 2014    Normal stress echocardiogram 2012        Past Surgical History:   Procedure Laterality Date    ENDOSCOPIC ULTRASOUND OF UPPER GASTROINTESTINAL TRACT N/A 2025    Procedure: ULTRASOUND, UPPER GI TRACT, ENDOSCOPIC;  Surgeon: Fish Curtis MD;  Location: [address]);  Service: Endoscopy;  Laterality: N/A;    INTERPOSITION ARTHROPLASTY OF CARPOMETACARPAL JOINTS Right 2023    Procedure: INTERPOSITION ARTHROPLASTY, CMC JOINT;  Surgeon: Williams, Claude S. IV, MD;  Location: [facility];  Service: Orthopedics;  Laterality: Right;    L-spine surgery  12/15    Dr. Correia       Family History   Problem Relation Name Age of Onset    Diabetes Mother      Hypertension Mother      Heart disease Father  62        MI       Social History[1]    Review of Systems   Constitutional:  Positive for fatigue and unexpected weight change. Negative for appetite change, chills and fever.   HENT:  Negative for mouth sores, nosebleeds, tinnitus, trouble swallowing and voice change.    Eyes:  Negative for pain, redness and visual disturbance.   Respiratory:  Negative for cough, shortness of breath and wheezing.    Cardiovascular:  Negative for chest pain, palpitations and leg swelling.   Gastrointestinal:  Positive for abdominal pain and constipation. Negative for abdominal distention, blood in stool, diarrhea, nausea and vomiting.   Endocrine:  Negative for polydipsia, polyphagia and polyuria.   Genitourinary:  Negative for flank pain, frequency and hematuria.   Musculoskeletal:  Negative for arthralgias, back pain, gait problem, joint swelling, myalgias, neck pain and neck stiffness.   Skin:  Negative for color change, pallor, rash and wound.   Neurological:  Negative for tremors, seizures, syncope, speech difficulty, weakness, light-headedness, numbness and headaches.   Hematological:  Negative for adenopathy. Does not bruise/bleed easily.   Psychiatric/Behavioral:  Negative for confusion, dysphoric mood and self-injury. The patient is not nervous/anxious.    All other systems reviewed and are negative.      Objective:  Physical Exam  Vitals reviewed.   Constitutional:       General: He is not in acute distress.     Appearance: He is well-developed. He is ill-appearing.      Comments: cachectic   HENT:      Head: Normocephalic and atraumatic.      Mouth/Throat:      Pharynx: No oropharyngeal exudate.   Eyes:      General: No scleral icterus.     Conjunctiva/sclera: Conjunctivae normal.      Pupils: Pupils are equal, round, and reactive to light.   Neck:      Thyroid: No thyromegaly.      Vascular: No JVD.   Cardiovascular:      Rate and Rhythm: Normal rate and regular rhythm.      Heart sounds: Normal heart sounds. No murmur heard.     No friction rub. No gallop.   Pulmonary:      Effort: Pulmonary effort is normal. No respiratory distress.      Breath sounds: Normal breath sounds. No wheezing or rales.   Abdominal:      General: Bowel sounds are normal. There is no distension.      Palpations: Abdomen is soft. There is no mass.      Tenderness: There is no abdominal tenderness. There is no rebound.      Hernia: No hernia is present.   Musculoskeletal:         General: No tenderness or deformity. Normal range of motion.      Cervical back: Normal range of motion and neck supple.   Lymphadenopathy:      Cervical: No cervical adenopathy.      Upper Body:       Right upper body: No supraclavicular adenopathy.      Left upper body: No supraclavicular adenopathy.   Skin:     General: Skin is warm and dry.      Coloration: Skin is not pale.      Findings: No erythema or rash.   Neurological:      Mental Status: He is alert and oriented to person, place, and time.      Cranial Nerves: No cranial nerve deficit.      Motor: No abnormal muscle tone.   Psychiatric:         Behavior: Behavior normal.         Thought Content: Thought content normal.         Judgment: Judgment normal.         Labs:  On 4/29/25, CA 19-9 normal at 33.8. PGX: DPYD normal metabolizer, UGT1A1 intermediate metabolizer.    Imaging Data:  CT C/A/P 4/29/25:     Impression:     9.4 cm mass involving the pancreatic body and tail highly concerning for adenocarcinoma.  It abuts the stomach, duodenum, proximal jejunum, and left adrenal gland without convincing invasion.     It encases the celiac, common hepatic, and superior mesenteric arteries which remain patent.  It encases the splenic artery which is moderately narrowed.  It abuts the aorta and left renal artery which remain patent.     Occludes the splenic and superior mesenteric veins, with nonocclusive thrombus extending into the main portal vein.  There are portosystemic collaterals in the upper abdomen.     Enlarged periportal lymph node concerning for metastasis.     Multiple liver metastases measuring up to 2.8 cm.     Few solid pulmonary nodules measuring up to 0.7 cm concerning for metastases.     Other findings as described    Assessment and plan:    1. Overlapping malignant neoplasm of pancreas    2. Metastasis to liver    3. 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 Immunodeficiency secondary to neoplasm    6. Anorexia    7. Cancer related pain    8. Primary hypertension    9. Type 2 diabetes mellitus with diabetic peripheral angiopathy without gangrene, without  long-term current use of insulin      1-5  - Mr Pedroza is a 71 yo man with stage IV undifferentiated carcinoma of the pancreatic body/tail, with mets to lymph nodes, liver and lung, MMR-proficient  - Long discussion with patient re the diagnosis, natural history and prognosis of a stage IV undifferentiated carcinoma of the pancreas. Cancer is not curable but treatable. The goal of treatment is palliative, with hopes of containing the cancer, slowing down growth, and prolonging life.   - discussed NALIFIROX vs gem/abraxane if he would like to get chemo. Discussed the regimen, schedule and side effects of each. Discussed the survival benefits of chemo when it works. After long discussion, patient decides to take NALIFIROX  - The side effects of chemotherapy were discussed with patient, which include but are not limited to hair loss, fatigue, decreased appetite, weight loss, weakness, bone marrow suppression, increased risk of infection, sepsis, anemia that may require blood transfusion, low platelet counts with increased risk of bleeding that may require platelet transfusion, diarrhea, constipation, mucositis, damage to the brain, heart, liver, kidney, damage to the nerves that may not be reversible, severe allergic reaction, damage at the injection site, death. Handouts provided to patient.  - Consented the patient to the treatment plan and the patient was educated on the planned duration of the treatment and schedule of the treatment administration.  - emla sent to [facility]. Antiemetics will got to [facility]  - discussed germline testing. He is interested. Will draw on return  - consult IR for port placement  - consult IR for neurolysis for pain  - return in 1-2 weeks to start chemo  - tempus blood pending. F/u on results.     6.  - try periactin    7.  - f/u with palliative  - consult IR for neurolysis    8.  - BP controlled  - c/w current medication    9.  - BS controlled  - c/w current  medication           [1]   Social History  Socioeconomic History    Marital status:     Number of children: 2   Occupational History    Occupation:      Comment: [employer]   Tobacco Use    Smoking status: Former     Current packs/day: 0.00     Types: Cigarettes     Quit date: 2010     Years since quittin.8    Smokeless tobacco: Never   Substance and Sexual Activity    Alcohol use: Yes     Alcohol/week: 8.0 standard drinks of alcohol     Types: 5 Cans of beer, 3 Standard drinks or equivalent per week    Drug use: No    Sexual activity: Yes     Partners: Female   Social History Narrative    Does [employer].    Retired in 10/18.     Social Drivers of Health     Financial Resource Strain: Patient Declined (2025)    Overall Financial Resource Strain (CARDIA)     Difficulty of Paying Living Expenses: Patient declined   Food Insecurity: Patient Declined (2025)    Hunger Vital Sign     Worried About Running Out of Food in the Last Year: Patient declined     Ran Out of Food in the Last Year: Patient declined   Transportation Needs: Patient Declined (2025)    PRAPARE - Transportation     Lack of Transportation (Medical): Patient declined     Lack of Transportation (Non-Medical): Patient declined   Stress: Patient Declined (2025)    [place] [employer] - Occupational Stress Questionnaire     Feeling of Stress : Patient declined   Housing Stability: Patient Declined (2025)    Housing Stability Vital Sign     Unable to Pay for Housing in the Last Year: Patient declined     Homeless in the Last Year: Patient declined

## 2025-05-08 ENCOUNTER — LAB VISIT (OUTPATIENT)
Dept: LAB | Facility: HOSPITAL | Age: 70
End: 2025-05-08
Attending: INTERNAL MEDICINE
Payer: MEDICARE

## 2025-05-08 ENCOUNTER — OFFICE VISIT (OUTPATIENT)
Dept: HEMATOLOGY/ONCOLOGY | Facility: CLINIC | Age: 70
End: 2025-05-08
Payer: MEDICARE

## 2025-05-08 VITALS
BODY MASS INDEX: 20.14 KG/M2 | DIASTOLIC BLOOD PRESSURE: 64 MMHG | WEIGHT: 124.75 LBS | SYSTOLIC BLOOD PRESSURE: 130 MMHG | HEART RATE: 100 BPM

## 2025-05-08 DIAGNOSIS — C78.7 METASTASIS TO LIVER: ICD-10-CM

## 2025-05-08 DIAGNOSIS — D49.9 IMMUNODEFICIENCY SECONDARY TO NEOPLASM: ICD-10-CM

## 2025-05-08 DIAGNOSIS — C78.01 MALIGNANT NEOPLASM METASTATIC TO BOTH LUNGS: ICD-10-CM

## 2025-05-08 DIAGNOSIS — G89.3 CANCER RELATED PAIN: ICD-10-CM

## 2025-05-08 DIAGNOSIS — C25.8 OVERLAPPING MALIGNANT NEOPLASM OF PANCREAS: ICD-10-CM

## 2025-05-08 DIAGNOSIS — C77.8 SECONDARY MALIGNANT NEOPLASM OF LYMPH NODES OF MULTIPLE SITES: ICD-10-CM

## 2025-05-08 DIAGNOSIS — D84.81 IMMUNODEFICIENCY SECONDARY TO NEOPLASM: ICD-10-CM

## 2025-05-08 DIAGNOSIS — R63.0 ANOREXIA: ICD-10-CM

## 2025-05-08 DIAGNOSIS — C78.02 MALIGNANT NEOPLASM METASTATIC TO BOTH LUNGS: ICD-10-CM

## 2025-05-08 DIAGNOSIS — C25.8 OVERLAPPING MALIGNANT NEOPLASM OF PANCREAS: Primary | ICD-10-CM

## 2025-05-08 DIAGNOSIS — E11.51 TYPE 2 DIABETES MELLITUS WITH DIABETIC PERIPHERAL ANGIOPATHY WITHOUT GANGRENE, WITHOUT LONG-TERM CURRENT USE OF INSULIN: ICD-10-CM

## 2025-05-08 DIAGNOSIS — I10 PRIMARY HYPERTENSION: ICD-10-CM

## 2025-05-08 LAB
ABSOLUTE EOSINOPHIL (OHS): 0.07 K/UL
ABSOLUTE MONOCYTE (OHS): 0.95 K/UL (ref 0.3–1)
ABSOLUTE NEUTROPHIL COUNT (OHS): 8.37 K/UL (ref 1.8–7.7)
ALBUMIN SERPL BCP-MCNC: 3.1 G/DL (ref 3.5–5.2)
ALP SERPL-CCNC: 181 UNIT/L (ref 40–150)
ALT SERPL W/O P-5'-P-CCNC: 23 UNIT/L (ref 10–44)
ANION GAP (OHS): 11 MMOL/L (ref 8–16)
AST SERPL-CCNC: 27 UNIT/L (ref 11–45)
BASOPHILS # BLD AUTO: 0.07 K/UL
BASOPHILS NFR BLD AUTO: 0.7 %
BILIRUB SERPL-MCNC: 0.4 MG/DL (ref 0.1–1)
BUN SERPL-MCNC: 19 MG/DL (ref 8–23)
CALCIUM SERPL-MCNC: 10.4 MG/DL (ref 8.7–10.5)
CHLORIDE SERPL-SCNC: 93 MMOL/L (ref 95–110)
CO2 SERPL-SCNC: 31 MMOL/L (ref 23–29)
CREAT SERPL-MCNC: 0.7 MG/DL (ref 0.5–1.4)
ERYTHROCYTE [DISTWIDTH] IN BLOOD BY AUTOMATED COUNT: 13.8 % (ref 11.5–14.5)
GFR SERPLBLD CREATININE-BSD FMLA CKD-EPI: >60 ML/MIN/1.73/M2
GLUCOSE SERPL-MCNC: 159 MG/DL (ref 70–110)
HCT VFR BLD AUTO: 38.4 % (ref 40–54)
HGB BLD-MCNC: 12.6 GM/DL (ref 14–18)
IMM GRANULOCYTES # BLD AUTO: 0.03 K/UL (ref 0–0.04)
IMM GRANULOCYTES NFR BLD AUTO: 0.3 % (ref 0–0.5)
LYMPHOCYTES # BLD AUTO: 0.81 K/UL (ref 1–4.8)
MCH RBC QN AUTO: 31.8 PG (ref 27–31)
MCHC RBC AUTO-ENTMCNC: 32.8 G/DL (ref 32–36)
MCV RBC AUTO: 97 FL (ref 82–98)
NUCLEATED RBC (/100WBC) (OHS): 0 /100 WBC
PLATELET # BLD AUTO: 373 K/UL (ref 150–450)
PLATELET BLD QL SMEAR: NORMAL
PMV BLD AUTO: 9.2 FL (ref 9.2–12.9)
POTASSIUM SERPL-SCNC: 3.7 MMOL/L (ref 3.5–5.1)
PROT SERPL-MCNC: 7.2 GM/DL (ref 6–8.4)
RBC # BLD AUTO: 3.96 M/UL (ref 4.6–6.2)
RELATIVE EOSINOPHIL (OHS): 0.7 %
RELATIVE LYMPHOCYTE (OHS): 7.9 % (ref 18–48)
RELATIVE MONOCYTE (OHS): 9.2 % (ref 4–15)
RELATIVE NEUTROPHIL (OHS): 81.2 % (ref 38–73)
SODIUM SERPL-SCNC: 135 MMOL/L (ref 136–145)
WBC # BLD AUTO: 10.3 K/UL (ref 3.9–12.7)

## 2025-05-08 PROCEDURE — 99205 OFFICE O/P NEW HI 60 MIN: CPT | Mod: S$GLB,,, | Performed by: INTERNAL MEDICINE

## 2025-05-08 PROCEDURE — 99999 PR PBB SHADOW E&M-EST. PATIENT-LVL IV: CPT | Mod: PBBFAC,,, | Performed by: INTERNAL MEDICINE

## 2025-05-08 PROCEDURE — 84460 ALANINE AMINO (ALT) (SGPT): CPT

## 2025-05-08 PROCEDURE — G2211 COMPLEX E/M VISIT ADD ON: HCPCS | Mod: S$GLB,,, | Performed by: INTERNAL MEDICINE

## 2025-05-08 PROCEDURE — 1101F PT FALLS ASSESS-DOCD LE1/YR: CPT | Mod: CPTII,S$GLB,, | Performed by: INTERNAL MEDICINE

## 2025-05-08 PROCEDURE — 3078F DIAST BP <80 MM HG: CPT | Mod: CPTII,S$GLB,, | Performed by: INTERNAL MEDICINE

## 2025-05-08 PROCEDURE — 1157F ADVNC CARE PLAN IN RCRD: CPT | Mod: CPTII,S$GLB,, | Performed by: INTERNAL MEDICINE

## 2025-05-08 PROCEDURE — 3288F FALL RISK ASSESSMENT DOCD: CPT | Mod: CPTII,S$GLB,, | Performed by: INTERNAL MEDICINE

## 2025-05-08 PROCEDURE — 1159F MED LIST DOCD IN RCRD: CPT | Mod: CPTII,S$GLB,, | Performed by: INTERNAL MEDICINE

## 2025-05-08 PROCEDURE — 1160F RVW MEDS BY RX/DR IN RCRD: CPT | Mod: CPTII,S$GLB,, | Performed by: INTERNAL MEDICINE

## 2025-05-08 PROCEDURE — 36415 COLL VENOUS BLD VENIPUNCTURE: CPT

## 2025-05-08 PROCEDURE — 3008F BODY MASS INDEX DOCD: CPT | Mod: CPTII,S$GLB,, | Performed by: INTERNAL MEDICINE

## 2025-05-08 PROCEDURE — 85025 COMPLETE CBC W/AUTO DIFF WBC: CPT

## 2025-05-08 PROCEDURE — 3075F SYST BP GE 130 - 139MM HG: CPT | Mod: CPTII,S$GLB,, | Performed by: INTERNAL MEDICINE

## 2025-05-08 PROCEDURE — 1111F DSCHRG MED/CURRENT MED MERGE: CPT | Mod: CPTII,S$GLB,, | Performed by: INTERNAL MEDICINE

## 2025-05-08 PROCEDURE — 1125F AMNT PAIN NOTED PAIN PRSNT: CPT | Mod: CPTII,S$GLB,, | Performed by: INTERNAL MEDICINE

## 2025-05-08 RX ORDER — LIDOCAINE AND PRILOCAINE 25; 25 MG/G; MG/G
CREAM TOPICAL ONCE
Qty: 30 G | Refills: 2 | Status: SHIPPED | OUTPATIENT
Start: 2025-05-08 | End: 2025-05-09

## 2025-05-08 RX ORDER — CYPROHEPTADINE HYDROCHLORIDE 4 MG/1
4 TABLET ORAL 3 TIMES DAILY
Qty: 90 TABLET | Refills: 11 | Status: SHIPPED | OUTPATIENT
Start: 2025-05-08

## 2025-05-08 NOTE — Clinical Note
Patient will be scheduled for NALIFIROX. The Thursday before NALIFIROX, see me (or Dr Anderson if I am out of office) with CBC, CMP, CA 19-9, tempus germline test (ordered), then chemo. 2 weeks after that, see me with CBC, CMP, CA 19-9, then chemo. Patient needs to be seen every 2 weeks with labs for chemo. Get tempus germline test with the first lab.

## 2025-05-09 DIAGNOSIS — C25.8 OVERLAPPING MALIGNANT NEOPLASM OF PANCREAS: Primary | ICD-10-CM

## 2025-05-12 ENCOUNTER — PATIENT MESSAGE (OUTPATIENT)
Dept: PALLIATIVE MEDICINE | Facility: CLINIC | Age: 70
End: 2025-05-12
Payer: MEDICARE

## 2025-05-12 ENCOUNTER — OFFICE VISIT (OUTPATIENT)
Dept: INTERVENTIONAL RADIOLOGY/VASCULAR | Facility: CLINIC | Age: 70
End: 2025-05-12
Payer: MEDICARE

## 2025-05-12 DIAGNOSIS — C25.8 OVERLAPPING MALIGNANT NEOPLASM OF PANCREAS: Primary | ICD-10-CM

## 2025-05-12 PROCEDURE — 1157F ADVNC CARE PLAN IN RCRD: CPT | Mod: CPTII,95,,

## 2025-05-12 PROCEDURE — 98002 SYNCH AUDIO-VIDEO NEW MOD 45: CPT | Mod: 95,,,

## 2025-05-12 RX ORDER — LIDOCAINE HYDROCHLORIDE 10 MG/ML
1 INJECTION, SOLUTION EPIDURAL; INFILTRATION; INTRACAUDAL; PERINEURAL ONCE
Status: CANCELLED | OUTPATIENT
Start: 2025-05-12 | End: 2025-05-12

## 2025-05-12 RX ORDER — SODIUM CHLORIDE 9 MG/ML
INJECTION, SOLUTION INTRAVENOUS CONTINUOUS
Status: CANCELLED | OUTPATIENT
Start: 2025-05-12

## 2025-05-12 NOTE — PROGRESS NOTES
Subjective     Patient ID: Gael Pedroza is a 70 y.o. male.    Chief Complaint: Back Pain    69 yo man with carotic artery stenosis, left ventricular diastolic dysfunction, HTN, stenosis of left subclavian artery, T2DM, who presents today to discuss pain procedures to help with pancreatic cancer pain. CC: 10/10 back pain that will radiate to his rids and abdominal regions. Imaging showed a pancreatic mass on his CT lumbar spine. Biopsy confirmed metastatic pancreatic cancer. Pain medications helps his pain. No other complaints today. Scheduled to get a port this week and will begin chemo soon.     PMH and medications reviewed.   No longer taking ASA. Will hold Eliquis for 48 hours prior to the procedure.  Procedure will be scheduled with general anes  Not taking any GLP-1 Agonists.  Dr. Granados clinic note reviewed.     Review of Systems   Constitutional:  Positive for appetite change, fatigue and unexpected weight change.   Respiratory:  Negative for shortness of breath.    Cardiovascular:  Negative for chest pain.   Gastrointestinal:  Positive for abdominal pain.   Musculoskeletal:  Positive for back pain.   Neurological:  Negative for facial asymmetry and speech difficulty.   Psychiatric/Behavioral:  Negative for behavioral problems and confusion.         Objective     Physical Exam  Constitutional:       Appearance: Normal appearance.      Comments: Virtual visit.    HENT:      Head: Atraumatic.      Nose: Nose normal.   Eyes:      Conjunctiva/sclera: Conjunctivae normal.   Pulmonary:      Effort: Pulmonary effort is normal. No respiratory distress.   Neurological:      General: No focal deficit present.      Mental Status: He is alert and oriented to person, place, and time.   Psychiatric:         Mood and Affect: Mood normal.         Behavior: Behavior normal.          Assessment and Plan     1. Overlapping malignant neoplasm of pancreas  Overview:  CT 4/25 - 9.6 cm mass in pancreas with multiple Mets to liver  and enlarged lymph nodes.    Orders:  -     IR Celiac Nerve Block (XPD); Future; Expected date: 05/12/2025    - Splanchnic pleux neuro cyroablation approved by Dr. Leyva. Referral from Dr. Granados.   - Discussed how the procedure will be performed, risks (including, but not limited to, pain, bleeding, infection, damage to nearby structures, and the need for additional procedures), benefits, possible complications, pre-post procedure expectations, and alternatives. The patient voices understanding and all questions have been answered.  The patient agrees to proceed as planned. Patient scheduled for 5/19. Clinic phone number provided.    The patient location is: [place]  The chief complaint leading to consultation is: back and abdominal pain    Visit type: audiovisual    45 minutes of total time spent on the encounter, which includes face to face time and non-face to face time preparing to see the patient (eg, review of tests), Obtaining and/or reviewing separately obtained history, Documenting clinical information in the electronic or other health record, Independently interpreting results (not separately reported) and communicating results to the patient/family/caregiver, or Care coordination (not separately reported).     Each patient to whom he or she provides medical services by telemedicine is:  (1) informed of the relationship between the physician and patient and the respective role of any other health care provider with respect to management of the patient; and (2) notified that he or she may decline to receive medical services by telemedicine and may withdraw from such care at any time.    Alie Kowalski PA-C  Interventional Radiology  [phone]

## 2025-05-13 ENCOUNTER — OFFICE VISIT (OUTPATIENT)
Dept: INTERNAL MEDICINE | Facility: CLINIC | Age: 70
End: 2025-05-13
Attending: INTERNAL MEDICINE
Payer: MEDICARE

## 2025-05-13 ENCOUNTER — PATIENT MESSAGE (OUTPATIENT)
Dept: HEMATOLOGY/ONCOLOGY | Facility: CLINIC | Age: 70
End: 2025-05-13
Payer: MEDICARE

## 2025-05-13 ENCOUNTER — TELEPHONE (OUTPATIENT)
Dept: PALLIATIVE MEDICINE | Facility: CLINIC | Age: 70
End: 2025-05-13
Payer: MEDICARE

## 2025-05-13 DIAGNOSIS — I10 PRIMARY HYPERTENSION: ICD-10-CM

## 2025-05-13 DIAGNOSIS — I65.21 STENOSIS OF RIGHT CAROTID ARTERY: ICD-10-CM

## 2025-05-13 DIAGNOSIS — M54.50 CHRONIC LOW BACK PAIN WITHOUT SCIATICA, UNSPECIFIED BACK PAIN LATERALITY: ICD-10-CM

## 2025-05-13 DIAGNOSIS — G89.29 CHRONIC LOW BACK PAIN WITHOUT SCIATICA, UNSPECIFIED BACK PAIN LATERALITY: ICD-10-CM

## 2025-05-13 DIAGNOSIS — I81 PORTAL VEIN THROMBOSIS: ICD-10-CM

## 2025-05-13 DIAGNOSIS — E11.51 TYPE 2 DIABETES MELLITUS WITH DIABETIC PERIPHERAL ANGIOPATHY WITHOUT GANGRENE, WITHOUT LONG-TERM CURRENT USE OF INSULIN: ICD-10-CM

## 2025-05-13 DIAGNOSIS — E43 SEVERE MALNUTRITION: Chronic | ICD-10-CM

## 2025-05-13 DIAGNOSIS — C25.8 OVERLAPPING MALIGNANT NEOPLASM OF PANCREAS: Primary | ICD-10-CM

## 2025-05-13 DIAGNOSIS — K86.89 PANCREATIC MASS: ICD-10-CM

## 2025-05-13 PROBLEM — E66.3 OVERWEIGHT: Status: RESOLVED | Noted: 2019-06-10 | Resolved: 2025-05-13

## 2025-05-13 NOTE — PROGRESS NOTES
Telemedicine Visit    The patient verbally consented to proceed with a telemedicine visit, following discussion of the options of face-to-face or telemedicine visit. The telemedicine visit was completed with real-time interactive audio and video using Zoom without complication.    The visit was conducted with a limited physical exam (visual exam), and was medically appropriate to meet the patient's needs.    The patient gives permission for us to bill for this telemedicine visit.  The patient understands that there are limitations including diagnostic, technical, and physical exam.  The patient understands that they have a right to stop the telemedicine visit at any time.    The patient is at their home.  The patient appeared somewhat frail, with normal responses and affect.  I did not notice any abnormal skin tone or abnormal breathing patterns.    I requested that the patient contact my office at [phone] to make an appointment for routine follow-up care, unless more specific instructions were given under A/P.    Chief Complaint:  Hospital follow-up for Metastatic pancreatic cancer    HPI:  Hospital Follow-up:    Admit date: 4/29/25  Discharge date: 5/3/25  Hospital D/c for:  Metastatic pancreatic cancer    Patient was called within 2 days of hospital discharge and made a follow-up appt with me within 14 calendar days of discharge.  The Discharge Summary was reviewed.       Family and/or Caretaker present at visit?  Yes. Wife  Diagnostic tests reviewed/disposition:  Yes.  Disease/illness education: Yes.   Home health/community services discussion/referrals: Doesn't need home health at this time.  Establishment or re-establishment of referral orders for community resources: None needed   Discussion with other health care providers: Notes in EPIC for review by all healthcare providers.          A/P:  Per orders and D/C instructions.  Continue diet and/or meds for DM, HTN, and high cholesterol, which are stable.    Continue with palliative care for pain management.  He has an appointment with  Interventional Radiology to get an epidural for his low back pain.  Follow-up with heme Onc for metastatic pancreatic cancer.

## 2025-05-13 NOTE — TELEPHONE ENCOUNTER
Called and spoke to patient in regards to the medical marijuana, I informed her that Dr. Whiteside has okayed to move forward and that I need to fax out the form. Patient verbalized understanding

## 2025-05-13 NOTE — TELEPHONE ENCOUNTER
I spoke to the patient wife Shira and informed her that her  cannabis form has been faxed over to [employer]. She reported that she would be heading with the patient to the shop.

## 2025-05-14 ENCOUNTER — PATIENT MESSAGE (OUTPATIENT)
Dept: INTERVENTIONAL RADIOLOGY/VASCULAR | Facility: HOSPITAL | Age: 70
End: 2025-05-14
Payer: MEDICARE

## 2025-05-14 NOTE — NURSING
Pre-procedure call complete.  Arrival time 0630.Spoke to Pt's wife Shira and daughter Tanesha  (involvement of care  per pts permission).  Instructed to have patient not to eat or drink anything after midnight the night before procedure.  Aware will need someone to provide transport home and monitor pt 8 hours post procedure.  No driving for at least 24 hours after procedure.   Advised to have patient to take blood pressure, heart medications,  with a sip of water morning of procedure.  Verbalized aware of which medications to take.  Do not take  sleep medication (including OTC) and anxiety medication the night before procedure.  Arrival time and location given.  Expected length of stay reviewed.  Covid screening completed.  Patient's wife and daughter verbalized understanding of all pre-procedure instructions.  Written instructions and directions sent to patient in [facility]/portal.

## 2025-05-15 ENCOUNTER — PATIENT MESSAGE (OUTPATIENT)
Dept: HEMATOLOGY/ONCOLOGY | Facility: CLINIC | Age: 70
End: 2025-05-15
Payer: MEDICARE

## 2025-05-15 ENCOUNTER — HOSPITAL ENCOUNTER (OUTPATIENT)
Dept: INTERVENTIONAL RADIOLOGY/VASCULAR | Facility: HOSPITAL | Age: 70
Discharge: HOME | End: 2025-05-15
Attending: INTERNAL MEDICINE | Admitting: RADIOLOGY
Payer: MEDICARE

## 2025-05-15 ENCOUNTER — TELEPHONE (OUTPATIENT)
Dept: HEMATOLOGY/ONCOLOGY | Facility: CLINIC | Age: 70
End: 2025-05-15
Payer: MEDICARE

## 2025-05-15 VITALS
TEMPERATURE: 99 F | DIASTOLIC BLOOD PRESSURE: 58 MMHG | OXYGEN SATURATION: 98 % | RESPIRATION RATE: 18 BRPM | HEART RATE: 100 BPM | SYSTOLIC BLOOD PRESSURE: 117 MMHG

## 2025-05-15 DIAGNOSIS — C25.8 OVERLAPPING MALIGNANT NEOPLASM OF PANCREAS: ICD-10-CM

## 2025-05-15 DIAGNOSIS — R53.1 GENERAL WEAKNESS: ICD-10-CM

## 2025-05-15 DIAGNOSIS — M54.59 OTHER LOW BACK PAIN: Primary | ICD-10-CM

## 2025-05-15 LAB — POCT GLUCOSE: 148 MG/DL (ref 70–110)

## 2025-05-15 PROCEDURE — 63600175 PHARM REV CODE 636 W HCPCS: Performed by: STUDENT IN AN ORGANIZED HEALTH CARE EDUCATION/TRAINING PROGRAM

## 2025-05-15 PROCEDURE — 94760 N-INVAS EAR/PLS OXIMETRY 1: CPT

## 2025-05-15 PROCEDURE — 99900035 HC TECH TIME PER 15 MIN (STAT)

## 2025-05-15 RX ORDER — LIDOCAINE HYDROCHLORIDE 10 MG/ML
1 INJECTION, SOLUTION EPIDURAL; INFILTRATION; INTRACAUDAL; PERINEURAL ONCE
Status: DISCONTINUED | OUTPATIENT
Start: 2025-05-15 | End: 2025-05-16 | Stop reason: HOSPADM

## 2025-05-15 RX ORDER — SODIUM CHLORIDE 9 MG/ML
INJECTION, SOLUTION INTRAVENOUS CONTINUOUS
Status: DISCONTINUED | OUTPATIENT
Start: 2025-05-15 | End: 2025-05-16 | Stop reason: HOSPADM

## 2025-05-15 RX ORDER — LIDOCAINE HYDROCHLORIDE 10 MG/ML
1 INJECTION, SOLUTION EPIDURAL; INFILTRATION; INTRACAUDAL; PERINEURAL ONCE
Status: DISCONTINUED | OUTPATIENT
Start: 2025-05-15 | End: 2025-05-15

## 2025-05-15 RX ORDER — FENTANYL CITRATE 50 UG/ML
INJECTION, SOLUTION INTRAMUSCULAR; INTRAVENOUS
Status: COMPLETED | OUTPATIENT
Start: 2025-05-15 | End: 2025-05-15

## 2025-05-15 RX ORDER — LIDOCAINE HYDROCHLORIDE 20 MG/ML
INJECTION, SOLUTION INFILTRATION; PERINEURAL
Status: COMPLETED | OUTPATIENT
Start: 2025-05-15 | End: 2025-05-15

## 2025-05-15 RX ORDER — MIDAZOLAM HYDROCHLORIDE 1 MG/ML
INJECTION, SOLUTION INTRAMUSCULAR; INTRAVENOUS
Status: COMPLETED | OUTPATIENT
Start: 2025-05-15 | End: 2025-05-15

## 2025-05-15 RX ORDER — HEPARIN SODIUM 1000 [USP'U]/ML
INJECTION, SOLUTION INTRAVENOUS; SUBCUTANEOUS
Status: COMPLETED | OUTPATIENT
Start: 2025-05-15 | End: 2025-05-15

## 2025-05-15 RX ADMIN — LIDOCAINE HYDROCHLORIDE 20 ML: 20 INJECTION, SOLUTION INFILTRATION; PERINEURAL at 07:05

## 2025-05-15 RX ADMIN — HEPARIN SODIUM 500 UNITS: 1000 INJECTION, SOLUTION INTRAVENOUS; SUBCUTANEOUS at 07:05

## 2025-05-15 RX ADMIN — FENTANYL CITRATE 175 MCG: 50 INJECTION, SOLUTION INTRAMUSCULAR; INTRAVENOUS at 07:05

## 2025-05-15 RX ADMIN — MIDAZOLAM HYDROCHLORIDE 2 MG: 1 INJECTION, SOLUTION INTRAMUSCULAR; INTRAVENOUS at 07:05

## 2025-05-15 NOTE — PROCEDURES
Interventional Radiology Post-Procedure Note    Pre Op Diagnosis: pancreatic cancer    Post Op Diagnosis: Same    Procedure: Port placement    Procedure performed by: Colleen Lopez MD    Written Informed Consent Obtained:  Yes    Specimen Removed: No    Estimated Blood Loss:  Minimal     Findings:   Patent right internal jugular vein  Successful placement of 8F port catheter in right internal jugular vein with catheter tip in the right atrium    Port is ready for use.       Colleen Lopez MD  Interventional Radiology

## 2025-05-15 NOTE — DISCHARGE INSTRUCTIONS
Port Placement Discharge Instructions:    You have had a port placed, and unless otherwise instructed no further IR follow up is needed and you should follow up with the doctor who ordered the port. The prot may be used for medications or blood draws depending on the reason it was placed.     Special Instructions:      Wound care: The dressing over your port site may be removed after 3-4  days. After removing the dressing, gently wash area daily with mild soap  and water. Pat dry. Cover with sterile dressing or band aid for 7-10 days  or until site is healed.   You can use a large pad-style bandage, or a 4x4 gauze dressing and tape to cover the site if your clothing (or bra straps) rubbing against it is uncomfortable to you. These items can be found at your local pharmacy or grocery/shopping center.   Ports need to be flushed approximately every 4 to 6 weeks, if not being  used more frequently. You will receive follow up care from your doctor  who ordered your port placement.     If I have skin adhesive how do I care for it?     You may have a type a skin adhesive (glue) instead of a bandage. Do not  bandage a wound treated with a skin adhesive. The skin adhesive works like a  bandage.   Do not use antibiotic ointment as it can break down the adhesive.   You can shower while the adhesive is on your skin (same day is ok), but do  not take a bath or soak or scrub the area for 14 days. Dry your skin by  patting it gently with a towel.   The adhesive will peel off on its own, usually in 5 to 10 days. o If it has been  10 days and you still have adhesive on you, you can use antibiotic ointment  or petroleum jelly to gently rub it off.   You may apply Emla (lidocaine cream) to your port site before needle access,  as long as the cream does not come in contact with steri strips or the skin  adhesive.     What activities can I perform?   If you have been given sedation no driving for 24 hours   Avoid lifting over 10  pounds for 2 days    Avoid heavy lifting or strenuous upper body activities for 5 days        Hand hygiene is VERY important! Anyone, including you, who touches the port site should do so with CLEAN hands.        Interventional Radiology Clinic   For complications   [phone]. Monday - Friday, 8:00 am - 4:00 pm    [phone] After hours and on holidays. Ask to speak with the interventional radiologist on call.     For Scheduling   [phone] Monday - Friday, 8:00 am - 4:00 pm

## 2025-05-15 NOTE — DISCHARGE SUMMARY
Radiology Discharge Summary      Hospital Course: No complications    Admit Date: 5/15/2025  Discharge Date: 05/15/2025     Instructions Given to Patient: Yes  Diet: Resume prior diet  Activity: Activity as tolerated and no driving for today    Description of Condition on Discharge: Stable  Vital Signs (Most Recent): Temp: 98.6 °F (37 °C) (05/15/25 0650)  Pulse: 101 (05/15/25 0757)  Resp: 16 (05/15/25 0757)  BP: (!) 140/70 (05/15/25 0757)  SpO2: 99 % (05/15/25 0757)    Discharge Disposition: Home    Discharge Diagnosis: pancreatic cancer     Follow-up: Follow-up with referring provider    Colleen Lopez MD

## 2025-05-15 NOTE — NURSING
Discussed referral to dietitian with in-person appt confirmed for 6/3/25 at 8am with Oma Souza RD.     Discussed Integrative Oncology resources and support including acupuncture, PT and psychosocial support.     Virtual Visit confirmed for 5/20/25 at 2:30pm with Gabby Benavides NP.     Acupuncture and PT referrals placed per protocol.     JAMILAH Lewis encouraged psychology referral. Sending portal information for review. Contact information provided for additional support, JAMILAH Fountain.

## 2025-05-15 NOTE — Clinical Note
Right: Chest.   Scrubbed with ChloroPrep With Tint.   Painted with None.  Hair: Clipped.  Skin prep dry before draping.

## 2025-05-15 NOTE — H&P
H&P Note  Interventional Radiology    Reason for Consult: port placement    SUBJECTIVE:     Chief Complaint: pancreatic cancer    History of Present Illness: 70M PMHx carotid artery stenosis, HTN, T2DM, and pancreatic cancer who presents for port placement.     Past Medical History:   Diagnosis Date    Carotid artery stenosis 11/15/2012    DDD (degenerative disc disease) 09/26/2012    Diabetes mellitus     DJD (degenerative joint disease) 09/26/2012    High cholesterol 09/26/2012    HTN (hypertension) 09/24/2014    2013    Increased glucose level 09/26/2012    LBP (low back pain) 09/26/2012    Neck pain 09/24/2014    MATEUSZ 2014    Normal stress echocardiogram 09/26/2012     Past Surgical History:   Procedure Laterality Date    ENDOSCOPIC ULTRASOUND OF UPPER GASTROINTESTINAL TRACT N/A 4/30/2025    Procedure: ULTRASOUND, UPPER GI TRACT, ENDOSCOPIC;  Surgeon: Fish Curtis MD;  Location: [address]);  Service: Endoscopy;  Laterality: N/A;    INTERPOSITION ARTHROPLASTY OF CARPOMETACARPAL JOINTS Right 8/11/2023    Procedure: INTERPOSITION ARTHROPLASTY, CMC JOINT;  Surgeon: Williams, Claude S. IV, MD;  [facility];  Service: Orthopedics;  Laterality: Right;    L-spine surgery  12/15    Dr. Correia     Family History   Problem Relation Name Age of Onset    Diabetes Mother      Hypertension Mother      Heart disease Father  62        MI     Social History[1]    Review of Systems:  Constitutional/General:No fever, chills, change in appetite or weight loss.  Hematological/Immuno: no known coagulopathies  Respiratory: no shortness of breath  Cardiovascular: no chest pain  Gastrointestinal: no abdominal pain  Genito-Urinary: no dysuria  Musculoskeletal: +sacral and back pain  Skin: Negative for rash, itching, pigmentation changes, nail or hair changes.  Neurological: no TIA or stroke symptoms  Psychiatric: normal mood/affect, good insight/judgement      OBJECTIVE:     Vital Signs Range (Last 24H):  Temp:  [98.6 °F  "(37 °C)]   Pulse:  []   Resp:  [17-20]   BP: (127)/(60)   SpO2:  [99 %-100 %]     Physical Exam:  General- Patient AAO x3 in NAD  ENT- EOMI  Neck- No masses  CV- Normal rate  Resp-  No increased WOB  GI- Non-distended  Extrem- No cyanosis, clubbing, edema  Derm- No rashes, masses, or lesions noted  Neuro-  No focal deficits noted    Physical Exam  There is no height or weight on file to calculate BMI.    Scheduled Meds:    LIDOcaine (PF) 10 mg/ml (1%)  1 mL Other Once     Continuous Infusions:    0.9% NaCl   Intravenous Continuous        0.9% NaCl   Intravenous Continuous         PRN Meds:    Allergies:   Review of patient's allergies indicates:   Allergen Reactions    Bactrim [sulfamethoxazole-trimethoprim] Itching, Rash and Blisters    Lipitor [atorvastatin]      Achy; pt reports no problem with Crestor    Levaquin [levofloxacin] Other (See Comments)     Muscle aches under shoulder blades       Labs:  No results for input(s): "INR", "PT", "PTT" in the last 168 hours.    Recent Labs   Lab 05/08/25  0956   WBC 10.30   HGB 12.6*   HCT 38.4*   MCV 97         Recent Labs   Lab 05/08/25  0956   *   *   K 3.7   CL 93*   CO2 31*   BUN 19   CREATININE 0.7   CALCIUM 10.4   ALT 23   AST 27   ALBUMIN 3.1*   BILITOT 0.4       Vitals (Most Recent):  Temp: 98.6 °F (37 °C) (05/15/25 0650)  Pulse: 101 (05/15/25 0656)  Resp: 20 (05/15/25 0656)  BP: 127/60 (05/15/25 0650)  SpO2: 100 % (05/15/25 0656)    ASA: 3  Mallampati: 2    Consent obtained.     ASSESSMENT/PLAN:     70M PMHx carotid artery stenosis, HTN, T2DM, and pancreatic cancer  - Port placement with up to moderate sedation    There are no hospital problems to display for this patient.      Discussed how the procedure will be performed, risk/benefits, possible complications, pre-post procedure expectations, and alternatives. The patient voices understanding and all questions have been answered.  The patient agrees to proceed as planned.    Colleen GOLDSTEIN" MD Jessica         [1]   Social History  Tobacco Use    Smoking status: Former     Current packs/day: 0.00     Types: Cigarettes     Quit date: 2010     Years since quittin.8    Smokeless tobacco: Never   Substance Use Topics    Alcohol use: Yes     Alcohol/week: 8.0 standard drinks of alcohol     Types: 5 Cans of beer, 3 Standard drinks or equivalent per week    Drug use: No

## 2025-05-15 NOTE — PLAN OF CARE
Pt in preop bay 30, VSS,  and IV inserted. Pt denies any open wounds on body or the use of any weight loss injections. Pt needs procedural consents signed, otherwise ready to roll.

## 2025-05-17 LAB
DNA RANGE(S) EXAMINED NAR: NORMAL
GENE DIS ANL INTERP-IMP: POSITIVE
GENE DIS ASSESSED: NORMAL
REASON FOR STUDY: NORMAL
TEMPUS BLOOD TUMOR MUTATIONAL BURDEN: 3.8 M/MB
TEMPUS LCA: NORMAL
TEMPUS MSI NOTE: NORMAL
TEMPUS PORTAL: NORMAL
TEMPUS TREATMENT IMPLICATIONS NOTE: NORMAL
TEMPUS TRIAL1: NORMAL
TEMPUS TRIAL2: NORMAL
TEMPUS TRIAL3: NORMAL
TEMPUS TRIALCOUNT: 3

## 2025-05-19 ENCOUNTER — TELEPHONE (OUTPATIENT)
Dept: HEMATOLOGY/ONCOLOGY | Facility: CLINIC | Age: 70
End: 2025-05-19
Payer: MEDICARE

## 2025-05-19 ENCOUNTER — ANESTHESIA EVENT (OUTPATIENT)
Dept: INTERVENTIONAL RADIOLOGY/VASCULAR | Facility: HOSPITAL | Age: 70
End: 2025-05-19
Payer: MEDICARE

## 2025-05-19 ENCOUNTER — HOSPITAL ENCOUNTER (OUTPATIENT)
Dept: INTERVENTIONAL RADIOLOGY/VASCULAR | Facility: HOSPITAL | Age: 70
Discharge: HOME | End: 2025-05-19
Payer: MEDICARE

## 2025-05-19 VITALS
OXYGEN SATURATION: 100 % | HEIGHT: 66 IN | HEART RATE: 100 BPM | RESPIRATION RATE: 20 BRPM | DIASTOLIC BLOOD PRESSURE: 59 MMHG | WEIGHT: 125 LBS | SYSTOLIC BLOOD PRESSURE: 105 MMHG | TEMPERATURE: 98 F | BODY MASS INDEX: 20.09 KG/M2

## 2025-05-19 DIAGNOSIS — C25.8 OVERLAPPING MALIGNANT NEOPLASM OF PANCREAS: ICD-10-CM

## 2025-05-19 LAB
POCT GLUCOSE: 122 MG/DL (ref 70–110)
POCT GLUCOSE: 156 MG/DL (ref 70–110)

## 2025-05-19 PROCEDURE — 82962 GLUCOSE BLOOD TEST: CPT

## 2025-05-19 PROCEDURE — 25000003 PHARM REV CODE 250

## 2025-05-19 PROCEDURE — 63600175 PHARM REV CODE 636 W HCPCS

## 2025-05-19 PROCEDURE — 37000009 HC ANESTHESIA EA ADD 15 MINS

## 2025-05-19 PROCEDURE — 77003 FLUOROGUIDE FOR SPINE INJECT: CPT | Mod: TC | Performed by: RADIOLOGY

## 2025-05-19 PROCEDURE — 37000008 HC ANESTHESIA 1ST 15 MINUTES

## 2025-05-19 PROCEDURE — C2618 PROBE/NEEDLE, CRYO: HCPCS

## 2025-05-19 RX ORDER — PROPOFOL 10 MG/ML
VIAL (ML) INTRAVENOUS
Status: DISCONTINUED | OUTPATIENT
Start: 2025-05-19 | End: 2025-05-19

## 2025-05-19 RX ORDER — ONDANSETRON HYDROCHLORIDE 2 MG/ML
4 INJECTION, SOLUTION INTRAVENOUS EVERY 6 HOURS PRN
Status: DISCONTINUED | OUTPATIENT
Start: 2025-05-19 | End: 2025-05-20 | Stop reason: HOSPADM

## 2025-05-19 RX ORDER — GLUCAGON 1 MG
1 KIT INJECTION
Status: DISCONTINUED | OUTPATIENT
Start: 2025-05-19 | End: 2025-05-20 | Stop reason: HOSPADM

## 2025-05-19 RX ORDER — ONDANSETRON HYDROCHLORIDE 2 MG/ML
INJECTION, SOLUTION INTRAVENOUS
Status: DISCONTINUED | OUTPATIENT
Start: 2025-05-19 | End: 2025-05-19

## 2025-05-19 RX ORDER — VASOPRESSIN 20 [USP'U]/ML
INJECTION, SOLUTION INTRAMUSCULAR; SUBCUTANEOUS
Status: DISCONTINUED | OUTPATIENT
Start: 2025-05-19 | End: 2025-05-19

## 2025-05-19 RX ORDER — FENTANYL CITRATE 50 UG/ML
INJECTION, SOLUTION INTRAMUSCULAR; INTRAVENOUS
Status: DISCONTINUED | OUTPATIENT
Start: 2025-05-19 | End: 2025-05-19

## 2025-05-19 RX ORDER — PHENYLEPHRINE HCL IN 0.9% NACL 1 MG/10 ML
SYRINGE (ML) INTRAVENOUS
Status: DISCONTINUED | OUTPATIENT
Start: 2025-05-19 | End: 2025-05-19

## 2025-05-19 RX ORDER — LIDOCAINE HYDROCHLORIDE 20 MG/ML
INJECTION, SOLUTION EPIDURAL; INFILTRATION; INTRACAUDAL; PERINEURAL
Status: DISCONTINUED | OUTPATIENT
Start: 2025-05-19 | End: 2025-05-19

## 2025-05-19 RX ORDER — OXYCODONE HYDROCHLORIDE 5 MG/1
10 TABLET ORAL ONCE
Refills: 0 | Status: COMPLETED | OUTPATIENT
Start: 2025-05-19 | End: 2025-05-19

## 2025-05-19 RX ORDER — ROCURONIUM BROMIDE 10 MG/ML
INJECTION, SOLUTION INTRAVENOUS
Status: DISCONTINUED | OUTPATIENT
Start: 2025-05-19 | End: 2025-05-19

## 2025-05-19 RX ADMIN — ROCURONIUM BROMIDE 50 MG: 10 INJECTION INTRAVENOUS at 07:05

## 2025-05-19 RX ADMIN — Medication 100 MCG: at 07:05

## 2025-05-19 RX ADMIN — Medication 200 MCG: at 08:05

## 2025-05-19 RX ADMIN — OXYCODONE 10 MG: 5 TABLET ORAL at 10:05

## 2025-05-19 RX ADMIN — LIDOCAINE HYDROCHLORIDE 100 MG: 20 INJECTION, SOLUTION EPIDURAL; INFILTRATION; INTRACAUDAL; PERINEURAL at 07:05

## 2025-05-19 RX ADMIN — VASOPRESSIN 2 UNITS: 20 INJECTION INTRAVENOUS at 08:05

## 2025-05-19 RX ADMIN — SODIUM CHLORIDE: 0.9 INJECTION, SOLUTION INTRAVENOUS at 07:05

## 2025-05-19 RX ADMIN — Medication 300 MCG: at 08:05

## 2025-05-19 RX ADMIN — ONDANSETRON 4 MG: 2 INJECTION INTRAMUSCULAR; INTRAVENOUS at 08:05

## 2025-05-19 RX ADMIN — PROPOFOL 150 MG: 10 INJECTION, EMULSION INTRAVENOUS at 07:05

## 2025-05-19 RX ADMIN — SUGAMMADEX 200 MG: 100 INJECTION, SOLUTION INTRAVENOUS at 08:05

## 2025-05-19 RX ADMIN — FENTANYL CITRATE 100 MCG: 50 INJECTION, SOLUTION INTRAMUSCULAR; INTRAVENOUS at 07:05

## 2025-05-19 NOTE — TELEPHONE ENCOUNTER
LMOVM pt to remind of appt tomorrow with Gabby Benavides NP. Contact information was given should pt need to contact us back.

## 2025-05-19 NOTE — PLAN OF CARE
Patient A&Ox4, VSS, pt tolerated PO liquids, no nausea/vomiting. Chronic pain level tolerable. Dressings to back have remained dry and intact with scant drainage present. Discharge instructions reviewed with patient and Wife at bedside. Verbalized understanding. IV removed. Pt ready for discharge.

## 2025-05-19 NOTE — NURSING
Spoke to patient and confirmed 11am virtual with Gabby Benavides tomorrow followed by labs and acupuncture at 2:30pm. Directions and details confirmed, JAMILAH Fountain

## 2025-05-19 NOTE — PROCEDURES
Radiology Post-Procedure Note    Pre Op Diagnosis: Pancreatic cancer    Post Op Diagnosis: Same    Procedure: Splanchnic nerve cryoablation    Procedure performed by: Morro Leyva MD    Written Informed Consent Obtained: Yes  Specimen Removed: NO  Estimated Blood Loss: Minimal    Findings:   Under CT guidance, two Ice Matt probes were advanced to the splanchnic nerves at the T12 level and cryoablation performed. No complications. See dictation.    Patient tolerated procedure well.    Morro Leyva M.D.  Interventional Radiology  Department of Radiology

## 2025-05-19 NOTE — PROGRESS NOTES
Pt transferred to [place] [id number] via stretcher. Bedside handoff conducted with [facility] RN Felicia. Pt AAOx4 and VSS at time of transfer. Posterior puncture dressings CDI. POC for discharge reviewed with patient and wife. All belongings and pt chart at bedside.

## 2025-05-19 NOTE — DISCHARGE SUMMARY
Radiology Discharge Summary      Hospital Course: No complications    Admit Date: 5/19/2025  Discharge Date: 05/19/2025     Instructions Given to Patient: Yes  Diet: Resume prior diet  Activity: activity as tolerated and no driving for today    Description of Condition on Discharge: Stable  Vital Signs (Most Recent): Temp: 98.4 °F (36.9 °C) (05/19/25 0615)  Pulse: 104 (05/19/25 0615)  Resp: 20 (05/19/25 0615)  BP: 113/62 (05/19/25 0615)  SpO2: 99 % (05/19/25 0615)    Discharge Disposition: Home    Discharge Diagnosis: Pancreatic cancer s/p splanchnic nerve cryoneurolysis    Follow up: As scheduled    Morro Leyva M.D.  Interventional Radiology  Department of Radiology

## 2025-05-19 NOTE — H&P
Radiology History & Physical      SUBJECTIVE:     History of Present Illness:  Gael Pedroza is a 70 y.o. male with hx of pancreatic cancer who presents for celiac plexus cryoablation. Refer to clinic note dated 5/12/25.  Past Medical History:   Diagnosis Date    Carotid artery stenosis 11/15/2012    DDD (degenerative disc disease) 09/26/2012    Diabetes mellitus     DJD (degenerative joint disease) 09/26/2012    High cholesterol 09/26/2012    HTN (hypertension) 09/24/2014    2013    Increased glucose level 09/26/2012    LBP (low back pain) 09/26/2012    Neck pain 09/24/2014    MATEUSZ 2014    Normal stress echocardiogram 09/26/2012    Pancreatic cancer      Past Surgical History:   Procedure Laterality Date    ENDOSCOPIC ULTRASOUND OF UPPER GASTROINTESTINAL TRACT N/A 04/30/2025    Procedure: ULTRASOUND, UPPER GI TRACT, ENDOSCOPIC;  Surgeon: Fish Curtis MD;  Location: [address]);  Service: Endoscopy;  Laterality: N/A;    INTERPOSITION ARTHROPLASTY OF CARPOMETACARPAL JOINTS Right 08/11/2023    Procedure: INTERPOSITION ARTHROPLASTY, CMC JOINT;  Surgeon: Williams, Claude S. IV, MD;  Location: [facility];  Service: Orthopedics;  Laterality: Right;    L-spine surgery  12/01/2015    Dr. Correia    PORTACATH PLACEMENT  05/2025       Home Meds:   Prior to Admission medications    Medication Sig Start Date End Date Taking? Authorizing Provider   aspirin (ECOTRIN) 81 MG EC tablet Take 1 tablet (81 mg total) by mouth once daily.  Patient taking differently: Take 81 mg by mouth once daily. States not currently taking (it's been about 2 weeks since last dose) 2/3/25  Yes Raquel Elise MD   dicyclomine (BENTYL) 10 MG capsule Take 1 capsule (10 mg total) by mouth 2 (two) times daily. 5/3/25 6/2/25 Yes Adán Sinclair PA-C   loperamide (IMODIUM) 2 mg capsule Take 2 tablets (4mg) by mouth after first loose stool, 1 tablet every 2 hours until diarrhea free for 12 hours. May take 2 tablets (4mg) by mouth every 4 hours at  night. May require more than the package labeling maximum dose of 16mg/day. 5/13/25  Yes Dar Granados MD   morphine (MSIR) 15 MG tablet Take 1 tablet (15 mg total) by mouth every 4 (four) hours as needed for Pain. 5/23/25 6/20/25 Yes El Whiteside Jr., MD   naloxegoL (MOVANTIK) 12.5 mg Tab Take 1 tablet (12.5 mg total) by mouth once daily. 5/6/25 8/4/25 Yes El Whiteside Jr., MD   alcohol swabs PadM Apply 1 each topically as needed. 12/13/24   HARLAN Steele MD   apixaban (ELIQUIS) 5 mg Tab Take 2 tablets (10 mg total) by mouth 2 (two) times daily for 2 days, THEN 1 tablet (5 mg total) 2 (two) times daily. 5/3/25 8/3/25  Adán Sinclair, PA-C   ascorbic acid, vitamin C, (VITAMIN C) 500 MG tablet Take 500 mg by mouth once daily.    Provider, Historical   blood sugar diagnostic (TRUE METRIX GLUCOSE TEST STRIP) Strp 200 each by Misc.(Non-Drug; Combo Route) route Daily. 12/13/24   HARLAN Steele MD   cholecalciferol, vitamin D3, 1,000 unit capsule Take 1,000 Units by mouth once daily.    Provider, Historical   cyproheptadine (PERIACTIN) 4 mg tablet Take 1 tablet (4 mg total) by mouth 3 (three) times daily. 5/8/25   Dar Granados MD   ezetimibe (ZETIA) 10 mg tablet Take 1 tablet (10 mg total) by mouth once daily. 2/3/25   Raquel Elise MD   gabapentin (NEURONTIN) 300 MG capsule Take 1 capsule (300 mg total) by mouth every evening. 5/6/25 8/4/25  El Whiteside Jr., MD   lancets 30 gauge Misc 1 lancet  by Misc.(Non-Drug; Combo Route) route Daily. 12/13/24 12/13/25  HARLAN Steele MD   multivitamin with minerals tablet Take 1 tablet by mouth once daily.    Provider, Historical   nebivoloL (BYSTOLIC) 5 MG Tab Take 1 tablet (5 mg total) by mouth once daily. 2/3/25   Raquel Elise MD   OLANZapine (ZYPREXA) 5 MG tablet Take 1 tablet (5 mg total) by mouth every evening. On days 1-3 of each chemotherapy cycle.  Patient taking differently: Take 5 mg by mouth every evening. On days 1-3 of each chemotherapy  cycle.    States has not yet started 5/13/25   Dar Granados MD   oxyCODONE myristate (XTAMPZA ER) 18 mg CSpT Take 1 capsule (18 mg total) by mouth every 12 (twelve) hours. 5/23/25 6/20/25  El Whiteside Jr., MD   prochlorperazine (COMPAZINE) 5 MG tablet Take 1 tablet (5 mg total) by mouth every 6 (six) hours as needed for Nausea.  Patient taking differently: Take 5 mg by mouth every 6 (six) hours as needed for Nausea. States has not yet started 5/13/25   Dar Granados MD   rosuvastatin (CRESTOR) 10 MG tablet Take 1 tablet (10 mg total) by mouth once daily. 2/3/25   Raquel Elise MD   tamsulosin (FLOMAX) 0.4 mg Cap TAKE ONE CAPSULE BY MOUTH EVERY DAY 2/4/25   HARLAN Steele MD   XIGDUO XR 5-1,000 mg TAKE TWO TABLETS BY MOUTH EVERY DAY  Patient taking differently: Take 2 tablets by mouth every morning. 4/16/25   HARLAN Steele MD     Anticoagulants/Antiplatelets: reviewed    Allergies:   Review of patient's allergies indicates:   Allergen Reactions    Bactrim [sulfamethoxazole-trimethoprim] Itching, Rash and Blisters    Lipitor [atorvastatin]      Achy; pt reports no problem with Crestor    Levaquin [levofloxacin] Other (See Comments)     Muscle aches under shoulder blades     Sedation History:  no adverse reactions    Review of Systems:   Hematological: no known coagulopathies  Respiratory: no shortness of breath  Cardiovascular: no chest pain  Gastrointestinal: no abdominal pain  Genito-Urinary: no dysuria  Musculoskeletal: negative  Neurological: no TIA or stroke symptoms         OBJECTIVE:     Vital Signs (Most Recent)  Temp: 98.4 °F (36.9 °C) (05/19/25 0615)  Pulse: 104 (05/19/25 0615)  Resp: 20 (05/19/25 0615)  BP: 113/62 (05/19/25 0615)  SpO2: 99 % (05/19/25 0615)    Physical Exam:  ASA: per anes   Mallampati: per anes     General: no acute distress  Mental Status: alert and oriented to person, place and time  HEENT: normocephalic, atraumatic  Chest: unlabored breathing  Heart: regular heart  rate  Abdomen: nondistended  Extremity: moves all extremities    Laboratory  Lab Results   Component Value Date    INR 1.1 05/01/2025       Lab Results   Component Value Date    WBC 10.30 05/08/2025    HGB 12.6 (L) 05/08/2025    HCT 38.4 (L) 05/08/2025    MCV 97 05/08/2025     05/08/2025      Lab Results   Component Value Date     (H) 05/08/2025     (L) 05/08/2025    K 3.7 05/08/2025    CL 93 (L) 05/08/2025    CO2 31 (H) 05/08/2025    BUN 19 05/08/2025    CREATININE 0.7 05/08/2025    CALCIUM 10.4 05/08/2025    MG 1.6 05/02/2025    ALT 23 05/08/2025    AST 27 05/08/2025    ALBUMIN 3.1 (L) 05/08/2025    BILITOT 0.4 05/08/2025    BILIDIR 0.2 12/18/2020       ASSESSMENT/PLAN:     Sedation Plan: per anes  Patient will undergo celiac plexus cryoablation.    Monique Corona MD PGY-2  Department of Radiology  [facility]

## 2025-05-19 NOTE — PROGRESS NOTES
Notified MD Leyva of patient decreased B/p. No new orders given . Site asset scant drainage with no hematoma noted.  Oral fluids given and tolerated. Continue with post op IV fluids

## 2025-05-19 NOTE — TRANSFER OF CARE
"Anesthesia Transfer of Care Note    Patient: Gael Pedroza    Procedure(s) Performed: * No procedures listed *    Patient location: PACU    Anesthesia Type: general    Transport from OR: Transported from OR on 6-10 L/min O2 by face mask with adequate spontaneous ventilation    Post pain: adequate analgesia    Post assessment: tolerated procedure well and no apparent anesthetic complications    Post vital signs: stable    Level of consciousness: awake and alert    Nausea/Vomiting: no nausea/vomiting    Complications: none    Transfer of care protocol was followed      Last vitals: Visit Vitals  BP (!) 149/67   Pulse 94   Temp 36.5 °C (97.7 °F) (Temporal)   Resp 10   Ht 5' 6" (1.676 m)   Wt 56.7 kg (125 lb)   SpO2 100%   BMI 20.18 kg/m²     "

## 2025-05-19 NOTE — PROGRESS NOTES
Pt has chronic back pain for which he takes morphine 15mg PO at home and is due for dose at 1000. See Mar for one time dose of oxycodone 10mg from anesthesia. Medication administered per MAR.

## 2025-05-19 NOTE — DISCHARGE INSTRUCTIONS
Discharge Instructions    Follow-up Care:  Follow-up care is an important part of your treatment and safety. Be sure to go to follow up appointments , and follow up with your primary care provider/ physician that sent you to IR.      Diet and Medication:  1. Unless specified on your personalized discharge instructions, continue previous medications and/or diet recommendations.   2. For questions about this please contact your primary care provider or the provider who ordered your drain.     Activity:   Remove dressing after 24 hours. If you have an open wound do not submerge that area for 10 days or until site healed.     If you have additional questions ask your provider.     If you have any of the following symptoms, call the Interventional Radiology Clinic    Increased pain at the procedure site   Increased or more short of breath    Continuous vomiting    Back or abdominal pain - this may indicate an infection    Fever (greater than 100.6 degrees F) or chills    Foul smelling drainage or abnormal bleeding from the procedure site    Interventional Radiology Clinic   For complications   [phone]. Monday - Friday, 8:00 am - 4:00 pm    [phone] After hours and on holidays. Ask to speak with the interventional radiologist on call.     Call 911 if you have any of the following signs and symptoms, or if you think you need emergency care.    Shortness of breath    Chest pain    Passing out, fainting (loss of consciousness)   Uncontrolled bleeding   Confusion    For Scheduling   [phone] Monday - Friday, 8:00 am - 4:00 pm

## 2025-05-19 NOTE — ANESTHESIA PROCEDURE NOTES
Intubation    Date/Time: 5/19/2025 7:12 AM    Performed by: Robe Burgess CRNA  Authorized by: Delfino Carson MD    Intubation:     Induction:  Intravenous    Intubated:  Postinduction    Mask Ventilation:  Easy mask    Attempts:  1    Attempted By:  CRNA    Method of Intubation:  Video laryngoscopy    Blade:  Morales 4    Difficult Airway Encountered?: No      Complications:  None    Airway Device:  Oral endotracheal tube    Airway Device Size:  7.5    Style/Cuff Inflation:  Cuffed (inflated to minimal occlusive pressure)    Inflation Amount (mL):  5    Tube secured:  22    Secured at:  The teeth    Placement Verified By:  Capnometry    Complicating Factors:  None    Findings Post-Intubation:  BS equal bilateral and atraumatic/condition of teeth unchanged

## 2025-05-20 ENCOUNTER — LAB VISIT (OUTPATIENT)
Dept: LAB | Facility: HOSPITAL | Age: 70
End: 2025-05-20
Attending: INTERNAL MEDICINE
Payer: MEDICARE

## 2025-05-20 ENCOUNTER — HOSPITAL ENCOUNTER (OUTPATIENT)
Facility: HOSPITAL | Age: 70
Discharge: HOME | End: 2025-05-21
Attending: STUDENT IN AN ORGANIZED HEALTH CARE EDUCATION/TRAINING PROGRAM | Admitting: INTERNAL MEDICINE
Payer: MEDICARE

## 2025-05-20 ENCOUNTER — TELEPHONE (OUTPATIENT)
Dept: HEMATOLOGY/ONCOLOGY | Facility: CLINIC | Age: 70
End: 2025-05-20
Payer: MEDICARE

## 2025-05-20 ENCOUNTER — CLINICAL SUPPORT (OUTPATIENT)
Dept: REHABILITATION | Facility: HOSPITAL | Age: 70
End: 2025-05-20
Attending: INTERNAL MEDICINE
Payer: MEDICARE

## 2025-05-20 ENCOUNTER — PATIENT MESSAGE (OUTPATIENT)
Dept: HEMATOLOGY/ONCOLOGY | Facility: CLINIC | Age: 70
End: 2025-05-20
Payer: MEDICARE

## 2025-05-20 ENCOUNTER — OFFICE VISIT (OUTPATIENT)
Dept: HEMATOLOGY/ONCOLOGY | Facility: CLINIC | Age: 70
End: 2025-05-20
Payer: MEDICARE

## 2025-05-20 VITALS
WEIGHT: 124.75 LBS | DIASTOLIC BLOOD PRESSURE: 47 MMHG | OXYGEN SATURATION: 93 % | RESPIRATION RATE: 21 BRPM | BODY MASS INDEX: 20.05 KG/M2 | HEIGHT: 66 IN | HEART RATE: 97 BPM | SYSTOLIC BLOOD PRESSURE: 82 MMHG

## 2025-05-20 DIAGNOSIS — C25.9 PANCREATIC CANCER METASTASIZED TO INTRA-ABDOMINAL LYMPH NODE: ICD-10-CM

## 2025-05-20 DIAGNOSIS — C78.7 METASTASIS TO LIVER: ICD-10-CM

## 2025-05-20 DIAGNOSIS — D64.9 ANEMIA, UNSPECIFIED TYPE: ICD-10-CM

## 2025-05-20 DIAGNOSIS — D49.9 IMMUNODEFICIENCY SECONDARY TO NEOPLASM: ICD-10-CM

## 2025-05-20 DIAGNOSIS — Z13.6 SCREENING FOR CARDIOVASCULAR CONDITION: ICD-10-CM

## 2025-05-20 DIAGNOSIS — R53.1 WEAKNESS: ICD-10-CM

## 2025-05-20 DIAGNOSIS — C25.8 OVERLAPPING MALIGNANT NEOPLASM OF PANCREAS: Primary | ICD-10-CM

## 2025-05-20 DIAGNOSIS — R53.83 FATIGUE, UNSPECIFIED TYPE: ICD-10-CM

## 2025-05-20 DIAGNOSIS — M54.50 CHRONIC LOW BACK PAIN WITHOUT SCIATICA, UNSPECIFIED BACK PAIN LATERALITY: ICD-10-CM

## 2025-05-20 DIAGNOSIS — R53.0 NEOPLASTIC MALIGNANT RELATED FATIGUE: ICD-10-CM

## 2025-05-20 DIAGNOSIS — G89.29 CHRONIC LOW BACK PAIN WITHOUT SCIATICA, UNSPECIFIED BACK PAIN LATERALITY: ICD-10-CM

## 2025-05-20 DIAGNOSIS — C77.8 SECONDARY MALIGNANT NEOPLASM OF LYMPH NODES OF MULTIPLE SITES: ICD-10-CM

## 2025-05-20 DIAGNOSIS — C78.01 MALIGNANT NEOPLASM METASTATIC TO BOTH LUNGS: ICD-10-CM

## 2025-05-20 DIAGNOSIS — R53.1 GENERALIZED WEAKNESS: ICD-10-CM

## 2025-05-20 DIAGNOSIS — L89.159 PRESSURE ULCER OF COCCYGEAL REGION, UNSPECIFIED PRESSURE ULCER STAGE: Primary | ICD-10-CM

## 2025-05-20 DIAGNOSIS — C77.2 PANCREATIC CANCER METASTASIZED TO INTRA-ABDOMINAL LYMPH NODE: ICD-10-CM

## 2025-05-20 DIAGNOSIS — E43 SEVERE MALNUTRITION: Chronic | ICD-10-CM

## 2025-05-20 DIAGNOSIS — C78.02 MALIGNANT NEOPLASM METASTATIC TO BOTH LUNGS: ICD-10-CM

## 2025-05-20 DIAGNOSIS — D73.5 SPLENIC INFARCT: ICD-10-CM

## 2025-05-20 DIAGNOSIS — C25.8 OVERLAPPING MALIGNANT NEOPLASM OF PANCREAS: ICD-10-CM

## 2025-05-20 DIAGNOSIS — D84.81 IMMUNODEFICIENCY SECONDARY TO NEOPLASM: ICD-10-CM

## 2025-05-20 DIAGNOSIS — D50.0 ANEMIA DUE TO CHRONIC BLOOD LOSS: ICD-10-CM

## 2025-05-20 DIAGNOSIS — I95.9 HYPOTENSION, UNSPECIFIED HYPOTENSION TYPE: Primary | ICD-10-CM

## 2025-05-20 DIAGNOSIS — M54.59 OTHER LOW BACK PAIN: Primary | ICD-10-CM

## 2025-05-20 PROBLEM — E87.1 HYPONATREMIA: Status: ACTIVE | Noted: 2025-05-20

## 2025-05-20 LAB
ABORH RETYPE: NORMAL
ABSOLUTE EOSINOPHIL (OHS): 0.05 K/UL
ABSOLUTE MONOCYTE (OHS): 0.64 K/UL (ref 0.3–1)
ABSOLUTE NEUTROPHIL COUNT (OHS): 8.53 K/UL (ref 1.8–7.7)
ALBUMIN SERPL BCP-MCNC: 2.3 G/DL (ref 3.5–5.2)
ALP SERPL-CCNC: 138 UNIT/L (ref 40–150)
ALT SERPL W/O P-5'-P-CCNC: 18 UNIT/L (ref 10–44)
ANION GAP (OHS): 11 MMOL/L (ref 8–16)
AST SERPL-CCNC: 40 UNIT/L (ref 11–45)
BACTERIA #/AREA URNS AUTO: NORMAL /HPF
BASOPHILS # BLD AUTO: 0.04 K/UL
BASOPHILS NFR BLD AUTO: 0.4 %
BILIRUB SERPL-MCNC: 0.5 MG/DL (ref 0.1–1)
BILIRUB UR QL STRIP.AUTO: NEGATIVE
BIPAP: 0
BNP SERPL-MCNC: 46 PG/ML (ref 0–99)
BUN SERPL-MCNC: 12 MG/DL (ref 6–30)
BUN SERPL-MCNC: 13 MG/DL (ref 8–23)
CALCIUM SERPL-MCNC: 8.8 MG/DL (ref 8.7–10.5)
CHLORIDE SERPL-SCNC: 95 MMOL/L (ref 95–110)
CHLORIDE SERPL-SCNC: 99 MMOL/L (ref 95–110)
CLARITY UR: CLEAR
CO2 SERPL-SCNC: 23 MMOL/L (ref 23–29)
COLOR UR AUTO: YELLOW
CORRECTED TEMPERATURE (PCO2): 45.8 MMHG
CORRECTED TEMPERATURE (PH): 7.41
CORRECTED TEMPERATURE (PO2): 14.8 MMHG
CREAT SERPL-MCNC: 0.5 MG/DL (ref 0.5–1.4)
CREAT SERPL-MCNC: 0.5 MG/DL (ref 0.5–1.4)
ERYTHROCYTE [DISTWIDTH] IN BLOOD BY AUTOMATED COUNT: 14.8 % (ref 11.5–14.5)
FIO2: 21 %
GFR SERPLBLD CREATININE-BSD FMLA CKD-EPI: >60 ML/MIN/1.73/M2
GLUCOSE SERPL-MCNC: 102 MG/DL (ref 70–110)
GLUCOSE SERPL-MCNC: 144 MG/DL (ref 70–110)
GLUCOSE UR QL STRIP: ABNORMAL
HCT VFR BLD AUTO: 32 % (ref 40–54)
HCT VFR BLD CALC: 38 %PCV (ref 36–54)
HCT VFR BLD CALC: 38.3 % (ref 36–54)
HGB BLD-MCNC: 9.7 GM/DL (ref 14–18)
HGB UR QL STRIP: NEGATIVE
HOLD SPECIMEN: NORMAL
HYALINE CASTS UR QL AUTO: 0 /LPF (ref 0–1)
IMM GRANULOCYTES # BLD AUTO: 0.05 K/UL (ref 0–0.04)
IMM GRANULOCYTES NFR BLD AUTO: 0.5 % (ref 0–0.5)
INDIRECT COOMBS: NORMAL
KETONES UR QL STRIP: ABNORMAL
LDH SERPL L TO P-CCNC: 1.4 MMOL/L (ref 0.5–2.2)
LEUKOCYTE ESTERASE UR QL STRIP: NEGATIVE
LYMPHOCYTES # BLD AUTO: 0.82 K/UL (ref 1–4.8)
MCH RBC QN AUTO: 31.2 PG (ref 27–31)
MCHC RBC AUTO-ENTMCNC: 30.3 G/DL (ref 32–36)
MCV RBC AUTO: 103 FL (ref 82–98)
MICROSCOPIC COMMENT: NORMAL
NITRITE UR QL STRIP: NEGATIVE
NUCLEATED RBC (/100WBC) (OHS): 0 /100 WBC
PCO2 BLDA: 45.8 MMHG (ref 35–45)
PH SMN: 7.41 [PH] (ref 7.35–7.45)
PH UR STRIP: 6 [PH]
PLATELET # BLD AUTO: 278 K/UL (ref 150–450)
PMV BLD AUTO: 9.5 FL (ref 9.2–12.9)
PO2 BLDA: 14.8 MMHG (ref 40–60)
POC BASE DEFICIT: 3.4 MMOL/L
POC HCO3: 25.7 MMOL/L (ref 24–28)
POC IONIZED CALCIUM: 1.16 MMOL/L (ref 1.06–1.42)
POC PERFORMED BY: ABNORMAL
POC TCO2 (MEASURED): 24 MMOL/L (ref 23–29)
POC TEMPERATURE: 37 C
POTASSIUM BLD-SCNC: 4.1 MMOL/L (ref 3.5–5.1)
POTASSIUM SERPL-SCNC: 4.8 MMOL/L (ref 3.5–5.1)
PROT SERPL-MCNC: 5.7 GM/DL (ref 6–8.4)
PROT UR QL STRIP: NEGATIVE
RBC # BLD AUTO: 3.11 M/UL (ref 4.6–6.2)
RBC #/AREA URNS AUTO: <1 /HPF (ref 0–4)
RELATIVE EOSINOPHIL (OHS): 0.5 %
RELATIVE LYMPHOCYTE (OHS): 8.1 % (ref 18–48)
RELATIVE MONOCYTE (OHS): 6.3 % (ref 4–15)
RELATIVE NEUTROPHIL (OHS): 84.2 % (ref 38–73)
RH BLD: NORMAL
SAMPLE: ABNORMAL
SODIUM BLD-SCNC: 133 MMOL/L (ref 136–145)
SODIUM SERPL-SCNC: 133 MMOL/L (ref 136–145)
SP GR UR STRIP: 1.01
SPECIMEN OUTDATE: NORMAL
SPECIMEN SOURCE: ABNORMAL
TROPONIN I SERPL HS-MCNC: <3 NG/L
UROBILINOGEN UR STRIP-ACNC: ABNORMAL EU/DL
WBC # BLD AUTO: 10.13 K/UL (ref 3.9–12.7)
WBC #/AREA URNS AUTO: <1 /HPF (ref 0–5)
YEAST UR QL AUTO: NORMAL /HPF

## 2025-05-20 PROCEDURE — 1157F ADVNC CARE PLAN IN RCRD: CPT | Mod: CPTII,S$GLB,, | Performed by: PHYSICIAN ASSISTANT

## 2025-05-20 PROCEDURE — 83880 ASSAY OF NATRIURETIC PEPTIDE: CPT | Performed by: EMERGENCY MEDICINE

## 2025-05-20 PROCEDURE — 1111F DSCHRG MED/CURRENT MED MERGE: CPT | Mod: CPTII,S$GLB,, | Performed by: PHYSICIAN ASSISTANT

## 2025-05-20 PROCEDURE — 99900035 HC TECH TIME PER 15 MIN (STAT)

## 2025-05-20 PROCEDURE — 82803 BLOOD GASES ANY COMBINATION: CPT

## 2025-05-20 PROCEDURE — 1157F ADVNC CARE PLAN IN RCRD: CPT | Mod: CPTII,95,, | Performed by: NURSE PRACTITIONER

## 2025-05-20 PROCEDURE — 83605 ASSAY OF LACTIC ACID: CPT

## 2025-05-20 PROCEDURE — 1111F DSCHRG MED/CURRENT MED MERGE: CPT | Mod: CPTII,95,, | Performed by: NURSE PRACTITIONER

## 2025-05-20 PROCEDURE — G2211 COMPLEX E/M VISIT ADD ON: HCPCS | Mod: S$GLB,,, | Performed by: PHYSICIAN ASSISTANT

## 2025-05-20 PROCEDURE — 85025 COMPLETE CBC W/AUTO DIFF WBC: CPT | Performed by: STUDENT IN AN ORGANIZED HEALTH CARE EDUCATION/TRAINING PROGRAM

## 2025-05-20 PROCEDURE — 85025 COMPLETE CBC W/AUTO DIFF WBC: CPT

## 2025-05-20 PROCEDURE — 3008F BODY MASS INDEX DOCD: CPT | Mod: CPTII,S$GLB,, | Performed by: PHYSICIAN ASSISTANT

## 2025-05-20 PROCEDURE — 99285 EMERGENCY DEPT VISIT HI MDM: CPT | Mod: 25

## 2025-05-20 PROCEDURE — 3288F FALL RISK ASSESSMENT DOCD: CPT | Mod: CPTII,S$GLB,, | Performed by: PHYSICIAN ASSISTANT

## 2025-05-20 PROCEDURE — 25500020 PHARM REV CODE 255: Performed by: STUDENT IN AN ORGANIZED HEALTH CARE EDUCATION/TRAINING PROGRAM

## 2025-05-20 PROCEDURE — 86901 BLOOD TYPING SEROLOGIC RH(D): CPT | Performed by: STUDENT IN AN ORGANIZED HEALTH CARE EDUCATION/TRAINING PROGRAM

## 2025-05-20 PROCEDURE — 84484 ASSAY OF TROPONIN QUANT: CPT | Performed by: EMERGENCY MEDICINE

## 2025-05-20 PROCEDURE — 3074F SYST BP LT 130 MM HG: CPT | Mod: CPTII,S$GLB,, | Performed by: PHYSICIAN ASSISTANT

## 2025-05-20 PROCEDURE — 36415 COLL VENOUS BLD VENIPUNCTURE: CPT

## 2025-05-20 PROCEDURE — 1160F RVW MEDS BY RX/DR IN RCRD: CPT | Mod: CPTII,S$GLB,, | Performed by: PHYSICIAN ASSISTANT

## 2025-05-20 PROCEDURE — 99215 OFFICE O/P EST HI 40 MIN: CPT | Mod: S$GLB,,, | Performed by: PHYSICIAN ASSISTANT

## 2025-05-20 PROCEDURE — 87040 BLOOD CULTURE FOR BACTERIA: CPT | Performed by: EMERGENCY MEDICINE

## 2025-05-20 PROCEDURE — 1101F PT FALLS ASSESS-DOCD LE1/YR: CPT | Mod: CPTII,S$GLB,, | Performed by: PHYSICIAN ASSISTANT

## 2025-05-20 PROCEDURE — 93005 ELECTROCARDIOGRAM TRACING: CPT

## 2025-05-20 PROCEDURE — 80047 BASIC METABLC PNL IONIZED CA: CPT

## 2025-05-20 PROCEDURE — 1126F AMNT PAIN NOTED NONE PRSNT: CPT | Mod: CPTII,S$GLB,, | Performed by: PHYSICIAN ASSISTANT

## 2025-05-20 PROCEDURE — 3078F DIAST BP <80 MM HG: CPT | Mod: CPTII,S$GLB,, | Performed by: PHYSICIAN ASSISTANT

## 2025-05-20 PROCEDURE — 99999 PR PBB SHADOW E&M-EST. PATIENT-LVL V: CPT | Mod: PBBFAC,,, | Performed by: PHYSICIAN ASSISTANT

## 2025-05-20 PROCEDURE — 98007 SYNCH AUDIO-VIDEO EST HI 40: CPT | Mod: 95,,, | Performed by: NURSE PRACTITIONER

## 2025-05-20 PROCEDURE — 63600175 PHARM REV CODE 636 W HCPCS: Performed by: STUDENT IN AN ORGANIZED HEALTH CARE EDUCATION/TRAINING PROGRAM

## 2025-05-20 PROCEDURE — 1159F MED LIST DOCD IN RCRD: CPT | Mod: CPTII,S$GLB,, | Performed by: PHYSICIAN ASSISTANT

## 2025-05-20 PROCEDURE — 1160F RVW MEDS BY RX/DR IN RCRD: CPT | Mod: CPTII,95,, | Performed by: NURSE PRACTITIONER

## 2025-05-20 PROCEDURE — 96360 HYDRATION IV INFUSION INIT: CPT

## 2025-05-20 PROCEDURE — 84460 ALANINE AMINO (ALT) (SGPT): CPT

## 2025-05-20 PROCEDURE — 93010 ELECTROCARDIOGRAM REPORT: CPT | Mod: ,,, | Performed by: INTERNAL MEDICINE

## 2025-05-20 PROCEDURE — 1159F MED LIST DOCD IN RCRD: CPT | Mod: CPTII,95,, | Performed by: NURSE PRACTITIONER

## 2025-05-20 PROCEDURE — 81001 URINALYSIS AUTO W/SCOPE: CPT | Performed by: EMERGENCY MEDICINE

## 2025-05-20 RX ADMIN — SODIUM CHLORIDE, POTASSIUM CHLORIDE, SODIUM LACTATE AND CALCIUM CHLORIDE 1000 ML: 600; 310; 30; 20 INJECTION, SOLUTION INTRAVENOUS at 04:05

## 2025-05-20 RX ADMIN — IOHEXOL 100 ML: 350 INJECTION, SOLUTION INTRAVENOUS at 08:05

## 2025-05-20 NOTE — PROGRESS NOTES
Urgent Care Oncology Visit    Date and Time: 05/21/2025 2:35 PM   Patient MRN: [id number]   Chief Complaint:   Chief Complaint   Patient presents with    Consult     Reason for Urgent Care Visit: fatigue, weakness  Patient Type: new/reestablish care  Intervention: labs ordered  Dispo: return to clinic as previous  UrgOnc appointment addressed via: correspondence with another provider  This UrgOnc visit was in person  Time spent handling UC issue: 40 mins   Was this virtual or in person UrgOnc visit appropriate? 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 He presents with worsening of his chronic back pain in Jan 2025. He was found to have a pancreatic mass on CT lumbar spine 4/24/25. C/A/P on 4/29/25 showed 9.4 cm mass involving the pancreatic body and tail highly concerning for adenocarcinoma. It abuts the stomach, duodenum, proximal jejunum, and left adrenal gland without convincing invasion. It encases the celiac, common hepatic, and superior mesenteric arteries which remain patent. It encases the splenic artery which is moderately narrowed. It abuts the aorta and left renal artery which remain patent. Occludes the splenic and superior mesenteric veins, with nonocclusive thrombus extending into the main portal vein. There are portosystemic collaterals in the upper abdomen. Enlarged periportal lymph node concerning for metastasis. Multiple liver metastases measuring up to 2.8 cm. Few solid pulmonary nodules measuring up to 0.7 cm concerning for metastases. Upper EUS biopsy of the pancreatic tail mass on 4/30/25 showed undifferentiated carcinoma.  MMR-proficient, ALY. Comment: Histologic sections reveal pleomorphic tumor cells with bizarre nuclear atypia, moderately abundant amphophilic cytoplasm, and multinucleated forms. Rare cells that may represent osteoclast-like giant cells are noted. Background necrosis is readily apparent. Tumor cells exhibits extensive positivity for pankeratin AE1/3 by immunohistochemistry. BerEP4 is negative. The morphologic features and immunoprofile support the above interpretation. Tempus pending. On 25, CA 19-9 normal at 33.8. PGX: DPYD normal metabolizer, UGT1A1 intermediate metabolizer. Patient noted 30 lbs weight loss over the past 3 months, anorexia, pain better on current pain regimen. He has a daughter and a son.     Interval History: Mr Pedroza returns today for concerns with feeling weak, fatigued and dehydrated. He is a current patient of Dr Dar Granados in medical oncology. He is s/p celiac nerve block that was performed yesterday. He actually feels that his pain is much better. However, since the procedure he has been feeling very fatigued, weak and dehydrated.     He is trying to drink as much as he can. He is eating somewhat but appetite has been poor over the past couple of weeks. He was due to start chemotherapy tomorrow with NALIRIFOX for the treatment of pancreatic cancer. It should be noted that his BP is very low today, compared to his baseline. He has not fallen but feels unsteady. No report of chest pain    ECO Presents with his caregiver    Past Medical History:   Diagnosis Date    Carotid artery stenosis 11/15/2012    DDD (degenerative disc disease) 2012    Diabetes mellitus     DJD (degenerative joint disease) 2012    High cholesterol 2012    HTN (hypertension) 2014    2013    Increased glucose level 2012    LBP (low back pain) 2012    Neck pain 2014    MATEUSZ 2014    Normal stress echocardiogram 2012    Pancreatic cancer         Past Surgical  History:   Procedure Laterality Date    ENDOSCOPIC ULTRASOUND OF UPPER GASTROINTESTINAL TRACT N/A 04/30/2025    Procedure: ULTRASOUND, UPPER GI TRACT, ENDOSCOPIC;  Surgeon: Fish Curtis MD;  Location: [address];  Service: Endoscopy;  Laterality: N/A;    INTERPOSITION ARTHROPLASTY OF CARPOMETACARPAL JOINTS Right 08/11/2023    Procedure: INTERPOSITION ARTHROPLASTY, CMC JOINT;  Surgeon: Williams, Claude S. IV, MD;  Location: [facility];  Service: Orthopedics;  Laterality: Right;    L-spine surgery  12/01/2015    Dr. Masood MCKEON PLACEMENT  05/2025       Family History   Problem Relation Name Age of Onset    Diabetes Mother      Hypertension Mother      Heart disease Father  62        MI       Social History[1]    Review of Systems   Constitutional:  Positive for activity change, fatigue and unexpected weight change. Negative for appetite change, chills and fever.   HENT:  Negative for mouth sores, nosebleeds, tinnitus, trouble swallowing and voice change.    Eyes:  Negative for pain, redness and visual disturbance.   Respiratory:  Negative for cough, shortness of breath and wheezing.    Cardiovascular:  Negative for chest pain, palpitations and leg swelling.   Gastrointestinal:  Positive for abdominal pain and constipation. Negative for abdominal distention, blood in stool, diarrhea, nausea and vomiting.   Endocrine: Negative for polydipsia, polyphagia and polyuria.   Genitourinary:  Negative for flank pain, frequency and hematuria.   Musculoskeletal:  Negative for arthralgias, back pain, gait problem, joint swelling, myalgias, neck pain and neck stiffness.   Skin:  Positive for pallor. Negative for color change, rash and wound.   Neurological:  Positive for weakness. Negative for tremors, seizures, syncope, speech difficulty, light-headedness, numbness and headaches.   Hematological:  Negative for adenopathy. Does not bruise/bleed easily.   Psychiatric/Behavioral:  Negative for confusion, dysphoric mood  and self-injury. The patient is not nervous/anxious.    All other systems reviewed and are negative.      Objective:  Physical Exam  Vitals reviewed.   Constitutional:       General: He is not in acute distress.     Appearance: He is well-developed. He is ill-appearing.      Comments: cachectic   HENT:      Head: Normocephalic and atraumatic.      Mouth/Throat:      Pharynx: No oropharyngeal exudate.   Eyes:      General: No scleral icterus.     Conjunctiva/sclera: Conjunctivae normal.      Pupils: Pupils are equal, round, and reactive to light.   Neck:      Thyroid: No thyromegaly.      Vascular: No JVD.   Cardiovascular:      Rate and Rhythm: Normal rate and regular rhythm.      Heart sounds: Normal heart sounds. No murmur heard.     No friction rub. No gallop.   Pulmonary:      Effort: Pulmonary effort is normal. No respiratory distress.      Breath sounds: Normal breath sounds. No wheezing or rales.   Abdominal:      General: Bowel sounds are normal. There is no distension.      Palpations: Abdomen is soft. There is no mass.      Tenderness: There is no abdominal tenderness. There is no rebound.      Hernia: No hernia is present.   Musculoskeletal:         General: No tenderness or deformity. Normal range of motion.      Cervical back: Normal range of motion and neck supple.   Lymphadenopathy:      Cervical: No cervical adenopathy.      Upper Body:      Right upper body: No supraclavicular adenopathy.      Left upper body: No supraclavicular adenopathy.   Skin:     General: Skin is warm and dry.      Coloration: Skin is pale.      Findings: No erythema or rash.   Neurological:      Mental Status: He is alert and oriented to person, place, and time.      Cranial Nerves: No cranial nerve deficit.      Motor: No abnormal muscle tone.   Psychiatric:         Behavior: Behavior normal.         Thought Content: Thought content normal.         Judgment: Judgment normal.         Labs:  Lab work reviewed. Hb dropped from  12.6 on 5/8/2025 to 9.7 today.     Imaging Data:  CT C/A/P 4/29/25:     Impression:     9.4 cm mass involving the pancreatic body and tail highly concerning for adenocarcinoma.  It abuts the stomach, duodenum, proximal jejunum, and left adrenal gland without convincing invasion.     It encases the celiac, common hepatic, and superior mesenteric arteries which remain patent.  It encases the splenic artery which is moderately narrowed.  It abuts the aorta and left renal artery which remain patent.     Occludes the splenic and superior mesenteric veins, with nonocclusive thrombus extending into the main portal vein.  There are portosystemic collaterals in the upper abdomen.     Enlarged periportal lymph node concerning for metastasis.     Multiple liver metastases measuring up to 2.8 cm.     Few solid pulmonary nodules measuring up to 0.7 cm concerning for metastases.     Other findings as described    Assessment and plan:    1. Overlapping malignant neoplasm of pancreas    2. Metastasis to liver    3. 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 Immunodeficiency secondary to neoplasm    6. Anemia due to chronic blood loss    7. Fatigue, unspecified type    8. Weakness      1-5  - Mr Pedroza is a 71 yo man with stage IV undifferentiated carcinoma of the pancreatic body/tail, with mets to lymph nodes, liver and lung, MMR-proficient  - Long discussion with patient re the diagnosis, natural history and prognosis of a stage IV undifferentiated carcinoma of the pancreas. Cancer is not curable but treatable. The goal of treatment is palliative, with hopes of containing the cancer, slowing down growth, and prolonging life.   - discussed NALIFIROX vs gem/abraxane if he would like to get chemo. Discussed the regimen, schedule and side effects of each. Discussed the survival benefits of chemo when it works. After long discussion, patient decides to take  NALIFIROX. He was due to begin on 5/21/2025.     Unfortunately, he is not feeling well today. He is s/p celiac nerve block that was performed on 5/19/2025. Since the procedure, he has felt very weak, fatigued and feeling that he is dehydrated. I have reviewed his lab work and vital signs. I am concerned that he is hemorrhaging from an unknown source given that his Hb has dropped two points in 12 days. His BP is also very low compared to his baseline. In addition, clinically he is not feeling well and displaying symptoms of dehydration and possible hypovolemia. Given his presentation today, I feel that he needs more urgent evaluation with IVFs and possible imaging to evaluate for bleeding. I have discussed his case with IR and Dr Granados as well, who are in agreement. I have discussed my concerns with the patient and caregiver who are agreeable. Transport was contacted and will take him to the ER now.   - I have personally reviewed the patient's lab work today, including CBC and CMP.   - Proceed to the ER now. ER was contacted as well.   - Will hold chemotherapy tomorrow     RTC as scheduled pending evaluation in the ER for consideration to start cycle 1 of NALIRIFOX.     6-8.  - BP very low today, compared to baseline. Concern for hypovolemia 2/2 bleeding vs dehydration.   - will send to ER for evaluation  - c/w current medication    Will hold chemotherapy tomorrow. RTC in 2 weeks as scheduled for cycle 1 of chemotherapy with NALIRIFOX pending evaluation in the ER today.     Patient and family members displayed understanding of the above encounter and treatment plan. All thoughtful questions were answered to their satisfaction. Patient was advised to notify the care team or proceed to the ER if signs and symptoms worsen.     40 minutes were spent today on this encounter including face to face time with the patient, data gathering/interpretation and documentation. Greater than 50% of this time involved counseling or  coordination of care. I have provided the patient with an opportunity to ask questions and have all questions answered to patient's satisfaction.     Visit today included increased complexity associated with the care of the episodic problem chemotherapy  addressed and managing the longitudinal care of the patient due to the serious and/or complex managed problem(s) GI malignancies/cancer. In addition, the above encounter addresses an illness that poses a significant threat to life, bodily function and overall performance status.      code applied: patient requires or will require a continuous, longitudinal, and active collaborative plan of care related to this patient's health condition, GI malignancies/cancer --the management of which requires the direction of a practitioner with specialized clinical knowledge, skill, and expertise       MAKSIM Messer, PAMinalC  [facility] MD [place]  Dept of Hematology/Oncology  PAJEANNA to GI Oncology team         Route Chart for Scheduling    Med Onc Chart Routing      Follow up with physician 2 weeks, 4 weeks and 6 weeks. See Dr Granados at [place] for NALIRIFOX.   Follow up with [facility]    Infusion scheduling note    Injection scheduling note    Labs CBC and CMP   Scheduling:  Preferred lab:  Lab interval: every 2 weeks     Imaging    Pharmacy appointment    Other referrals                Treatment Plan Information   OP PANC NALIRIFOX (liposomal irinotecan OXALIplatin leucovorin fluorouracil) Q2W Dar Granados MD   Associated diagnosis: Overlapping malignant neoplasm of pancreas   noted on 4/25/2025   Line of treatment: First Line  Treatment Goal: Palliative     Upcoming Treatment Dates - OP PANC NALIRIFOX (liposomal irinotecan OXALIplatin leucovorin fluorouracil) Q2W    5/14/2025       Chemotherapy       irinotecan liposomaL (ONIVYDE) 45 mg/m2 = 73.1 mg in 0.9% NaCl 517 mL chemo infusion       leucovorin calcium 400 mg/m2 = 650 mg in D5W 250 mL infusion       fluorouracil (Adrucil)  2,400 mg/m2 = 3,890 mg in 0.9% NaCl 100 mL chemo infusion       oxaliplatin (ELOXATIN) 60 mg/m2 = 97 mg in D5W 519.4 mL chemo infusion       Antiemetics       palonosetron (ALOXI) 0.25 mg with Dexamethasone (DECADRON) 12 mg in NS 50 mL IVPB       fosaprepitant 150 mg in 0.9% NaCl 150 mL IVPB  2025       Growth Factor       pegfilgrastim (NEULASTA (ON BODY INJECTOR)) injection 6 mg  2025       Chemotherapy       irinotecan liposomaL (ONIVYDE) 50 mg/m2 = 80.84 mg in 0.9% NaCl 518.8 mL chemo infusion       oxaliplatin (ELOXATIN) 60 mg/m2 = 97 mg in D5W 519.4 mL chemo infusion       leucovorin calcium 400 mg/m2 = 650 mg in D5W 250 mL infusion       fluorouracil (Adrucil) 2,400 mg/m2 = 3,890 mg in 0.9% NaCl 100 mL chemo infusion       Antiemetics       fosaprepitant 150 mg in 0.9% NaCl 150 mL IVPB       palonosetron (ALOXI) 0.25 mg with Dexamethasone (DECADRON) 12 mg in NS 50 mL IVPB  2025       Growth Factor       pegfilgrastim (NEULASTA (ON BODY INJECTOR)) injection 6 mg             [1]   Social History  Socioeconomic History    Marital status:     Number of children: 2   Occupational History    Occupation:      Comment: [employer]   Tobacco Use    Smoking status: Former     Current packs/day: 0.00     Types: Cigarettes     Quit date: 2010     Years since quittin.9    Smokeless tobacco: Never   Substance and Sexual Activity    Alcohol use: Yes     Alcohol/week: 8.0 standard drinks of alcohol     Types: 5 Cans of beer, 3 Standard drinks or equivalent per week    Drug use: No    Sexual activity: Yes     Partners: Female   Social History Narrative    Does [employer].    Retired in 10/18.     Social Drivers of Health     Financial Resource Strain: Low Risk  (2025)    Overall Financial Resource Strain (CARDIA)     Difficulty of Paying Living Expenses: Not hard at all   Food Insecurity: No Food Insecurity (2025)    Hunger Vital Sign     Worried About Running Out of Food in the  Last Year: Never true     Ran Out of Food in the Last Year: Never true   Transportation Needs: No Transportation Needs (5/21/2025)    PRAPARE - Transportation     Lack of Transportation (Medical): No     Lack of Transportation (Non-Medical): No   Physical Activity: Unknown (5/12/2025)    Exercise Vital Sign     Days of Exercise per Week: 0 days   Stress: Stress Concern Present (5/21/2025)    [employer] [employer] - Occupational Stress Questionnaire     Feeling of Stress : To some extent   Housing Stability: Low Risk  (5/21/2025)    Housing Stability Vital Sign     Unable to Pay for Housing in the Last Year: No     Number of Times Moved in the Last Year: 0     Homeless in the Last Year: No

## 2025-05-20 NOTE — ANESTHESIA POSTPROCEDURE EVALUATION
Anesthesia Post Evaluation    Patient: Gael Pedroza    Procedure(s) Performed: * No procedures listed *    Final Anesthesia Type: general      Patient location during evaluation: PACU  Patient participation: Yes- Able to Participate  Level of consciousness: awake and alert  Post-procedure vital signs: reviewed and stable  Pain management: adequate  Airway patency: patent    PONV status at discharge: No PONV  Anesthetic complications: no      Cardiovascular status: blood pressure returned to baseline  Respiratory status: unassisted  Hydration status: euvolemic  Follow-up not needed.              Vitals Value Taken Time   /59 05/19/25 13:15   Temp 36.8 °C (98.2 °F) 05/19/25 13:15   Pulse 100 05/19/25 13:15   Resp 20 05/19/25 13:15   SpO2 100 % 05/19/25 13:15         Event Time   Out of Recovery 10:15:00         Pain/Tre Score: Pain Rating Prior to Med Admin: 7 (5/19/2025 10:41 AM)  Tre Score: 10 (5/19/2025  1:15 PM)

## 2025-05-20 NOTE — PROGRESS NOTES
"The patient location is: home  The chief complaint leading to consultation is: fatigue, decreased appetite, pain    Visit type: audiovisual    Each patient to whom he or she provides medical services by telemedicine is:  (1) informed of the relationship between the physician and patient and the respective role of any other health care provider with respect to management of the patient; and (2) notified that he or she may decline to receive medical services by telemedicine and may withdraw from such care at any time.    Notes:   Gael Pedroza  70 y.o. is here to seek an integrative approach to discuss side effects related to pancreatic cancer treatment. Gael Pedroza  was referred by Dr. Martino  Mr. Pedroza is here today virtually with his wife and daughter.  About 6 months ago the back pain became "unbearable" and was sent to pain management. He eventually had a CT on 4/24/25 and a mass was found. He will start chemo tomorrow. He had a nerve block yesterday to help decrease pain to his back. He has had back pain since he was a teenager. He previously had a fusion, approximately 10 years ago, which helped. He states he has not felt well since yesterday after his procedure. He gets exhausted walking 20-30 ft, feels weak and he has to sit down wherever he is, and is having shortness of breath with exertion. He does not have a good appetite, but it has increased and he feels like it is much better now than a few weeks ago. Today he is not hungry. He is sleeping well as long as he doesn't lay on his back. He has a pressure ulcer on his coccyx that is extremely painful. The area is slightly opened from the picture he showed me on video.  He denies anxiety or depression.   He reports his activity level has decreased over the last 6 weeks.   He is  and has 2 children. He reports a good support system. He is retired from [employer].       Pillars Assessment    Sleep  How many hours of sleep per night? 5-6 " hours  Do you have trouble falling asleep, staying asleep or waking up earlier than you need to? yes  Do you have daytime fatigue? yes  Do you need medication for sleep? no  Do you use any supplements or other interventions for sleep? no    Resilience  Rate your current level of stress- low    Nutrition   Food allergies or sensitivities: no  Do you adhere to a particular type of diet? no  Do you have any concerns with your eating habits? yes    Exercise  How would you describe your physical activity level? low    Past Medical History  Past Medical History:   Diagnosis Date    Carotid artery stenosis 11/15/2012    DDD (degenerative disc disease) 09/26/2012    Diabetes mellitus     DJD (degenerative joint disease) 09/26/2012    High cholesterol 09/26/2012    HTN (hypertension) 09/24/2014    2013    Increased glucose level 09/26/2012    LBP (low back pain) 09/26/2012    Neck pain 09/24/2014    MATEUSZ 2014    Normal stress echocardiogram 09/26/2012    Pancreatic cancer       Past Surgical History   Past Surgical History:   Procedure Laterality Date    ENDOSCOPIC ULTRASOUND OF UPPER GASTROINTESTINAL TRACT N/A 04/30/2025    Procedure: ULTRASOUND, UPPER GI TRACT, ENDOSCOPIC;  Surgeon: Fish Curtis MD;  Location: [address]);  Service: Endoscopy;  Laterality: N/A;    INTERPOSITION ARTHROPLASTY OF CARPOMETACARPAL JOINTS Right 08/11/2023    Procedure: INTERPOSITION ARTHROPLASTY, [facility] JOINT;  Surgeon: Williams, Claude S. IV, MD;  Location: [facility];  Service: Orthopedics;  Laterality: Right;    L-spine surgery  12/01/2015    Dr. Masood MCKEON PLACEMENT  05/2025      Family History   Family History   Problem Relation Name Age of Onset    Diabetes Mother      Hypertension Mother      Heart disease Father  62        MI      Allergies  Review of patient's allergies indicates:   Allergen Reactions    Bactrim [sulfamethoxazole-trimethoprim] Itching, Rash and Blisters    Lipitor [atorvastatin]      Achy; pt reports no  problem with Crestor    Levaquin [levofloxacin] Other (See Comments)     Muscle aches under shoulder blades      Current Medications:  Current Medications[1]     Review of Systems  Review of Systems   Constitutional:  Positive for malaise/fatigue.   HENT: Negative.     Eyes: Negative.    Respiratory:  Positive for shortness of breath.    Cardiovascular: Negative.    Gastrointestinal: Negative.    Musculoskeletal:  Positive for back pain and joint pain.   Skin: Negative.    Neurological:  Positive for weakness.   Endo/Heme/Allergies: Negative.    Psychiatric/Behavioral: Negative.        Physical Exam      There were no vitals filed for this visit.  There is no height or weight on file to calculate BMI.  Physical Exam  Skin:            Comments: Unable to assess color and size due to being shown a picture of the wound on the audiovisual visits.          ASSESSMENT :  1. Pressure ulcer of coccygeal region, unspecified pressure ulcer stage    2. Chronic low back pain without sciatica, unspecified back pain laterality    3. Generalized weakness    4. Neoplastic malignant related fatigue    5. Overlapping malignant neoplasm of pancreas      PLAN:  Reviewed all information discussed at today's visit and all questions were answered.    Counseled on healthy lifestyle and behavior modifications   Referral to PT previously placed.  I discussed the importance of therapy and explained the physical therapists will work with you to develop a specialized treatment program to help reduce and improve cancer-related problems and improve your strength and function.  Referral to Acupuncture previously placed I explained acupuncture can reduce fatigue,  pain, and neuropathy. It can also assist with behavioral health such as depression and anxiety and improve overall sleep quality. Acupuncture helps improve overall symptoms from treatment.   Nutrition appointment scheduled, previously placed by Dr. Granados  Referral to wound care  I  recommended a silicone foam dressing for the coccyx area until it can be assessed by wound care.   I discussed and recommended the following support services:  Jake Chi and Yoga I suggested Jake Chi and/or Yoga as these practices reduce stress, increases flexibility and muscle strength, improves balance and promotes serenity in the power of movement to help fight disease and boost your immune system.   Music and relaxation therapy and Meditation which can decrease stress by lowering blood pressure, slowing breathing, and helping you be more present in the moment. It improves sleep by relaxing the body and mind at the end of the day.Meditation also trains you how to focus on one thing at a time, improving concentration. It also promotes emotional well-being by decreasing depression and anxiety, and helping create a more positive outlook on life.    I sent a message to Dr. Granados reporting patients report of weakness, shortness of breath on exertion, and balance problems. I also reported his lack of appetite and decreased fluid intake today--12 ounces of water, a cup of coffee, a protein drink, and one piece of bread.  She is going to have him come in today for a NP visit to evaluate.     Follow up with [facility] as needed    I spent a total of 49 minutes on the day of the visit.This includes face to face time and non-face to face time preparing to see the patient (eg, review of tests), obtaining and/or reviewing separately obtained history, documenting clinical information in the electronic or other health record, independently interpreting results and communicating results to the patient/family/caregiver, or care coordinator.       [1]   Current Outpatient Medications:     alcohol swabs PadM, Apply 1 each topically as needed., Disp: 100 each, Rfl: 3    apixaban (ELIQUIS) 5 mg Tab, Take 2 tablets (10 mg total) by mouth 2 (two) times daily for 2 days, THEN 1 tablet (5 mg total) 2 (two) times daily., Disp: 188  tablet, Rfl: 0    ascorbic acid, vitamin C, (VITAMIN C) 500 MG tablet, Take 500 mg by mouth once daily., Disp: , Rfl:     aspirin (ECOTRIN) 81 MG EC tablet, Take 1 tablet (81 mg total) by mouth once daily. (Patient taking differently: Take 81 mg by mouth once daily. States not currently taking (it's been about 2 weeks since last dose)), Disp: 90 tablet, Rfl: 3    blood sugar diagnostic (TRUE METRIX GLUCOSE TEST STRIP) Strp, 200 each by Misc.(Non-Drug; Combo Route) route Daily., Disp: 200 each, Rfl: 0    cholecalciferol, vitamin D3, 1,000 unit capsule, Take 1,000 Units by mouth once daily., Disp: , Rfl:     cyproheptadine (PERIACTIN) 4 mg tablet, Take 1 tablet (4 mg total) by mouth 3 (three) times daily., Disp: 90 tablet, Rfl: 11    dicyclomine (BENTYL) 10 MG capsule, Take 1 capsule (10 mg total) by mouth 2 (two) times daily., Disp: 60 capsule, Rfl: 0    ezetimibe (ZETIA) 10 mg tablet, Take 1 tablet (10 mg total) by mouth once daily., Disp: 90 tablet, Rfl: 3    gabapentin (NEURONTIN) 300 MG capsule, Take 1 capsule (300 mg total) by mouth every evening., Disp: 30 capsule, Rfl: 2    lancets 30 gauge Misc, 1 lancet  by Misc.(Non-Drug; Combo Route) route Daily., Disp: 200 each, Rfl: 3    loperamide (IMODIUM) 2 mg capsule, Take 2 tablets (4mg) by mouth after first loose stool, 1 tablet every 2 hours until diarrhea free for 12 hours. May take 2 tablets (4mg) by mouth every 4 hours at night. May require more than the package labeling maximum dose of 16mg/day., Disp: 30 capsule, Rfl: 11    [START ON 5/23/2025] morphine (MSIR) 15 MG tablet, Take 1 tablet (15 mg total) by mouth every 4 (four) hours as needed for Pain., Disp: 168 tablet, Rfl: 0    multivitamin with minerals tablet, Take 1 tablet by mouth once daily., Disp: , Rfl:     naloxegoL (MOVANTIK) 12.5 mg Tab, Take 1 tablet (12.5 mg total) by mouth once daily., Disp: 30 tablet, Rfl: 2    nebivoloL (BYSTOLIC) 5 MG Tab, Take 1 tablet (5 mg total) by mouth once daily.,  Disp: 90 tablet, Rfl: 3    OLANZapine (ZYPREXA) 5 MG tablet, Take 1 tablet (5 mg total) by mouth every evening. On days 1-3 of each chemotherapy cycle. (Patient taking differently: Take 5 mg by mouth every evening. On days 1-3 of each chemotherapy cycle.  States has not yet started), Disp: 6 tablet, Rfl: 11    [START ON 5/23/2025] oxyCODONE myristate (XTAMPZA ER) 18 mg CSpT, Take 1 capsule (18 mg total) by mouth every 12 (twelve) hours., Disp: 56 capsule, Rfl: 0    prochlorperazine (COMPAZINE) 5 MG tablet, Take 1 tablet (5 mg total) by mouth every 6 (six) hours as needed for Nausea. (Patient taking differently: Take 5 mg by mouth every 6 (six) hours as needed for Nausea. States has not yet started), Disp: 20 tablet, Rfl: 11    rosuvastatin (CRESTOR) 10 MG tablet, Take 1 tablet (10 mg total) by mouth once daily., Disp: 90 tablet, Rfl: 3    tamsulosin (FLOMAX) 0.4 mg Cap, TAKE ONE CAPSULE BY MOUTH EVERY DAY, Disp: 90 capsule, Rfl: 1    XIGDUO XR 5-1,000 mg, TAKE TWO TABLETS BY MOUTH EVERY DAY (Patient taking differently: Take 2 tablets by mouth every morning.), Disp: 180 tablet, Rfl: 1  No current facility-administered medications for this visit.

## 2025-05-20 NOTE — FIRST PROVIDER EVALUATION
Medical screening examination initiated.  I have conducted a focused provider triage encounter, findings are as follows:    Brief history of present illness:  sent from oncology clinic, hypotensive    There were no vitals filed for this visit.    Pertinent physical exam:  nontoxic, wheelchair bound    Brief workup plan:  labs    Preliminary workup initiated; this workup will be continued and followed by the physician or advanced practice provider that is assigned to the patient when roomed.

## 2025-05-20 NOTE — ED PROVIDER NOTES
Encounter Date: 5/20/2025       History     Chief Complaint   Patient presents with    Abdominal Pain     Hx pancreatic cancer, had nerve block done yesterday,concern for internal bleeding per family member      HPI    69 yo M w/pancreatic cancer, HTN, carotid artery stenosis, HTN, portal vein thrombosis, on eliquis (paused for procedure) recent celiac nerve block on 5/19, presenting with weakness, hypotension, sent in from heme/onc clinic.    Reports that he had a celiac nerve block yesterday, was persistently low blood pressure in the PACU, since discharge, has had significant lightheadedness, dyspnea with walking, near syncopal episodes with ambulation.  Denies abdominal pain, or back pain, no significant bleeding from bilateral back nerve block entry sites.  Has had normal bowel movement this morning, slightly loose, no dark tarry stool.  No dysuria, no hematuria, no epistaxis or gum bleeding.  Normal p.o. intake yesterday per wife at bedside.    Also had recent right-sided chest port placed in preparation for starting chemo this week.  Denies pain in this area, denies fever, chills.    Reports that when he was here in late April, he was diagnosed with thrombosis in the main portal vein, , began taking Eliquis early in May, stopping it for several days prior to the celiac nerve block, restarted Eliquis today.    Review of patient's allergies indicates:   Allergen Reactions    Bactrim [sulfamethoxazole-trimethoprim] Itching, Rash and Blisters    Lipitor [atorvastatin]      Achy; pt reports no problem with Crestor    Levaquin [levofloxacin] Other (See Comments)     Muscle aches under shoulder blades     Past Medical History:   Diagnosis Date    Carotid artery stenosis 11/15/2012    DDD (degenerative disc disease) 09/26/2012    Diabetes mellitus     DJD (degenerative joint disease) 09/26/2012    High cholesterol 09/26/2012    HTN (hypertension) 09/24/2014    2013    Increased glucose level 09/26/2012    LBP (low  back pain) 09/26/2012    Neck pain 09/24/2014    MATEUSZ 2014    Normal stress echocardiogram 09/26/2012    Pancreatic cancer      Past Surgical History:   Procedure Laterality Date    ENDOSCOPIC ULTRASOUND OF UPPER GASTROINTESTINAL TRACT N/A 04/30/2025    Procedure: ULTRASOUND, UPPER GI TRACT, ENDOSCOPIC;  Surgeon: Fish Curtis MD;  Location: [facility] ENDO (2ND FLR);  Service: Endoscopy;  Laterality: N/A;    INTERPOSITION ARTHROPLASTY OF CARPOMETACARPAL JOINTS Right 08/11/2023    Procedure: INTERPOSITION ARTHROPLASTY, CMC JOINT;  Surgeon: Williams, Claude S. IV, MD;  Location: [facility];  Service: Orthopedics;  Laterality: Right;    L-spine surgery  12/01/2015    Dr. Correia    PORTACATH PLACEMENT  05/2025     Family History   Problem Relation Name Age of Onset    Diabetes Mother      Hypertension Mother      Heart disease Father  62        MI     Social History[1]  Review of Systems    Physical Exam     Initial Vitals   BP Pulse Resp Temp SpO2   05/20/25 1524 05/20/25 1524 05/20/25 1524 05/20/25 1524 05/20/25 1638   (!) 85/50 97 16 97.3 °F (36.3 °C) 99 %      MAP       --                Physical Exam    Constitutional: He appears well-developed.   HENT:   Head: Normocephalic and atraumatic.   Eyes: EOM are normal.   Neck: Neck supple.   Pulmonary/Chest: Breath sounds normal. No stridor.   R chest port in place, no induration, no erythema   Abdominal: Abdomen is soft. He exhibits no distension.   Nontender to deep palpation, nondistended, no rebound, no ecchymosis   Musculoskeletal:      Cervical back: Neck supple.     Neurological: He is alert.   Skin: Skin is warm.   No active bleeding from nerve block sites on bilateral thoracolumbar back, old bleeding visualized, no hematoma         ED Course   Procedures  Labs Reviewed   URINALYSIS, REFLEX TO URINE CULTURE - Abnormal       Result Value    Color, UA Yellow      Appearance, UA Clear      pH, UA 6.0      Spec Grav UA 1.015      Protein, UA Negative      Glucose, UA 4+ (*)      Ketones, UA 1+ (*)     Bilirubin, UA Negative      Blood, UA Negative      Nitrites, UA Negative      Urobilinogen, UA 2.0-3.0 (*)     Leukocyte Esterase, UA Negative     CBC WITH DIFFERENTIAL - Abnormal    WBC 10.80      RBC 2.91 (*)     HGB 8.9 (*)     HCT 27.8 (*)     MCV 96      MCH 30.6      MCHC 32.0      RDW 14.6 (*)     Platelet Count 281      MPV 10.0      Nucleated RBC 0      Neut % 82.4 (*)     Lymph % 7.8 (*)     Mono % 8.5      Eos % 0.3      Basophil % 0.4      Imm Grans % 0.6 (*)     Neut # 8.90 (*)     Lymph # 0.84 (*)     Mono # 0.92      Eos # 0.03      Baso # 0.04      Imm Grans # 0.07 (*)    ISTAT PROCEDURE - Abnormal    POC Glucose 102      POC BUN 12      POC Creatinine 0.5      POC Sodium 133 (*)     POC Potassium 4.1      POC Chloride 95      POC TCO2 (MEASURED) 24      POC Ionized Calcium 1.16      POC Hematocrit 38      Sample JUAN MANUEL     CULTURE, BLOOD - Normal    Blood Culture No Growth After 6 Hours     B-TYPE NATRIURETIC PEPTIDE - Normal    BNP 46     TROPONIN I HIGH SENSITIVITY - Normal    Troponin High Sensitive <3     CULTURE, BLOOD   GREY TOP URINE HOLD    Extra Tube Hold for add-ons.     URINALYSIS MICROSCOPIC    RBC, UA <1      WBC, UA <1      Bacteria, UA None      Yeast, UA None      Hyaline Casts, UA 0      Microscopic Comment       CBC W/ AUTO DIFFERENTIAL    Narrative:     The following orders were created for panel order CBC auto differential.  Procedure                               Abnormality         Status                     ---------                               -----------         ------                     CBC with Differential[[id number]]       Abnormal            Final result                 Please view results for these tests on the individual orders.   SARS-COV-2 RNA AMPLIFICATION, QUAL   TYPE & SCREEN    Specimen Outdate 05/23/2025 23:59      Group & Rh A POS      Indirect Tl NEG     ABORH RETYPE    ABORH Retype A POS     ISTAT CHEM8          Imaging Results                CT Abdomen Pelvis With IV Contrast NO Oral Contrast (Final result)  Result time 05/20/25 21:15:18      Final result by Carlos Ruano MD (05/20/25 21:15:18)                   Impression:      No evidence of acute pulmonary embolus to the proximal segmental level.    Similar to mildly worse pancreatic mass with vascular interfaces discussed above.  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    New small left pleural effusion with ground-glass opacities in the left lung base.  Similar to minimally increased size of left lower lobe pulmonary nodule.    Similar to mildly worse hepatic metastatic disease and enlarged periportal lymph node.    New/worse anasarca and small volume abdominopelvic free fluid measuring slightly denser than simple fluid.    Additional findings discussed in the body of the report.    This report was flagged in Epic as abnormal.      Electronically signed by: Carlos Ruano MD  Date:    05/20/2025  Time:    21:15               Narrative:    EXAMINATION:  CTA CHEST NON CORONARY (PE STUDIES); CT ABDOMEN PELVIS WITH IV CONTRAST    CLINICAL HISTORY:  Pulmonary embolism (PE) suspected, high prob;; Abdominal pain, post-op;hb drop 12 to 9 after celiac nerve block, hypotension;    TECHNIQUE:  Low dose axial images, sagittal and coronal reformations were obtained from the thoracic inlet to the pubic symphysis following the IV administration of 100 mL of Omnipaque 350 .  Oral contrast was not given. CTA chest obtained utilizing PE protocol with MIP reformats.  CT abdomen pelvis obtained in the portal venous phase as a separate acquisition.    COMPARISON:  CT chest abdomen pelvis, 04/29/2025.    FINDINGS:  CTA CHEST:    Examination of the soft tissue and vascular structures at the base of the neck is negative for acute finding.    The thoracic aorta maintains normal caliber,  contour, and course with similar moderate atherosclerotic calcification.  There is no evidence of aneurysmal dilation or dissection.    The pulmonary arteries distribute normally.  No convincing evidence of acute pulmonary embolus to the proximal segmental level.    The trachea and proximal airways are patent.    The lungs are symmetrically expanded and there are new ground-glass opacities in the left lower lobe with new small left pleural effusion.  Slightly increased size of nodular density in the left lower lobe now measuring 8 mm (series 302, image 342; previously 7 mm).  Question minimal right pleural fluid.  Lungs are otherwise essentially clear and similar to prior exam.    The heart is not enlarged.  No abnormal bowing of the interventricular septum.  Coronary artery calcifications.  No significant pericardial fluid.    There is no bulky superior mediastinal lymphadenopathy.    The esophagus maintains a normal course and caliber.    CT ABDOMEN PELVIS:    Abdomen:    Liver is similar in size and contour.  Multiple irregular hypoattenuating masses in the liver again demonstrated, suggestive of metastatic disease.  Largest mass in the left hepatic lobe measures 2.8 cm (series 303, image 31; previously 2.8 cm).  Mass in the inferior right hepatic lobe measures roughly 4.4 cm in size (series 303, image 53; previously 4.2 cm when measured similarly).  Mass in the superior right hepatic lobe measures 4.6 cm (axial image 35; previously 3.9 cm).  Gallbladder is similar and negative for acute finding.  There is diffuse thrombosis of the extrahepatic main portal vein with partial thrombosis of the splenic and superior mesenteric vein.  Worsening thrombosis in the main portal vein when compared prior study, though the intrahepatic portal veins remain grossly patent, allowing for bolus timing.  No new or worsening intrahepatic biliary duct dilatation.  Possible minimal intrahepatic duct dilatation in the inferior right  hepatic lobe adjacent to liver mass.  No severe biliary duct dilatation.    Spleen is not significantly enlarged.  There are multiple new hypoattenuating foci in the spleen suspicious for splenic infarcts.    Adrenal glands are negative for acute finding.    There is a hypoattenuating irregularly enhancing pancreatic mass as seen previously, measuring roughly 10 x 8 cm (axial series 303, image 49; previously 9 x 7 cm when measured similarly).  Similar vascular interfaces including the celiac artery and SMA.  Persistent thrombosis of the portal vein, splenic vein, and upper superior mesenteric vein.  Worsening thrombosis of the main portal vein extending into the hilum.  Few small collateral vessels.  Probable small gastroesophageal varices.  Medial left renal vein is not well delineated and could be compressed by pancreatic mass.    The kidneys are symmetric.  No hydronephrosis. No asymmetric perinephric fat stranding.  Minimal right perinephric fluid along the medial superior pole could be related to recent procedure (coronal series 604, image 131).  Volume of fluid is not greater than expected.    Diffuse mesenteric edema and trace free fluid, worse from prior study.  No small bowel obstruction.  The stomach is not overly distended.    No gross pneumoperitoneum.  No sizable retroperitoneal hematoma identified.  Trace fluid in left retroperitoneum and left pericolic gutter could be related to recent procedure.    Enlarged periportal lymph node measuring up to 1.8 cm in short axis (series 303, image 57; previously 1.5 cm).  Additional mass conglomerate or lymph node in the left retroperitoneum which may communicate with pancreatic mass (axial image 71).    Abdominal aorta is normal in caliber with moderate atherosclerosis.  Pancreatic mass interfaces with the SMA and celiac artery.    Pelvis:    Urinary bladder is decompressed with symmetric wall prominence.  Calcifications in the prostate.  Prostate is stable and  mildly enlarged.  New/worse moderate volume pelvic free fluid.  Fluid measures approximately 12 HU, which could suggest some component of blood products in this patient with recent procedure (simple fluid density is 0 HU).  Rectum is negative for acute finding.    Bones and soft tissues:    No aggressive osseous lesions.  Degenerative changes in the spine with multilevel ankylosis suggestive of dish.  Operative changes in the lower lumbar spine as seen previously.  Mild body wall edema.                                        CTA Chest Non-Coronary (PE Studies) (Final result)  Result time 05/20/25 21:15:18      Final result by Carlos Ruano MD (05/20/25 21:15:18)                   Impression:      No evidence of acute pulmonary embolus to the proximal segmental level.    Similar to mildly worse pancreatic mass with vascular interfaces discussed above.  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    New small left pleural effusion with ground-glass opacities in the left lung base.  Similar to minimally increased size of left lower lobe pulmonary nodule.    Similar to mildly worse hepatic metastatic disease and enlarged periportal lymph node.    New/worse anasarca and small volume abdominopelvic free fluid measuring slightly denser than simple fluid.    Additional findings discussed in the body of the report.    This report was flagged in Epic as abnormal.      Electronically signed by: Carlos Ruano MD  Date:    05/20/2025  Time:    21:15               Narrative:    EXAMINATION:  CTA CHEST NON CORONARY (PE STUDIES); CT ABDOMEN PELVIS WITH IV CONTRAST    CLINICAL HISTORY:  Pulmonary embolism (PE) suspected, high prob;; Abdominal pain, post-op;hb drop 12 to 9 after celiac nerve block, hypotension;    TECHNIQUE:  Low dose axial images, sagittal and coronal reformations were obtained from the thoracic  inlet to the pubic symphysis following the IV administration of 100 mL of Omnipaque 350 .  Oral contrast was not given. CTA chest obtained utilizing PE protocol with MIP reformats.  CT abdomen pelvis obtained in the portal venous phase as a separate acquisition.    COMPARISON:  CT chest abdomen pelvis, 04/29/2025.    FINDINGS:  CTA CHEST:    Examination of the soft tissue and vascular structures at the base of the neck is negative for acute finding.    The thoracic aorta maintains normal caliber, contour, and course with similar moderate atherosclerotic calcification.  There is no evidence of aneurysmal dilation or dissection.    The pulmonary arteries distribute normally.  No convincing evidence of acute pulmonary embolus to the proximal segmental level.    The trachea and proximal airways are patent.    The lungs are symmetrically expanded and there are new ground-glass opacities in the left lower lobe with new small left pleural effusion.  Slightly increased size of nodular density in the left lower lobe now measuring 8 mm (series 302, image 342; previously 7 mm).  Question minimal right pleural fluid.  Lungs are otherwise essentially clear and similar to prior exam.    The heart is not enlarged.  No abnormal bowing of the interventricular septum.  Coronary artery calcifications.  No significant pericardial fluid.    There is no bulky superior mediastinal lymphadenopathy.    The esophagus maintains a normal course and caliber.    CT ABDOMEN PELVIS:    Abdomen:    Liver is similar in size and contour.  Multiple irregular hypoattenuating masses in the liver again demonstrated, suggestive of metastatic disease.  Largest mass in the left hepatic lobe measures 2.8 cm (series 303, image 31; previously 2.8 cm).  Mass in the inferior right hepatic lobe measures roughly 4.4 cm in size (series 303, image 53; previously 4.2 cm when measured similarly).  Mass in the superior right hepatic lobe measures 4.6 cm (axial image  35; previously 3.9 cm).  Gallbladder is similar and negative for acute finding.  There is diffuse thrombosis of the extrahepatic main portal vein with partial thrombosis of the splenic and superior mesenteric vein.  Worsening thrombosis in the main portal vein when compared prior study, though the intrahepatic portal veins remain grossly patent, allowing for bolus timing.  No new or worsening intrahepatic biliary duct dilatation.  Possible minimal intrahepatic duct dilatation in the inferior right hepatic lobe adjacent to liver mass.  No severe biliary duct dilatation.    Spleen is not significantly enlarged.  There are multiple new hypoattenuating foci in the spleen suspicious for splenic infarcts.    Adrenal glands are negative for acute finding.    There is a hypoattenuating irregularly enhancing pancreatic mass as seen previously, measuring roughly 10 x 8 cm (axial series 303, image 49; previously 9 x 7 cm when measured similarly).  Similar vascular interfaces including the celiac artery and SMA.  Persistent thrombosis of the portal vein, splenic vein, and upper superior mesenteric vein.  Worsening thrombosis of the main portal vein extending into the hilum.  Few small collateral vessels.  Probable small gastroesophageal varices.  Medial left renal vein is not well delineated and could be compressed by pancreatic mass.    The kidneys are symmetric.  No hydronephrosis. No asymmetric perinephric fat stranding.  Minimal right perinephric fluid along the medial superior pole could be related to recent procedure (coronal series 604, image 131).  Volume of fluid is not greater than expected.    Diffuse mesenteric edema and trace free fluid, worse from prior study.  No small bowel obstruction.  The stomach is not overly distended.    No gross pneumoperitoneum.  No sizable retroperitoneal hematoma identified.  Trace fluid in left retroperitoneum and left pericolic gutter could be related to recent procedure.    Enlarged  periportal lymph node measuring up to 1.8 cm in short axis (series 303, image 57; previously 1.5 cm).  Additional mass conglomerate or lymph node in the left retroperitoneum which may communicate with pancreatic mass (axial image 71).    Abdominal aorta is normal in caliber with moderate atherosclerosis.  Pancreatic mass interfaces with the SMA and celiac artery.    Pelvis:    Urinary bladder is decompressed with symmetric wall prominence.  Calcifications in the prostate.  Prostate is stable and mildly enlarged.  New/worse moderate volume pelvic free fluid.  Fluid measures approximately 12 HU, which could suggest some component of blood products in this patient with recent procedure (simple fluid density is 0 HU).  Rectum is negative for acute finding.    Bones and soft tissues:    No aggressive osseous lesions.  Degenerative changes in the spine with multilevel ankylosis suggestive of dish.  Operative changes in the lower lumbar spine as seen previously.  Mild body wall edema.                                       US Upper Extremity Veins Right (Final result)  Result time 05/20/25 19:09:58      Final result by Figueroa Ordonez MD (05/20/25 19:09:58)                   Impression:      No thrombus in central veins of the right upper extremity.    Electronically signed by resident: Darryl Mireles  Date:    05/20/2025  Time:    19:05    Electronically signed by: Figueroa Ordonez MD  Date:    05/20/2025  Time:    19:09               Narrative:    EXAMINATION:  US UPPER EXTREMITY VEINS RIGHT    CLINICAL HISTORY:  right upper extremity swelling , recent R chest port placement;    TECHNIQUE:  Duplex and color flow Doppler evaluation and dynamic compression was performed of the right upper extremity veins.    COMPARISON:  None    FINDINGS:  Central veins: The internal jugular, subclavian, and axillary veins are patent and free of thrombus.    Arm veins: The brachial, basilic, and cephalic veins are patent and  compressible.    Contralateral subclavian/internal jugular veins: The left subclavian and internal jugular veins are patent and free of thrombus.    Other findings: None.                                       X-Ray Chest AP Portable (Final result)  Result time 05/20/25 19:36:33      Final result by Jan Kovacs MD (05/20/25 19:36:33)                   Impression:      1. Chronic appearing interstitial findings, no large focal consolidation.      Electronically signed by: Jan Kovacs MD  Date:    05/20/2025  Time:    19:36               Narrative:    EXAMINATION:  XR CHEST AP PORTABLE    CLINICAL HISTORY:  Sepsis;    TECHNIQUE:  Single frontal view of the chest was performed.    COMPARISON:  10/22/2015    FINDINGS:  Right chest wall port catheter tip projects over the distal SVC.  The cardiomediastinal silhouette is within normal limits.  There is no pleural effusion.  The trachea is midline.  The lungs are symmetrically expanded bilaterally with coarse interstitial attenuation, accentuated by habitus..  No large focal consolidation seen.  There is no pneumothorax.  The osseous structures are remarkable for degenerative change..                                       Medications   lactated ringers bolus 1,000 mL (0 mLs Intravenous Stopped 5/20/25 1733)   iohexoL (OMNIPAQUE 350) injection 100 mL (100 mLs Intravenous Given 5/20/25 2023)     Medical Decision Making  71 yo M w/pancreatic cancer, HTN, carotid artery stenosis, HTN, portal vein thrombosis, on eliquis (paused for procedure) recent celiac nerve block on 5/19, presenting with weakness, hypotension, sent in from heme/onc clinic.      Differential diagnosis includes but is not limited to: postprocedural complication, intraabdominal/retroperitoneal bleeding/hematoma, infection, symptomatic anemia, PE, hypotension 2/2 portal hypertension    Has a nontender abdomen, with no rebound, and no tenderness to CVA, no ecchymosis or hematoma visualized on abdomen  or back, with no active bleeding from the nerve block sites on the back, however pain may be masked given the celiac nerve block.  Does have drop in hemoglobin from 12 to 9, has had both right chest port placed and celiac nerve block performed since the hemoglobin of 12 was drawn.    Given significant hypotension,, with dyspnea on exertion, obtained workup for PE, patient restarted Eliquis today for his portal vein thrombosis, but was paused for several days during the procedures.    Denies dark tarry stool, with no hematemesis, no gum bleeding, no other evidence of bleeding noted by patient, with normal light brown colored stool today, less concern for GI bleed.    Blood pressure improved after 1 L of IV fluids, with maps continuing above 65, no infectious symptoms or abdominal tenderness to suggest sepsis, no intra-abdominal sign of infection on CT.    CTAP does have moderate amount of pelvic fluid, concerning for blood, discussed with interventional Radiology resident, Wes, who reported attending looked at the imaging, and does not see any active bleeding for IR intervention, believes fluid may be more related to hepatic dysfunction.  Pending repeat CBC to evaluate for acute drop in hemoglobin while in the ED, continuing without abdominal pain or back pain on reassessments in the ED. With progression in thrombosis in main portal vein, splenic infarcts, on CTAP, no PE.  Discussed findings with patient, repeat CBC ordered to evaluate for further drop in hemoglobin.  Patient restarted Eliquis this morning after stopping for his celiac nerve block yesterday, will defer nighttime dose/IV heparin until repeat CBC results, in case of significant drop suggesting intra/retroperitoneal bleed. Collection of perinephric fluid on the right on CT is not greater than expected postprocedurally.    Discussed case with admitting hospitalist, and Oncology, given his oncology plans, may be a oncology admission.  Signed out to  incoming ED provider pending repeat CBC, admission.        Amount and/or Complexity of Data Reviewed  Labs: ordered. Decision-making details documented in ED Course.  Radiology: ordered.    Risk  Prescription drug management.               ED Course as of 25 0031   e May 20, 2025   1658 POC Lactate: 1.4 [LF]   1702 Hb downtrended from 12 to 9 since chest port and celiac nerve block [LF]   1702 Perfusion deficit on R hand compared to L, pulse 2+ radial on R however cyanotic compared to L, ordered for RUE DVT study, CTA of chest to look for vascular thrombosis s/p R chest port placement vs PE [LF]   1716 Troponin I High Sensitivity: <3 [LF]   2150 CTA: No evidence of acute pulmonary embolus to the proximal segmental level.     Similar to mildly worse pancreatic mass with vascular interfaces discussed above.  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     New small left pleural effusion with ground-glass opacities in the left lung base.  Similar to minimally increased size of left lower lobe pulmonary nodule.     Similar to mildly worse hepatic metastatic disease and enlarged periportal lymph node.     New/worse anasarca and small volume abdominopelvic free fluid measuring slightly denser than simple fluid.   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

## 2025-05-20 NOTE — TELEPHONE ENCOUNTER
Received secure chat from [facility] FNP that patient was not doing well today.  Patient had a nerve block yesterday and FNP worried about patient starting chemo tomorrow. FNP reports he is weak, has VICENTE, and is having balance problems. Has eaten little today.  Per MD, patient to see PA in urgent care slot, even if arrival is after 1 pm.  Per MD, patient to get lab work and determine if patient appropriate for chemo tomorrow.  Called patient and wife and both verbalized understanding of appointment dates and times.

## 2025-05-20 NOTE — ED TRIAGE NOTES
Geal Pedroza, a 70 y.o. male presents to the ED w/ complaint of weakness, SOB upon exertion and feeling cold.     Triage note:  Chief Complaint   Patient presents with    Abdominal Pain     Hx pancreatic cancer, had nerve block done yesterday,concern for internal bleeding per family member      Review of patient's allergies indicates:   Allergen Reactions    Bactrim [sulfamethoxazole-trimethoprim] Itching, Rash and Blisters    Lipitor [atorvastatin]      Achy; pt reports no problem with Crestor    Levaquin [levofloxacin] Other (See Comments)     Muscle aches under shoulder blades     Past Medical History:   Diagnosis Date    Carotid artery stenosis 11/15/2012    DDD (degenerative disc disease) 09/26/2012    Diabetes mellitus     DJD (degenerative joint disease) 09/26/2012    High cholesterol 09/26/2012    HTN (hypertension) 09/24/2014    2013    Increased glucose level 09/26/2012    LBP (low back pain) 09/26/2012    Neck pain 09/24/2014    MATEUSZ 2014    Normal stress echocardiogram 09/26/2012    Pancreatic cancer

## 2025-05-20 NOTE — ED NOTES
I-STAT Chem-8+ Results:   Value Reference Range   Sodium 133 136-145 mmol/L   Potassium  4.1 3.5-5.1 mmol/L   Chloride 95  mmol/L   Ionized Calcium 1.16 1.06-1.42 mmol/L   CO2 (measured) 24 23-29 mmol/L   Glucose 102  mg/dL   BUN 12 6-30 mg/dL   Creatinine 0.5 0.5-1.4 mg/dL   Hematocrit 38 36-54%

## 2025-05-21 ENCOUNTER — TELEPHONE (OUTPATIENT)
Dept: HEMATOLOGY/ONCOLOGY | Facility: CLINIC | Age: 70
End: 2025-05-21
Payer: MEDICARE

## 2025-05-21 ENCOUNTER — DOCUMENTATION ONLY (OUTPATIENT)
Dept: HEMATOLOGY/ONCOLOGY | Facility: CLINIC | Age: 70
End: 2025-05-21
Payer: MEDICARE

## 2025-05-21 VITALS
DIASTOLIC BLOOD PRESSURE: 68 MMHG | HEIGHT: 66 IN | RESPIRATION RATE: 18 BRPM | BODY MASS INDEX: 21.29 KG/M2 | SYSTOLIC BLOOD PRESSURE: 107 MMHG | WEIGHT: 132.5 LBS | TEMPERATURE: 98 F | HEART RATE: 99 BPM | OXYGEN SATURATION: 98 %

## 2025-05-21 DIAGNOSIS — R63.0 ANOREXIA: ICD-10-CM

## 2025-05-21 DIAGNOSIS — C25.8 OVERLAPPING MALIGNANT NEOPLASM OF PANCREAS: Primary | ICD-10-CM

## 2025-05-21 DIAGNOSIS — R53.1 WEAKNESS: ICD-10-CM

## 2025-05-21 PROBLEM — C77.2 PANCREATIC CANCER METASTASIZED TO INTRA-ABDOMINAL LYMPH NODE: Status: ACTIVE | Noted: 2025-05-21

## 2025-05-21 PROBLEM — C25.9 PANCREATIC CANCER METASTASIZED TO INTRA-ABDOMINAL LYMPH NODE: Status: ACTIVE | Noted: 2025-05-21

## 2025-05-21 PROBLEM — I95.9 HYPOTENSION: Status: ACTIVE | Noted: 2025-05-21

## 2025-05-21 LAB
ABSOLUTE EOSINOPHIL (OHS): 0.03 K/UL
ABSOLUTE EOSINOPHIL (OHS): 0.04 K/UL
ABSOLUTE MONOCYTE (OHS): 0.92 K/UL (ref 0.3–1)
ABSOLUTE MONOCYTE (OHS): 1.02 K/UL (ref 0.3–1)
ABSOLUTE NEUTROPHIL COUNT (OHS): 8.9 K/UL (ref 1.8–7.7)
ABSOLUTE NEUTROPHIL COUNT (OHS): 9.2 K/UL (ref 1.8–7.7)
ALBUMIN SERPL BCP-MCNC: 2.2 G/DL (ref 3.5–5.2)
ALP SERPL-CCNC: 136 UNIT/L (ref 40–150)
ALT SERPL W/O P-5'-P-CCNC: 17 UNIT/L (ref 10–44)
ANION GAP (OHS): 12 MMOL/L (ref 8–16)
ANION GAP (OHS): 13 MMOL/L (ref 8–16)
AST SERPL-CCNC: 38 UNIT/L (ref 11–45)
BASOPHILS # BLD AUTO: 0.04 K/UL
BASOPHILS # BLD AUTO: 0.05 K/UL
BASOPHILS NFR BLD AUTO: 0.4 %
BASOPHILS NFR BLD AUTO: 0.4 %
BILIRUB SERPL-MCNC: 0.4 MG/DL (ref 0.1–1)
BUN SERPL-MCNC: 8 MG/DL (ref 8–23)
BUN SERPL-MCNC: 9 MG/DL (ref 8–23)
CALCIUM SERPL-MCNC: 8.4 MG/DL (ref 8.7–10.5)
CALCIUM SERPL-MCNC: 8.8 MG/DL (ref 8.7–10.5)
CHLORIDE SERPL-SCNC: 100 MMOL/L (ref 95–110)
CHLORIDE SERPL-SCNC: 103 MMOL/L (ref 95–110)
CO2 SERPL-SCNC: 18 MMOL/L (ref 23–29)
CO2 SERPL-SCNC: 26 MMOL/L (ref 23–29)
CREAT SERPL-MCNC: 0.5 MG/DL (ref 0.5–1.4)
CREAT SERPL-MCNC: 0.5 MG/DL (ref 0.5–1.4)
ERYTHROCYTE [DISTWIDTH] IN BLOOD BY AUTOMATED COUNT: 14.4 % (ref 11.5–14.5)
ERYTHROCYTE [DISTWIDTH] IN BLOOD BY AUTOMATED COUNT: 14.6 % (ref 11.5–14.5)
GFR SERPLBLD CREATININE-BSD FMLA CKD-EPI: >60 ML/MIN/1.73/M2
GFR SERPLBLD CREATININE-BSD FMLA CKD-EPI: >60 ML/MIN/1.73/M2
GLUCOSE SERPL-MCNC: 124 MG/DL (ref 70–110)
GLUCOSE SERPL-MCNC: 126 MG/DL (ref 70–110)
HCT VFR BLD AUTO: 27.8 % (ref 40–54)
HCT VFR BLD AUTO: 30.3 % (ref 40–54)
HGB BLD-MCNC: 10.2 GM/DL (ref 14–18)
HGB BLD-MCNC: 8.9 GM/DL (ref 14–18)
IMM GRANULOCYTES # BLD AUTO: 0.07 K/UL (ref 0–0.04)
IMM GRANULOCYTES # BLD AUTO: 0.13 K/UL (ref 0–0.04)
IMM GRANULOCYTES NFR BLD AUTO: 0.6 % (ref 0–0.5)
IMM GRANULOCYTES NFR BLD AUTO: 1.1 % (ref 0–0.5)
INR PPP: 1.3 (ref 0.8–1.2)
LYMPHOCYTES # BLD AUTO: 0.84 K/UL (ref 1–4.8)
LYMPHOCYTES # BLD AUTO: 1.21 K/UL (ref 1–4.8)
MAGNESIUM SERPL-MCNC: 1.6 MG/DL (ref 1.6–2.6)
MCH RBC QN AUTO: 30.6 PG (ref 27–31)
MCH RBC QN AUTO: 31.7 PG (ref 27–31)
MCHC RBC AUTO-ENTMCNC: 32 G/DL (ref 32–36)
MCHC RBC AUTO-ENTMCNC: 33.7 G/DL (ref 32–36)
MCV RBC AUTO: 94 FL (ref 82–98)
MCV RBC AUTO: 96 FL (ref 82–98)
NUCLEATED RBC (/100WBC) (OHS): 0 /100 WBC
NUCLEATED RBC (/100WBC) (OHS): 0 /100 WBC
OHS QRS DURATION: 84 MS
OHS QTC CALCULATION: 435 MS
PHOSPHATE SERPL-MCNC: 3 MG/DL (ref 2.7–4.5)
PLATELET # BLD AUTO: 281 K/UL (ref 150–450)
PLATELET # BLD AUTO: 340 K/UL (ref 150–450)
PMV BLD AUTO: 10 FL (ref 9.2–12.9)
PMV BLD AUTO: 10.2 FL (ref 9.2–12.9)
POCT GLUCOSE: 118 MG/DL (ref 70–110)
POCT GLUCOSE: 128 MG/DL (ref 70–110)
POTASSIUM SERPL-SCNC: 4.1 MMOL/L (ref 3.5–5.1)
POTASSIUM SERPL-SCNC: 5.8 MMOL/L (ref 3.5–5.1)
PROT SERPL-MCNC: 5.8 GM/DL (ref 6–8.4)
PROTHROMBIN TIME: 13.5 SECONDS (ref 9–12.5)
RBC # BLD AUTO: 2.91 M/UL (ref 4.6–6.2)
RBC # BLD AUTO: 3.22 M/UL (ref 4.6–6.2)
RELATIVE EOSINOPHIL (OHS): 0.3 %
RELATIVE EOSINOPHIL (OHS): 0.3 %
RELATIVE LYMPHOCYTE (OHS): 10.4 % (ref 18–48)
RELATIVE LYMPHOCYTE (OHS): 7.8 % (ref 18–48)
RELATIVE MONOCYTE (OHS): 8.5 % (ref 4–15)
RELATIVE MONOCYTE (OHS): 8.8 % (ref 4–15)
RELATIVE NEUTROPHIL (OHS): 79 % (ref 38–73)
RELATIVE NEUTROPHIL (OHS): 82.4 % (ref 38–73)
SARS-COV-2 RDRP RESP QL NAA+PROBE: NEGATIVE
SODIUM SERPL-SCNC: 133 MMOL/L (ref 136–145)
SODIUM SERPL-SCNC: 139 MMOL/L (ref 136–145)
WBC # BLD AUTO: 10.8 K/UL (ref 3.9–12.7)
WBC # BLD AUTO: 11.65 K/UL (ref 3.9–12.7)

## 2025-05-21 PROCEDURE — U0002 COVID-19 LAB TEST NON-CDC: HCPCS | Performed by: STUDENT IN AN ORGANIZED HEALTH CARE EDUCATION/TRAINING PROGRAM

## 2025-05-21 PROCEDURE — 63600175 PHARM REV CODE 636 W HCPCS: Performed by: INTERNAL MEDICINE

## 2025-05-21 PROCEDURE — 25000003 PHARM REV CODE 250: Performed by: INTERNAL MEDICINE

## 2025-05-21 PROCEDURE — 84100 ASSAY OF PHOSPHORUS: CPT | Performed by: INTERNAL MEDICINE

## 2025-05-21 PROCEDURE — 25000003 PHARM REV CODE 250

## 2025-05-21 PROCEDURE — G0378 HOSPITAL OBSERVATION PER HR: HCPCS

## 2025-05-21 PROCEDURE — 80053 COMPREHEN METABOLIC PANEL: CPT | Performed by: INTERNAL MEDICINE

## 2025-05-21 PROCEDURE — 85025 COMPLETE CBC W/AUTO DIFF WBC: CPT | Performed by: INTERNAL MEDICINE

## 2025-05-21 PROCEDURE — 96361 HYDRATE IV INFUSION ADD-ON: CPT

## 2025-05-21 PROCEDURE — 63600175 PHARM REV CODE 636 W HCPCS

## 2025-05-21 PROCEDURE — 83735 ASSAY OF MAGNESIUM: CPT | Performed by: INTERNAL MEDICINE

## 2025-05-21 PROCEDURE — 99223 1ST HOSP IP/OBS HIGH 75: CPT | Mod: ,,, | Performed by: PHYSICIAN ASSISTANT

## 2025-05-21 PROCEDURE — 85610 PROTHROMBIN TIME: CPT | Performed by: INTERNAL MEDICINE

## 2025-05-21 PROCEDURE — 82374 ASSAY BLOOD CARBON DIOXIDE: CPT

## 2025-05-21 PROCEDURE — 36415 COLL VENOUS BLD VENIPUNCTURE: CPT | Performed by: INTERNAL MEDICINE

## 2025-05-21 RX ORDER — HEPARIN SODIUM,PORCINE/PF 10 UNIT/ML
10 SYRINGE (ML) INTRAVENOUS
Status: DISCONTINUED | OUTPATIENT
Start: 2025-05-21 | End: 2025-05-21 | Stop reason: HOSPADM

## 2025-05-21 RX ORDER — SODIUM,POTASSIUM PHOSPHATES 280-250MG
2 POWDER IN PACKET (EA) ORAL
Status: DISCONTINUED | OUTPATIENT
Start: 2025-05-21 | End: 2025-05-21

## 2025-05-21 RX ORDER — HEPARIN 100 UNIT/ML
100 SYRINGE INTRAVENOUS
Status: DISCONTINUED | OUTPATIENT
Start: 2025-05-21 | End: 2025-05-21 | Stop reason: HOSPADM

## 2025-05-21 RX ORDER — INSULIN ASPART 100 [IU]/ML
0-5 INJECTION, SOLUTION INTRAVENOUS; SUBCUTANEOUS
Status: DISCONTINUED | OUTPATIENT
Start: 2025-05-21 | End: 2025-05-21 | Stop reason: HOSPADM

## 2025-05-21 RX ORDER — MORPHINE SULFATE 15 MG/1
15 TABLET ORAL EVERY 4 HOURS PRN
Refills: 0 | Status: DISCONTINUED | OUTPATIENT
Start: 2025-05-21 | End: 2025-05-21 | Stop reason: HOSPADM

## 2025-05-21 RX ORDER — LANOLIN ALCOHOL/MO/W.PET/CERES
800 CREAM (GRAM) TOPICAL
Status: DISCONTINUED | OUTPATIENT
Start: 2025-05-21 | End: 2025-05-21 | Stop reason: HOSPADM

## 2025-05-21 RX ORDER — TAMSULOSIN HYDROCHLORIDE 0.4 MG/1
1 CAPSULE ORAL DAILY
Status: DISCONTINUED | OUTPATIENT
Start: 2025-05-21 | End: 2025-05-21 | Stop reason: HOSPADM

## 2025-05-21 RX ORDER — SODIUM CHLORIDE 0.9 % (FLUSH) 0.9 %
10 SYRINGE (ML) INJECTION
Status: DISCONTINUED | OUTPATIENT
Start: 2025-05-21 | End: 2025-05-21 | Stop reason: HOSPADM

## 2025-05-21 RX ORDER — ACETAMINOPHEN 325 MG/1
650 TABLET ORAL EVERY 4 HOURS PRN
Status: DISCONTINUED | OUTPATIENT
Start: 2025-05-21 | End: 2025-05-21 | Stop reason: HOSPADM

## 2025-05-21 RX ORDER — ATORVASTATIN CALCIUM 40 MG/1
40 TABLET, FILM COATED ORAL DAILY
Status: DISCONTINUED | OUTPATIENT
Start: 2025-05-21 | End: 2025-05-21 | Stop reason: HOSPADM

## 2025-05-21 RX ORDER — EZETIMIBE 10 MG/1
10 TABLET ORAL DAILY
Status: DISCONTINUED | OUTPATIENT
Start: 2025-05-21 | End: 2025-05-21 | Stop reason: HOSPADM

## 2025-05-21 RX ORDER — NALOXONE HCL 0.4 MG/ML
0.02 VIAL (ML) INJECTION
Status: DISCONTINUED | OUTPATIENT
Start: 2025-05-21 | End: 2025-05-21 | Stop reason: HOSPADM

## 2025-05-21 RX ORDER — OXYCODONE HCL 10 MG/1
10 TABLET, FILM COATED, EXTENDED RELEASE ORAL EVERY 12 HOURS
Refills: 0 | Status: DISCONTINUED | OUTPATIENT
Start: 2025-05-21 | End: 2025-05-21

## 2025-05-21 RX ORDER — HEPARIN 100 UNIT/ML
500 SYRINGE INTRAVENOUS
Status: DISCONTINUED | OUTPATIENT
Start: 2025-05-21 | End: 2025-05-21 | Stop reason: HOSPADM

## 2025-05-21 RX ORDER — CYPROHEPTADINE HYDROCHLORIDE 4 MG/1
4 TABLET ORAL 3 TIMES DAILY
Status: DISCONTINUED | OUTPATIENT
Start: 2025-05-21 | End: 2025-05-21 | Stop reason: HOSPADM

## 2025-05-21 RX ORDER — SODIUM CHLORIDE 0.9 % (FLUSH) 0.9 %
10 SYRINGE (ML) INJECTION EVERY 12 HOURS PRN
Status: DISCONTINUED | OUTPATIENT
Start: 2025-05-21 | End: 2025-05-21 | Stop reason: HOSPADM

## 2025-05-21 RX ORDER — IBUPROFEN 200 MG
24 TABLET ORAL
Status: DISCONTINUED | OUTPATIENT
Start: 2025-05-21 | End: 2025-05-21 | Stop reason: HOSPADM

## 2025-05-21 RX ORDER — SODIUM CHLORIDE 0.9 % (FLUSH) 0.9 %
20 SYRINGE (ML) INJECTION
Status: DISCONTINUED | OUTPATIENT
Start: 2025-05-21 | End: 2025-05-21 | Stop reason: HOSPADM

## 2025-05-21 RX ORDER — TALC
6 POWDER (GRAM) TOPICAL NIGHTLY PRN
Status: DISCONTINUED | OUTPATIENT
Start: 2025-05-21 | End: 2025-05-21 | Stop reason: HOSPADM

## 2025-05-21 RX ORDER — GLUCAGON 1 MG
1 KIT INJECTION
Status: DISCONTINUED | OUTPATIENT
Start: 2025-05-21 | End: 2025-05-21 | Stop reason: HOSPADM

## 2025-05-21 RX ORDER — OXYCODONE HCL 10 MG/1
20 TABLET, FILM COATED, EXTENDED RELEASE ORAL EVERY 12 HOURS
Status: DISCONTINUED | OUTPATIENT
Start: 2025-05-21 | End: 2025-05-21 | Stop reason: HOSPADM

## 2025-05-21 RX ORDER — IBUPROFEN 200 MG
16 TABLET ORAL
Status: DISCONTINUED | OUTPATIENT
Start: 2025-05-21 | End: 2025-05-21 | Stop reason: HOSPADM

## 2025-05-21 RX ADMIN — SODIUM ZIRCONIUM CYCLOSILICATE 5 G: 5 POWDER, FOR SUSPENSION ORAL at 10:05

## 2025-05-21 RX ADMIN — EZETIMIBE 10 MG: 10 TABLET ORAL at 09:05

## 2025-05-21 RX ADMIN — HEPARIN 500 UNITS: 100 SYRINGE at 01:05

## 2025-05-21 RX ADMIN — Medication 800 MG: at 09:05

## 2025-05-21 RX ADMIN — ATORVASTATIN CALCIUM 40 MG: 40 TABLET, FILM COATED ORAL at 09:05

## 2025-05-21 RX ADMIN — Medication 800 MG: at 06:05

## 2025-05-21 RX ADMIN — TAMSULOSIN HYDROCHLORIDE 0.4 MG: 0.4 CAPSULE ORAL at 09:05

## 2025-05-21 RX ADMIN — SODIUM CHLORIDE, POTASSIUM CHLORIDE, SODIUM LACTATE AND CALCIUM CHLORIDE 1000 ML: 600; 310; 30; 20 INJECTION, SOLUTION INTRAVENOUS at 01:05

## 2025-05-21 RX ADMIN — THERA TABS 1 TABLET: TAB at 09:05

## 2025-05-21 NOTE — HOSPITAL COURSE
71 yo M w/pancreatic cancer not on chemo who was admitted to the medical oncology service for [facility]. On day 1 of admission patient pressure improved, he reported feeling improved and expressed interest in discharging home. Imaging revealed pelvic fluid collection that was likely related to malignancy as opposed to blood collection. He was deemed to be HDS, potassium normalized after treatment with Lowkelma, and a decision was made to d/c the patient. All queries answered and patient was d/c on 5/21.

## 2025-05-21 NOTE — ASSESSMENT & PLAN NOTE
PancreaticPatient has metastatic cancer of pancreatic primary. The cancer has metastasized to LN, spleen, bone. The patient is under the care of an outpatient oncologist. The patient has not started chemotherapy .Their staging information is listed below.    Cancer Staging   No matching staging information was found for the patient.  Stage IV undifferentiated metastatic cancer

## 2025-05-21 NOTE — ASSESSMENT & PLAN NOTE
Well controlled.   -Holding oral hypoglycemics  -Low intensity ISS  -Patient is NPO for potential procedure

## 2025-05-21 NOTE — NURSING
Patient admitted to room 805 from ED; accompanied by spouse. Vital signs stable on room air. Oriented x4. Ambulatory; urinal provided. LDAs: 18g R AC, 20g L AV & port not accessed. Informed of unit routines. Addressed all needs for comfort and orientation to floor. MD notified of arrival to room. GUALBERTO.

## 2025-05-21 NOTE — DISCHARGE SUMMARY
[facility] Oncology ([facility])  Hematology/Oncology  Discharge Summary      Patient Name: Gael Pedroza  MRN: [id number]  Admission Date: 5/20/2025  Hospital Length of Stay: 0 days  Discharge Date and Time: 05/21/2025 3:44 PM  Attending Physician: No att. providers found   Discharging Provider: Taya Vann MD  Primary Care Provider: HARLAN Steele MD    HPI: No notes on file    * No surgery found *     Hospital Course: 69 yo M w/pancreatic cancer not on chemo who was admitted to the medical oncology service for [facility]. On day 1 of admission patient pressure improved, he reported feeling improved and expressed interest in discharging home. Imaging revealed pelvic fluid collection that was likely related to malignancy as opposed to blood collection. He was deemed to be HDS, potassium normalized after treatment with Lowkelma, and a decision was made to d/c the patient. All queries answered and patient was d/c on 5/21.     Goals of Care Treatment Preferences:  Code Status: DNR    Living Will: Yes  La[place]: Yes  What is most important right now is to focus on spending time at home, avoiding the hospital, remaining as independent as possible, symptom/pain control, improvement in condition but with limits to invasive therapies.  Accordingly, we have decided that the best plan to meet the patient's goals includes continuing with treatment.      Consults:   Consults (From admission, onward)          Status Ordering Provider     Inpatient consult to Registered Dietitian/Nutritionist  Once        Provider:  (Not yet assigned)    VERONICA Ruiz     Inpatient consult to Oncology  Once        Provider:  (Not yet assigned)    Completed ESTEFANIA PELAEZ     Inpatient consult to Interventional Radiology  Once        Provider:  (Not yet assigned)    Completed ESTEFANIA PELAEZ          Vitals:    05/21/25 0900 05/21/25 1003 05/21/25 1101 05/21/25 1206   BP:   107/68    BP Location:       Patient Position:   Sitting    Pulse:   99     Resp:   18    Temp:   97.9 °F (36.6 °C)    TempSrc: Oral  Oral    SpO2:  99% 98% 98%   Weight:       Height:                Physical Exam  Vitals reviewed.   Constitutional:       General: He is awake.      Appearance: Normal appearance. He is underweight. He is not ill-appearing, toxic-appearing or diaphoretic.   HENT:      Head: Normocephalic.   Eyes:      General: No scleral icterus.        Right eye: No discharge.         Left eye: No discharge.      Conjunctiva/sclera: Conjunctivae normal.   Pulmonary:      Effort: Pulmonary effort is normal.   Musculoskeletal:      Cervical back: Normal range of motion.      Comments: Erythema around coccyx without open wound or drainage noted   Abdominal  Exam unremarkable  Neurological:      General: No focal deficit present.      Mental Status: He is alert and oriented to person, place, and time.   Psychiatric:         Mood and Affect: Mood normal.         Behavior: Behavior normal. Behavior is cooperative.         Thought Content: Thought content normal.         Judgment: Judgment normal.                  Significant Labs: All pertinent labs within the past 24 hours have been reviewed.    Significant Diagnostic Studies: Labs: BMP:   Recent Labs   Lab 05/20/25  1331 05/21/25  0234 05/21/25  1217   * 124* 126*   * 133* 139   K 4.8 5.8* 4.1   CL 99 103 100   CO2 23 18* 26   BUN 13 9 8   CREATININE 0.5 0.5 0.5   CALCIUM 8.8 8.4* 8.8   MG  --  1.6  --    , CMP   Recent Labs   Lab 05/20/25  1331 05/21/25  0234 05/21/25  1217   * 133* 139   K 4.8 5.8* 4.1   CL 99 103 100   CO2 23 18* 26   * 124* 126*   BUN 13 9 8   CREATININE 0.5 0.5 0.5   CALCIUM 8.8 8.4* 8.8   PROT 5.7* 5.8*  --    ALBUMIN 2.3* 2.2*  --    BILITOT 0.5 0.4  --    ALKPHOS 138 136  --    AST 40 38  --    ALT 18 17  --    ANIONGAP 11 12 13   , and CBC   Recent Labs   Lab 05/20/25  1331 05/20/25  1653 05/20/25  2312 05/21/25  0234   WBC 10.13  --  10.80 11.65   HGB 9.7*  --  8.9* 10.2*    HCT 32.0*   < > 27.8* 30.3*     --  281 340    < > = values in this interval not displayed.       Pending Diagnostic Studies:       None          Final Active Diagnoses:    Diagnosis Date Noted POA    PRINCIPAL PROBLEM:  Hypotension [I95.9] 05/21/2025 Yes    Pancreatic cancer metastasized to intra-abdominal lymph node [C25.9, C77.2] 05/21/2025 Yes    Anemia [D64.9] 05/20/2025 Yes    Hyponatremia [E87.1] 05/20/2025 Yes    Weakness [R53.1] 05/20/2025 Yes    Pressure ulcer of coccygeal region, unspecified pressure ulcer stage [L89.159] 05/20/2025 Yes    Severe malnutrition [E43] 04/30/2025 Yes     Chronic    Type 2 diabetes mellitus with circulatory disorder, without long-term current use of insulin [E11.59] 09/26/2012 Yes      Problems Resolved During this Admission:      Discharged Condition: stable    Disposition: Home or Self Care    Follow Up:   Follow-up Information       Provider, Kylie .    Specialty: Internal Medicine  Contact information:  [address] [address]                           Patient Instructions:      Ambulatory referral/consult to [facility]   Standing Status: Future   Referral Priority: Routine Referral Type: Consultation   Referred to Provider: KYLIE PROVIDER Requested Specialty: Internal Medicine   Number of Visits Requested: 1     Medications:  Reconciled Home Medications:      Medication List        PAUSE taking these medications      nebivoloL 5 MG Tab  Wait to take this until your doctor or other care provider tells you to start again.  Hold while blood pressure is low  Commonly known as: BYSTOLIC  Take 1 tablet (5 mg total) by mouth once daily.            CONTINUE taking these medications      alcohol swabs Padm  Apply 1 each topically as needed.     ascorbic acid (vitamin C) 500 MG tablet  Commonly known as: VITAMIN C  Take 500 mg by mouth once daily.     aspirin 81 MG EC tablet  Commonly known as: ECOTRIN  Take 1 tablet (81 mg  total) by mouth once daily.     blood sugar diagnostic Strp  Commonly known as: TRUE METRIX GLUCOSE TEST STRIP  200 each by Misc.(Non-Drug; Combo Route) route Daily.     cholecalciferol (vitamin D3) 25 mcg (1,000 unit) capsule  Commonly known as: VITAMIN D3  Take 1,000 Units by mouth once daily.     cyproheptadine 4 mg tablet  Commonly known as: PERIACTIN  Take 1 tablet (4 mg total) by mouth 3 (three) times daily.     dicyclomine 10 MG capsule  Commonly known as: BENTYL  Take 1 capsule (10 mg total) by mouth 2 (two) times daily.     ELIQUIS 5 mg Tab  Generic drug: apixaban  Take 2 tablets (10 mg total) by mouth 2 (two) times daily for 2 days, THEN 1 tablet (5 mg total) 2 (two) times daily.  Start taking on: May 3, 2025     ezetimibe 10 mg tablet  Commonly known as: ZETIA  Take 1 tablet (10 mg total) by mouth once daily.     gabapentin 300 MG capsule  Commonly known as: NEURONTIN  Take 1 capsule (300 mg total) by mouth every evening.     lancets 30 gauge Misc  1 lancet  by Misc.(Non-Drug; Combo Route) route Daily.     loperamide 2 mg capsule  Commonly known as: IMODIUM  Take 2 tablets (4mg) by mouth after first loose stool, 1 tablet every 2 hours until diarrhea free for 12 hours. May take 2 tablets (4mg) by mouth every 4 hours at night. May require more than the package labeling maximum dose of 16mg/day.     morphine 15 MG tablet  Commonly known as: MSIR  Take 1 tablet (15 mg total) by mouth every 4 (four) hours as needed for Pain.  Start taking on: May 23, 2025     MOVANTIK 12.5 mg Tab  Generic drug: naloxegoL  Take 1 tablet (12.5 mg total) by mouth once daily.     multivitamin with minerals tablet  Take 1 tablet by mouth once daily.     OLANZapine 5 MG tablet  Commonly known as: ZyPREXA  Take 1 tablet (5 mg total) by mouth every evening. On days 1-3 of each chemotherapy cycle.     prochlorperazine 5 MG tablet  Commonly known as: COMPAZINE  Take 1 tablet (5 mg total) by mouth every 6 (six) hours as needed for  Nausea.     rosuvastatin 10 MG tablet  Commonly known as: CRESTOR  Take 1 tablet (10 mg total) by mouth once daily.     tamsulosin 0.4 mg Cap  Commonly known as: FLOMAX  TAKE ONE CAPSULE BY MOUTH EVERY DAY     XIGDUO XR 5-1,000 mg  Generic drug: dapaglifloz propaned-metformin  TAKE TWO TABLETS BY MOUTH EVERY DAY     XTAMPZA ER 18 mg Cspt  Generic drug: oxyCODONE myristate  Take 1 capsule (18 mg total) by mouth every 12 (twelve) hours.  Start taking on: May 23, 2025              Taya Vann MD  Hematology/Oncology  [address] - Oncology ([facility])

## 2025-05-21 NOTE — ASSESSMENT & PLAN NOTE
Hyponatremia is likely due to Dehydration/hypovolemia. The patient's most recent sodium results are listed below.  Recent Labs     05/20/25  1331   *     Plan  - Correct the sodium by 4-6mEq in 24 hours.   - Obtain the following studies: none.  - Will treat the hyponatremia with IV fluids as follows: IVF 2L   - Monitor sodium Daily.   - Patient hyponatremia is stable  - mild, asymptomatic

## 2025-05-21 NOTE — CONSULTS
Consult received, chart reviewed, and patient evaluated at bedside.  Patient admitted to Medical Oncology.  See full H&P.    Hans Michelle M.D.  Hematology & Medical Oncology Fellow  [facility] [place]

## 2025-05-21 NOTE — ASSESSMENT & PLAN NOTE
Nutrition consulted. Most recent weight and BMI monitored-     Measurements:  Wt Readings from Last 1 Encounters:   05/20/25 59.9 kg (132 lb)   Body mass index is 21.31 kg/m².    Patient has been screened and assessed by RD.    Malnutrition Type:  Context:  chronic with malignancy  Level:  severe    Malnutrition Characteristic Summary:       Interventions/Recommendations (treatment strategy):   RD consult placed

## 2025-05-21 NOTE — PLAN OF CARE
Pt discharged to home per MD order. Patient is AAOx4. Electrolytes replaced. Port de accessed. Discharge teaching and follow up instructions provided to patient and family at bedside. Pt ambulating in room with steady gait; tolerating regular diet; voiding without difficulty. All VSS; O2 sats WNL. Pt left floor by wheelchair with belongings and family.

## 2025-05-21 NOTE — SUBJECTIVE & OBJECTIVE
Past Medical History:   Diagnosis Date    Carotid artery stenosis 11/15/2012    DDD (degenerative disc disease) 09/26/2012    Diabetes mellitus     DJD (degenerative joint disease) 09/26/2012    High cholesterol 09/26/2012    HTN (hypertension) 09/24/2014    2013    Increased glucose level 09/26/2012    LBP (low back pain) 09/26/2012    Neck pain 09/24/2014    MATEUSZ 2014    Normal stress echocardiogram 09/26/2012    Pancreatic cancer        Past Surgical History:   Procedure Laterality Date    ENDOSCOPIC ULTRASOUND OF UPPER GASTROINTESTINAL TRACT N/A 04/30/2025    Procedure: ULTRASOUND, UPPER GI TRACT, ENDOSCOPIC;  Surgeon: Fish Curtis MD;  Location: [facility] (2ND FLR);  Service: Endoscopy;  Laterality: N/A;    INTERPOSITION ARTHROPLASTY OF CARPOMETACARPAL JOINTS Right 08/11/2023    Procedure: INTERPOSITION ARTHROPLASTY, CMC JOINT;  Surgeon: Williams, Claude S. IV, MD;  Location: [facility];  Service: Orthopedics;  Laterality: Right;    L-spine surgery  12/01/2015    Dr. Masood SOTOACATLIAM PLACEMENT  05/2025       Review of patient's allergies indicates:   Allergen Reactions    Bactrim [sulfamethoxazole-trimethoprim] Itching, Rash and Blisters    Lipitor [atorvastatin]      Achy; pt reports no problem with Crestor    Levaquin [levofloxacin] Other (See Comments)     Muscle aches under shoulder blades       Current Facility-Administered Medications on File Prior to Encounter   Medication    [DISCONTINUED] dextrose 50% injection 12.5 g    [DISCONTINUED] glucagon (human recombinant) injection 1 mg    [DISCONTINUED] ondansetron injection 4 mg     Current Outpatient Medications on File Prior to Encounter   Medication Sig    alcohol swabs PadM Apply 1 each topically as needed.    apixaban (ELIQUIS) 5 mg Tab Take 2 tablets (10 mg total) by mouth 2 (two) times daily for 2 days, THEN 1 tablet (5 mg total) 2 (two) times daily.    ascorbic acid, vitamin C, (VITAMIN C) 500 MG tablet Take 500 mg by mouth once daily.    blood sugar  diagnostic (TRUE METRIX GLUCOSE TEST STRIP) Strp 200 each by Misc.(Non-Drug; Combo Route) route Daily.    cyproheptadine (PERIACTIN) 4 mg tablet Take 1 tablet (4 mg total) by mouth 3 (three) times daily.    dicyclomine (BENTYL) 10 MG capsule Take 1 capsule (10 mg total) by mouth 2 (two) times daily.    ezetimibe (ZETIA) 10 mg tablet Take 1 tablet (10 mg total) by mouth once daily.    lancets 30 gauge Misc 1 lancet  by Misc.(Non-Drug; Combo Route) route Daily.    [START ON 5/23/2025] morphine (MSIR) 15 MG tablet Take 1 tablet (15 mg total) by mouth every 4 (four) hours as needed for Pain.    multivitamin with minerals tablet Take 1 tablet by mouth once daily.    nebivoloL (BYSTOLIC) 5 MG Tab Take 1 tablet (5 mg total) by mouth once daily.    OLANZapine (ZYPREXA) 5 MG tablet Take 1 tablet (5 mg total) by mouth every evening. On days 1-3 of each chemotherapy cycle. (Patient taking differently: Take 5 mg by mouth every evening. On days 1-3 of each chemotherapy cycle.    States has not yet started)    [START ON 5/23/2025] oxyCODONE myristate (XTAMPZA ER) 18 mg CSpT Take 1 capsule (18 mg total) by mouth every 12 (twelve) hours.    rosuvastatin (CRESTOR) 10 MG tablet Take 1 tablet (10 mg total) by mouth once daily.    tamsulosin (FLOMAX) 0.4 mg Cap TAKE ONE CAPSULE BY MOUTH EVERY DAY    XIGDUO XR 5-1,000 mg TAKE TWO TABLETS BY MOUTH EVERY DAY (Patient taking differently: Take 2 tablets by mouth every morning.)    aspirin (ECOTRIN) 81 MG EC tablet Take 1 tablet (81 mg total) by mouth once daily. (Patient taking differently: Take 81 mg by mouth once daily. States not currently taking (it's been about 2 weeks since last dose))    cholecalciferol, vitamin D3, 1,000 unit capsule Take 1,000 Units by mouth once daily.    gabapentin (NEURONTIN) 300 MG capsule Take 1 capsule (300 mg total) by mouth every evening.    loperamide (IMODIUM) 2 mg capsule Take 2 tablets (4mg) by mouth after first loose stool, 1 tablet every 2 hours until  diarrhea free for 12 hours. May take 2 tablets (4mg) by mouth every 4 hours at night. May require more than the package labeling maximum dose of 16mg/day.    naloxegoL (MOVANTIK) 12.5 mg Tab Take 1 tablet (12.5 mg total) by mouth once daily.    prochlorperazine (COMPAZINE) 5 MG tablet Take 1 tablet (5 mg total) by mouth every 6 (six) hours as needed for Nausea. (Patient taking differently: Take 5 mg by mouth every 6 (six) hours as needed for Nausea. States has not yet started)     Family History       Problem Relation (Age of Onset)    Diabetes Mother    Heart disease Father (62)    Hypertension Mother          Tobacco Use    Smoking status: Former     Current packs/day: 0.00     Types: Cigarettes     Quit date: 2010     Years since quittin.9    Smokeless tobacco: Never   Substance and Sexual Activity    Alcohol use: Yes     Alcohol/week: 8.0 standard drinks of alcohol     Types: 5 Cans of beer, 3 Standard drinks or equivalent per week    Drug use: No    Sexual activity: Yes     Partners: Female     Review of Systems   Constitutional:  Positive for activity change, appetite change, chills, fatigue and unexpected weight change.   HENT: Negative.     Eyes: Negative.    Respiratory:  Positive for shortness of breath.    Cardiovascular: Negative.    Gastrointestinal:  Positive for abdominal pain. Negative for diarrhea, nausea and vomiting.   Endocrine: Positive for cold intolerance.   Genitourinary: Negative.    Musculoskeletal:  Positive for arthralgias.   Skin:  Positive for wound.        Known coccyx wound without drainage but is painful.    Neurological:  Positive for dizziness, weakness and light-headedness. Negative for syncope.   Hematological: Negative.    Psychiatric/Behavioral: Negative.     All other systems reviewed and are negative.    Objective:     Vital Signs (Most Recent):  Temp: 98.6 °F (37 °C) (25 193)  Pulse: 98 (25)  Resp: 14 (25)  BP: (!) 104/56 (25  0100)  SpO2: 96 % (05/21/25 0100) Vital Signs (24h Range):  Temp:  [97.3 °F (36.3 °C)-98.6 °F (37 °C)] 98.6 °F (37 °C)  Pulse:  [] 98  Resp:  [10-21] 14  SpO2:  [93 %-100 %] 96 %  BP: ()/(39-62) 104/56     Weight: 59.9 kg (132 lb)  Body mass index is 21.31 kg/m².     Physical Exam  Vitals reviewed.   Constitutional:       General: He is awake.      Appearance: Normal appearance. He is underweight. He is not ill-appearing, toxic-appearing or diaphoretic.   HENT:      Head: Normocephalic.   Eyes:      General: No scleral icterus.        Right eye: No discharge.         Left eye: No discharge.      Conjunctiva/sclera: Conjunctivae normal.   Pulmonary:      Effort: Pulmonary effort is normal.   Musculoskeletal:      Cervical back: Normal range of motion.   Skin:     General: Skin is dry.      Coloration: Skin is pale. Skin is not jaundiced.      Findings: Lesion present.             Comments: Erythema around coccyx without open wound or drainage noted   Neurological:      General: No focal deficit present.      Mental Status: He is alert and oriented to person, place, and time.   Psychiatric:         Mood and Affect: Mood normal.         Behavior: Behavior normal. Behavior is cooperative.         Thought Content: Thought content normal.         Judgment: Judgment normal.                Significant Labs: All pertinent labs within the past 24 hours have been reviewed.  Recent Lab Results  (Last 5 results in the past 24 hours)        05/21/25  0010   05/20/25  2312   05/20/25  1844   05/20/25  1658   05/20/25  1653        Base Deficit         3.4       Performed By:         CVINET       Correct Temperature (PH)         7.406       Corrected Temperature (pCO2)         45.8       Corrected Temperature (pO2)         14.8       Specimen source         Venous       ABO and RH     A POS           Appearance, UA     Clear           Bacteria, UA     None           Baso #   0.04             Basophil %   0.4              Bilirubin (UA)     Negative           BIPAP         0       Color, UA     Yellow           SARS COV-2 MOLECULAR Negative  Comment: This test utilizes isothermal nucleic acid amplification technology to detect the SARS-CoV-2 RdRp nucleic acid segment. The analytical sensitivity (limit of detection) is 500 copies/swab.     A POSITIVE result is indicative of the presence of SARS-CoV-2 RNA; clinical correlation with patient history and other diagnostic information is necessary to determine patient infection status.    A NEGATIVE result means that SARS-CoV-2 nucleic acids are not present above the limit of detection. A NEGATIVE result should be treated as presumptive. It does not rule out the possibility of COVID-19 and should not be the sole basis for treatment decisions.    This test is Food and Drug Administration (FDA) approved.  Performance characteristics of this test has been independently verified by [facility] Department of Pathology and Laboratory Medicine.               Eos #   0.03             Eos %   0.3             FiO2         21.0       Glucose, UA     4+           Gran # (ANC)   8.90             Hematocrit   27.8             Hemoglobin   8.9             Extra Tube     Hold for add-ons.  Comment: Auto resulted.              Hyaline Casts, UA     0           Immature Grans (Abs)   0.07  Comment: Mild elevation in immature granulocytes is non specific and can be seen in a variety of conditions including stress response, acute inflammation, trauma and pregnancy. Correlation with other laboratory and clinical findings is essential.             Immature Granulocytes   0.6             Ketones, UA     1+           Leukocyte Esterase, UA     Negative           Lymph #   0.84             Lymph %   7.8             MCH   30.6             MCHC   32.0             MCV   96             Microscopic Comment       Comment: Other formed elements not mentioned in the report are not present in the microscopic  examination.           Mono #   0.92             Mono %   8.5             MPV   10.0             Neut %   82.4             NITRITE UA     Negative           nRBC   0             Blood, UA     Negative           pH, UA     6.0           Platelet Count   281             POC BUN       12         POC Chloride       95         POC Creatinine       0.5         POC Glucose       102         POC HCO3         25.7       POC Hematocrit       38   38.3       POC Ionized Calcium       1.16         POC Lactate         1.4       POC PCO2         45.8  Comment: Value above reference range       POC PH         7.406       POC PO2         14.8  Comment: Value below reference range       POC Potassium       4.1         POC Sodium       133         POC TCO2 (MEASURED)       24         POC Temp         37.0       Protein, UA     Negative  Comment: Recommend a 24 hour urine protein or a urine protein/creatinine ratio if globulin induced proteinuria is clinically suspected.           RBC   2.91             RBC, UA     <1           RDW   14.6             Sample       JUAN MANUEL         Spec Grav UA     1.015           Urobilinogen, UA     2.0-3.0           WBC, UA     <1           WBC   10.80             Yeast, UA     None                                  Significant Imaging: I have reviewed all pertinent imaging results/findings within the past 24 hours.  CT: I have reviewed all pertinent results/findings within the past 24 hours and my personal findings are:  CTPA and CT abd/pel without PE. + L pleural effusion. Slightly increased pancreatic mass, increase in ascites.   EKG: I have reviewed all pertinent results/findings within the past 24 hours and my personal findings are: NSR

## 2025-05-21 NOTE — ASSESSMENT & PLAN NOTE
Occurred suddenly post procedure associated with hypotension.   -improved with IVF and BP improvement, but he hasn't started trying to ambulate again  -progressive mobilization  -Additional IVF  -Monitor BP, HGB

## 2025-05-21 NOTE — PLAN OF CARE
- Dx: abdominal pain with hx of pancreatic cancer  - Admitted w/ nerve block done 5/19 & concern for internal bleeding  - 18g R AC dressing CDI, 20g L AC, dressing CDI  - R chest port not accessed  - AAOx4   - No complaints of pain throughout shift  - Standby assist while ambulating  - NPO diet - possible paracentesis today  - Afebrile  - Continent of bowel and bladder   - RA; refuses tele  - Skin intact, bruised        - q1h patient rounds. All VSS; no acute events so far this shift. Non skid socks on; Pt. Instructed to call if any assistance is needed. CHG wipes provided and encouraged use. Pt remaining free from falls or injury; Bed locked in lowest position w/side rails up x2 & all belongings including call light within reach; Plan of care ongoing; All needs met at this time.

## 2025-05-21 NOTE — ASSESSMENT & PLAN NOTE
Improved after IVF. I suspect this is an effect of anesthesia from his procedure.   -Will order additional L IVF tonight  -monitor BP Q 4 hrs

## 2025-05-21 NOTE — ASSESSMENT & PLAN NOTE
Nutrition consulted. Most recent weight and BMI monitored-     Measurements:  Wt Readings from Last 1 Encounters:   05/21/25 60.1 kg (132 lb 8 oz)   Body mass index is 21.39 kg/m².    Patient has been screened and assessed by RD.    Malnutrition Type:  Context:  chronic with malignancy  Level:  severe    Malnutrition Characteristic Summary:       Interventions/Recommendations (treatment strategy):   RD consult placed

## 2025-05-21 NOTE — H&P
[facility]  [facility]    Patient Name: Gael Pedroza  MRN: [id number]  Admission Date: 5/20/2025  Attending Physician: Ibrahima Monsalve MD  Primary Care Provider: HARLAN Steele MD         Patient information was obtained from patient, spouse/SO, past medical records, ER records, and primary team.       Subjective:     Principal Problem:<principal problem not specified>    Chief Complaint:   Chief Complaint   Patient presents with    Abdominal Pain     Hx pancreatic cancer, had nerve block done yesterday,concern for internal bleeding per family member         HPI: 71 yo M w/pancreatic cancer not on chemo, HTN, carotid artery stenosis, HTN, portal vein thrombosis, on eliquis (paused for procedure and restarted this AM) recent celiac nerve block on 5/19, port placement last week presenting with marked weakness, hypotension, sent in from heme/onc clinic today.     Patient reports that he had a celiac nerve block yesterday and had persistently low blood pressure in the PACU, since discharge, has had significant lightheadedness, dyspnea with walking, near syncopal episodes with ambulation, postural lightheadedness and found it very difficult to ambulate short distances. His block was under general anesthesia.  Denies abdominal pain, or back pain, no significant bleeding from bilateral back nerve block entry sites.  Has had normal bowel movement this morning, slightly loose, no dark tarry stool.  No dysuria, no hematuria, no epistaxis or gum bleeding.  Normal p.o. intake yesterday per wife at bedside which is an improvement and now his BM frequency has improved. He thinks the nerve block is helping significantly with his pain.   He thinks his cancer pain is well controlled and in terms of the back pain - it is gone.   Patient reports 30 lbs weight loss over the past 3 months.     Also had recent right-sided chest port placed in preparation for starting chemo this week.  Denies  pain in his area, denies fever, chills.     He was diagnosed with thrombosis in the main portal vein in late April and started Eliquis early in May, stopping it for several days prior to the celiac nerve block, restarted Eliquis today.    In the ED, hypotension improved and his general sense of wellbeing improved after fluids, CT abd/pel with new intraabdominal/pelvic moderate fluid, IR attending evaluated imaging and does not think it is blood, no active bleeding seen. We discussed next steps for this at length but I was non-committal as to whether heme/onco would feel fluid analysis was needed. Concern is bleeding vs malignant ascites. If he remains stable with BP and HGB, the risk/benefit ratio could change so I told him to discuss this with the team in the AM. In the event a paracentesis is needed, I discussed he would be NPO and we would hold his eliquis tonight and tomorrow AM.    He is admitted for weakness, monitoring for hypotension, bleeding, repeat CBCs. 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 He was found to have a pancreatic mass on CT lumbar spine 4/24/25. C/A/P on 4/29/25 showed 9.4 cm mass involving the pancreatic body and tail highly concerning for adenocarcinoma. It abuts the stomach, duodenum, proximal jejunum, and left adrenal gland without convincing invasion. It encases the celiac, common hepatic, and superior mesenteric arteries which remain patent. It encases the splenic artery which is moderately narrowed. It abuts the aorta and left renal artery which remain patent. Occludes the splenic and superior mesenteric veins, with nonocclusive thrombus extending into the  main portal vein. There are portosystemic collaterals in the upper abdomen. Enlarged periportal lymph node concerning for metastasis. Multiple liver metastases measuring up to 2.8 cm. Few solid pulmonary nodules measuring up to 0.7 cm concerning for metastases. Upper EUS biopsy of the pancreatic tail mass on 4/30/25 showed undifferentiated carcinoma. MMR-proficient, ALY. Comment: Histologic sections reveal pleomorphic tumor cells with bizarre nuclear atypia, moderately abundant amphophilic cytoplasm, and multinucleated forms. Rare cells that may represent osteoclast-like giant cells are noted. Background necrosis is readily apparent. Tumor cells exhibits extensive positivity for pankeratin AE1/3 by immunohistochemistry. BerEP4 is negative. The morphologic features and immunoprofile support the above interpretation. Tempus pending. On 4/29/25, CA 19-9 normal at 33.8. PGX: DPYD normal metabolizer, UGT1A1 intermediate metabolizer.     Past Medical History:   Diagnosis Date    Carotid artery stenosis 11/15/2012    DDD (degenerative disc disease) 09/26/2012    Diabetes mellitus     DJD (degenerative joint disease) 09/26/2012    High cholesterol 09/26/2012    HTN (hypertension) 09/24/2014    2013    Increased glucose level 09/26/2012    LBP (low back pain) 09/26/2012    Neck pain 09/24/2014    MATEUSZ 2014    Normal stress echocardiogram 09/26/2012    Pancreatic cancer        Past Surgical History:   Procedure Laterality Date    ENDOSCOPIC ULTRASOUND OF UPPER GASTROINTESTINAL TRACT N/A 04/30/2025    Procedure: ULTRASOUND, UPPER GI TRACT, ENDOSCOPIC;  Surgeon: Fish Curtis MD;  Location: [address]);  Service: Endoscopy;  Laterality: N/A;    INTERPOSITION ARTHROPLASTY OF CARPOMETACARPAL JOINTS Right 08/11/2023    Procedure: INTERPOSITION ARTHROPLASTY, CMC JOINT;  Surgeon: Williams, Claude S. IV, MD;  Location: [facility];  Service: Orthopedics;  Laterality: Right;    L-spine surgery  12/01/2015    Dr. Coreria     PORTACATH PLACEMENT  05/2025       Review of patient's allergies indicates:   Allergen Reactions    Bactrim [sulfamethoxazole-trimethoprim] Itching, Rash and Blisters    Lipitor [atorvastatin]      Achy; pt reports no problem with Crestor    Levaquin [levofloxacin] Other (See Comments)     Muscle aches under shoulder blades       Current Facility-Administered Medications on File Prior to Encounter   Medication    [DISCONTINUED] dextrose 50% injection 12.5 g    [DISCONTINUED] glucagon (human recombinant) injection 1 mg    [DISCONTINUED] ondansetron injection 4 mg     Current Outpatient Medications on File Prior to Encounter   Medication Sig    alcohol swabs PadM Apply 1 each topically as needed.    apixaban (ELIQUIS) 5 mg Tab Take 2 tablets (10 mg total) by mouth 2 (two) times daily for 2 days, THEN 1 tablet (5 mg total) 2 (two) times daily.    ascorbic acid, vitamin C, (VITAMIN C) 500 MG tablet Take 500 mg by mouth once daily.    blood sugar diagnostic (TRUE METRIX GLUCOSE TEST STRIP) Strp 200 each by Misc.(Non-Drug; Combo Route) route Daily.    cyproheptadine (PERIACTIN) 4 mg tablet Take 1 tablet (4 mg total) by mouth 3 (three) times daily.    dicyclomine (BENTYL) 10 MG capsule Take 1 capsule (10 mg total) by mouth 2 (two) times daily.    ezetimibe (ZETIA) 10 mg tablet Take 1 tablet (10 mg total) by mouth once daily.    lancets 30 gauge Misc 1 lancet  by Misc.(Non-Drug; Combo Route) route Daily.    [START ON 5/23/2025] morphine (MSIR) 15 MG tablet Take 1 tablet (15 mg total) by mouth every 4 (four) hours as needed for Pain.    multivitamin with minerals tablet Take 1 tablet by mouth once daily.    nebivoloL (BYSTOLIC) 5 MG Tab Take 1 tablet (5 mg total) by mouth once daily.    OLANZapine (ZYPREXA) 5 MG tablet Take 1 tablet (5 mg total) by mouth every evening. On days 1-3 of each chemotherapy cycle. (Patient taking differently: Take 5 mg by mouth every evening. On days 1-3 of each chemotherapy cycle.    States has  not yet started)    [START ON 2025] oxyCODONE myristate (XTAMPZA ER) 18 mg CSpT Take 1 capsule (18 mg total) by mouth every 12 (twelve) hours.    rosuvastatin (CRESTOR) 10 MG tablet Take 1 tablet (10 mg total) by mouth once daily.    tamsulosin (FLOMAX) 0.4 mg Cap TAKE ONE CAPSULE BY MOUTH EVERY DAY    XIGDUO XR 5-1,000 mg TAKE TWO TABLETS BY MOUTH EVERY DAY (Patient taking differently: Take 2 tablets by mouth every morning.)    aspirin (ECOTRIN) 81 MG EC tablet Take 1 tablet (81 mg total) by mouth once daily. (Patient taking differently: Take 81 mg by mouth once daily. States not currently taking (it's been about 2 weeks since last dose))    cholecalciferol, vitamin D3, 1,000 unit capsule Take 1,000 Units by mouth once daily.    gabapentin (NEURONTIN) 300 MG capsule Take 1 capsule (300 mg total) by mouth every evening.    loperamide (IMODIUM) 2 mg capsule Take 2 tablets (4mg) by mouth after first loose stool, 1 tablet every 2 hours until diarrhea free for 12 hours. May take 2 tablets (4mg) by mouth every 4 hours at night. May require more than the package labeling maximum dose of 16mg/day.    naloxegoL (MOVANTIK) 12.5 mg Tab Take 1 tablet (12.5 mg total) by mouth once daily.    prochlorperazine (COMPAZINE) 5 MG tablet Take 1 tablet (5 mg total) by mouth every 6 (six) hours as needed for Nausea. (Patient taking differently: Take 5 mg by mouth every 6 (six) hours as needed for Nausea. States has not yet started)     Family History       Problem Relation (Age of Onset)    Diabetes Mother    Heart disease Father (62)    Hypertension Mother          Tobacco Use    Smoking status: Former     Current packs/day: 0.00     Types: Cigarettes     Quit date: 2010     Years since quittin.9    Smokeless tobacco: Never   Substance and Sexual Activity    Alcohol use: Yes     Alcohol/week: 8.0 standard drinks of alcohol     Types: 5 Cans of beer, 3 Standard drinks or equivalent per week    Drug use: No    Sexual  activity: Yes     Partners: Female     Review of Systems   Constitutional:  Positive for activity change, appetite change, chills, fatigue and unexpected weight change.   HENT: Negative.     Eyes: Negative.    Respiratory:  Positive for shortness of breath.    Cardiovascular: Negative.    Gastrointestinal:  Positive for abdominal pain. Negative for diarrhea, nausea and vomiting.   Endocrine: Positive for cold intolerance.   Genitourinary: Negative.    Musculoskeletal:  Positive for arthralgias.   Skin:  Positive for wound.        Known coccyx wound without drainage but is painful.    Neurological:  Positive for dizziness, weakness and light-headedness. Negative for syncope.   Hematological: Negative.    Psychiatric/Behavioral: Negative.     All other systems reviewed and are negative.    Objective:     Vital Signs (Most Recent):  Temp: 98.6 °F (37 °C) (05/20/25 1930)  Pulse: 98 (05/21/25 0100)  Resp: 14 (05/21/25 0100)  BP: (!) 104/56 (05/21/25 0100)  SpO2: 96 % (05/21/25 0100) Vital Signs (24h Range):  Temp:  [97.3 °F (36.3 °C)-98.6 °F (37 °C)] 98.6 °F (37 °C)  Pulse:  [] 98  Resp:  [10-21] 14  SpO2:  [93 %-100 %] 96 %  BP: ()/(39-62) 104/56     Weight: 59.9 kg (132 lb)  Body mass index is 21.31 kg/m².     Physical Exam  Vitals reviewed.   Constitutional:       General: He is awake.      Appearance: Normal appearance. He is underweight. He is not ill-appearing, toxic-appearing or diaphoretic.   HENT:      Head: Normocephalic.   Eyes:      General: No scleral icterus.        Right eye: No discharge.         Left eye: No discharge.      Conjunctiva/sclera: Conjunctivae normal.   Pulmonary:      Effort: Pulmonary effort is normal.   Musculoskeletal:      Cervical back: Normal range of motion.   Skin:     General: Skin is dry.      Coloration: Skin is pale. Skin is not jaundiced.      Findings: Lesion present.             Comments: Erythema around coccyx without open wound or drainage noted   Neurological:       General: No focal deficit present.      Mental Status: He is alert and oriented to person, place, and time.   Psychiatric:         Mood and Affect: Mood normal.         Behavior: Behavior normal. Behavior is cooperative.         Thought Content: Thought content normal.         Judgment: Judgment normal.                Significant Labs: All pertinent labs within the past 24 hours have been reviewed.  Recent Lab Results  (Last 5 results in the past 24 hours)        05/21/25  0010   05/20/25  2312   05/20/25  1844   05/20/25  1658   05/20/25  1653        Base Deficit         3.4       Performed By:         CVINET       Correct Temperature (PH)         7.406       Corrected Temperature (pCO2)         45.8       Corrected Temperature (pO2)         14.8       Specimen source         Venous       ABO and RH     A POS           Appearance, UA     Clear           Bacteria, UA     None           Baso #   0.04             Basophil %   0.4             Bilirubin (UA)     Negative           BIPAP         0       Color, UA     Yellow           SARS COV-2 MOLECULAR Negative  Comment: This test utilizes isothermal nucleic acid amplification technology to detect the SARS-CoV-2 RdRp nucleic acid segment. The analytical sensitivity (limit of detection) is 500 copies/swab.     A POSITIVE result is indicative of the presence of SARS-CoV-2 RNA; clinical correlation with patient history and other diagnostic information is necessary to determine patient infection status.    A NEGATIVE result means that SARS-CoV-2 nucleic acids are not present above the limit of detection. A NEGATIVE result should be treated as presumptive. It does not rule out the possibility of COVID-19 and should not be the sole basis for treatment decisions.    This test is Food and Drug Administration (FDA) approved.  Performance characteristics of this test has been independently verified by [facility] Department of Pathology and Laboratory Medicine.                Eos #   0.03             Eos %   0.3             FiO2         21.0       Glucose, UA     4+           Gran # (ANC)   8.90             Hematocrit   27.8             Hemoglobin   8.9             Extra Tube     Hold for add-ons.  Comment: Auto resulted.              Hyaline Casts, UA     0           Immature Grans (Abs)   0.07  Comment: Mild elevation in immature granulocytes is non specific and can be seen in a variety of conditions including stress response, acute inflammation, trauma and pregnancy. Correlation with other laboratory and clinical findings is essential.             Immature Granulocytes   0.6             Ketones, UA     1+           Leukocyte Esterase, UA     Negative           Lymph #   0.84             Lymph %   7.8             MCH   30.6             MCHC   32.0             MCV   96             Microscopic Comment       Comment: Other formed elements not mentioned in the report are not present in the microscopic examination.           Mono #   0.92             Mono %   8.5             MPV   10.0             Neut %   82.4             NITRITE UA     Negative           nRBC   0             Blood, UA     Negative           pH, UA     6.0           Platelet Count   281             POC BUN       12         POC Chloride       95         POC Creatinine       0.5         POC Glucose       102         POC HCO3         25.7       POC Hematocrit       38   38.3       POC Ionized Calcium       1.16         POC Lactate         1.4       POC PCO2         45.8  Comment: Value above reference range       POC PH         7.406       POC PO2         14.8  Comment: Value below reference range       POC Potassium       4.1         POC Sodium       133         POC TCO2 (MEASURED)       24         POC Temp         37.0       Protein, UA     Negative  Comment: Recommend a 24 hour urine protein or a urine protein/creatinine ratio if globulin induced proteinuria is clinically suspected.           RBC   2.91              RBC, UA     <1           RDW   14.6             Sample       JUAN MANUEL         Spec Grav UA     1.015           Urobilinogen, UA     2.0-3.0           WBC, UA     <1           WBC   10.80             Yeast, UA     None                                  Significant Imaging: I have reviewed all pertinent imaging results/findings within the past 24 hours.  CT: I have reviewed all pertinent results/findings within the past 24 hours and my personal findings are:  CTPA and CT abd/pel without PE. + L pleural effusion. Slightly increased pancreatic mass, increase in ascites.   EKG: I have reviewed all pertinent results/findings within the past 24 hours and my personal findings are: NSR  Assessment/Plan:     Assessment & Plan  Hypotension  Improved after IVF. I suspect this is an effect of anesthesia from his procedure.   -Will order additional L IVF tonight  -monitor BP Q 4 hrs    Weakness  Occurred suddenly post procedure associated with hypotension.   -improved with IVF and BP improvement, but he hasn't started trying to ambulate again  -progressive mobilization  -Additional IVF  -Monitor BP, HGB    Anemia  Anemia is likely due to malignancy and bone marrow supression. There is concern for post procedure bleeding and his HGB was 12 2 weeks ago, 9.7 yesterday and 8.5 today. Most recent hemoglobin and hematocrit are listed below.  Recent Labs     05/20/25  1331 05/20/25  1653 05/20/25  1658 05/20/25  2312   HGB 9.7*  --   --  8.9*   HCT 32.0* 38.3 38 27.8*     Plan  - Monitor serial CBC: Every 12 hours  - Transfuse PRBC if patient becomes hemodynamically unstable, symptomatic or H/H drops below 7/21.  - Patient has not received any PRBC transfusions to date  - Patient's anemia is currently worsening. Will adjust treatment as follows: monitoring, holding eliquis, holding VTE prophylaxis, considering abdominal fluid analysis  - NPO  Severe malnutrition  Nutrition consulted. Most recent weight and BMI monitored-      Measurements:  Wt Readings from Last 1 Encounters:   05/20/25 59.9 kg (132 lb)   Body mass index is 21.31 kg/m².    Patient has been screened and assessed by RD.    Malnutrition Type:  Context:  chronic with malignancy  Level:  severe    Malnutrition Characteristic Summary:       Interventions/Recommendations (treatment strategy):   RD consult placed    Pancreatic cancer metastasized to intra-abdominal lymph node  PancreaticPatient has metastatic cancer of pancreatic primary. The cancer has metastasized to LN, spleen, bone. The patient is under the care of an outpatient oncologist. The patient has not started chemotherapy .Their staging information is listed below.    Cancer Staging   No matching staging information was found for the patient.  Stage IV undifferentiated metastatic cancer    Pressure ulcer of coccygeal region, unspecified pressure ulcer stage  Know wound POA. Staging deferred to wound care. It is not open.   -Wound care and nutrition consulted.     Hyponatremia  Hyponatremia is likely due to Dehydration/hypovolemia. The patient's most recent sodium results are listed below.  Recent Labs     05/20/25  1331   *     Plan  - Correct the sodium by 4-6mEq in 24 hours.   - Obtain the following studies: none.  - Will treat the hyponatremia with IV fluids as follows: IVF 2L   - Monitor sodium Daily.   - Patient hyponatremia is stable  - mild, asymptomatic  Type 2 diabetes mellitus with circulatory disorder, without long-term current use of insulin  Well controlled.   -Holding oral hypoglycemics  -Low intensity ISS  -Patient is NPO for potential procedure    VTE Risk Mitigation (From admission, onward)           Ordered     Reason for No Pharmacological VTE Prophylaxis  Once        Comments: Holding eliquis   Question:  Reasons:  Answer:  Physician Provided (leave comment)  Comment:  possible procedure    05/21/25 0059     IP VTE HIGH RISK PATIENT  Once         05/21/25 0059     Place sequential  compression device  Until discontinued         05/21/25 0059                   Code - DNR      The attending portion of this evaluation, treatment, and documentation was performed per Jason Valenzuela Jr, MD via Telemedicine AudioVisual using the secure [employer] software platform with 2 way audio/video. The provider was located off-site and the patient is located in the hospital. The aforementioned video software was utilized to document the relevant history and physical exam    On 05/21/2025, patient should be placed in hospital observation services under my care.        Jason Valenzuela Jr, MD  Department of Hospital Medicine   [facility] - Emergency Dept

## 2025-05-21 NOTE — ASSESSMENT & PLAN NOTE
Anemia is likely due to malignancy and bone marrow supression. There is concern for post procedure bleeding and his HGB was 12 2 weeks ago, 9.7 yesterday and 8.5 today. Most recent hemoglobin and hematocrit are listed below.  Recent Labs     05/20/25  1331 05/20/25  1653 05/20/25  1658 05/20/25  2312   HGB 9.7*  --   --  8.9*   HCT 32.0* 38.3 38 27.8*     Plan  - Monitor serial CBC: Every 12 hours  - Transfuse PRBC if patient becomes hemodynamically unstable, symptomatic or H/H drops below 7/21.  - Patient has not received any PRBC transfusions to date  - Patient's anemia is currently worsening. Will adjust treatment as follows: monitoring, holding eliquis, holding VTE prophylaxis, considering abdominal fluid analysis  - NPO

## 2025-05-21 NOTE — ASSESSMENT & PLAN NOTE
Know wound POA. Staging deferred to wound care. It is not open.   -Wound care and nutrition consulted.

## 2025-05-21 NOTE — CONSULTS
Dandy Russ - Oncology ([facility])    Wound Care     Patient Name:  Gael Pedroza  MRN:  [id number]  Date: 5/21/2025  Diagnosis: Hypotension     History:  Past Medical History:   Diagnosis Date    Carotid artery stenosis 11/15/2012    DDD (degenerative disc disease) 09/26/2012    Diabetes mellitus     DJD (degenerative joint disease) 09/26/2012    High cholesterol 09/26/2012    HTN (hypertension) 09/24/2014    2013    Increased glucose level 09/26/2012    LBP (low back pain) 09/26/2012    Neck pain 09/24/2014    MATEUSZ 2014    Normal stress echocardiogram 09/26/2012    Pancreatic cancer      Social History[1]  Precautions:  Allergies as of 05/20/2025 - Reviewed 05/20/2025   Allergen Reaction Noted    Bactrim [sulfamethoxazole-trimethoprim] Itching, Rash, and Blisters 09/26/2012    Lipitor [atorvastatin]  11/15/2012    Levaquin [levofloxacin] Other (See Comments) 11/11/2015       WO Assessment Details / Treatment:    Chart reviewed for this encounter.   Labs:   WBC (K/uL)   Date Value   05/21/2025 11.65   05/20/2025 10.80     Glucose (mg/dL)   Date Value   05/21/2025 124 (H)   05/20/2025 144 (H)   12/13/2024 102   06/13/2024 116 (H)     Albumin (g/dL)   Date Value   05/21/2025 2.2 (L)   05/20/2025 2.3 (L)   12/13/2024 3.9   06/13/2024 4.1       Israel Score: 22    Narrative:  Pt seen for WC consultation agreeable to assessment  Chart reviewed for this encounter. Patient sitting up in recliner, wife at bedside. Able to stand independently for assessment. L&R mid back puncture sites with scab. Mepore dsg for protection measures. Coccyx/buttocks, pink &dry. Pt states he's had for awhile and c/o soreness. Pt continent of bowel and bladder. Patient refused mattress overlay but agreeable to chair cushion.    Recommend: Clean coccyx/buttocks with purple bath wipes or no rinse foam, pat dry, apply z barrier cream BID. Apply foam dsg for protection. Change q 3 days and prn soilage.     Bedside nursing to continue care, dressing  changes, & continue monitoring.  Bedside nursing to maintain pressure injury prevention interventions, (PIP). WC to follow.      Discussed POC with patient and primary nurse.   See EMR for orders & patient education.    See Flow Sheet for additional documentation and media.       05/21/25 1004   WOCN Assessment   WOCN Total Time (mins) 45   Visit Date 05/21/25   Visit Time 1004   Consult Type New   WOCN Speciality Wound   Intervention assessed;changed;applied;chart review;coordination of care   Teaching on-going   Skin Interventions   Pressure Reduction Devices foam padding utilized;chair cushion utilized   Skin Protection skin sealant/moisture barrier applied   Positioning   Body Position position changed independently        Wound 04/29/25 Shearing midline Coccyx   Date First Assessed: 04/29/25   Present on Original Admission: Yes  Primary Wound Type: Shearing  Orientation: midline  Location: Coccyx  Removal Indication and Assessment: (c)    Wound Image    Dressing Appearance No dressing   Drainage Amount None   Drainage Characteristics/Odor No odor   Appearance Dry;Pink   Care Cleansed with:;Soap and water;Applied:;Skin Barrier   Dressing Foam   Dressing Change Due 05/27/25        Wound 05/19/25 0845 Puncture Left Back   Date First Assessed/Time First Assessed: 05/19/25 0845   Primary Wound Type: Puncture  Side: Left  Location: Back   Wound Image    Dressing Appearance Dry;Intact   Drainage Amount None   Care Cleansed with:;Sterile normal saline   Dressing Changed;Island/border        Wound 05/19/25 0845 Puncture Right Back   Date First Assessed/Time First Assessed: 05/19/25 0845   Primary Wound Type: Puncture  Side: Right  Location: Back   Dressing Appearance Dry;Intact   Drainage Amount None   Appearance Other (see comments)  (scab)   Care Cleansed with:;Sterile normal saline   Dressing Applied;Island/border              [1]   Social History  Socioeconomic History    Marital status:     Number of  children: 2   Occupational History    Occupation:      Comment: [employer]   Tobacco Use    Smoking status: Former     Current packs/day: 0.00     Types: Cigarettes     Quit date: 2010     Years since quittin.9    Smokeless tobacco: Never   Substance and Sexual Activity    Alcohol use: Yes     Alcohol/week: 8.0 standard drinks of alcohol     Types: 5 Cans of beer, 3 Standard drinks or equivalent per week    Drug use: No    Sexual activity: Yes     Partners: Female   Social History Narrative    Does [employer].    Retired in 10/18.     Social Drivers of Health     Financial Resource Strain: Low Risk  (2025)    Overall Financial Resource Strain (CARDIA)     Difficulty of Paying Living Expenses: Not hard at all   Food Insecurity: No Food Insecurity (2025)    Hunger Vital Sign     Worried About Running Out of Food in the Last Year: Never true     Ran Out of Food in the Last Year: Never true   Transportation Needs: No Transportation Needs (2025)    PRAPARE - Transportation     Lack of Transportation (Medical): No     Lack of Transportation (Non-Medical): No   Physical Activity: Unknown (2025)    Exercise Vital Sign     Days of Exercise per Week: 0 days   Stress: Stress Concern Present (2025)    [employer] - Occupational Stress Questionnaire     Feeling of Stress : To some extent   Housing Stability: Low Risk  (2025)    Housing Stability Vital Sign     Unable to Pay for Housing in the Last Year: No     Number of Times Moved in the Last Year: 0     Homeless in the Last Year: No

## 2025-05-21 NOTE — CONSULTS
Interventional Radiology  Consult/History & Physical Note    Consult Requested By: Jemma Mg MD  Reason for Consult: s/p celiac nerve block 5/19/25 MD [facility] now with hypotension, Hb drop, fluid in peritoneum, nontender abdomen    SUBJECTIVE:     Chief Complaint: hypotension and c/f bleeding following splanchnic neurolysis     History of Present Illness:  Gael Pedroza is a 70 y.o. male with a PMHx of newly diagnosed pancreatic cancer c/b PVT, T2DM, HTN, carotid artery stenosis who was admitted on 5/20/25 for hypotension, weakness and c/f bleeding following his outpatient IR splanchnic neurolysis on 5/19/25. Interventional Radiology has been consulted for evaluation given pt's recent IR procedure. Pt has had recent imaging including a CT a/p and CTA chest on 5/20/25 which did not reveal any new hematomas or other signs of internal bleeding. The pt's H/H is currently 10.2 from 8.9. He has not received any blood transfusions since he was admitted. The pt's WBC is 11.65 from 10.80. BP was initially 70-80s/40s on presentation to the ED, is now improved to 100s/50-60s after receiving IVF. He feels well today. He no longer feels lightheaded or dizzy. Ambulating without difficulty. He reports his abdominal pain has improved by over 60% since his splanchnic neurolysis procedure was performed. He is happy with the procedure results. He is eager to discharge home.    Review of Systems   Constitutional:  Negative for chills and fever.   Gastrointestinal:  Negative for abdominal pain.       Scheduled Meds:   atorvastatin  40 mg Oral Daily    cyproheptadine  4 mg Oral TID    ezetimibe  10 mg Oral Daily    multivitamin  1 tablet Oral Daily    oxyCODONE  20 mg Oral Q12H    tamsulosin  1 capsule Oral Daily     Continuous Infusions:  PRN Meds:  Current Facility-Administered Medications:     acetaminophen, 650 mg, Oral, Q4H PRN    dextrose 50%, 12.5 g, Intravenous, PRN    dextrose 50%, 25 g, Intravenous, PRN    glucagon  (human recombinant), 1 mg, Intramuscular, PRN    glucose, 16 g, Oral, PRN    glucose, 24 g, Oral, PRN    heparin, porcine (PF), 100 Units, Intravenous, PRN    heparin, porcine (PF), 500 Units, Intravenous, PRN    heparin, porcine (PF), 10 Units, Intravenous, PRN    insulin aspart U-100, 0-5 Units, Subcutaneous, QID (AC + HS) PRN    magnesium oxide, 800 mg, Oral, PRN    magnesium oxide, 800 mg, Oral, PRN    melatonin, 6 mg, Oral, Nightly PRN    morphine, 15 mg, Oral, Q4H PRN    naloxone, 0.02 mg, Intravenous, PRN    sodium chloride 0.9%, 10 mL, Intravenous, Q12H PRN    sodium chloride 0.9%, 10 mL, Intravenous, PRN    sodium chloride 0.9%, 20 mL, Intravenous, PRN    Review of patient's allergies indicates:   Allergen Reactions    Bactrim [sulfamethoxazole-trimethoprim] Itching, Rash and Blisters    Lipitor [atorvastatin]      Achy; pt reports no problem with Crestor    Levaquin [levofloxacin] Other (See Comments)     Muscle aches under shoulder blades       Past Medical History:   Diagnosis Date    Carotid artery stenosis 11/15/2012    DDD (degenerative disc disease) 09/26/2012    Diabetes mellitus     DJD (degenerative joint disease) 09/26/2012    High cholesterol 09/26/2012    HTN (hypertension) 09/24/2014    2013    Increased glucose level 09/26/2012    LBP (low back pain) 09/26/2012    Neck pain 09/24/2014    MATEUSZ 2014    Normal stress echocardiogram 09/26/2012    Pancreatic cancer      Past Surgical History:   Procedure Laterality Date    ENDOSCOPIC ULTRASOUND OF UPPER GASTROINTESTINAL TRACT N/A 04/30/2025    Procedure: ULTRASOUND, UPPER GI TRACT, ENDOSCOPIC;  Surgeon: Fish Curtis MD;  Location: [address]);  Service: Endoscopy;  Laterality: N/A;    INTERPOSITION ARTHROPLASTY OF CARPOMETACARPAL JOINTS Right 08/11/2023    Procedure: INTERPOSITION ARTHROPLASTY, CMC JOINT;  Surgeon: Williams, Claude S. IV, MD;  Location: [facility];  Service: Orthopedics;  Laterality: Right;    L-spine surgery  12/01/2015     Dr. Masood MCKEON PLACEMENT  05/2025     Family History   Problem Relation Name Age of Onset    Diabetes Mother      Hypertension Mother      Heart disease Father  62        MI     Social History[1]    OBJECTIVE:     Vital Signs (Most Recent)  Temp: 97.9 °F (36.6 °C) (05/21/25 1101)  Pulse: 99 (05/21/25 1101)  Resp: 18 (05/21/25 1101)  BP: 107/68 (05/21/25 1101)  SpO2: 98 % (05/21/25 1206)    Physical Exam:  Physical Exam  Vitals and nursing note reviewed.   Constitutional:       General: He is not in acute distress.     Appearance: He is ill-appearing.   HENT:      Head: Normocephalic and atraumatic.      Right Ear: External ear normal.      Left Ear: External ear normal.   Eyes:      Extraocular Movements: Extraocular movements intact.      Conjunctiva/sclera: Conjunctivae normal.      Pupils: Pupils are equal, round, and reactive to light.   Cardiovascular:      Rate and Rhythm: Normal rate.   Pulmonary:      Effort: Pulmonary effort is normal. No respiratory distress.   Abdominal:      General: Abdomen is flat.   Skin:     General: Skin is warm and dry.      Coloration: Skin is not jaundiced.   Neurological:      General: No focal deficit present.      Mental Status: He is alert and oriented to person, place, and time.   Psychiatric:         Mood and Affect: Mood normal.         Behavior: Behavior normal.         Thought Content: Thought content normal.         Judgment: Judgment normal.         Laboratory  I have reviewed all pertinent lab results within the past 24 hours.  CBC:   Recent Labs   Lab 05/21/25 0234   WBC 11.65   RBC 3.22*   HGB 10.2*   HCT 30.3*      MCV 94   MCH 31.7*   MCHC 33.7     BMP:   Recent Labs   Lab 05/21/25  0234 05/21/25  1217   * 126*   * 139   K 5.8* 4.1    100   CO2 18* 26   BUN 9 8   CREATININE 0.5 0.5   CALCIUM 8.4* 8.8   MG 1.6  --      CMP:   Recent Labs   Lab 05/21/25  0234 05/21/25  1217   * 126*   CALCIUM 8.4* 8.8   ALBUMIN 2.2*  --     PROT 5.8*  --    * 139   K 5.8* 4.1   CO2 18* 26    100   BUN 9 8   CREATININE 0.5 0.5   ALKPHOS 136  --    ALT 17  --    AST 38  --    BILITOT 0.4  --      LFTs:   Recent Labs   Lab 05/21/25  0234   ALT 17   AST 38   ALKPHOS 136   BILITOT 0.4   PROT 5.8*   ALBUMIN 2.2*     Coagulation:   Recent Labs   Lab 05/21/25  0234   INR 1.3*     Microbiology Results (last 7 days)       Procedure Component Value Units Date/Time    Blood culture x two cultures. Draw prior to antibiotics. [[id number]]  (Normal) Collected: 05/20/25 1651    Order Status: Completed Specimen: Blood from Peripheral, Antecubital, Right Updated: 05/21/25 0111     Blood Culture No Growth After 6 Hours    Blood culture x two cultures. Draw prior to antibiotics. [[id number]]  (Normal) Collected: 05/20/25 1607    Order Status: Completed Specimen: Blood from Peripheral, Antecubital, Left Updated: 05/21/25 0013     Blood Culture No Growth After 6 Hours          Specimen (24h ago, onward)      None            ASA/Mallampati  ASA: 3  Mallampati: 2    Imaging:  Recent imaging studies including CT a/p on 5/20/25    EXAMINATION:  CTA CHEST NON CORONARY (PE STUDIES); CT ABDOMEN PELVIS WITH IV CONTRAST    CLINICAL HISTORY:  Pulmonary embolism (PE) suspected, high prob;; Abdominal pain, post-op;hb drop 12 to 9 after celiac nerve block, hypotension;    TECHNIQUE:  Low dose axial images, sagittal and coronal reformations were obtained from the thoracic inlet to the pubic symphysis following the IV administration of 100 mL of Omnipaque 350 .  Oral contrast was not given. CTA chest obtained utilizing PE protocol with MIP reformats.  CT abdomen pelvis obtained in the portal venous phase as a separate acquisition.    COMPARISON: ...        No evidence of acute pulmonary embolus to the proximal segmental level.    Similar to mildly worse pancreatic mass with vascular interfaces discussed above.  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    New small left pleural effusion with ground-glass opacities in the left lung base.  Similar to minimally increased size of left lower lobe pulmonary nodule.    Similar to mildly worse hepatic metastatic disease and enlarged periportal lymph node.    New/worse anasarca and small volume abdominopelvic free fluid measuring slightly denser than simple fluid. ...     PACS Images for [facility]     Show images for CT Abdomen Pelvis With IV Contrast NO Oral Contrast  PACS Images for [employer] [facility] [employer] Viewer     Show images for CT Abdomen Pelvis With IV Contrast NO Oral Contrast   Contains abnormal data CT Abdomen Pelvis With IV Contrast NO Oral Contrast  Order: [id number]   Status: Final result       Next appt: 05/29/2025 at 02:00 PM in Lab (LAB, SPECIMEN [facility])    Test Result Released: Yes (seen)    0 Result Notes  Details    Reading Physician Reading Date Result Priority   Carlos Ruano MD  [phone]  5/20/2025      Narrative & Impression  EXAMINATION:  CTA CHEST NON CORONARY (PE STUDIES); CT ABDOMEN PELVIS WITH IV CONTRAST     CLINICAL HISTORY:  Pulmonary embolism (PE) suspected, high prob;; Abdominal pain, post-op;hb drop 12 to 9 after celiac nerve block, hypotension;     TECHNIQUE:  Low dose axial images, sagittal and coronal reformations were obtained from the thoracic inlet to the pubic symphysis following the IV administration of 100 mL of Omnipaque 350 .  Oral contrast was not given. CTA chest obtained utilizing PE protocol with MIP reformats.  CT abdomen pelvis obtained in the portal venous phase as a separate acquisition.     COMPARISON:  CT chest abdomen pelvis, 04/29/2025.     FINDINGS:  CTA CHEST:     Examination of the soft tissue and vascular structures at the base of the neck is negative for acute finding.     The thoracic aorta maintains normal caliber, contour, and course with similar moderate  atherosclerotic calcification.  There is no evidence of aneurysmal dilation or dissection.     The pulmonary arteries distribute normally.  No convincing evidence of acute pulmonary embolus to the proximal segmental level.     The trachea and proximal airways are patent.     The lungs are symmetrically expanded and there are new ground-glass opacities in the left lower lobe with new small left pleural effusion.  Slightly increased size of nodular density in the left lower lobe now measuring 8 mm (series 302, image 342; previously 7 mm).  Question minimal right pleural fluid.  Lungs are otherwise essentially clear and similar to prior exam.     The heart is not enlarged.  No abnormal bowing of the interventricular septum.  Coronary artery calcifications.  No significant pericardial fluid.     There is no bulky superior mediastinal lymphadenopathy.     The esophagus maintains a normal course and caliber.     CT ABDOMEN PELVIS:     Abdomen:     Liver is similar in size and contour.  Multiple irregular hypoattenuating masses in the liver again demonstrated, suggestive of metastatic disease.  Largest mass in the left hepatic lobe measures 2.8 cm (series 303, image 31; previously 2.8 cm).  Mass in the inferior right hepatic lobe measures roughly 4.4 cm in size (series 303, image 53; previously 4.2 cm when measured similarly).  Mass in the superior right hepatic lobe measures 4.6 cm (axial image 35; previously 3.9 cm).  Gallbladder is similar and negative for acute finding.  There is diffuse thrombosis of the extrahepatic main portal vein with partial thrombosis of the splenic and superior mesenteric vein.  Worsening thrombosis in the main portal vein when compared prior study, though the intrahepatic portal veins remain grossly patent, allowing for bolus timing.  No new or worsening intrahepatic biliary duct dilatation.  Possible minimal intrahepatic duct dilatation in the inferior right hepatic lobe adjacent to liver  mass.  No severe biliary duct dilatation.     Spleen is not significantly enlarged.  There are multiple new hypoattenuating foci in the spleen suspicious for splenic infarcts.     Adrenal glands are negative for acute finding.     There is a hypoattenuating irregularly enhancing pancreatic mass as seen previously, measuring roughly 10 x 8 cm (axial series 303, image 49; previously 9 x 7 cm when measured similarly).  Similar vascular interfaces including the celiac artery and SMA.  Persistent thrombosis of the portal vein, splenic vein, and upper superior mesenteric vein.  Worsening thrombosis of the main portal vein extending into the hilum.  Few small collateral vessels.  Probable small gastroesophageal varices.  Medial left renal vein is not well delineated and could be compressed by pancreatic mass.     The kidneys are symmetric.  No hydronephrosis. No asymmetric perinephric fat stranding.  Minimal right perinephric fluid along the medial superior pole could be related to recent procedure (coronal series 604, image 131).  Volume of fluid is not greater than expected.     Diffuse mesenteric edema and trace free fluid, worse from prior study.  No small bowel obstruction.  The stomach is not overly distended.     No gross pneumoperitoneum.  No sizable retroperitoneal hematoma identified.  Trace fluid in left retroperitoneum and left pericolic gutter could be related to recent procedure.     Enlarged periportal lymph node measuring up to 1.8 cm in short axis (series 303, image 57; previously 1.5 cm).  Additional mass conglomerate or lymph node in the left retroperitoneum which may communicate with pancreatic mass (axial image 71).     Abdominal aorta is normal in caliber with moderate atherosclerosis.  Pancreatic mass interfaces with the SMA and celiac artery.     Pelvis:     Urinary bladder is decompressed with symmetric wall prominence.  Calcifications in the prostate.  Prostate is stable and mildly enlarged.   New/worse moderate volume pelvic free fluid.  Fluid measures approximately 12 HU, which could suggest some component of blood products in this patient with recent procedure (simple fluid density is 0 HU).  Rectum is negative for acute finding.     Bones and soft tissues:     No aggressive osseous lesions.  Degenerative changes in the spine with multilevel ankylosis suggestive of dish.  Operative changes in the lower lumbar spine as seen previously.  Mild body wall edema.     Impression:     No evidence of acute pulmonary embolus to the proximal segmental level.     Similar to mildly worse pancreatic mass with vascular interfaces discussed above.  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     New small left pleural effusion with ground-glass opacities in the left lung base.  Similar to minimally increased size of left lower lobe pulmonary nodule.     Similar to mildly worse hepatic metastatic disease and enlarged periportal lymph node.     New/worse anasarca and small volume abdominopelvic free fluid measuring slightly denser than simple fluid.     Additional findings discussed in the body of the report.     This report was flagged in Epic as abnormal.        Electronically signed by:Carlos Ruano MD  Date:                                            05/20/2025  Time:                                           21:15       ASSESSMENT/PLAN:     Assessment:  70 y.o. male with a PMHx of newly diagnosed pancreatic cancer c/b PVT, T2DM, HTN, carotid artery stenosis who has been referred to IR for evaluation of hypotension and c/f bleeding following his outpatient IR splanchnic neurolysis procedure on 5/19. CT a/p on 5/20 reviewed with staff, does not show any signs of internal bleeding such as new hematomas or hemoperitoneum. Hgb improved on AM labs without intervention. Pt denies pain at neurolysis access  site. Explained to pt that hypotension could be related to his neurolysis procedure due to the potential effects on the parasympathetic nervous system, however BP improved today following IVF and pt no longer feeling faint. He also reports his abdominal pain is improved by over 60% following his procedure. From an IR standpoint, pt is clear to discharge home. Plan discussed with ordering physician and pt who verbalized understanding of the plan and would like to proceed.    Plan:  Although pt's procedure may have contributed to his hypotension, BP improved today after receiving IVF  Low suspicion for internal bleeding at this time. Hgb improved on AM labs, pt denies pain at access site.   Thank you for the consult. Please contact with questions via [employer] secure chat or spectra link    Time spent during patient care today was 78 minutes. This includes time spent before the visit reviewing the chart, discussing case with staff physician and ordering provider, time spent during the face to face patient visit, and time spent after the visit on documentation. Time excludes procedure time.     Jesenia Payne PA-C  Interventional Radiology  Spectra: [id number]        [1]   Social History  Tobacco Use    Smoking status: Former     Current packs/day: 0.00     Types: Cigarettes     Quit date: 2010     Years since quittin.9    Smokeless tobacco: Never   Substance Use Topics    Alcohol use: Yes     Alcohol/week: 8.0 standard drinks of alcohol     Types: 5 Cans of beer, 3 Standard drinks or equivalent per week    Drug use: No

## 2025-05-21 NOTE — HPI
69 yo M w/pancreatic cancer not on chemo, HTN, carotid artery stenosis, HTN, portal vein thrombosis, on eliquis (paused for procedure and restarted this AM) recent celiac nerve block on 5/19, port placement last week presenting with marked weakness, hypotension, sent in from heme/onc clinic today.     Patient reports that he had a celiac nerve block yesterday and had persistently low blood pressure in the PACU, since discharge, has had significant lightheadedness, dyspnea with walking, near syncopal episodes with ambulation, postural lightheadedness and found it very difficult to ambulate short distances. His block was under general anesthesia.  Denies abdominal pain, or back pain, no significant bleeding from bilateral back nerve block entry sites.  Has had normal bowel movement this morning, slightly loose, no dark tarry stool.  No dysuria, no hematuria, no epistaxis or gum bleeding.  Normal p.o. intake yesterday per wife at bedside which is an improvement and now his BM frequency has improved. He thinks the nerve block is helping significantly with his pain.   He thinks his cancer pain is well controlled and in terms of the back pain - it is gone.   Patient reports 30 lbs weight loss over the past 3 months.     Also had recent right-sided chest port placed in preparation for starting chemo this week.  Denies pain in his area, denies fever, chills.     He was diagnosed with thrombosis in the main portal vein in late April and started Eliquis early in May, stopping it for several days prior to the celiac nerve block, restarted Eliquis today.    In the ED, hypotension improved and his general sense of wellbeing improved after fluids, CT abd/pel with new intraabdominal/pelvic moderate fluid, IR attending evaluated imaging and does not think it is blood, no active bleeding seen. We discussed next steps for this at length but I was non-committal as to whether heme/onco would feel fluid analysis was needed. Concern is  bleeding vs malignant ascites. If he remains stable with BP and HGB, the risk/benefit ratio could change so I told him to discuss this with the team in the AM. In the event a paracentesis is needed, I discussed he would be NPO and we would hold his eliquis tonight and tomorrow AM.    He is admitted for weakness, monitoring for hypotension, bleeding, repeat CBCs. 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 He was found to have a pancreatic mass on CT lumbar spine 4/24/25. C/A/P on 4/29/25 showed 9.4 cm mass involving the pancreatic body and tail highly concerning for adenocarcinoma. It abuts the stomach, duodenum, proximal jejunum, and left adrenal gland without convincing invasion. It encases the celiac, common hepatic, and superior mesenteric arteries which remain patent. It encases the splenic artery which is moderately narrowed. It abuts the aorta and left renal artery which remain patent. Occludes the splenic and superior mesenteric veins, with nonocclusive thrombus extending into the main portal vein. There are portosystemic collaterals in the upper abdomen. Enlarged periportal lymph node concerning for metastasis. Multiple liver metastases measuring up to 2.8 cm. Few solid pulmonary nodules measuring up to 0.7 cm concerning for metastases. Upper EUS biopsy of the pancreatic tail mass on 4/30/25 showed undifferentiated carcinoma. MMR-proficient, ALY. Comment: Histologic sections reveal pleomorphic tumor cells with bizarre nuclear atypia, moderately abundant amphophilic cytoplasm, and multinucleated forms. Rare cells that may represent osteoclast-like giant cells are noted. Background  necrosis is readily apparent. Tumor cells exhibits extensive positivity for pankeratin AE1/3 by immunohistochemistry. BerEP4 is negative. The morphologic features and immunoprofile support the above interpretation. Tempus pending. On 4/29/25, CA 19-9 normal at 33.8. PGX: DPYD normal metabolizer, UGT1A1 intermediate metabolizer.

## 2025-05-21 NOTE — NURSING
Spoke with daughter Bhupinder who inquired of patient's plan of care as it pertains to acupuncture and home health care in the home. He is being discharged today. Daughter requested outpatient social work support to examine [facility] coverage. Although patient lives within the home, his wife is not able to lift him when needed as she injured her shoulder. JAMILAH Lewis will place social work consult for greater support. Patient will defer chemo plan to Med Onc via portal messaging system, JAMILAH Fountain.

## 2025-05-21 NOTE — PROGRESS NOTES
When pt was preparing for discharge from inpatient today, SW was notified by Med/Onc team that he had no needs upon d/c and would go home and follow up by seeing Dr. Granados in clinic and start chemo as planned.    Just after he was discharged, SW received inbasket messages from Aleida Zhang PA-C and from Carole Espinosa RN regarding pt's daughter calling about home health. SW called pt's daughter, Tanesha Pedroza [phone] to discuss. Tanesha was driving in from [place] during the conversation and was wanting information about what home health provides and how it works. SW gave general info about HH services and agreed to email Tanesha a list of agencies that might be covered by pt's insurance.    Tanesha was also wondering if pt's appt with Dr. Granados and chemo could be moved sooner, and at main campus. FRANKY reached out to schedulers to coordinate. Will follow up.    Tod Quarles, [facility]  Oncology Social Worker  [facility]  [phone]

## 2025-05-22 ENCOUNTER — TELEPHONE (OUTPATIENT)
Dept: HEMATOLOGY/ONCOLOGY | Facility: CLINIC | Age: 70
End: 2025-05-22
Payer: MEDICARE

## 2025-05-22 ENCOUNTER — PATIENT MESSAGE (OUTPATIENT)
Dept: HEMATOLOGY/ONCOLOGY | Facility: CLINIC | Age: 70
End: 2025-05-22
Payer: MEDICARE

## 2025-05-22 ENCOUNTER — DOCUMENTATION ONLY (OUTPATIENT)
Dept: HEMATOLOGY/ONCOLOGY | Facility: CLINIC | Age: 70
End: 2025-05-22
Payer: MEDICARE

## 2025-05-22 NOTE — TELEPHONE ENCOUNTER
Patient discharged yesterday and scheduled for chemo 6/2.  Per Krysten Cole RN in [facility], patient could be placed on waitlist for earlier infusion next week.  Called patient and left voicemail asking for return call.

## 2025-05-22 NOTE — PROGRESS NOTES
FRANKY sent a list of home health agencies that appeared under pt's specific insurance plan and emailed the list to pt's daughter, Tanesha ([email or web address]) per her request. Tanesha immediately replied that she received the email. FRANKY encouraged Tanesha to reach out if she has any questions.    Tod Quarles, [facility]  Oncology Social Worker  [facility]  [phone]

## 2025-05-23 ENCOUNTER — TELEPHONE (OUTPATIENT)
Dept: INTERVENTIONAL RADIOLOGY/VASCULAR | Facility: HOSPITAL | Age: 70
End: 2025-05-23
Payer: MEDICARE

## 2025-05-23 ENCOUNTER — DOCUMENTATION ONLY (OUTPATIENT)
Dept: HEMATOLOGY/ONCOLOGY | Facility: CLINIC | Age: 70
End: 2025-05-23
Payer: MEDICARE

## 2025-05-23 NOTE — NURSING
Called patient to assess pain post procedure per Dr. Leyva request.  Patient reports 10/10 back pain prior to procedure.  Currently reports 6-7/10 back pain prior to taking pain medication.  No pain after taking pain medication.  Dr. Leyva updated via message.  Patient provided with IR clinic # and after hours #.

## 2025-05-24 LAB
BACTERIA BLD CULT: NORMAL
BACTERIA BLD CULT: NORMAL

## 2025-05-25 LAB
BACTERIA BLD CULT: NORMAL
BACTERIA BLD CULT: NORMAL

## 2025-05-26 ENCOUNTER — DOCUMENTATION ONLY (OUTPATIENT)
Dept: HEMATOLOGY/ONCOLOGY | Facility: CLINIC | Age: 70
End: 2025-05-26
Payer: MEDICARE

## 2025-05-26 DIAGNOSIS — R53.1 WEAKNESS: ICD-10-CM

## 2025-05-26 DIAGNOSIS — R63.0 ANOREXIA: ICD-10-CM

## 2025-05-26 DIAGNOSIS — C25.8 OVERLAPPING MALIGNANT NEOPLASM OF PANCREAS: Primary | ICD-10-CM

## 2025-05-26 DIAGNOSIS — R53.83 FATIGUE, UNSPECIFIED TYPE: ICD-10-CM

## 2025-05-26 NOTE — PROGRESS NOTES
SW spoke to pt's daughter, Tanesha, who said [facility] came out to do an assessment but needed PT orders. SW requested subsequent HH order from ASHLYN Andujar and faxed them to [facility] at [phone].     Will continue to monitor.    Tod Quarles, [facility]  Oncology Social Worker  [facility]  [phone]

## 2025-05-28 ENCOUNTER — TELEPHONE (OUTPATIENT)
Dept: HEMATOLOGY/ONCOLOGY | Facility: CLINIC | Age: 70
End: 2025-05-28
Payer: MEDICARE

## 2025-05-28 NOTE — NURSING
JAMILAH Lewis changed patient's upcoming visit with Oma Souza RD to a virtual as chemo occurs day prior per patient's request. JAMILAH Fountain.

## 2025-05-29 ENCOUNTER — PATIENT MESSAGE (OUTPATIENT)
Dept: HEMATOLOGY/ONCOLOGY | Facility: CLINIC | Age: 70
End: 2025-05-29

## 2025-05-29 ENCOUNTER — OFFICE VISIT (OUTPATIENT)
Dept: HEMATOLOGY/ONCOLOGY | Facility: CLINIC | Age: 70
End: 2025-05-29
Payer: MEDICARE

## 2025-05-29 ENCOUNTER — LAB VISIT (OUTPATIENT)
Dept: LAB | Facility: HOSPITAL | Age: 70
End: 2025-05-29
Attending: INTERNAL MEDICINE
Payer: MEDICARE

## 2025-05-29 VITALS
BODY MASS INDEX: 20.39 KG/M2 | SYSTOLIC BLOOD PRESSURE: 81 MMHG | WEIGHT: 126.31 LBS | DIASTOLIC BLOOD PRESSURE: 49 MMHG | TEMPERATURE: 98 F | OXYGEN SATURATION: 99 % | HEART RATE: 113 BPM

## 2025-05-29 DIAGNOSIS — D49.9 IMMUNODEFICIENCY SECONDARY TO NEOPLASM: ICD-10-CM

## 2025-05-29 DIAGNOSIS — C25.8 OVERLAPPING MALIGNANT NEOPLASM OF PANCREAS: Primary | ICD-10-CM

## 2025-05-29 DIAGNOSIS — C77.8 SECONDARY MALIGNANT NEOPLASM OF LYMPH NODES OF MULTIPLE SITES: ICD-10-CM

## 2025-05-29 DIAGNOSIS — E11.59 TYPE 2 DIABETES MELLITUS WITH OTHER CIRCULATORY COMPLICATION, WITHOUT LONG-TERM CURRENT USE OF INSULIN: ICD-10-CM

## 2025-05-29 DIAGNOSIS — C25.8 OVERLAPPING MALIGNANT NEOPLASM OF PANCREAS: ICD-10-CM

## 2025-05-29 DIAGNOSIS — I81 PORTAL VEIN THROMBOSIS: ICD-10-CM

## 2025-05-29 DIAGNOSIS — D84.81 IMMUNODEFICIENCY SECONDARY TO NEOPLASM: ICD-10-CM

## 2025-05-29 DIAGNOSIS — C78.7 METASTASIS TO LIVER: ICD-10-CM

## 2025-05-29 DIAGNOSIS — K86.89 PANCREATIC INSUFFICIENCY: ICD-10-CM

## 2025-05-29 DIAGNOSIS — I10 PRIMARY HYPERTENSION: ICD-10-CM

## 2025-05-29 DIAGNOSIS — G89.3 CANCER RELATED PAIN: ICD-10-CM

## 2025-05-29 DIAGNOSIS — C78.01 MALIGNANT NEOPLASM METASTATIC TO BOTH LUNGS: ICD-10-CM

## 2025-05-29 DIAGNOSIS — R19.7 DIARRHEA, UNSPECIFIED TYPE: ICD-10-CM

## 2025-05-29 DIAGNOSIS — C78.02 MALIGNANT NEOPLASM METASTATIC TO BOTH LUNGS: ICD-10-CM

## 2025-05-29 LAB
ABSOLUTE EOSINOPHIL (OHS): 0.13 K/UL
ABSOLUTE MONOCYTE (OHS): 0.71 K/UL (ref 0.3–1)
ABSOLUTE NEUTROPHIL COUNT (OHS): 8.6 K/UL (ref 1.8–7.7)
ALBUMIN SERPL BCP-MCNC: 2.6 G/DL (ref 3.5–5.2)
ALP SERPL-CCNC: 94 UNIT/L (ref 40–150)
ALT SERPL W/O P-5'-P-CCNC: 14 UNIT/L (ref 10–44)
ANION GAP (OHS): 10 MMOL/L (ref 8–16)
AST SERPL-CCNC: 22 UNIT/L (ref 11–45)
BASOPHILS # BLD AUTO: 0.09 K/UL
BASOPHILS NFR BLD AUTO: 0.8 %
BILIRUB SERPL-MCNC: 0.4 MG/DL (ref 0.1–1)
BUN SERPL-MCNC: 17 MG/DL (ref 8–23)
CALCIUM SERPL-MCNC: 9 MG/DL (ref 8.7–10.5)
CANCER AG19-9 SERPL-ACNC: 18.3 U/ML
CHLORIDE SERPL-SCNC: 100 MMOL/L (ref 95–110)
CO2 SERPL-SCNC: 25 MMOL/L (ref 23–29)
CREAT SERPL-MCNC: 0.6 MG/DL (ref 0.5–1.4)
ERYTHROCYTE [DISTWIDTH] IN BLOOD BY AUTOMATED COUNT: 15.4 % (ref 11.5–14.5)
GFR SERPLBLD CREATININE-BSD FMLA CKD-EPI: >60 ML/MIN/1.73/M2
GLUCOSE SERPL-MCNC: 110 MG/DL (ref 70–110)
HCT VFR BLD AUTO: 31.9 % (ref 40–54)
HGB BLD-MCNC: 10.4 GM/DL (ref 14–18)
IMM GRANULOCYTES # BLD AUTO: 0.05 K/UL (ref 0–0.04)
IMM GRANULOCYTES NFR BLD AUTO: 0.5 % (ref 0–0.5)
LYMPHOCYTES # BLD AUTO: 1.22 K/UL (ref 1–4.8)
MCH RBC QN AUTO: 31.8 PG (ref 27–31)
MCHC RBC AUTO-ENTMCNC: 32.6 G/DL (ref 32–36)
MCV RBC AUTO: 98 FL (ref 82–98)
NUCLEATED RBC (/100WBC) (OHS): 0 /100 WBC
PLATELET # BLD AUTO: 387 K/UL (ref 150–450)
PMV BLD AUTO: 9.3 FL (ref 9.2–12.9)
POTASSIUM SERPL-SCNC: 4.3 MMOL/L (ref 3.5–5.1)
PROT SERPL-MCNC: 6.3 GM/DL (ref 6–8.4)
RBC # BLD AUTO: 3.27 M/UL (ref 4.6–6.2)
RELATIVE EOSINOPHIL (OHS): 1.2 %
RELATIVE LYMPHOCYTE (OHS): 11.3 % (ref 18–48)
RELATIVE MONOCYTE (OHS): 6.6 % (ref 4–15)
RELATIVE NEUTROPHIL (OHS): 79.6 % (ref 38–73)
SODIUM SERPL-SCNC: 135 MMOL/L (ref 136–145)
WBC # BLD AUTO: 10.8 K/UL (ref 3.9–12.7)

## 2025-05-29 PROCEDURE — 85025 COMPLETE CBC W/AUTO DIFF WBC: CPT

## 2025-05-29 PROCEDURE — 99999 PR PBB SHADOW E&M-EST. PATIENT-LVL IV: CPT | Mod: PBBFAC,,, | Performed by: INTERNAL MEDICINE

## 2025-05-29 PROCEDURE — 1101F PT FALLS ASSESS-DOCD LE1/YR: CPT | Mod: CPTII,S$GLB,, | Performed by: INTERNAL MEDICINE

## 2025-05-29 PROCEDURE — 36415 COLL VENOUS BLD VENIPUNCTURE: CPT

## 2025-05-29 PROCEDURE — 3008F BODY MASS INDEX DOCD: CPT | Mod: CPTII,S$GLB,, | Performed by: INTERNAL MEDICINE

## 2025-05-29 PROCEDURE — 80053 COMPREHEN METABOLIC PANEL: CPT

## 2025-05-29 PROCEDURE — 1157F ADVNC CARE PLAN IN RCRD: CPT | Mod: CPTII,S$GLB,, | Performed by: INTERNAL MEDICINE

## 2025-05-29 PROCEDURE — 1159F MED LIST DOCD IN RCRD: CPT | Mod: CPTII,S$GLB,, | Performed by: INTERNAL MEDICINE

## 2025-05-29 PROCEDURE — 1160F RVW MEDS BY RX/DR IN RCRD: CPT | Mod: CPTII,S$GLB,, | Performed by: INTERNAL MEDICINE

## 2025-05-29 PROCEDURE — 99215 OFFICE O/P EST HI 40 MIN: CPT | Mod: S$GLB,,, | Performed by: INTERNAL MEDICINE

## 2025-05-29 PROCEDURE — 3074F SYST BP LT 130 MM HG: CPT | Mod: CPTII,S$GLB,, | Performed by: INTERNAL MEDICINE

## 2025-05-29 PROCEDURE — 3078F DIAST BP <80 MM HG: CPT | Mod: CPTII,S$GLB,, | Performed by: INTERNAL MEDICINE

## 2025-05-29 PROCEDURE — 1111F DSCHRG MED/CURRENT MED MERGE: CPT | Mod: CPTII,S$GLB,, | Performed by: INTERNAL MEDICINE

## 2025-05-29 PROCEDURE — G2211 COMPLEX E/M VISIT ADD ON: HCPCS | Mod: S$GLB,,, | Performed by: INTERNAL MEDICINE

## 2025-05-29 PROCEDURE — 86301 IMMUNOASSAY TUMOR CA 19-9: CPT

## 2025-05-29 PROCEDURE — 3288F FALL RISK ASSESSMENT DOCD: CPT | Mod: CPTII,S$GLB,, | Performed by: INTERNAL MEDICINE

## 2025-05-29 RX ORDER — SODIUM CHLORIDE 0.9 % (FLUSH) 0.9 %
10 SYRINGE (ML) INJECTION
Status: CANCELLED | OUTPATIENT
Start: 2025-05-29

## 2025-05-29 RX ORDER — PANCRELIPASE 36000; 180000; 114000 [USP'U]/1; [USP'U]/1; [USP'U]/1
1 CAPSULE, DELAYED RELEASE PELLETS ORAL
Qty: 90 CAPSULE | Refills: 11 | Status: SHIPPED | OUTPATIENT
Start: 2025-05-29

## 2025-05-29 RX ORDER — DIPHENOXYLATE HYDROCHLORIDE AND ATROPINE SULFATE 2.5; .025 MG/1; MG/1
1 TABLET ORAL 4 TIMES DAILY PRN
Qty: 120 TABLET | Refills: 1 | Status: SHIPPED | OUTPATIENT
Start: 2025-05-29 | End: 2026-05-29

## 2025-05-29 RX ORDER — LIDOCAINE AND PRILOCAINE 25; 25 MG/G; MG/G
CREAM TOPICAL
COMMUNITY

## 2025-05-29 RX ORDER — HEPARIN 100 UNIT/ML
500 SYRINGE INTRAVENOUS
Status: CANCELLED | OUTPATIENT
Start: 2025-05-29

## 2025-05-29 RX ORDER — SODIUM CHLORIDE 9 MG/ML
INJECTION, SOLUTION INTRAVENOUS
Start: 2025-05-29

## 2025-05-29 NOTE — PROGRESS NOTES
PROGRESS NOTE    Subjective:       Patient ID: Gael Pedroza is a 70 y.o. male.    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 ALY  Tempus blood 5/8/25: KRAS G12D, TP53, TERT, ctDNA 17.0%, TMB 3.8    Oncologic History:  1.Mr Pedroza is a 69 yo man with carotic artery stenosis, left ventricular diastolic dysfunction, HTN, stenosis of left subclavian artery, T2DM, who first saw me on 5/8/25 for further management of pancreatic cancer. He presents with worsening of his chronic back pain in Jan 2025. He was found to have a pancreatic mass on CT lumbar spine 4/24/25. C/A/P on 4/29/25 showed 9.4 cm mass involving the pancreatic body and tail highly concerning for adenocarcinoma. It abuts the stomach, duodenum, proximal jejunum, and left adrenal gland without convincing invasion. It encases the celiac, common hepatic, and superior mesenteric arteries which remain patent. It encases the splenic artery which is moderately narrowed. It abuts the aorta and left renal artery which remain patent. Occludes the splenic and superior mesenteric veins, with nonocclusive thrombus extending into the main portal vein. There are portosystemic collaterals in the upper abdomen. Enlarged periportal lymph node concerning for metastasis. Multiple liver metastases measuring up to 2.8 cm. Few solid pulmonary nodules measuring up to 0.7 cm concerning for metastases. Upper EUS biopsy of the pancreatic tail mass on 4/30/25 showed undifferentiated carcinoma. MMR-proficient, ALY. Comment: Histologic sections reveal  pleomorphic tumor cells with bizarre nuclear atypia, moderately abundant amphophilic cytoplasm, and multinucleated forms. Rare cells that may represent osteoclast-like giant cells are noted. Background necrosis is readily apparent. Tumor cells exhibits extensive positivity for pankeratin AE1/3 by immunohistochemistry. BerEP4 is negative. The morphologic features and immunoprofile support the above interpretation. Tempus pending. On 25, CA 19-9 normal at 33.8. PGX: DPYD normal metabolizer, UGT1A1 intermediate metabolizer. Patient noted 30 lbs weight loss over the past 3 months, anorexia, pain better on current pain regimen. He has a daughter and a son. Accompanied by wife, brother and sister-in-law today.   Discussed palliative NALIRIFOX.     Interval History:   Mr Pedroza returns for follow up. He had low BP and diarrhea after celiac block. Was seen at the clinic last week and sent to ED. [place] better with improvement in BP after IVF. He is eating small amount of food and drinking 2-3 bottles of fluids a day, also drinks protein drinks. He feels better now. Pain is controlled after celiac block.     ECO    ROS:   A ten-point system review is obtained and negative except for what was stated in the Interval History.     Physical Examination:   Vital signs reviewed. BP 81/49 mmHg, Pulse 113  General: well hydrated, well developed, in no acute distress  HEENT: normocephalic, EOMI, anicteric sclerae  Neck: supple, no JVD, thyromegaly, cervical or supraclavicular lymphadenopathy  Lungs: clear breath sounds bilaterally, no wheezing, rales, or rhonchi  Heart: RRR, no M/R/G  Abdomen: soft, no tenderness, non-distended, no hepatosplenomegaly, mass, or hernia. BS present  Extremities: no clubbing, cyanosis, or edema  Skin: no rash  Neuro: alert and oriented x 4, no focal neuro deficit  Psych: pleasant and appropriate mood and affect    Objective:     Laboratory Data:  Labs reviewed. CBC, CMP adequate for  chemo    Imaging Data:  CT C/A/P 5/20/25:  Impression:     No evidence of acute pulmonary embolus to the proximal segmental level.     Similar to mildly worse pancreatic mass with vascular interfaces discussed above.  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     New small left pleural effusion with ground-glass opacities in the left lung base.  Similar to minimally increased size of left lower lobe pulmonary nodule.     Similar to mildly worse hepatic metastatic disease and enlarged periportal lymph node.     New/worse anasarca and small volume abdominopelvic free fluid measuring slightly denser than simple fluid.       Assessment and Plan:     1. Overlapping malignant neoplasm of pancreas    2. Metastasis to liver    3. 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 Immunodeficiency secondary to neoplasm    6. Diarrhea, unspecified type    7. Pancreatic insufficiency    8. Primary hypertension    9. Cancer related pain    10. Type 2 diabetes mellitus with other circulatory complication, without long-term current use of insulin    11. Portal vein thrombosis      1-5  - Mr Pedroza is a 69 yo man with stage IV undifferentiated carcinoma of the pancreatic body/tail, with mets to lymph nodes, liver and lung, MMR-proficient   - was hospitalized last week for hypotension. BP has been low since celiac block  - BP low today but he is feeling fine. Will give IVF tomorrow  - again discussed with patient whether he would like to receive chemo. Chemo will make him feel weaker. Patient would like to get chemo. Consent has been signed at the last visit  - will give IVF again before chemo next Monday. As long as SBP above 90 will proceed with NALIRIFOX. Dose reduced NALIRIFOX to see if he can tolerate  - reviewed tempus results. No targetable  mutation. Daughter wonders if cancer in inheritable. Discussed germline testing. Patient would like to get germline testing. Will do tomorrow  - NALIRIFOX next Monday  - return in 2 weeks for cycle 2    6.  - after celiac block. Patient feels diarrhea is related to food  - try creon  - c/w imodium prn. Also sent lomotil    7.  - try creon to see if it helps with diarrhea    8.  - asked patient to hold all BP medication  - see above. IVF. Will give chemo as long as SBP above 90    9.  - better after celiac block  - f/u with palliative medicine    10.  - BS controlled  - c/w current medication    11.  - from cancer compression  - c/w eliquis 5 mg bid    Follow-up:     RTC in 2 weeks.   Knows to call in the interval if any problems arise.    I spent 50 minutes reviewing the chart, interpreting laboratory result and imaging data, coordinating patient's care, with at least 50% of the time on face-to-face counseling.       Electronically signed by Dar Granados

## 2025-05-30 ENCOUNTER — INFUSION (OUTPATIENT)
Dept: INFUSION THERAPY | Facility: HOSPITAL | Age: 70
End: 2025-05-30
Attending: INTERNAL MEDICINE
Payer: MEDICARE

## 2025-05-30 ENCOUNTER — LAB VISIT (OUTPATIENT)
Dept: LAB | Facility: HOSPITAL | Age: 70
End: 2025-05-30
Attending: INTERNAL MEDICINE
Payer: MEDICARE

## 2025-05-30 VITALS
RESPIRATION RATE: 18 BRPM | HEART RATE: 100 BPM | SYSTOLIC BLOOD PRESSURE: 115 MMHG | TEMPERATURE: 98 F | DIASTOLIC BLOOD PRESSURE: 59 MMHG | OXYGEN SATURATION: 99 %

## 2025-05-30 DIAGNOSIS — C25.8 OVERLAPPING MALIGNANT NEOPLASM OF PANCREAS: ICD-10-CM

## 2025-05-30 DIAGNOSIS — C25.8 OVERLAPPING MALIGNANT NEOPLASM OF PANCREAS: Primary | ICD-10-CM

## 2025-05-30 PROCEDURE — 63600175 PHARM REV CODE 636 W HCPCS: Performed by: INTERNAL MEDICINE

## 2025-05-30 PROCEDURE — 36415 COLL VENOUS BLD VENIPUNCTURE: CPT

## 2025-05-30 PROCEDURE — A4216 STERILE WATER/SALINE, 10 ML: HCPCS | Performed by: INTERNAL MEDICINE

## 2025-05-30 PROCEDURE — 96360 HYDRATION IV INFUSION INIT: CPT

## 2025-05-30 PROCEDURE — 25000003 PHARM REV CODE 250: Performed by: INTERNAL MEDICINE

## 2025-05-30 RX ORDER — SODIUM CHLORIDE 0.9 % (FLUSH) 0.9 %
10 SYRINGE (ML) INJECTION
OUTPATIENT
Start: 2025-05-30

## 2025-05-30 RX ORDER — SODIUM CHLORIDE 0.9 % (FLUSH) 0.9 %
10 SYRINGE (ML) INJECTION
Status: DISCONTINUED | OUTPATIENT
Start: 2025-05-30 | End: 2025-05-30 | Stop reason: HOSPADM

## 2025-05-30 RX ORDER — HEPARIN 100 UNIT/ML
500 SYRINGE INTRAVENOUS
Status: DISCONTINUED | OUTPATIENT
Start: 2025-05-30 | End: 2025-05-30 | Stop reason: HOSPADM

## 2025-05-30 RX ORDER — HEPARIN 100 UNIT/ML
500 SYRINGE INTRAVENOUS
OUTPATIENT
Start: 2025-05-30

## 2025-05-30 RX ADMIN — SODIUM CHLORIDE 1000 ML: 9 INJECTION, SOLUTION INTRAVENOUS at 09:05

## 2025-05-30 RX ADMIN — HEPARIN 500 UNITS: 100 SYRINGE at 10:05

## 2025-05-30 RX ADMIN — Medication 10 ML: at 10:05

## 2025-05-30 NOTE — PLAN OF CARE
Pt received and tolerated 1 liter IVF well. VSS. /59, , SpO2 99%. Port flushed with NS and Heparin and de-accessed per protocol. Treatment calendar and AVS printed and reviewed. Patient left clinic in no acute distress.

## 2025-06-02 ENCOUNTER — INFUSION (OUTPATIENT)
Dept: INFUSION THERAPY | Facility: HOSPITAL | Age: 70
End: 2025-06-02
Attending: INTERNAL MEDICINE
Payer: MEDICARE

## 2025-06-02 VITALS
WEIGHT: 126.31 LBS | HEART RATE: 99 BPM | HEIGHT: 66 IN | BODY MASS INDEX: 20.3 KG/M2 | SYSTOLIC BLOOD PRESSURE: 121 MMHG | DIASTOLIC BLOOD PRESSURE: 63 MMHG

## 2025-06-02 DIAGNOSIS — C25.8 OVERLAPPING MALIGNANT NEOPLASM OF PANCREAS: Primary | ICD-10-CM

## 2025-06-02 PROCEDURE — 63600175 PHARM REV CODE 636 W HCPCS: Performed by: INTERNAL MEDICINE

## 2025-06-02 PROCEDURE — 96361 HYDRATE IV INFUSION ADD-ON: CPT

## 2025-06-02 PROCEDURE — 96367 TX/PROPH/DG ADDL SEQ IV INF: CPT

## 2025-06-02 PROCEDURE — 96415 CHEMO IV INFUSION ADDL HR: CPT

## 2025-06-02 PROCEDURE — 96413 CHEMO IV INFUSION 1 HR: CPT

## 2025-06-02 PROCEDURE — 96375 TX/PRO/DX INJ NEW DRUG ADDON: CPT

## 2025-06-02 PROCEDURE — 96416 CHEMO PROLONG INFUSE W/PUMP: CPT

## 2025-06-02 PROCEDURE — 25000003 PHARM REV CODE 250: Performed by: INTERNAL MEDICINE

## 2025-06-02 PROCEDURE — 96417 CHEMO IV INFUS EACH ADDL SEQ: CPT

## 2025-06-02 RX ORDER — EPINEPHRINE 0.3 MG/.3ML
0.3 INJECTION SUBCUTANEOUS ONCE AS NEEDED
Status: DISCONTINUED | OUTPATIENT
Start: 2025-06-02 | End: 2025-06-02 | Stop reason: HOSPADM

## 2025-06-02 RX ORDER — SODIUM CHLORIDE 0.9 % (FLUSH) 0.9 %
10 SYRINGE (ML) INJECTION
OUTPATIENT
Start: 2025-06-02

## 2025-06-02 RX ORDER — ATROPINE SULFATE 0.4 MG/ML
0.4 INJECTION, SOLUTION ENDOTRACHEAL; INTRAMEDULLARY; INTRAMUSCULAR; INTRAVENOUS; SUBCUTANEOUS ONCE AS NEEDED
Status: COMPLETED | OUTPATIENT
Start: 2025-06-02 | End: 2025-06-02

## 2025-06-02 RX ORDER — HEPARIN 100 UNIT/ML
500 SYRINGE INTRAVENOUS
OUTPATIENT
Start: 2025-06-02

## 2025-06-02 RX ORDER — PROCHLORPERAZINE EDISYLATE 5 MG/ML
5 INJECTION INTRAMUSCULAR; INTRAVENOUS ONCE AS NEEDED
Status: DISCONTINUED | OUTPATIENT
Start: 2025-06-02 | End: 2025-06-02 | Stop reason: HOSPADM

## 2025-06-02 RX ORDER — HEPARIN 100 UNIT/ML
500 SYRINGE INTRAVENOUS
Status: DISCONTINUED | OUTPATIENT
Start: 2025-06-02 | End: 2025-06-02 | Stop reason: HOSPADM

## 2025-06-02 RX ORDER — SODIUM CHLORIDE 0.9 % (FLUSH) 0.9 %
10 SYRINGE (ML) INJECTION
Status: DISCONTINUED | OUTPATIENT
Start: 2025-06-02 | End: 2025-06-02 | Stop reason: HOSPADM

## 2025-06-02 RX ORDER — DIPHENHYDRAMINE HYDROCHLORIDE 50 MG/ML
50 INJECTION, SOLUTION INTRAMUSCULAR; INTRAVENOUS ONCE AS NEEDED
Status: DISCONTINUED | OUTPATIENT
Start: 2025-06-02 | End: 2025-06-02 | Stop reason: HOSPADM

## 2025-06-02 RX ADMIN — SODIUM CHLORIDE 1000 ML: 9 INJECTION, SOLUTION INTRAVENOUS at 08:06

## 2025-06-02 RX ADMIN — OXALIPLATIN 97 MG: 5 INJECTION, SOLUTION INTRAVENOUS at 01:06

## 2025-06-02 RX ADMIN — LEUCOVORIN CALCIUM 650 MG: 500 INJECTION, POWDER, LYOPHILIZED, FOR SOLUTION INTRAMUSCULAR; INTRAVENOUS at 03:06

## 2025-06-02 RX ADMIN — SODIUM CHLORIDE 0.25 MG: 9 INJECTION, SOLUTION INTRAVENOUS at 11:06

## 2025-06-02 RX ADMIN — ATROPINE SULFATE 0.4 MG: 0.4 INJECTION, SOLUTION INTRAVENOUS at 11:06

## 2025-06-02 RX ADMIN — SODIUM CHLORIDE 3240 MG: 9 INJECTION, SOLUTION INTRAVENOUS at 03:06

## 2025-06-02 RX ADMIN — SODIUM CHLORIDE 150 MG: 9 INJECTION, SOLUTION INTRAVENOUS at 10:06

## 2025-06-02 RX ADMIN — SODIUM CHLORIDE 64.93 MG: 0.9 INJECTION, SOLUTION INTRAVENOUS at 11:06

## 2025-06-03 ENCOUNTER — CLINICAL SUPPORT (OUTPATIENT)
Dept: HEMATOLOGY/ONCOLOGY | Facility: CLINIC | Age: 70
End: 2025-06-03
Payer: MEDICARE

## 2025-06-03 DIAGNOSIS — C25.8 OVERLAPPING MALIGNANT NEOPLASM OF PANCREAS: ICD-10-CM

## 2025-06-03 DIAGNOSIS — Z71.3 NUTRITIONAL COUNSELING: Primary | ICD-10-CM

## 2025-06-03 DIAGNOSIS — E44.0 MODERATE MALNUTRITION: ICD-10-CM

## 2025-06-04 ENCOUNTER — INFUSION (OUTPATIENT)
Dept: INFUSION THERAPY | Facility: HOSPITAL | Age: 70
End: 2025-06-04
Attending: INTERNAL MEDICINE
Payer: MEDICARE

## 2025-06-04 VITALS
SYSTOLIC BLOOD PRESSURE: 91 MMHG | OXYGEN SATURATION: 99 % | DIASTOLIC BLOOD PRESSURE: 60 MMHG | TEMPERATURE: 98 F | HEART RATE: 106 BPM | RESPIRATION RATE: 18 BRPM

## 2025-06-04 DIAGNOSIS — C25.8 OVERLAPPING MALIGNANT NEOPLASM OF PANCREAS: Primary | ICD-10-CM

## 2025-06-04 PROCEDURE — 25000003 PHARM REV CODE 250: Performed by: INTERNAL MEDICINE

## 2025-06-04 PROCEDURE — A4216 STERILE WATER/SALINE, 10 ML: HCPCS | Performed by: INTERNAL MEDICINE

## 2025-06-04 PROCEDURE — 63600175 PHARM REV CODE 636 W HCPCS: Mod: JZ,TB | Performed by: INTERNAL MEDICINE

## 2025-06-04 PROCEDURE — 96377 APPLICATON ON-BODY INJECTOR: CPT

## 2025-06-04 RX ORDER — SODIUM CHLORIDE 0.9 % (FLUSH) 0.9 %
10 SYRINGE (ML) INJECTION
Status: DISCONTINUED | OUTPATIENT
Start: 2025-06-04 | End: 2025-06-04 | Stop reason: HOSPADM

## 2025-06-04 RX ORDER — PROCHLORPERAZINE EDISYLATE 5 MG/ML
5 INJECTION INTRAMUSCULAR; INTRAVENOUS ONCE AS NEEDED
Status: DISCONTINUED | OUTPATIENT
Start: 2025-06-04 | End: 2025-06-04 | Stop reason: HOSPADM

## 2025-06-04 RX ORDER — HEPARIN 100 UNIT/ML
500 SYRINGE INTRAVENOUS
Status: DISCONTINUED | OUTPATIENT
Start: 2025-06-04 | End: 2025-06-04 | Stop reason: HOSPADM

## 2025-06-04 RX ADMIN — PEGFILGRASTIM 6 MG: KIT SUBCUTANEOUS at 02:06

## 2025-06-04 RX ADMIN — Medication 10 ML: at 02:06

## 2025-06-04 RX ADMIN — HEPARIN 500 UNITS: 100 SYRINGE at 02:06

## 2025-06-09 ENCOUNTER — PATIENT MESSAGE (OUTPATIENT)
Dept: HEMATOLOGY/ONCOLOGY | Facility: CLINIC | Age: 70
End: 2025-06-09
Payer: MEDICARE

## 2025-06-09 ENCOUNTER — TELEPHONE (OUTPATIENT)
Dept: HEMATOLOGY/ONCOLOGY | Facility: CLINIC | Age: 70
End: 2025-06-09
Payer: MEDICARE

## 2025-06-09 DIAGNOSIS — I10 PRIMARY HYPERTENSION: ICD-10-CM

## 2025-06-09 DIAGNOSIS — C25.8 OVERLAPPING MALIGNANT NEOPLASM OF PANCREAS: Primary | ICD-10-CM

## 2025-06-09 DIAGNOSIS — E44.0 MODERATE MALNUTRITION: ICD-10-CM

## 2025-06-09 NOTE — NURSING
Patient phones to request chemo  kit. RN Joshua will contact NJAMA Becker for assistance, JAMILAH Fountain.

## 2025-06-10 ENCOUNTER — OFFICE VISIT (OUTPATIENT)
Dept: PALLIATIVE MEDICINE | Facility: CLINIC | Age: 70
End: 2025-06-10
Payer: MEDICARE

## 2025-06-10 VITALS
DIASTOLIC BLOOD PRESSURE: 60 MMHG | WEIGHT: 121.69 LBS | TEMPERATURE: 98 F | RESPIRATION RATE: 18 BRPM | HEIGHT: 67 IN | HEART RATE: 124 BPM | OXYGEN SATURATION: 97 % | BODY MASS INDEX: 19.1 KG/M2 | SYSTOLIC BLOOD PRESSURE: 100 MMHG

## 2025-06-10 DIAGNOSIS — K86.89 PANCREATIC MASS: Primary | ICD-10-CM

## 2025-06-10 DIAGNOSIS — Z71.89 ADVANCE CARE PLANNING: ICD-10-CM

## 2025-06-10 DIAGNOSIS — G89.3 CANCER RELATED PAIN: ICD-10-CM

## 2025-06-10 PROCEDURE — 1125F AMNT PAIN NOTED PAIN PRSNT: CPT | Mod: CPTII,S$GLB,, | Performed by: STUDENT IN AN ORGANIZED HEALTH CARE EDUCATION/TRAINING PROGRAM

## 2025-06-10 PROCEDURE — 3074F SYST BP LT 130 MM HG: CPT | Mod: CPTII,S$GLB,, | Performed by: STUDENT IN AN ORGANIZED HEALTH CARE EDUCATION/TRAINING PROGRAM

## 2025-06-10 PROCEDURE — 1123F ACP DISCUSS/DSCN MKR DOCD: CPT | Mod: CPTII,S$GLB,, | Performed by: STUDENT IN AN ORGANIZED HEALTH CARE EDUCATION/TRAINING PROGRAM

## 2025-06-10 PROCEDURE — 1159F MED LIST DOCD IN RCRD: CPT | Mod: CPTII,S$GLB,, | Performed by: STUDENT IN AN ORGANIZED HEALTH CARE EDUCATION/TRAINING PROGRAM

## 2025-06-10 PROCEDURE — 99999 PR PBB SHADOW E&M-EST. PATIENT-LVL IV: CPT | Mod: PBBFAC,,, | Performed by: STUDENT IN AN ORGANIZED HEALTH CARE EDUCATION/TRAINING PROGRAM

## 2025-06-10 PROCEDURE — 1160F RVW MEDS BY RX/DR IN RCRD: CPT | Mod: CPTII,S$GLB,, | Performed by: STUDENT IN AN ORGANIZED HEALTH CARE EDUCATION/TRAINING PROGRAM

## 2025-06-10 PROCEDURE — 99215 OFFICE O/P EST HI 40 MIN: CPT | Mod: S$GLB,,, | Performed by: STUDENT IN AN ORGANIZED HEALTH CARE EDUCATION/TRAINING PROGRAM

## 2025-06-10 PROCEDURE — 3008F BODY MASS INDEX DOCD: CPT | Mod: CPTII,S$GLB,, | Performed by: STUDENT IN AN ORGANIZED HEALTH CARE EDUCATION/TRAINING PROGRAM

## 2025-06-10 PROCEDURE — 3078F DIAST BP <80 MM HG: CPT | Mod: CPTII,S$GLB,, | Performed by: STUDENT IN AN ORGANIZED HEALTH CARE EDUCATION/TRAINING PROGRAM

## 2025-06-10 RX ORDER — METOCLOPRAMIDE 5 MG/1
5 TABLET ORAL
Qty: 120 TABLET | Refills: 5 | Status: SHIPPED | OUTPATIENT
Start: 2025-06-10 | End: 2025-12-07

## 2025-06-10 RX ORDER — MORPHINE SULFATE 15 MG/1
15 TABLET ORAL EVERY 8 HOURS PRN
Qty: 90 TABLET | Refills: 0 | Status: SHIPPED | OUTPATIENT
Start: 2025-06-10 | End: 2025-06-10 | Stop reason: RX

## 2025-06-10 RX ORDER — OXYCODONE HYDROCHLORIDE 10 MG/1
10 TABLET ORAL EVERY 8 HOURS PRN
Qty: 90 TABLET | Refills: 0 | Status: SHIPPED | OUTPATIENT
Start: 2025-06-10 | End: 2025-07-12

## 2025-06-10 RX ORDER — OXYCODONE 18 MG/1
18 CAPSULE, EXTENDED RELEASE ORAL EVERY 12 HOURS
Qty: 56 CAPSULE | Refills: 0 | Status: SHIPPED | OUTPATIENT
Start: 2025-06-23 | End: 2025-07-21

## 2025-06-10 NOTE — PROGRESS NOTES
Palliative Medicine Clinic Note     Reason for Consult: Followup    Chief Complaint: Pancreatic cancer        ASSESSMENT/PLAN:      Plan/Recommendations:    Diagnoses and all orders for this visit:    Pancreatic mass  -     Dnr (do not resuscitate)  -     metoclopramide HCl (REGLAN) 5 MG tablet; Take 1 tablet (5 mg total) by mouth 4 (four) times daily before meals and nightly. Take 30-60 minutes prior to meals.  -     Discontinue: morphine (MSIR) 15 MG tablet; Take 1 tablet (15 mg total) by mouth every 8 (eight) hours as needed for Pain.  -     oxyCODONE (ROXICODONE) 10 mg Tab immediate release tablet; Take 1 tablet (10 mg total) by mouth every 8 (eight) hours as needed for Pain.  -     oxyCODONE myristate (XTAMPZA ER) 18 mg CSpT; Take 1 capsule (18 mg total) by mouth every 12 (twelve) hours.    Advance care planning  -     Dnr (do not resuscitate)    Cancer related pain  -     Discontinue: morphine (MSIR) 15 MG tablet; Take 1 tablet (15 mg total) by mouth every 8 (eight) hours as needed for Pain.  -     oxyCODONE (ROXICODONE) 10 mg Tab immediate release tablet; Take 1 tablet (10 mg total) by mouth every 8 (eight) hours as needed for Pain.              Advance Care Planning   Advance Directives:   [place]: Yes    Medical Power of : Yes    Goals of Care: What is most important right now is to focus on curative/life-prolongation (regardless of treatment burdens). Accordingly, we have decided that the best plan to meet the patient's goals includes continuing with treatment.                Follow up: 5 weeks     Plan discussed with: spouse       SUBJECTIVE:      History of Present Illness / Interval History:  Gael Pedroza is 70 y.o. male with PMH of HTN, T2DM, HLD and very recent (4/24/25) dx of metastatic pancreatic cancer following a CT of the lumbar spine to workup acute on chronic back pain unexpectedly revealed an unresectable 9.6cm pancreatic body/tail mass with multiple liver mets and extensive  lymphadenopathy. Pt has had a 30 lb unintended weight loss over the past 4 months and is now s/p EUS biopsy of the primary mass on 4/30/25, final path confirmed poorly differentiated carcinoma.    ---    Initial visit: 5/6/25    Advised pt that he is presenting with stage IV disease and can accept hospice services at any time. Pt is aware of the well establish palliative chemo regimen for pancreatic CA which may be augmented by newer targeted treatments and/or immunotherapy. Wants full eval by med-onc.    Pt's chief pain source is referred pain to the thoracic back between the shoulder blades, described as paroxysmal and 10/10 with a squeezing or stabbing quality. This pain does improve to 6/10 with PRN short acting morphine and his baseline pain is about 4/10 with OxyContin. Secondarily pt reports less frequent right flank pain radiating along the right lower costal margin, described as burning or tingling and convincingly neuropathic in etiology.    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 Added Movantik to treat prominent opioid induced constipation.    Refilled current combination opioid regimen of oxycodone-ER 18mg bid and MSIR 15mg q4h prn. Pt reports presently using about 3-4 doses of MSIR daily thus has some flexibility.    LAPost electronically filed and new DNR/DNI order placed.     ---    Interval Events    Pt went to [facility] in [place] and was offered ivermectin, fenbendazole and IV Vitamin C. Daughter contacted writer over safety concerns and I advised that PO vitamin C is of no consequence but I would not countenance the use of multiple antihelminthic agents as an off-label treatment for pancreatic cancer, particularly given prolonged dosing far in excess of typical single-dose use for andres juvencio parasitic worm infections carrying a high risk of hepatotoxicity given pt's extensive hepatic metastasis.    Pt has since consented to palliative chemotherapy from Dr. Granados.    6/10/25 followup  visit (today)    CC: cachexia    Pt presents accompanied by his spouse two weeks after his first chemo infusion, pale but independently ambulatory, with mild temporal wasting and limb atrophy since last visit.    He reports early satiety and is eating smaller more frequent meals for this reason as well as diarrhea immediately following most food intake. He insists he has formed stool daily and there is no palpable stool at the RLQ; bowel sounds are hyperactive but non obstructive; no concern for impaction. His chief pain source is pleuritic pain at the right flank suspected 2/2 liver capsule stretch.    Recommend increasing OTC metamucil gummies to 8g bid. Stop Movantik as pt is not taking and may not benefit at this time.    Start premeal reglan tid ac + hs to augment gastric emptying.    Pt is using dispensary THC gummies of unclear strength, not listed on PDMP. I recommend obtaining 2.5mg THC for AM use and can continue 5-10mg at bedtime as an effective sleep aid. I do not expect CBD to improve this pt's symptoms.    For pleuritic pain, increase gabapentin dose to 300mg bid and reassess in 5 weeks. Short trial of meloxicam 7.5mg daily with breakfast x 14 days. If NSAIDs are ineffective and pt has intractable pleurisy will consider corticosteroids.    Pt warrants short acting opioid tx for SOB and paroxysmal pains. MSIR is on backorder so replaced with OME-equivalent 10mg oxycodone q8h prn. Continue current Xtampza 18mg bid.    ROS:  Review of Systems   Constitutional:  Positive for activity change, appetite change, fatigue and unexpected weight change.   Respiratory:  Negative for chest tightness and shortness of breath.    Cardiovascular:  Negative for chest pain.   Gastrointestinal:  Positive for abdominal pain, constipation and nausea. Negative for vomiting.   Genitourinary:  Negative for difficulty urinating.   Musculoskeletal:  Positive for back pain and myalgias.   Neurological:  Negative for weakness,  numbness and memory loss.   Psychiatric/Behavioral:  Positive for depressed mood and sleep disturbance. Negative for behavioral problems, confusion, dysphoric mood and suicidal ideas. The patient is nervous/anxious.        Review of Symptoms      Symptom Assessment (ESAS 0-10 Scale)  Pain:  4  Dyspnea:  0  Anxiety:  0  Nausea:  0  Depression:  0  Anorexia:  0  Fatigue:  0  Insomnia:  0  Restlessness:  0  Agitation:  0     CAM / Delirium:  Negative  Constipation:  Positive  Diarrhea:  Negative    Anxiety:  Is nervous/anxious  Constipation:  Constipation    Pain Assessment:    Location(s): abdomen    Abdomen       Location: right and anterior        Quality: Stabbing and throbbing        Quantity: 4/10 in intensity        Chronicity: Onset 1 month(s) ago, gradually worsening        Aggravating Factors: Eating and movement        Alleviating Factors: Opiates        Associated Symptoms: Myalgias    ECOG Performance Status ndGndrndanddndend:nd nd2nd Living Arrangements:  Lives in home and Lives with spouse    Psychosocial/Cultural:   See Palliative Psychosocial Note: No  Social Issues Identified: Coping deficit pt/family and New Diagnosis/Trauma  Bereavement Risk: Yes: Close or dependent relationship to the  person  Caregiver Needs Discussed. Caregiver Distress: Yes: Need for respite, Issues of guilt, Intensity of family caregiving, Caregiver Burnout Risk, and Caregiver support and community resources discussed.    Cultural: No specific concerns.  **Primary  to Follow**  Palliative Care  Consult: No    Spiritual:  F - Luciana and Belief:  Orthodoxy  I - Importance:  No impact on care  C - Community:  Home [employer]  A - Address in Care:  Sacramental anointing PRN     Time-Based Charting:  Yes  Chart Review: 12 minutes  Face to Face: 15 minutes  Symptom Assessment: 18 minutes  Coordination of Care: 6 minutes    Total Time Spent: 51 minutes          Medications:  Current Medications[1]      External   database queried on 06/10/2025  by El Whiteside [facility] :    05/29/2025 05/29/2025 1 Diphenoxylate-Atrop 2.5-0.025 120.00 30 Li Du [id number] Nol (3156) 0 0.00 MME [place]   05/26/2025 05/06/2025 2 Xtampza Er 18 Mg Capsule 56.00 28 Ja lucero [id number] Och (1258) 0 60.00 [place]   05/06/2025 05/06/2025 2 Gabapentin 300 Mg Capsule 30.00 30 Ja lucero [id number] Och (1258) 0  [place]   05/03/2025 05/03/2025 2 Xtampza Er 18 Mg Capsule 42.00 21 Co [place] [id number] Och (0778) 0 60.00 [place]   05/03/2025 05/03/2025 2 Morphine Sulfate Ir 15 Mg Tab 126.00 21 Co [place] [id number] Wal (4510) 0 90.00 [place]ME [place]   04/28/2025 04/28/2025 1 Oxycodone-Acetaminophen 5-325 30.00 5 Da Bro [id number] Nol (3156) 0 45.00 [place]   04/25/2025 04/25/2025 1 Hydrocodone-Acetamin 5-325 Mg 30.00 7 Da Bro [id number] Nol (3156) 0 21.43 [place]   04/11/2025 04/11/2025 1 Gabapentin 300 Mg Capsule 30.00 30 Da Bro [id number] Nol (3156) 0  [place]   03/13/2025 03/13/2025 1 Tramadol Hcl 50 Mg Tablet 40.00 10 An Zwi [id number] Nol (3156) 0 40.00 [place]   01/27/2025 01/27/2025 1 Hydrocodone-Acetamin 5-325 Mg 10.00 2 Ch Vania [id number] Nol (3156) 0 25.00 [place]   08/11/2023 08/11/2023 2 Hydrocodone-Acetamin 5-325 Mg 20.00 4 Cl Galen [id number] Och (1157) 0 25.00 [place]   07/10/2023 07/10/2023 1 Tramadol Hcl 50 Mg Tablet 40.00 7 An Tod [id number] Nol (9830) 0 57.14 [place]       Review of patient's allergies indicates:   Allergen Reactions    Bactrim [sulfamethoxazole-trimethoprim] Itching, Rash and Blisters    Lipitor [atorvastatin]      Achy; pt reports no problem with Crestor    Levaquin [levofloxacin] Other (See Comments)     Muscle aches under shoulder blades           OBJECTIVE:         Physical Exam:  Vitals:      Physical Exam  Constitutional:       General: He is not in acute distress.     Appearance: He is normal weight. He is not ill-appearing or toxic-appearing.   HENT:      Head: Normocephalic  and atraumatic.      Comments: Slight temporal wasting     Mouth/Throat:      Mouth: Mucous membranes are moist.      Pharynx: Oropharynx is clear.   Eyes:      Extraocular Movements: Extraocular movements intact.      Pupils: Pupils are equal, round, and reactive to light.   Cardiovascular:      Rate and Rhythm: Normal rate and regular rhythm.      Pulses: Normal pulses.      Heart sounds: No murmur heard.  Pulmonary:      Effort: Pulmonary effort is normal.      Breath sounds: Normal breath sounds. No wheezing or rales.   Abdominal:      General: Abdomen is flat. Bowel sounds are increased. There is no distension.      Palpations: Abdomen is soft. There is no mass.      Tenderness: There is no abdominal tenderness. There is no guarding.      Comments: Redundant abdominal skin folds due to massive weight loss   Musculoskeletal:         General: No swelling, tenderness or deformity. Normal range of motion.   Skin:     General: Skin is warm and dry.      Coloration: Skin is pale. Skin is not jaundiced.      Findings: No bruising.   Neurological:      General: No focal deficit present.      Mental Status: He is alert and oriented to person, place, and time. Mental status is at baseline.      GCS: GCS eye subscore is 4. GCS verbal subscore is 5. GCS motor subscore is 6.      Cranial Nerves: No cranial nerve deficit.      Sensory: No sensory deficit.      Motor: No weakness.      Coordination: Coordination normal.      Gait: Gait normal.      Deep Tendon Reflexes: Reflexes normal.   Psychiatric:         Attention and Perception: Attention and perception normal.         Mood and Affect: Mood normal. Affect is flat.         Speech: Speech normal.         Behavior: Behavior is not withdrawn. Behavior is cooperative.         Thought Content: Thought content normal.         Cognition and Memory: Cognition and memory normal.         Judgment: Judgment normal.           Labs: noncontributory      Imaging: noncontributory        I spent a total of 51 minutes on the day of the visit. This includes face to face time in discussion of goals of care, symptom assessment, coordination of care and emotional support.  This also includes non-face to face time preparing to see the patient (eg, review of tests/imaging), obtaining and/or reviewing separately obtained history, documenting clinical information in the electronic or other health record, independently interpreting results and communicating results to the patient/family/caregiver, or care coordinator.     El Whiteside Jr, MD         [1]   Current Outpatient Medications:     alcohol swabs PadM, Apply 1 each topically as needed., Disp: 100 each, Rfl: 3    apixaban (ELIQUIS) 5 mg Tab, Take 2 tablets (10 mg total) by mouth 2 (two) times daily for 2 days, THEN 1 tablet (5 mg total) 2 (two) times daily., Disp: 188 tablet, Rfl: 0    ascorbic acid, vitamin C, (VITAMIN C) 500 MG tablet, Take 500 mg by mouth once daily., Disp: , Rfl:     aspirin (ECOTRIN) 81 MG EC tablet, Take 1 tablet (81 mg total) by mouth once daily. (Patient taking differently: Take 81 mg by mouth once daily. States not currently taking (it's been about 2 weeks since last dose)), Disp: 90 tablet, Rfl: 3    blood sugar diagnostic (TRUE METRIX GLUCOSE TEST STRIP) Strp, 200 each by Misc.(Non-Drug; Combo Route) route Daily., Disp: 200 each, Rfl: 0    cholecalciferol, vitamin D3, 1,000 unit capsule, Take 1,000 Units by mouth once daily., Disp: , Rfl:     cyproheptadine (PERIACTIN) 4 mg tablet, Take 1 tablet (4 mg total) by mouth 3 (three) times daily., Disp: 90 tablet, Rfl: 11    diphenoxylate-atropine 2.5-0.025 mg (LOMOTIL) 2.5-0.025 mg per tablet, Take 1 tablet by mouth 4 (four) times daily as needed for Diarrhea., Disp: 120 tablet, Rfl: 1    ezetimibe (ZETIA) 10 mg tablet, Take 1 tablet (10 mg total) by mouth once daily., Disp: 90 tablet, Rfl: 3    gabapentin (NEURONTIN) 300 MG capsule, Take 1 capsule (300 mg total) by mouth  every evening., Disp: 30 capsule, Rfl: 2    lancets 30 gauge Misc, 1 lancet  by Misc.(Non-Drug; Combo Route) route Daily., Disp: 200 each, Rfl: 3    LIDOcaine-prilocaine (EMLA) cream, Apply topically as needed., Disp: , Rfl:     lipase-protease-amylase (CREON) 36,000-114,000- 180,000 unit CpDR, Take 1 capsule by mouth 3 (three) times daily with meals., Disp: 90 capsule, Rfl: 11    loperamide (IMODIUM) 2 mg capsule, Take 2 tablets (4mg) by mouth after first loose stool, 1 tablet every 2 hours until diarrhea free for 12 hours. May take 2 tablets (4mg) by mouth every 4 hours at night. May require more than the package labeling maximum dose of 16mg/day., Disp: 30 capsule, Rfl: 11    morphine (MSIR) 15 MG tablet, Take 1 tablet (15 mg total) by mouth every 4 (four) hours as needed for Pain., Disp: 168 tablet, Rfl: 0    multivitamin with minerals tablet, Take 1 tablet by mouth once daily., Disp: , Rfl:     naloxegoL (MOVANTIK) 12.5 mg Tab, Take 1 tablet (12.5 mg total) by mouth once daily., Disp: 30 tablet, Rfl: 2    [Paused] nebivoloL (BYSTOLIC) 5 MG Tab, Take 1 tablet (5 mg total) by mouth once daily., Disp: 90 tablet, Rfl: 3    OLANZapine (ZYPREXA) 5 MG tablet, Take 1 tablet (5 mg total) by mouth every evening. On days 1-3 of each chemotherapy cycle. (Patient taking differently: Take 5 mg by mouth every evening. On days 1-3 of each chemotherapy cycle.  States has not yet started), Disp: 6 tablet, Rfl: 11    oxyCODONE myristate (XTAMPZA ER) 18 mg CSpT, Take 1 capsule (18 mg total) by mouth every 12 (twelve) hours., Disp: 56 capsule, Rfl: 0    prochlorperazine (COMPAZINE) 5 MG tablet, Take 1 tablet (5 mg total) by mouth every 6 (six) hours as needed for Nausea. (Patient taking differently: Take 5 mg by mouth every 6 (six) hours as needed for Nausea. States has not yet started), Disp: 20 tablet, Rfl: 11    rosuvastatin (CRESTOR) 10 MG tablet, Take 1 tablet (10 mg total) by mouth once daily., Disp: 90 tablet, Rfl: 3     tamsulosin (FLOMAX) 0.4 mg Cap, TAKE ONE CAPSULE BY MOUTH EVERY DAY, Disp: 90 capsule, Rfl: 1    XIGDUO XR 5-1,000 mg, TAKE TWO TABLETS BY MOUTH EVERY DAY (Patient taking differently: Take 2 tablets by mouth every morning.), Disp: 180 tablet, Rfl: 1

## 2025-06-12 ENCOUNTER — LAB VISIT (OUTPATIENT)
Dept: LAB | Facility: HOSPITAL | Age: 70
End: 2025-06-12
Attending: INTERNAL MEDICINE
Payer: MEDICARE

## 2025-06-12 ENCOUNTER — OFFICE VISIT (OUTPATIENT)
Dept: HEMATOLOGY/ONCOLOGY | Facility: CLINIC | Age: 70
End: 2025-06-12
Payer: MEDICARE

## 2025-06-12 VITALS
OXYGEN SATURATION: 98 % | HEART RATE: 110 BPM | DIASTOLIC BLOOD PRESSURE: 51 MMHG | SYSTOLIC BLOOD PRESSURE: 85 MMHG | BODY MASS INDEX: 18.85 KG/M2 | WEIGHT: 120.38 LBS

## 2025-06-12 DIAGNOSIS — D49.9 IMMUNODEFICIENCY SECONDARY TO NEOPLASM: ICD-10-CM

## 2025-06-12 DIAGNOSIS — D84.821 IMMUNODEFICIENCY SECONDARY TO CHEMOTHERAPY: ICD-10-CM

## 2025-06-12 DIAGNOSIS — C25.8 OVERLAPPING MALIGNANT NEOPLASM OF PANCREAS: Primary | ICD-10-CM

## 2025-06-12 DIAGNOSIS — C77.8 SECONDARY MALIGNANT NEOPLASM OF LYMPH NODES OF MULTIPLE SITES: ICD-10-CM

## 2025-06-12 DIAGNOSIS — C25.9 PANCREATIC CANCER METASTASIZED TO INTRA-ABDOMINAL LYMPH NODE: ICD-10-CM

## 2025-06-12 DIAGNOSIS — C78.7 METASTASIS TO LIVER: ICD-10-CM

## 2025-06-12 DIAGNOSIS — K86.89 PANCREATIC INSUFFICIENCY: ICD-10-CM

## 2025-06-12 DIAGNOSIS — I95.9 HYPOTENSION, UNSPECIFIED HYPOTENSION TYPE: ICD-10-CM

## 2025-06-12 DIAGNOSIS — C77.2 PANCREATIC CANCER METASTASIZED TO INTRA-ABDOMINAL LYMPH NODE: ICD-10-CM

## 2025-06-12 DIAGNOSIS — R19.7 DIARRHEA, UNSPECIFIED TYPE: ICD-10-CM

## 2025-06-12 DIAGNOSIS — F41.0 ANXIETY DISORDER DUE TO GENERAL MEDICAL CONDITION WITH PANIC ATTACK: Primary | ICD-10-CM

## 2025-06-12 DIAGNOSIS — C25.8 OVERLAPPING MALIGNANT NEOPLASM OF PANCREAS: ICD-10-CM

## 2025-06-12 DIAGNOSIS — F06.4 ANXIETY DISORDER DUE TO GENERAL MEDICAL CONDITION WITH PANIC ATTACK: Primary | ICD-10-CM

## 2025-06-12 DIAGNOSIS — D84.81 IMMUNODEFICIENCY SECONDARY TO NEOPLASM: ICD-10-CM

## 2025-06-12 DIAGNOSIS — C78.02 MALIGNANT NEOPLASM METASTATIC TO BOTH LUNGS: ICD-10-CM

## 2025-06-12 DIAGNOSIS — I81 PORTAL VEIN THROMBOSIS: ICD-10-CM

## 2025-06-12 DIAGNOSIS — C78.01 MALIGNANT NEOPLASM METASTATIC TO BOTH LUNGS: ICD-10-CM

## 2025-06-12 DIAGNOSIS — G89.3 CANCER RELATED PAIN: ICD-10-CM

## 2025-06-12 DIAGNOSIS — Z79.69 IMMUNODEFICIENCY SECONDARY TO CHEMOTHERAPY: ICD-10-CM

## 2025-06-12 DIAGNOSIS — T45.1X5A IMMUNODEFICIENCY SECONDARY TO CHEMOTHERAPY: ICD-10-CM

## 2025-06-12 LAB
ABSOLUTE EOSINOPHIL (OHS): 0.07 K/UL
ABSOLUTE MONOCYTE (OHS): 0.87 K/UL (ref 0.3–1)
ABSOLUTE NEUTROPHIL COUNT (OHS): 15.6 K/UL (ref 1.8–7.7)
ALBUMIN SERPL BCP-MCNC: 3.1 G/DL (ref 3.5–5.2)
ALP SERPL-CCNC: 166 UNIT/L (ref 40–150)
ALT SERPL W/O P-5'-P-CCNC: 17 UNIT/L (ref 10–44)
ANION GAP (OHS): 9 MMOL/L (ref 8–16)
AST SERPL-CCNC: 16 UNIT/L (ref 11–45)
BASOPHILS # BLD AUTO: 0.08 K/UL
BASOPHILS NFR BLD AUTO: 0.5 %
BILIRUB SERPL-MCNC: 0.4 MG/DL (ref 0.1–1)
BUN SERPL-MCNC: 16 MG/DL (ref 8–23)
CALCIUM SERPL-MCNC: 9.6 MG/DL (ref 8.7–10.5)
CANCER AG19-9 SERPL-ACNC: 13.1 U/ML
CHLORIDE SERPL-SCNC: 105 MMOL/L (ref 95–110)
CO2 SERPL-SCNC: 25 MMOL/L (ref 23–29)
CREAT SERPL-MCNC: 0.6 MG/DL (ref 0.5–1.4)
ERYTHROCYTE [DISTWIDTH] IN BLOOD BY AUTOMATED COUNT: 17.4 % (ref 11.5–14.5)
GFR SERPLBLD CREATININE-BSD FMLA CKD-EPI: >60 ML/MIN/1.73/M2
GLUCOSE SERPL-MCNC: 201 MG/DL (ref 70–110)
HCT VFR BLD AUTO: 31.4 % (ref 40–54)
HGB BLD-MCNC: 10.1 GM/DL (ref 14–18)
IMM GRANULOCYTES # BLD AUTO: 0.18 K/UL (ref 0–0.04)
IMM GRANULOCYTES NFR BLD AUTO: 1 % (ref 0–0.5)
LYMPHOCYTES # BLD AUTO: 0.75 K/UL (ref 1–4.8)
MCH RBC QN AUTO: 32.1 PG (ref 27–31)
MCHC RBC AUTO-ENTMCNC: 32.2 G/DL (ref 32–36)
MCV RBC AUTO: 100 FL (ref 82–98)
NUCLEATED RBC (/100WBC) (OHS): 0 /100 WBC
PLATELET # BLD AUTO: 281 K/UL (ref 150–450)
PMV BLD AUTO: 9.9 FL (ref 9.2–12.9)
POTASSIUM SERPL-SCNC: 4.2 MMOL/L (ref 3.5–5.1)
PROT SERPL-MCNC: 6.8 GM/DL (ref 6–8.4)
RBC # BLD AUTO: 3.15 M/UL (ref 4.6–6.2)
RELATIVE EOSINOPHIL (OHS): 0.4 %
RELATIVE LYMPHOCYTE (OHS): 4.3 % (ref 18–48)
RELATIVE MONOCYTE (OHS): 5 % (ref 4–15)
RELATIVE NEUTROPHIL (OHS): 88.8 % (ref 38–73)
SODIUM SERPL-SCNC: 139 MMOL/L (ref 136–145)
WBC # BLD AUTO: 17.55 K/UL (ref 3.9–12.7)

## 2025-06-12 PROCEDURE — G2211 COMPLEX E/M VISIT ADD ON: HCPCS | Mod: S$GLB,,, | Performed by: INTERNAL MEDICINE

## 2025-06-12 PROCEDURE — 36415 COLL VENOUS BLD VENIPUNCTURE: CPT

## 2025-06-12 PROCEDURE — 1101F PT FALLS ASSESS-DOCD LE1/YR: CPT | Mod: CPTII,S$GLB,, | Performed by: INTERNAL MEDICINE

## 2025-06-12 PROCEDURE — 80053 COMPREHEN METABOLIC PANEL: CPT

## 2025-06-12 PROCEDURE — 99999 PR PBB SHADOW E&M-EST. PATIENT-LVL IV: CPT | Mod: PBBFAC,,, | Performed by: INTERNAL MEDICINE

## 2025-06-12 PROCEDURE — 3074F SYST BP LT 130 MM HG: CPT | Mod: CPTII,S$GLB,, | Performed by: INTERNAL MEDICINE

## 2025-06-12 PROCEDURE — 1159F MED LIST DOCD IN RCRD: CPT | Mod: CPTII,S$GLB,, | Performed by: INTERNAL MEDICINE

## 2025-06-12 PROCEDURE — 85025 COMPLETE CBC W/AUTO DIFF WBC: CPT

## 2025-06-12 PROCEDURE — 3008F BODY MASS INDEX DOCD: CPT | Mod: CPTII,S$GLB,, | Performed by: INTERNAL MEDICINE

## 2025-06-12 PROCEDURE — 3288F FALL RISK ASSESSMENT DOCD: CPT | Mod: CPTII,S$GLB,, | Performed by: INTERNAL MEDICINE

## 2025-06-12 PROCEDURE — 3078F DIAST BP <80 MM HG: CPT | Mod: CPTII,S$GLB,, | Performed by: INTERNAL MEDICINE

## 2025-06-12 PROCEDURE — 1126F AMNT PAIN NOTED NONE PRSNT: CPT | Mod: CPTII,S$GLB,, | Performed by: INTERNAL MEDICINE

## 2025-06-12 PROCEDURE — 86301 IMMUNOASSAY TUMOR CA 19-9: CPT

## 2025-06-12 PROCEDURE — 1160F RVW MEDS BY RX/DR IN RCRD: CPT | Mod: CPTII,S$GLB,, | Performed by: INTERNAL MEDICINE

## 2025-06-12 PROCEDURE — 99215 OFFICE O/P EST HI 40 MIN: CPT | Mod: S$GLB,,, | Performed by: INTERNAL MEDICINE

## 2025-06-12 PROCEDURE — 1157F ADVNC CARE PLAN IN RCRD: CPT | Mod: CPTII,S$GLB,, | Performed by: INTERNAL MEDICINE

## 2025-06-12 RX ORDER — HEPARIN 100 UNIT/ML
500 SYRINGE INTRAVENOUS
OUTPATIENT
Start: 2025-06-16

## 2025-06-12 RX ORDER — PROCHLORPERAZINE EDISYLATE 5 MG/ML
5 INJECTION INTRAMUSCULAR; INTRAVENOUS ONCE AS NEEDED
OUTPATIENT
Start: 2025-06-16

## 2025-06-12 RX ORDER — HEPARIN 100 UNIT/ML
500 SYRINGE INTRAVENOUS
OUTPATIENT
Start: 2025-06-18

## 2025-06-12 RX ORDER — EPINEPHRINE 0.3 MG/.3ML
0.3 INJECTION SUBCUTANEOUS ONCE AS NEEDED
OUTPATIENT
Start: 2025-06-16

## 2025-06-12 RX ORDER — PROCHLORPERAZINE EDISYLATE 5 MG/ML
5 INJECTION INTRAMUSCULAR; INTRAVENOUS ONCE AS NEEDED
OUTPATIENT
Start: 2025-06-18

## 2025-06-12 RX ORDER — SODIUM CHLORIDE 0.9 % (FLUSH) 0.9 %
10 SYRINGE (ML) INJECTION
OUTPATIENT
Start: 2025-06-18

## 2025-06-12 RX ORDER — MORPHINE 10 MG/ML
6 TINCTURE ORAL 4 TIMES DAILY PRN
Qty: 473 ML | Refills: 0 | Status: SHIPPED | OUTPATIENT
Start: 2025-06-12

## 2025-06-12 RX ORDER — SODIUM CHLORIDE 0.9 % (FLUSH) 0.9 %
10 SYRINGE (ML) INJECTION
OUTPATIENT
Start: 2025-06-16

## 2025-06-12 RX ORDER — LORAZEPAM 0.5 MG/1
0.5 TABLET ORAL EVERY 8 HOURS PRN
Qty: 21 TABLET | Refills: 0 | Status: SHIPPED | OUTPATIENT
Start: 2025-06-12 | End: 2025-06-19

## 2025-06-12 RX ORDER — ATROPINE SULFATE 0.4 MG/ML
0.4 INJECTION, SOLUTION ENDOTRACHEAL; INTRAMEDULLARY; INTRAMUSCULAR; INTRAVENOUS; SUBCUTANEOUS ONCE AS NEEDED
OUTPATIENT
Start: 2025-06-16

## 2025-06-12 RX ORDER — SODIUM CHLORIDE 9 MG/ML
INJECTION, SOLUTION INTRAVENOUS
Start: 2025-06-16

## 2025-06-12 RX ORDER — DIPHENHYDRAMINE HYDROCHLORIDE 50 MG/ML
50 INJECTION, SOLUTION INTRAMUSCULAR; INTRAVENOUS ONCE AS NEEDED
OUTPATIENT
Start: 2025-06-16

## 2025-06-12 NOTE — PROGRESS NOTES
PROGRESS NOTE    Subjective:       Patient ID: Gael Pedroza is a 70 y.o. male.    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 ALY  Tempus blood 5/8/25: KRAS G12D, TP53, TERT, ctDNA 17.0%, TMB 3.8    Oncologic History:  1.Mr Pedroza is a 71 yo man with carotic artery stenosis, left ventricular diastolic dysfunction, HTN, stenosis of left subclavian artery, T2DM, who first saw me on 5/8/25 for further management of pancreatic cancer. He presents with worsening of his chronic back pain in Jan 2025. He was found to have a pancreatic mass on CT lumbar spine 4/24/25. C/A/P on 4/29/25 showed 9.4 cm mass involving the pancreatic body and tail highly concerning for adenocarcinoma. It abuts the stomach, duodenum, proximal jejunum, and left adrenal gland without convincing invasion. It encases the celiac, common hepatic, and superior mesenteric arteries which remain patent. It encases the splenic artery which is moderately narrowed. It abuts the aorta and left renal artery which remain patent. Occludes the splenic and superior mesenteric veins, with nonocclusive thrombus extending into the main portal vein. There are portosystemic collaterals in the upper abdomen. Enlarged periportal lymph node concerning for metastasis. Multiple liver metastases measuring up to 2.8 cm. Few solid pulmonary nodules measuring up to 0.7 cm concerning for metastases. Upper EUS biopsy of the pancreatic tail mass on 4/30/25 showed undifferentiated carcinoma. MMR-proficient, ALY. Comment: Histologic sections reveal  pleomorphic tumor cells with bizarre nuclear atypia, moderately abundant amphophilic cytoplasm, and multinucleated forms. Rare cells that may represent osteoclast-like giant cells are noted. Background necrosis is readily apparent. Tumor cells exhibits extensive positivity for pankeratin AE1/3 by immunohistochemistry. BerEP4 is negative. The morphologic features and immunoprofile support the above interpretation. Tempus pending. On 25, CA 19-9 normal at 33.8. PGX: DPYD normal metabolizer, UGT1A1 intermediate metabolizer. Patient noted 30 lbs weight loss over the past 3 months, anorexia, pain better on current pain regimen. He has a daughter and a son. Accompanied by wife, brother and sister-in-law today.   Discussed palliative NALIRIFOX.   2. NALIFIROX started on 25    Interval History:   Mr Pedroza returns for follow up. He has been having a lot of diarrhea. Takes lomotil qid and imodium in between. Takes creon 1 cap tid with meals and increased to 2 cap tid with meals yesterday. He actually felt better and was able to eat well over the past few days. Pain is gone. BP was good until today.     ECO    ROS:   A ten-point system review is obtained and negative except for what was stated in the Interval History.     Physical Examination:   Vital signs reviewed. BP 85/51  General: well hydrated, well developed, in no acute distress  HEENT: normocephalic, EOMI, anicteric sclerae  Neck: supple, no JVD, thyromegaly, cervical or supraclavicular lymphadenopathy  Lungs: clear breath sounds bilaterally, no wheezing, rales, or rhonchi  Heart: RRR, no M/R/G  Abdomen: soft, no tenderness, non-distended, no hepatosplenomegaly, mass, or hernia. BS present  Extremities: no clubbing, cyanosis, or edema  Skin: no rash  Neuro: alert and oriented x 4, no focal neuro deficit  Psych: pleasant and appropriate mood and affect    Objective:     Laboratory Data:  Labs reviewed. CBC, CMP adequate for chemo    Imaging Data:  CT  C/A/P 5/20/25:  Impression:     No evidence of acute pulmonary embolus to the proximal segmental level.     Similar to mildly worse pancreatic mass with vascular interfaces discussed above.  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     New small left pleural effusion with ground-glass opacities in the left lung base.  Similar to minimally increased size of left lower lobe pulmonary nodule.     Similar to mildly worse hepatic metastatic disease and enlarged periportal lymph node.     New/worse anasarca and small volume abdominopelvic free fluid measuring slightly denser than simple fluid.       Assessment and Plan:     1. Overlapping malignant neoplasm of pancreas    2. Metastasis to liver    3. 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 Pancreatic insufficiency    8. Diarrhea, unspecified type    9. Cancer related pain    10. Hypotension, unspecified hypotension type    11. Portal vein thrombosis        1-6  - Mr Pedroza is a 69 yo man with stage IV undifferentiated carcinoma of the pancreatic body/tail, with mets to lymph nodes, liver and lung, MMR-proficient   - BP has been low since celiac block  - NALIFIROX started on 5/30/25  - he actually feels better since chemo. Cycle 2 NALIFIROX next Monday. Will give IVF for low BP prior to chemo and also for diarrhea. Reduce onivyde to 35 mg/m2 for diarrhea. Keep oxaliplatin at 60 mg/m2 and 5FU at 2000 mg/m2  - RTC in 2 weeks for cycle 3    7.  - increase creon to 3 cap tid with meals  - they have a lot of pills. Asked them to call us if he needs a new prescription    8.  - c/w imodium and lomotil prn  - diarrhea related to food. Asked patient to take creon 3 cap tid with meals  - opium  tincture sent    9.  - better after celiac block  - f/u with palliative medicine    10.  - since celiac block. BP was good until today. He feels well today. Encouraged oral hydration  - will give IVF prior to chemo next Monday    11.  - from cancer compression  - c/w eliquis 5 mg bid    Follow-up:     RTC in 2 weeks.   Knows to call in the interval if any problems arise.    I spent 50 minutes reviewing the chart, interpreting laboratory result and imaging data, coordinating patient's care, with at least 50% of the time on face-to-face counseling.       Electronically signed by Dar Granados

## 2025-06-16 ENCOUNTER — TELEPHONE (OUTPATIENT)
Dept: HEMATOLOGY/ONCOLOGY | Facility: CLINIC | Age: 70
End: 2025-06-16
Payer: MEDICARE

## 2025-06-16 ENCOUNTER — INFUSION (OUTPATIENT)
Dept: INFUSION THERAPY | Facility: HOSPITAL | Age: 70
End: 2025-06-16
Attending: INTERNAL MEDICINE
Payer: MEDICARE

## 2025-06-16 VITALS
RESPIRATION RATE: 18 BRPM | OXYGEN SATURATION: 100 % | BODY MASS INDEX: 19.88 KG/M2 | HEIGHT: 67 IN | WEIGHT: 126.63 LBS | SYSTOLIC BLOOD PRESSURE: 95 MMHG | DIASTOLIC BLOOD PRESSURE: 55 MMHG | HEART RATE: 100 BPM | TEMPERATURE: 98 F

## 2025-06-16 DIAGNOSIS — K86.89 PANCREATIC INSUFFICIENCY: ICD-10-CM

## 2025-06-16 DIAGNOSIS — C25.8 OVERLAPPING MALIGNANT NEOPLASM OF PANCREAS: Primary | ICD-10-CM

## 2025-06-16 PROCEDURE — 96375 TX/PRO/DX INJ NEW DRUG ADDON: CPT

## 2025-06-16 PROCEDURE — A4216 STERILE WATER/SALINE, 10 ML: HCPCS | Performed by: INTERNAL MEDICINE

## 2025-06-16 PROCEDURE — 63600175 PHARM REV CODE 636 W HCPCS: Performed by: INTERNAL MEDICINE

## 2025-06-16 PROCEDURE — 96415 CHEMO IV INFUSION ADDL HR: CPT

## 2025-06-16 PROCEDURE — 25000003 PHARM REV CODE 250: Performed by: INTERNAL MEDICINE

## 2025-06-16 PROCEDURE — 96413 CHEMO IV INFUSION 1 HR: CPT

## 2025-06-16 PROCEDURE — 96367 TX/PROPH/DG ADDL SEQ IV INF: CPT

## 2025-06-16 PROCEDURE — 96361 HYDRATE IV INFUSION ADD-ON: CPT

## 2025-06-16 PROCEDURE — 96416 CHEMO PROLONG INFUSE W/PUMP: CPT

## 2025-06-16 PROCEDURE — 96417 CHEMO IV INFUS EACH ADDL SEQ: CPT

## 2025-06-16 RX ORDER — PANCRELIPASE 36000; 180000; 114000 [USP'U]/1; [USP'U]/1; [USP'U]/1
3 CAPSULE, DELAYED RELEASE PELLETS ORAL
Qty: 300 CAPSULE | Refills: 11 | Status: SHIPPED | OUTPATIENT
Start: 2025-06-16

## 2025-06-16 RX ORDER — PROCHLORPERAZINE EDISYLATE 5 MG/ML
5 INJECTION INTRAMUSCULAR; INTRAVENOUS ONCE AS NEEDED
Status: DISCONTINUED | OUTPATIENT
Start: 2025-06-16 | End: 2025-06-16 | Stop reason: HOSPADM

## 2025-06-16 RX ORDER — PANCRELIPASE 36000; 180000; 114000 [USP'U]/1; [USP'U]/1; [USP'U]/1
3 CAPSULE, DELAYED RELEASE PELLETS ORAL
Qty: 90 CAPSULE | Refills: 11 | Status: CANCELLED | OUTPATIENT
Start: 2025-06-16

## 2025-06-16 RX ORDER — ATROPINE SULFATE 0.4 MG/ML
0.4 INJECTION, SOLUTION ENDOTRACHEAL; INTRAMEDULLARY; INTRAMUSCULAR; INTRAVENOUS; SUBCUTANEOUS ONCE AS NEEDED
Status: COMPLETED | OUTPATIENT
Start: 2025-06-16 | End: 2025-06-16

## 2025-06-16 RX ORDER — SODIUM CHLORIDE 0.9 % (FLUSH) 0.9 %
10 SYRINGE (ML) INJECTION
Status: DISCONTINUED | OUTPATIENT
Start: 2025-06-16 | End: 2025-06-16 | Stop reason: HOSPADM

## 2025-06-16 RX ORDER — DIPHENHYDRAMINE HYDROCHLORIDE 50 MG/ML
50 INJECTION, SOLUTION INTRAMUSCULAR; INTRAVENOUS ONCE AS NEEDED
Status: DISCONTINUED | OUTPATIENT
Start: 2025-06-16 | End: 2025-06-16 | Stop reason: HOSPADM

## 2025-06-16 RX ORDER — SODIUM CHLORIDE 9 MG/ML
1000 INJECTION, SOLUTION INTRAVENOUS
Status: COMPLETED | OUTPATIENT
Start: 2025-06-16 | End: 2025-06-16

## 2025-06-16 RX ORDER — EPINEPHRINE 0.3 MG/.3ML
0.3 INJECTION SUBCUTANEOUS ONCE AS NEEDED
Status: DISCONTINUED | OUTPATIENT
Start: 2025-06-16 | End: 2025-06-16 | Stop reason: HOSPADM

## 2025-06-16 RX ORDER — HEPARIN 100 UNIT/ML
500 SYRINGE INTRAVENOUS
Status: DISCONTINUED | OUTPATIENT
Start: 2025-06-16 | End: 2025-06-16 | Stop reason: HOSPADM

## 2025-06-16 RX ADMIN — ATROPINE SULFATE 0.4 MG: 0.4 INJECTION, SOLUTION INTRAVENOUS at 10:06

## 2025-06-16 RX ADMIN — LEUCOVORIN CALCIUM 650 MG: 500 INJECTION, POWDER, LYOPHILIZED, FOR SOLUTION INTRAMUSCULAR; INTRAVENOUS at 02:06

## 2025-06-16 RX ADMIN — IRINOTECAN HYDROCHLORIDE 56.76 MG: 4.3 INJECTION, POWDER, FOR SOLUTION INTRAVENOUS at 10:06

## 2025-06-16 RX ADMIN — DEXAMETHASONE SODIUM PHOSPHATE 0.25 MG: 10 INJECTION, SOLUTION INTRAMUSCULAR; INTRAVENOUS at 10:06

## 2025-06-16 RX ADMIN — SODIUM CHLORIDE: 9 INJECTION, SOLUTION INTRAVENOUS at 09:06

## 2025-06-16 RX ADMIN — SODIUM CHLORIDE 150 MG: 9 INJECTION, SOLUTION INTRAVENOUS at 09:06

## 2025-06-16 RX ADMIN — DEXTROSE MONOHYDRATE: 50 INJECTION, SOLUTION INTRAVENOUS at 12:06

## 2025-06-16 RX ADMIN — OXALIPLATIN 97 MG: 5 INJECTION, SOLUTION INTRAVENOUS at 12:06

## 2025-06-16 RX ADMIN — Medication 10 ML: at 10:06

## 2025-06-16 RX ADMIN — SODIUM CHLORIDE 1000 ML: 9 INJECTION, SOLUTION INTRAVENOUS at 08:06

## 2025-06-16 RX ADMIN — FLUOROURACIL 3240 MG: 50 INJECTION, SOLUTION INTRAVENOUS at 02:06

## 2025-06-16 NOTE — TELEPHONE ENCOUNTER
Patient needs new script as Dr. Granados changed prescription from 1 capsule PO Tid to 3 capsules PO TID with each meal.New prescription has been updated and routed to Dr. Granados for approval

## 2025-06-16 NOTE — NURSING
Pt tolerated NAILRIFOX well. No adverse reaction noted. Pt education reinforced on chemo regimen, side effects, what to expect, and when to call Dr. Granados. Pt verbalized understanding. I reviewed pt calender w/ pt and understanding verbalized. PAC remains accessed with 5FU infusing at 2.2mL/hr over 46 hrs. Patent upon  discharge.

## 2025-06-18 ENCOUNTER — INFUSION (OUTPATIENT)
Dept: INFUSION THERAPY | Facility: HOSPITAL | Age: 70
End: 2025-06-18
Attending: INTERNAL MEDICINE
Payer: MEDICARE

## 2025-06-18 VITALS
DIASTOLIC BLOOD PRESSURE: 52 MMHG | HEART RATE: 100 BPM | TEMPERATURE: 98 F | OXYGEN SATURATION: 99 % | SYSTOLIC BLOOD PRESSURE: 98 MMHG | RESPIRATION RATE: 18 BRPM

## 2025-06-18 DIAGNOSIS — C25.8 OVERLAPPING MALIGNANT NEOPLASM OF PANCREAS: Primary | ICD-10-CM

## 2025-06-18 PROCEDURE — 96377 APPLICATON ON-BODY INJECTOR: CPT

## 2025-06-18 PROCEDURE — 63600175 PHARM REV CODE 636 W HCPCS: Mod: JZ,TB | Performed by: INTERNAL MEDICINE

## 2025-06-18 PROCEDURE — A4216 STERILE WATER/SALINE, 10 ML: HCPCS | Performed by: INTERNAL MEDICINE

## 2025-06-18 PROCEDURE — 25000003 PHARM REV CODE 250: Performed by: INTERNAL MEDICINE

## 2025-06-18 RX ORDER — SODIUM CHLORIDE 0.9 % (FLUSH) 0.9 %
10 SYRINGE (ML) INJECTION
Status: DISCONTINUED | OUTPATIENT
Start: 2025-06-18 | End: 2025-06-18 | Stop reason: HOSPADM

## 2025-06-18 RX ORDER — PROCHLORPERAZINE EDISYLATE 5 MG/ML
5 INJECTION INTRAMUSCULAR; INTRAVENOUS ONCE AS NEEDED
Status: DISCONTINUED | OUTPATIENT
Start: 2025-06-18 | End: 2025-06-18 | Stop reason: HOSPADM

## 2025-06-18 RX ORDER — HEPARIN 100 UNIT/ML
500 SYRINGE INTRAVENOUS
Status: DISCONTINUED | OUTPATIENT
Start: 2025-06-18 | End: 2025-06-18 | Stop reason: HOSPADM

## 2025-06-18 RX ADMIN — HEPARIN 500 UNITS: 100 SYRINGE at 01:06

## 2025-06-18 RX ADMIN — Medication 10 ML: at 01:06

## 2025-06-18 RX ADMIN — PEGFILGRASTIM 6 MG: KIT SUBCUTANEOUS at 01:06

## 2025-06-18 NOTE — PLAN OF CARE
Pt came in for pump D/C. Pt tolerated home 5FU well. VSS. Port flushed w/ NS and heparin, blood return noted and deaccessed. Pt education reinforced and explained what to expect in the next couple of days. Also instructed pt on monitoring bp and s/s to report. Pt verbalized understanding.

## 2025-06-20 ENCOUNTER — DOCUMENTATION ONLY (OUTPATIENT)
Dept: INTERNAL MEDICINE | Facility: CLINIC | Age: 70
End: 2025-06-20
Payer: MEDICARE

## 2025-06-20 DIAGNOSIS — Z78.9 KNOWN MEDICAL PROBLEMS: ICD-10-CM

## 2025-06-20 DIAGNOSIS — M45.9 ANKYLOSING SPONDYLITIS, UNSPECIFIED SITE OF SPINE: ICD-10-CM

## 2025-06-20 DIAGNOSIS — I10 PRIMARY HYPERTENSION: ICD-10-CM

## 2025-06-20 DIAGNOSIS — E11.51 TYPE 2 DIABETES MELLITUS WITH DIABETIC PERIPHERAL ANGIOPATHY WITHOUT GANGRENE, WITHOUT LONG-TERM CURRENT USE OF INSULIN: Primary | ICD-10-CM

## 2025-06-20 DIAGNOSIS — C77.2 PANCREATIC CANCER METASTASIZED TO INTRA-ABDOMINAL LYMPH NODE: ICD-10-CM

## 2025-06-20 DIAGNOSIS — C25.9 PANCREATIC CANCER METASTASIZED TO INTRA-ABDOMINAL LYMPH NODE: ICD-10-CM

## 2025-06-20 NOTE — PROGRESS NOTES
Advanced primary care management (APCM) billing requirements have been met for this month.  Written consent was obtained and scanned in the electronic medical record.    My office is prepared and capable of delivering all the following APCM service elements to this Medicare beneficiary:     The patient is aware that only one practitioner can furnish and be paid for the service during a calendar month; that they have the right to stop the services at any time; and that cost sharing may apply.  Provide 24/7 access to care team/practitioner for urgent needs.  Provide continuity of care with the PCP, whom the patient can schedule successive routine appointments.  Deliver care in alternative ways to traditional office visits to best meet the patient's needs, such as Telemedecine visits.  There is overall comprehensive care management, systematic needs assessment (medical and psychosocial), and system-based approaches to ensure receipt of preventive services.    Perform medication management, reconciliation, and oversight of self-management.  Ensure timely follow-up communication with the patient and/or caregiver after an emergency department visit, discharge from hospital, skilled nursing facility, or other health care facility, within 7 calendar days of discharge.  Provide coordination of care transitions between and among health care facilities.  Ensure timely exchange of electronic health information with other practitioners and providers to support continuity of care.  Provide ongoing communication and coordination with other practitioners and other health care facilities, and document communication regarding the patient's psychosocial needs, functional deficits, goals, preferences, and desired outcomes, including cultural and linguistic factors, in the patient's medical record.  Provide enhanced opportunities for the patient or caregiver to communicate with the care team regarding the patient's care using  non-face-to-face consultation methods, such as secure messaging, patient portal, or other patient-initiated digital communications that require a clinical decision.  Analyze patient population data to identify gaps in care and offer interventions.  Risk stratify the practice population based on defined diagnoses, claims, or other electronic data to identify and target services to patients.  Be assessed through performance measurement of primary care quality, total cost of care, and meaningful use of Certified EHR Technology.  Development, implementation, revision, and maintenance of an electronic patient-centered comprehensive care plan.  The patient centered comprehensive care plan is listed under Patient Instructions for the 1st visit (or occasionally the 2nd visit) with me each calendar year. A copy is printed for the patient and it can be accessed at any time through the patient portal.  A typical comprehensive care plan includes the following recommendations:  1. Exercise  Exercise aerobically with a target heart rate of (220-age) x 0.8  Exercise 30-45 minutes on most days of the week  2. Diet and Supplements- All supplements can be obtained through a varied, healthy diet  Calcium: 1,000 - 1,200 mg each day  8 oz milk or Calcium fortified O.J. = 300, 8 oz Yogurt = 400 mg, 1 oz of cheese =100-200 mg              8 oz Oatmeal = 215 mg, 3 oz [place] = 240 mg  Vitamin D: 800 iu each day- Can probably be obtained by 30 min. of direct sunlight    each day             3 oz. [place] = 800 iu,  3 oz. Tuna =150 iu, Milk or fortified O.J. = 120 iu  Fish oil: 1-2 grams each day or about 840 mg of EPA and DHA (Omega-3 fatty acids) each day             3 oz. [place]=2 grams,  3 oz. Tuna=1.3 grams,  3 oz. drained light Tuna= 0.25 grams  Folic acid 800 mcg each day for all women planning or capable of pregnancy  3. Lifestyle  Alcohol: 1 drink = 12 oz. domestic beer, 4 oz. wine, or 1 oz. hard (80 proof) liquor             Males:  </= 14 drinks per week with no more than 4 in any one day             Females: </= 7 drinks per week with no more than 3 in any one day  Salt: 1.2 - 3 grams of Sodium each day.  Tobacco: Don't smoke, or quit smoking (discuss with your doctor)  Depression: If you feel depressed discuss with your doctor  Weight: Maintain a healthy body weight. Stay within 10% of:             Males: 106 lbs. + 6 lbs per inch height above 5 feet             Females: 100 lbs + 5 lbs per inch height above 5 feet  4. Routine tests  Blood pressure check at each visit, or at least once each year  HIV screening (one time) if less than 65 years old  Hepatitis C screen (one time) if less than 80 years old  Cholesterol screening every 3 years starting at age 21  Glucose/Hemaglobin A1C check every 3 years starting at age 35.  TSH (thyroid screen) every 2 years starting at age 50  Colonoscopy at age 45, and repeat every 10 years until age 75. Consider screening until age 85. DNA stool test (Cologuard) every 3 years is an acceptable alternative.  Vision screen at age 65  Females:  Gyn exam with cervical HPV test every 3 years or Pap smear every three years starting at age 25                  Stop screening at age 65 if past 3 exams were normal                  No screening for women who have had a hysterectomy with removal of cervix  Mammogram every 1-2 years starting at age 40 until age 75  Consider continuing Mammograms every other year for those older than 75 with a life expectancy of more than 10 years  Bone density scan at about age 65  Males:  PSA screening annually at age 50, age 45 for  Americans, until age 75. Consider annual screening after age 75          5. Immunizations  Influenza vaccine every year in the fall, especially if >50 or with a chronic disease  Consider getting a Covid booster vaccine annually  Tetanus/Diphtheria/Pertussis (Tdap) vaccine once (after the age of 18), then Tetanus/Diphtheria (Td) or Tdap vaccine every  10 years  Shingles (Shingrix) vaccine after age 50 and get a 2nd dose after 2-6 months  RSV (respiratory syncytial virus) vaccine after age 60  Pneumonia vaccine (Prevnar-20) at age 65    6. Advanced Directive/End of life care planning  You should consider having a signed document which informs physicians and family of your end of life care wishes.  You can go to Control[employer]/DNR/[place]. Under Step 1 click download AdobePDF and print.  This is the [place] physician order for scope of Treatment ([employer]) form.  You can also request a blank copy of the [employer] form from my office.  Bring a copy of the signed document to my office so we can scan it in your medical chart.

## 2025-06-26 ENCOUNTER — RESULTS FOLLOW-UP (OUTPATIENT)
Dept: HEMATOLOGY/ONCOLOGY | Facility: CLINIC | Age: 70
End: 2025-06-26

## 2025-06-26 ENCOUNTER — OFFICE VISIT (OUTPATIENT)
Dept: HEMATOLOGY/ONCOLOGY | Facility: CLINIC | Age: 70
End: 2025-06-26
Payer: MEDICARE

## 2025-06-26 ENCOUNTER — LAB VISIT (OUTPATIENT)
Dept: LAB | Facility: HOSPITAL | Age: 70
End: 2025-06-26
Attending: INTERNAL MEDICINE
Payer: MEDICARE

## 2025-06-26 VITALS
BODY MASS INDEX: 19.47 KG/M2 | DIASTOLIC BLOOD PRESSURE: 60 MMHG | OXYGEN SATURATION: 98 % | SYSTOLIC BLOOD PRESSURE: 100 MMHG | HEART RATE: 104 BPM | WEIGHT: 124.31 LBS

## 2025-06-26 DIAGNOSIS — I95.9 HYPOTENSION, UNSPECIFIED HYPOTENSION TYPE: ICD-10-CM

## 2025-06-26 DIAGNOSIS — R19.7 DIARRHEA, UNSPECIFIED TYPE: ICD-10-CM

## 2025-06-26 DIAGNOSIS — C77.8 SECONDARY MALIGNANT NEOPLASM OF LYMPH NODES OF MULTIPLE SITES: ICD-10-CM

## 2025-06-26 DIAGNOSIS — C78.7 METASTASIS TO LIVER: ICD-10-CM

## 2025-06-26 DIAGNOSIS — C25.8 OVERLAPPING MALIGNANT NEOPLASM OF PANCREAS: Primary | ICD-10-CM

## 2025-06-26 DIAGNOSIS — D84.81 IMMUNODEFICIENCY SECONDARY TO NEOPLASM: ICD-10-CM

## 2025-06-26 DIAGNOSIS — Z79.69 IMMUNODEFICIENCY SECONDARY TO CHEMOTHERAPY: ICD-10-CM

## 2025-06-26 DIAGNOSIS — K86.89 PANCREATIC INSUFFICIENCY: ICD-10-CM

## 2025-06-26 DIAGNOSIS — T45.1X5A IMMUNODEFICIENCY SECONDARY TO CHEMOTHERAPY: ICD-10-CM

## 2025-06-26 DIAGNOSIS — E11.59 TYPE 2 DIABETES MELLITUS WITH OTHER CIRCULATORY COMPLICATION, WITHOUT LONG-TERM CURRENT USE OF INSULIN: ICD-10-CM

## 2025-06-26 DIAGNOSIS — I81 PORTAL VEIN THROMBOSIS: ICD-10-CM

## 2025-06-26 DIAGNOSIS — G89.3 CANCER RELATED PAIN: ICD-10-CM

## 2025-06-26 DIAGNOSIS — C78.01 MALIGNANT NEOPLASM METASTATIC TO BOTH LUNGS: ICD-10-CM

## 2025-06-26 DIAGNOSIS — C25.8 OVERLAPPING MALIGNANT NEOPLASM OF PANCREAS: ICD-10-CM

## 2025-06-26 DIAGNOSIS — D49.9 IMMUNODEFICIENCY SECONDARY TO NEOPLASM: ICD-10-CM

## 2025-06-26 DIAGNOSIS — D84.821 IMMUNODEFICIENCY SECONDARY TO CHEMOTHERAPY: ICD-10-CM

## 2025-06-26 DIAGNOSIS — C78.02 MALIGNANT NEOPLASM METASTATIC TO BOTH LUNGS: ICD-10-CM

## 2025-06-26 LAB
ABSOLUTE NEUTROPHIL MANUAL (OHS): 30 K/UL
ALBUMIN SERPL BCP-MCNC: 3 G/DL (ref 3.5–5.2)
ALP SERPL-CCNC: 198 UNIT/L (ref 40–150)
ALT SERPL W/O P-5'-P-CCNC: 14 UNIT/L (ref 10–44)
ANION GAP (OHS): 9 MMOL/L (ref 8–16)
ANISOCYTOSIS BLD QL SMEAR: SLIGHT
AST SERPL-CCNC: 17 UNIT/L (ref 11–45)
BASOPHILS NFR BLD MANUAL: 1 %
BILIRUB SERPL-MCNC: 0.3 MG/DL (ref 0.1–1)
BUN SERPL-MCNC: 14 MG/DL (ref 8–23)
CALCIUM SERPL-MCNC: 9.3 MG/DL (ref 8.7–10.5)
CANCER AG19-9 SERPL-ACNC: 17.8 U/ML
CHLORIDE SERPL-SCNC: 104 MMOL/L (ref 95–110)
CO2 SERPL-SCNC: 26 MMOL/L (ref 23–29)
CREAT SERPL-MCNC: 0.6 MG/DL (ref 0.5–1.4)
ERYTHROCYTE [DISTWIDTH] IN BLOOD BY AUTOMATED COUNT: 18.3 % (ref 11.5–14.5)
GFR SERPLBLD CREATININE-BSD FMLA CKD-EPI: >60 ML/MIN/1.73/M2
GLUCOSE SERPL-MCNC: 203 MG/DL (ref 70–110)
HCT VFR BLD AUTO: 30.5 % (ref 40–54)
HGB BLD-MCNC: 9.8 GM/DL (ref 14–18)
HYPOCHROMIA BLD QL SMEAR: ABNORMAL
LYMPHOCYTES NFR BLD MANUAL: 3 % (ref 18–48)
MCH RBC QN AUTO: 32.9 PG (ref 27–31)
MCHC RBC AUTO-ENTMCNC: 32.1 G/DL (ref 32–36)
MCV RBC AUTO: 102 FL (ref 82–98)
MONOCYTES NFR BLD MANUAL: 5 % (ref 4–15)
NEUTROPHILS NFR BLD MANUAL: 77 % (ref 38–73)
NEUTS BAND NFR BLD MANUAL: 14 %
NUCLEATED RBC (/100WBC) (OHS): 0 /100 WBC
PLATELET # BLD AUTO: 266 K/UL (ref 150–450)
PLATELET BLD QL SMEAR: ABNORMAL
PMV BLD AUTO: 10.8 FL (ref 9.2–12.9)
POIKILOCYTOSIS BLD QL SMEAR: SLIGHT
POTASSIUM SERPL-SCNC: 4.4 MMOL/L (ref 3.5–5.1)
PROT SERPL-MCNC: 6.4 GM/DL (ref 6–8.4)
RBC # BLD AUTO: 2.98 M/UL (ref 4.6–6.2)
SODIUM SERPL-SCNC: 139 MMOL/L (ref 136–145)
WBC # BLD AUTO: 32.98 K/UL (ref 3.9–12.7)

## 2025-06-26 PROCEDURE — 3078F DIAST BP <80 MM HG: CPT | Mod: CPTII,S$GLB,, | Performed by: INTERNAL MEDICINE

## 2025-06-26 PROCEDURE — 3008F BODY MASS INDEX DOCD: CPT | Mod: CPTII,S$GLB,, | Performed by: INTERNAL MEDICINE

## 2025-06-26 PROCEDURE — 1157F ADVNC CARE PLAN IN RCRD: CPT | Mod: CPTII,S$GLB,, | Performed by: INTERNAL MEDICINE

## 2025-06-26 PROCEDURE — 1101F PT FALLS ASSESS-DOCD LE1/YR: CPT | Mod: CPTII,S$GLB,, | Performed by: INTERNAL MEDICINE

## 2025-06-26 PROCEDURE — G2211 COMPLEX E/M VISIT ADD ON: HCPCS | Mod: S$GLB,,, | Performed by: INTERNAL MEDICINE

## 2025-06-26 PROCEDURE — 86301 IMMUNOASSAY TUMOR CA 19-9: CPT

## 2025-06-26 PROCEDURE — 99999 PR PBB SHADOW E&M-EST. PATIENT-LVL IV: CPT | Mod: PBBFAC,,, | Performed by: INTERNAL MEDICINE

## 2025-06-26 PROCEDURE — 3074F SYST BP LT 130 MM HG: CPT | Mod: CPTII,S$GLB,, | Performed by: INTERNAL MEDICINE

## 2025-06-26 PROCEDURE — 99215 OFFICE O/P EST HI 40 MIN: CPT | Mod: S$GLB,,, | Performed by: INTERNAL MEDICINE

## 2025-06-26 PROCEDURE — 80053 COMPREHEN METABOLIC PANEL: CPT

## 2025-06-26 PROCEDURE — 85027 COMPLETE CBC AUTOMATED: CPT

## 2025-06-26 PROCEDURE — 36415 COLL VENOUS BLD VENIPUNCTURE: CPT

## 2025-06-26 PROCEDURE — 1159F MED LIST DOCD IN RCRD: CPT | Mod: CPTII,S$GLB,, | Performed by: INTERNAL MEDICINE

## 2025-06-26 PROCEDURE — 1126F AMNT PAIN NOTED NONE PRSNT: CPT | Mod: CPTII,S$GLB,, | Performed by: INTERNAL MEDICINE

## 2025-06-26 PROCEDURE — 1160F RVW MEDS BY RX/DR IN RCRD: CPT | Mod: CPTII,S$GLB,, | Performed by: INTERNAL MEDICINE

## 2025-06-26 PROCEDURE — 3288F FALL RISK ASSESSMENT DOCD: CPT | Mod: CPTII,S$GLB,, | Performed by: INTERNAL MEDICINE

## 2025-06-26 RX ORDER — SODIUM CHLORIDE 0.9 % (FLUSH) 0.9 %
10 SYRINGE (ML) INJECTION
OUTPATIENT
Start: 2025-06-30

## 2025-06-26 RX ORDER — HEPARIN 100 UNIT/ML
500 SYRINGE INTRAVENOUS
OUTPATIENT
Start: 2025-07-02

## 2025-06-26 RX ORDER — SODIUM CHLORIDE 9 MG/ML
INJECTION, SOLUTION INTRAVENOUS
Start: 2025-06-30

## 2025-06-26 RX ORDER — SODIUM CHLORIDE 0.9 % (FLUSH) 0.9 %
10 SYRINGE (ML) INJECTION
OUTPATIENT
Start: 2025-07-02

## 2025-06-26 RX ORDER — PROCHLORPERAZINE EDISYLATE 5 MG/ML
5 INJECTION INTRAMUSCULAR; INTRAVENOUS ONCE AS NEEDED
OUTPATIENT
Start: 2025-06-30

## 2025-06-26 RX ORDER — DIPHENHYDRAMINE HYDROCHLORIDE 50 MG/ML
50 INJECTION, SOLUTION INTRAMUSCULAR; INTRAVENOUS ONCE AS NEEDED
OUTPATIENT
Start: 2025-06-30

## 2025-06-26 RX ORDER — PROCHLORPERAZINE EDISYLATE 5 MG/ML
5 INJECTION INTRAMUSCULAR; INTRAVENOUS ONCE AS NEEDED
OUTPATIENT
Start: 2025-07-02

## 2025-06-26 RX ORDER — HEPARIN 100 UNIT/ML
500 SYRINGE INTRAVENOUS
OUTPATIENT
Start: 2025-06-30

## 2025-06-26 RX ORDER — ATROPINE SULFATE 0.4 MG/ML
0.4 INJECTION, SOLUTION ENDOTRACHEAL; INTRAMEDULLARY; INTRAMUSCULAR; INTRAVENOUS; SUBCUTANEOUS ONCE AS NEEDED
OUTPATIENT
Start: 2025-06-30

## 2025-06-26 RX ORDER — EPINEPHRINE 0.3 MG/.3ML
0.3 INJECTION SUBCUTANEOUS ONCE AS NEEDED
OUTPATIENT
Start: 2025-06-30

## 2025-06-26 NOTE — Clinical Note
Please keep follow up appts. Move labs on 7/24 to 7/23 and add CT C/A/P on 7/23. Also see me in 6 weeks with CBC, CMP, CA 19-9, then chemo

## 2025-06-26 NOTE — PROGRESS NOTES
PROGRESS NOTE    Subjective:       Patient ID: Gael Pedroza is a 70 y.o. male.    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 ALY  Tempus blood 5/8/25: KRAS G12D, TP53, TERT, ctDNA 17.0%, TMB 3.8    Oncologic History:  1.Mr Pedroza is a 69 yo man with carotic artery stenosis, left ventricular diastolic dysfunction, HTN, stenosis of left subclavian artery, T2DM, who first saw me on 5/8/25 for further management of pancreatic cancer. He presents with worsening of his chronic back pain in Jan 2025. He was found to have a pancreatic mass on CT lumbar spine 4/24/25. C/A/P on 4/29/25 showed 9.4 cm mass involving the pancreatic body and tail highly concerning for adenocarcinoma. It abuts the stomach, duodenum, proximal jejunum, and left adrenal gland without convincing invasion. It encases the celiac, common hepatic, and superior mesenteric arteries which remain patent. It encases the splenic artery which is moderately narrowed. It abuts the aorta and left renal artery which remain patent. Occludes the splenic and superior mesenteric veins, with nonocclusive thrombus extending into the main portal vein. There are portosystemic collaterals in the upper abdomen. Enlarged periportal lymph node concerning for metastasis. Multiple liver metastases measuring up to 2.8 cm. Few solid pulmonary nodules measuring up to 0.7 cm concerning for metastases. Upper EUS biopsy of the pancreatic tail mass on 4/30/25 showed undifferentiated carcinoma. MMR-proficient, ALY. Comment: Histologic sections reveal  pleomorphic tumor cells with bizarre nuclear atypia, moderately abundant amphophilic cytoplasm, and multinucleated forms. Rare cells that may represent osteoclast-like giant cells are noted. Background necrosis is readily apparent. Tumor cells exhibits extensive positivity for pankeratin AE1/3 by immunohistochemistry. BerEP4 is negative. The morphologic features and immunoprofile support the above interpretation. Tempus pending. On 25, CA 19-9 normal at 33.8. PGX: DPYD normal metabolizer, UGT1A1 intermediate metabolizer. Patient noted 30 lbs weight loss over the past 3 months, anorexia, pain better on current pain regimen. He has a daughter and a son. Accompanied by wife, brother and sister-in-law today.   Discussed palliative NALIRIFOX.   2. NALIFIROX started on 25, s/p 2 cycles    Interval History:   Mr Pedroza returns for follow up. He did better with cycle 2. Diarrhea better controlled on opium tincture, imodium, lomotil. Increasing creon to 3 cap tid also helps. Pain is well controlled. BP better    ECO    ROS:   A ten-point system review is obtained and negative except for what was stated in the Interval History.     Physical Examination:   Vital signs reviewed. /60  General: well hydrated, well developed, in no acute distress  HEENT: normocephalic, EOMI, anicteric sclerae  Neck: supple, no JVD, thyromegaly, cervical or supraclavicular lymphadenopathy  Lungs: clear breath sounds bilaterally, no wheezing, rales, or rhonchi  Heart: RRR, no M/R/G  Abdomen: soft, no tenderness, non-distended, no hepatosplenomegaly, mass, or hernia. BS present  Extremities: no clubbing, cyanosis, or edema  Skin: no rash  Neuro: alert and oriented x 4, no focal neuro deficit  Psych: pleasant and appropriate mood and affect    Objective:     Laboratory Data:  Labs reviewed. CBC, CMP adequate for chemo    Imaging Data:  CT C/A/P 25:  Impression:     No evidence of acute pulmonary embolus to the proximal  segmental level.     Similar to mildly worse pancreatic mass with vascular interfaces discussed above.  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     New small left pleural effusion with ground-glass opacities in the left lung base.  Similar to minimally increased size of left lower lobe pulmonary nodule.     Similar to mildly worse hepatic metastatic disease and enlarged periportal lymph node.     New/worse anasarca and small volume abdominopelvic free fluid measuring slightly denser than simple fluid.       Assessment and Plan:     1. Overlapping malignant neoplasm of pancreas    2. Metastasis to liver    3. 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 Diarrhea, unspecified type    8. Cancer related pain    9. Hypotension, unspecified hypotension type    10. Portal vein thrombosis    11. Pancreatic insufficiency    12. Type 2 diabetes mellitus with other circulatory complication, without long-term current use of insulin        1-6  - Mr Pedroza is a 69 yo man with stage IV undifferentiated carcinoma of the pancreatic body/tail, with mets to lymph nodes, liver and lung, MMR-proficient   - BP has been low since celiac block  - NALIFIROX started on 5/30/25, s/p 2 cycles  - doing well. C/w NALIFIROX at current dose. Will try dropping neulasta this time given WBC is high  - RTC in 2 weeks for cycle 4    7.  - better. C/w imodium, lomotil, opium tincture prn  - c/w creon 3 cap tid with meals. Can take 1 cap with snaks.     8.  - better after celiac block  - f/u with palliative medicine    9.  - after celiac block. It is better now. Patient would like to c/w IVF with chemo as it made him feel better  - monitor    10  - from cancer  compression  - c/w eliquis 5 mg bid    11  - creon 3 cap tid with meals and 1 cap with snacks    12.  - BS controlled  - c/w current medication    Follow-up:     RTC in 2 weeks.   Knows to call in the interval if any problems arise.    I spent 50 minutes reviewing the chart, interpreting laboratory result and imaging data, coordinating patient's care, with at least 50% of the time on face-to-face counseling.       Electronically signed by Dar Granados

## 2025-06-30 ENCOUNTER — INFUSION (OUTPATIENT)
Dept: INFUSION THERAPY | Facility: HOSPITAL | Age: 70
End: 2025-06-30
Attending: INTERNAL MEDICINE
Payer: MEDICARE

## 2025-06-30 VITALS
DIASTOLIC BLOOD PRESSURE: 57 MMHG | SYSTOLIC BLOOD PRESSURE: 100 MMHG | WEIGHT: 127.44 LBS | TEMPERATURE: 98 F | RESPIRATION RATE: 18 BRPM | HEART RATE: 99 BPM | BODY MASS INDEX: 20 KG/M2 | OXYGEN SATURATION: 97 % | HEIGHT: 67 IN

## 2025-06-30 DIAGNOSIS — C25.8 OVERLAPPING MALIGNANT NEOPLASM OF PANCREAS: Primary | ICD-10-CM

## 2025-06-30 PROCEDURE — 96367 TX/PROPH/DG ADDL SEQ IV INF: CPT

## 2025-06-30 PROCEDURE — 96375 TX/PRO/DX INJ NEW DRUG ADDON: CPT

## 2025-06-30 PROCEDURE — 96413 CHEMO IV INFUSION 1 HR: CPT

## 2025-06-30 PROCEDURE — 96368 THER/DIAG CONCURRENT INF: CPT

## 2025-06-30 PROCEDURE — 96417 CHEMO IV INFUS EACH ADDL SEQ: CPT

## 2025-06-30 PROCEDURE — 63600175 PHARM REV CODE 636 W HCPCS: Performed by: INTERNAL MEDICINE

## 2025-06-30 PROCEDURE — 96361 HYDRATE IV INFUSION ADD-ON: CPT

## 2025-06-30 PROCEDURE — 96415 CHEMO IV INFUSION ADDL HR: CPT

## 2025-06-30 PROCEDURE — 25000003 PHARM REV CODE 250: Performed by: INTERNAL MEDICINE

## 2025-06-30 RX ORDER — PROCHLORPERAZINE EDISYLATE 5 MG/ML
5 INJECTION INTRAMUSCULAR; INTRAVENOUS ONCE AS NEEDED
Status: DISCONTINUED | OUTPATIENT
Start: 2025-06-30 | End: 2025-06-30 | Stop reason: HOSPADM

## 2025-06-30 RX ORDER — HEPARIN 100 UNIT/ML
500 SYRINGE INTRAVENOUS
Status: DISCONTINUED | OUTPATIENT
Start: 2025-06-30 | End: 2025-06-30 | Stop reason: HOSPADM

## 2025-06-30 RX ORDER — ATROPINE SULFATE 0.4 MG/ML
0.4 INJECTION, SOLUTION ENDOTRACHEAL; INTRAMEDULLARY; INTRAMUSCULAR; INTRAVENOUS; SUBCUTANEOUS ONCE AS NEEDED
Status: COMPLETED | OUTPATIENT
Start: 2025-06-30 | End: 2025-06-30

## 2025-06-30 RX ORDER — SODIUM CHLORIDE 9 MG/ML
INJECTION, SOLUTION INTRAVENOUS
Status: DISCONTINUED | OUTPATIENT
Start: 2025-06-30 | End: 2025-06-30 | Stop reason: HOSPADM

## 2025-06-30 RX ORDER — DIPHENHYDRAMINE HYDROCHLORIDE 50 MG/ML
50 INJECTION, SOLUTION INTRAMUSCULAR; INTRAVENOUS ONCE AS NEEDED
Status: DISCONTINUED | OUTPATIENT
Start: 2025-06-30 | End: 2025-06-30 | Stop reason: HOSPADM

## 2025-06-30 RX ORDER — EPINEPHRINE 0.3 MG/.3ML
0.3 INJECTION SUBCUTANEOUS ONCE AS NEEDED
Status: DISCONTINUED | OUTPATIENT
Start: 2025-06-30 | End: 2025-06-30 | Stop reason: HOSPADM

## 2025-06-30 RX ORDER — SODIUM CHLORIDE 0.9 % (FLUSH) 0.9 %
10 SYRINGE (ML) INJECTION
Status: DISCONTINUED | OUTPATIENT
Start: 2025-06-30 | End: 2025-06-30 | Stop reason: HOSPADM

## 2025-06-30 RX ADMIN — DEXTROSE MONOHYDRATE: 50 INJECTION, SOLUTION INTRAVENOUS at 08:06

## 2025-06-30 RX ADMIN — SODIUM CHLORIDE 0.25 MG: 9 INJECTION, SOLUTION INTRAVENOUS at 07:06

## 2025-06-30 RX ADMIN — LEUCOVORIN CALCIUM 650 MG: 200 INJECTION, POWDER, LYOPHILIZED, FOR SOLUTION INTRAMUSCULAR; INTRAVENOUS at 08:06

## 2025-06-30 RX ADMIN — ATROPINE SULFATE 0.4 MG: 0.4 INJECTION, SOLUTION INTRAVENOUS at 10:06

## 2025-06-30 RX ADMIN — OXALIPLATIN 97 MG: 5 INJECTION, SOLUTION INTRAVENOUS at 08:06

## 2025-06-30 RX ADMIN — SODIUM CHLORIDE 150 MG: 9 INJECTION, SOLUTION INTRAVENOUS at 08:06

## 2025-06-30 RX ADMIN — IRINOTECAN HYDROCHLORIDE 56.76 MG: 4.3 INJECTION, POWDER, FOR SOLUTION INTRAVENOUS at 10:06

## 2025-06-30 RX ADMIN — SODIUM CHLORIDE 3240 MG: 9 INJECTION, SOLUTION INTRAVENOUS at 12:06

## 2025-06-30 NOTE — PLAN OF CARE
Patient tolerated NALIRIFOX infusion today. RTC 7/2/25 @ 1000 for pump dc. NAD noted. VSS. CADD pump infusing 5fu at 2.2 ml/hr for 46 hrs. Verified by 2 Rns. Discharged home and ambulated via cane with spouse by his side off unit.

## 2025-07-02 ENCOUNTER — INFUSION (OUTPATIENT)
Dept: INFUSION THERAPY | Facility: HOSPITAL | Age: 70
End: 2025-07-02
Attending: INTERNAL MEDICINE
Payer: MEDICARE

## 2025-07-02 VITALS
TEMPERATURE: 98 F | RESPIRATION RATE: 18 BRPM | SYSTOLIC BLOOD PRESSURE: 99 MMHG | OXYGEN SATURATION: 99 % | HEART RATE: 106 BPM | DIASTOLIC BLOOD PRESSURE: 63 MMHG

## 2025-07-02 DIAGNOSIS — C25.8 OVERLAPPING MALIGNANT NEOPLASM OF PANCREAS: Primary | ICD-10-CM

## 2025-07-02 PROCEDURE — 63600175 PHARM REV CODE 636 W HCPCS: Performed by: INTERNAL MEDICINE

## 2025-07-02 PROCEDURE — 25000003 PHARM REV CODE 250: Performed by: INTERNAL MEDICINE

## 2025-07-02 PROCEDURE — A4216 STERILE WATER/SALINE, 10 ML: HCPCS | Performed by: INTERNAL MEDICINE

## 2025-07-02 RX ORDER — SODIUM CHLORIDE 0.9 % (FLUSH) 0.9 %
10 SYRINGE (ML) INJECTION
Status: DISCONTINUED | OUTPATIENT
Start: 2025-07-02 | End: 2025-07-02 | Stop reason: HOSPADM

## 2025-07-02 RX ORDER — HEPARIN 100 UNIT/ML
500 SYRINGE INTRAVENOUS
Status: DISCONTINUED | OUTPATIENT
Start: 2025-07-02 | End: 2025-07-02 | Stop reason: HOSPADM

## 2025-07-02 RX ORDER — PROCHLORPERAZINE EDISYLATE 5 MG/ML
5 INJECTION INTRAMUSCULAR; INTRAVENOUS ONCE AS NEEDED
Status: DISCONTINUED | OUTPATIENT
Start: 2025-07-02 | End: 2025-07-02 | Stop reason: HOSPADM

## 2025-07-02 RX ADMIN — HEPARIN 500 UNITS: 100 SYRINGE at 09:07

## 2025-07-02 RX ADMIN — Medication 10 ML: at 09:07

## 2025-07-02 NOTE — PLAN OF CARE
Pt came in for pump D/C. Pt tolerated home 5FU well, reports very tired today, no other complaints. Port flushed w/ NS and heparin, blood return noted and deaccessed. Pt education reinforced and explained what to expect in the next couple of days. Pt verbalized understanding.

## 2025-07-03 ENCOUNTER — PATIENT MESSAGE (OUTPATIENT)
Dept: HEMATOLOGY/ONCOLOGY | Facility: CLINIC | Age: 70
End: 2025-07-03
Payer: MEDICARE

## 2025-07-10 ENCOUNTER — DOCUMENTATION ONLY (OUTPATIENT)
Dept: HEMATOLOGY/ONCOLOGY | Facility: CLINIC | Age: 70
End: 2025-07-10
Payer: MEDICARE

## 2025-07-10 ENCOUNTER — LAB VISIT (OUTPATIENT)
Dept: LAB | Facility: HOSPITAL | Age: 70
End: 2025-07-10
Attending: INTERNAL MEDICINE
Payer: MEDICARE

## 2025-07-10 ENCOUNTER — OFFICE VISIT (OUTPATIENT)
Dept: HEMATOLOGY/ONCOLOGY | Facility: CLINIC | Age: 70
End: 2025-07-10
Payer: MEDICARE

## 2025-07-10 VITALS
DIASTOLIC BLOOD PRESSURE: 64 MMHG | WEIGHT: 125.88 LBS | OXYGEN SATURATION: 97 % | HEART RATE: 107 BPM | TEMPERATURE: 98 F | BODY MASS INDEX: 19.72 KG/M2 | SYSTOLIC BLOOD PRESSURE: 102 MMHG

## 2025-07-10 DIAGNOSIS — C25.8 OVERLAPPING MALIGNANT NEOPLASM OF PANCREAS: ICD-10-CM

## 2025-07-10 DIAGNOSIS — C77.8 SECONDARY MALIGNANT NEOPLASM OF LYMPH NODES OF MULTIPLE SITES: ICD-10-CM

## 2025-07-10 DIAGNOSIS — R53.81 DEBILITY: ICD-10-CM

## 2025-07-10 DIAGNOSIS — D84.81 IMMUNODEFICIENCY SECONDARY TO NEOPLASM: ICD-10-CM

## 2025-07-10 DIAGNOSIS — C25.8 OVERLAPPING MALIGNANT NEOPLASM OF PANCREAS: Primary | ICD-10-CM

## 2025-07-10 DIAGNOSIS — I95.9 HYPOTENSION, UNSPECIFIED HYPOTENSION TYPE: ICD-10-CM

## 2025-07-10 DIAGNOSIS — D84.821 IMMUNODEFICIENCY SECONDARY TO CHEMOTHERAPY: ICD-10-CM

## 2025-07-10 DIAGNOSIS — C78.7 METASTASIS TO LIVER: ICD-10-CM

## 2025-07-10 DIAGNOSIS — T45.1X5A IMMUNODEFICIENCY SECONDARY TO CHEMOTHERAPY: ICD-10-CM

## 2025-07-10 DIAGNOSIS — C78.02 MALIGNANT NEOPLASM METASTATIC TO BOTH LUNGS: ICD-10-CM

## 2025-07-10 DIAGNOSIS — G89.3 CANCER RELATED PAIN: ICD-10-CM

## 2025-07-10 DIAGNOSIS — D49.9 IMMUNODEFICIENCY SECONDARY TO NEOPLASM: ICD-10-CM

## 2025-07-10 DIAGNOSIS — C78.01 MALIGNANT NEOPLASM METASTATIC TO BOTH LUNGS: ICD-10-CM

## 2025-07-10 DIAGNOSIS — K86.89 PANCREATIC INSUFFICIENCY: ICD-10-CM

## 2025-07-10 DIAGNOSIS — Z79.69 IMMUNODEFICIENCY SECONDARY TO CHEMOTHERAPY: ICD-10-CM

## 2025-07-10 DIAGNOSIS — I81 PORTAL VEIN THROMBOSIS: ICD-10-CM

## 2025-07-10 DIAGNOSIS — R19.7 DIARRHEA, UNSPECIFIED TYPE: ICD-10-CM

## 2025-07-10 DIAGNOSIS — I10 PRIMARY HYPERTENSION: ICD-10-CM

## 2025-07-10 LAB
ABSOLUTE EOSINOPHIL (OHS): 0.05 K/UL
ABSOLUTE MONOCYTE (OHS): 0.76 K/UL (ref 0.3–1)
ABSOLUTE NEUTROPHIL COUNT (OHS): 11.69 K/UL (ref 1.8–7.7)
ALBUMIN SERPL BCP-MCNC: 3 G/DL (ref 3.5–5.2)
ALP SERPL-CCNC: 92 UNIT/L (ref 40–150)
ALT SERPL W/O P-5'-P-CCNC: 9 UNIT/L (ref 10–44)
ANION GAP (OHS): 4 MMOL/L (ref 8–16)
AST SERPL-CCNC: 15 UNIT/L (ref 11–45)
BASOPHILS # BLD AUTO: 0.06 K/UL
BASOPHILS NFR BLD AUTO: 0.4 %
BILIRUB SERPL-MCNC: 0.4 MG/DL (ref 0.1–1)
BUN SERPL-MCNC: 15 MG/DL (ref 8–23)
CALCIUM SERPL-MCNC: 9.3 MG/DL (ref 8.7–10.5)
CANCER AG19-9 SERPL-ACNC: 14.9 U/ML
CHLORIDE SERPL-SCNC: 106 MMOL/L (ref 95–110)
CO2 SERPL-SCNC: 28 MMOL/L (ref 23–29)
CREAT SERPL-MCNC: 0.6 MG/DL (ref 0.5–1.4)
ERYTHROCYTE [DISTWIDTH] IN BLOOD BY AUTOMATED COUNT: 17.6 % (ref 11.5–14.5)
GFR SERPLBLD CREATININE-BSD FMLA CKD-EPI: >60 ML/MIN/1.73/M2
GLUCOSE SERPL-MCNC: 161 MG/DL (ref 70–110)
HCT VFR BLD AUTO: 31.8 % (ref 40–54)
HGB BLD-MCNC: 10.3 GM/DL (ref 14–18)
IMM GRANULOCYTES # BLD AUTO: 0.07 K/UL (ref 0–0.04)
IMM GRANULOCYTES NFR BLD AUTO: 0.5 % (ref 0–0.5)
LYMPHOCYTES # BLD AUTO: 0.72 K/UL (ref 1–4.8)
MCH RBC QN AUTO: 33.1 PG (ref 27–31)
MCHC RBC AUTO-ENTMCNC: 32.4 G/DL (ref 32–36)
MCV RBC AUTO: 102 FL (ref 82–98)
NUCLEATED RBC (/100WBC) (OHS): 0 /100 WBC
PLATELET # BLD AUTO: 250 K/UL (ref 150–450)
PMV BLD AUTO: 10.3 FL (ref 9.2–12.9)
POTASSIUM SERPL-SCNC: 4.2 MMOL/L (ref 3.5–5.1)
PROT SERPL-MCNC: 6.6 GM/DL (ref 6–8.4)
RBC # BLD AUTO: 3.11 M/UL (ref 4.6–6.2)
RELATIVE EOSINOPHIL (OHS): 0.4 %
RELATIVE LYMPHOCYTE (OHS): 5.4 % (ref 18–48)
RELATIVE MONOCYTE (OHS): 5.7 % (ref 4–15)
RELATIVE NEUTROPHIL (OHS): 87.6 % (ref 38–73)
SODIUM SERPL-SCNC: 138 MMOL/L (ref 136–145)
WBC # BLD AUTO: 13.35 K/UL (ref 3.9–12.7)

## 2025-07-10 PROCEDURE — 36415 COLL VENOUS BLD VENIPUNCTURE: CPT

## 2025-07-10 PROCEDURE — 85025 COMPLETE CBC W/AUTO DIFF WBC: CPT

## 2025-07-10 PROCEDURE — 82040 ASSAY OF SERUM ALBUMIN: CPT

## 2025-07-10 PROCEDURE — 86301 IMMUNOASSAY TUMOR CA 19-9: CPT

## 2025-07-10 PROCEDURE — 1160F RVW MEDS BY RX/DR IN RCRD: CPT | Mod: CPTII,S$GLB,, | Performed by: INTERNAL MEDICINE

## 2025-07-10 PROCEDURE — 3288F FALL RISK ASSESSMENT DOCD: CPT | Mod: CPTII,S$GLB,, | Performed by: INTERNAL MEDICINE

## 2025-07-10 PROCEDURE — 1101F PT FALLS ASSESS-DOCD LE1/YR: CPT | Mod: CPTII,S$GLB,, | Performed by: INTERNAL MEDICINE

## 2025-07-10 PROCEDURE — 3078F DIAST BP <80 MM HG: CPT | Mod: CPTII,S$GLB,, | Performed by: INTERNAL MEDICINE

## 2025-07-10 PROCEDURE — 99999 PR PBB SHADOW E&M-EST. PATIENT-LVL V: CPT | Mod: PBBFAC,,, | Performed by: INTERNAL MEDICINE

## 2025-07-10 PROCEDURE — 99215 OFFICE O/P EST HI 40 MIN: CPT | Mod: S$GLB,,, | Performed by: INTERNAL MEDICINE

## 2025-07-10 PROCEDURE — 3008F BODY MASS INDEX DOCD: CPT | Mod: CPTII,S$GLB,, | Performed by: INTERNAL MEDICINE

## 2025-07-10 PROCEDURE — G2211 COMPLEX E/M VISIT ADD ON: HCPCS | Mod: S$GLB,,, | Performed by: INTERNAL MEDICINE

## 2025-07-10 PROCEDURE — 3074F SYST BP LT 130 MM HG: CPT | Mod: CPTII,S$GLB,, | Performed by: INTERNAL MEDICINE

## 2025-07-10 PROCEDURE — 1159F MED LIST DOCD IN RCRD: CPT | Mod: CPTII,S$GLB,, | Performed by: INTERNAL MEDICINE

## 2025-07-10 PROCEDURE — 1157F ADVNC CARE PLAN IN RCRD: CPT | Mod: CPTII,S$GLB,, | Performed by: INTERNAL MEDICINE

## 2025-07-10 RX ORDER — PROCHLORPERAZINE EDISYLATE 5 MG/ML
5 INJECTION INTRAMUSCULAR; INTRAVENOUS ONCE AS NEEDED
OUTPATIENT
Start: 2025-07-16

## 2025-07-10 RX ORDER — ATROPINE SULFATE 0.4 MG/ML
0.4 INJECTION, SOLUTION ENDOTRACHEAL; INTRAMEDULLARY; INTRAMUSCULAR; INTRAVENOUS; SUBCUTANEOUS ONCE AS NEEDED
OUTPATIENT
Start: 2025-07-14

## 2025-07-10 RX ORDER — SODIUM CHLORIDE 0.9 % (FLUSH) 0.9 %
10 SYRINGE (ML) INJECTION
OUTPATIENT
Start: 2025-07-16

## 2025-07-10 RX ORDER — SODIUM CHLORIDE 9 MG/ML
INJECTION, SOLUTION INTRAVENOUS
Start: 2025-07-14 | End: 2025-07-14

## 2025-07-10 RX ORDER — HEPARIN 100 UNIT/ML
500 SYRINGE INTRAVENOUS
OUTPATIENT
Start: 2025-07-16

## 2025-07-10 RX ORDER — PROCHLORPERAZINE EDISYLATE 5 MG/ML
5 INJECTION INTRAMUSCULAR; INTRAVENOUS ONCE AS NEEDED
OUTPATIENT
Start: 2025-07-14

## 2025-07-10 RX ORDER — DIPHENHYDRAMINE HYDROCHLORIDE 50 MG/ML
50 INJECTION, SOLUTION INTRAMUSCULAR; INTRAVENOUS ONCE AS NEEDED
OUTPATIENT
Start: 2025-07-14

## 2025-07-10 RX ORDER — SODIUM CHLORIDE 0.9 % (FLUSH) 0.9 %
10 SYRINGE (ML) INJECTION
OUTPATIENT
Start: 2025-07-14

## 2025-07-10 RX ORDER — HEPARIN 100 UNIT/ML
500 SYRINGE INTRAVENOUS
OUTPATIENT
Start: 2025-07-14

## 2025-07-10 RX ORDER — SODIUM CHLORIDE 9 MG/ML
INJECTION, SOLUTION INTRAVENOUS
Start: 2025-07-16

## 2025-07-10 RX ORDER — EPINEPHRINE 0.3 MG/.3ML
0.3 INJECTION SUBCUTANEOUS ONCE AS NEEDED
OUTPATIENT
Start: 2025-07-14

## 2025-07-10 NOTE — PROGRESS NOTES
FRANKY received an inbasket message from Dr. Granados to say she placed an order for Outpatient PT for pt, and that pt has a specific place he is requesting to go. FRANKY called pt and spoke to his wife, Shira Pedroza. Previously, pt has used [facility] in [place]. FRANKY called [facility] [phone] to set it up. Orders were faxed to [phone].    Tod Quarles, [facility]  Oncology Social Worker  [facility]  [phone]

## 2025-07-10 NOTE — PROGRESS NOTES
PROGRESS NOTE    Subjective:       Patient ID: Gael Pedroza is a 70 y.o. male.    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 ALY  Tempus blood 5/8/25: KRAS G12D, TP53, TERT, ctDNA 17.0%, TMB 3.8    Oncologic History:  1.Mr Pedroza is a 69 yo man with carotic artery stenosis, left ventricular diastolic dysfunction, HTN, stenosis of left subclavian artery, T2DM, who first saw me on 5/8/25 for further management of pancreatic cancer. He presents with worsening of his chronic back pain in Jan 2025. He was found to have a pancreatic mass on CT lumbar spine 4/24/25. C/A/P on 4/29/25 showed 9.4 cm mass involving the pancreatic body and tail highly concerning for adenocarcinoma. It abuts the stomach, duodenum, proximal jejunum, and left adrenal gland without convincing invasion. It encases the celiac, common hepatic, and superior mesenteric arteries which remain patent. It encases the splenic artery which is moderately narrowed. It abuts the aorta and left renal artery which remain patent. Occludes the splenic and superior mesenteric veins, with nonocclusive thrombus extending into the main portal vein. There are portosystemic collaterals in the upper abdomen. Enlarged periportal lymph node concerning for metastasis. Multiple liver metastases measuring up to 2.8 cm. Few solid pulmonary nodules measuring up to 0.7 cm concerning for metastases. Upper EUS biopsy of the pancreatic tail mass on 4/30/25 showed undifferentiated carcinoma. MMR-proficient, ALY. Comment: Histologic sections reveal  pleomorphic tumor cells with bizarre nuclear atypia, moderately abundant amphophilic cytoplasm, and multinucleated forms. Rare cells that may represent osteoclast-like giant cells are noted. Background necrosis is readily apparent. Tumor cells exhibits extensive positivity for pankeratin AE1/3 by immunohistochemistry. BerEP4 is negative. The morphologic features and immunoprofile support the above interpretation. Tempus pending. On 25, CA 19-9 normal at 33.8. PGX: DPYD normal metabolizer, UGT1A1 intermediate metabolizer. Patient noted 30 lbs weight loss over the past 3 months, anorexia, pain better on current pain regimen. He has a daughter and a son. Accompanied by wife, brother and sister-in-law today.   Discussed palliative NALIRIFOX.   2. NALIFIROX started on 25, s/p 3 cycles    Interval History:   Mr Pedroza returns for follow up. Feel tired for longer time after cycle 3. Still has a lot of small diarrhea, especially after he eats. Creon does help him. Pain is much better after celiac block. Home PT has completed. [facility] outpatient PT.     ECO    ROS:   A ten-point system review is obtained and negative except for what was stated in the Interval History.     Physical Examination:   Vital signs reviewed. /64  General: well hydrated, well developed, in no acute distress  HEENT: normocephalic, EOMI, anicteric sclerae  Neck: supple, no JVD, thyromegaly, cervical or supraclavicular lymphadenopathy  Lungs: clear breath sounds bilaterally, no wheezing, rales, or rhonchi  Heart: RRR, no M/R/G  Abdomen: soft, no tenderness, non-distended, no hepatosplenomegaly, mass, or hernia. BS present  Extremities: no clubbing, cyanosis, or edema  Skin: no rash  Neuro: alert and oriented x 4, no focal neuro deficit  Psych: pleasant and appropriate mood and affect    Objective:     Laboratory Data:  Labs reviewed. CBC, CMP adequate for chemo    Imaging Data:  CT C/A/P 25:  Impression:     No evidence of acute  pulmonary embolus to the proximal segmental level.     Similar to mildly worse pancreatic mass with vascular interfaces discussed above.  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     New small left pleural effusion with ground-glass opacities in the left lung base.  Similar to minimally increased size of left lower lobe pulmonary nodule.     Similar to mildly worse hepatic metastatic disease and enlarged periportal lymph node.     New/worse anasarca and small volume abdominopelvic free fluid measuring slightly denser than simple fluid.       Assessment and Plan:     1. Overlapping malignant neoplasm of pancreas    2. Metastasis to liver    3. 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 Diarrhea, unspecified type    8. Pancreatic insufficiency    9. Cancer related pain    10. Hypotension, unspecified hypotension type    11. Portal vein thrombosis    12. Primary hypertension        1-6  - Mr Pedroza is a 69 yo man with stage IV undifferentiated carcinoma of the pancreatic body/tail, with mets to lymph nodes, liver and lung, MMR-proficient   - BP has been low since celiac block, slowly improving  - NALIFIROX started on 5/30/25, s/p 3 cycles  - doing well. C/w NALIFIROX at current dose. Cycle 4 next Monday. IVF with day 1 and day 3. Will drop neulasta again this time given his WBC is still slightly up.    - RTC in 2 weeks for cycle 5 with restaging scan prior  - try outpatient PT    7.8  - better. C/w imodium, lomotil, opium tincture prn  - c/w creon 3 cap tid with meals. Can take 1 cap with snaks.     9.  - better after celiac block  - f/u with palliative medicine    10.  - after celiac block. It is better now.   - c/w IVF with chemo  -  continue to hold valsartan  - monitor    11.  - from cancer compression  - c/w eliquis 5 mg bid    12.  - hold valsartan in light of low BP    Follow-up:     RTC in 2 weeks.   Knows to call in the interval if any problems arise.    I spent 50 minutes reviewing the chart, interpreting laboratory result and imaging data, coordinating patient's care, with at least 50% of the time on face-to-face counseling.    code applied: patient requires a continuous, longitudinal, and active collaborative plan of care related to this patient's health condition, undifferentiated carcinoma of the pancreas -- the management of which requires the direction of a practitioner with specialized clinical knowledge, skill, and expertise.        Electronically signed by Dar Granados

## 2025-07-14 ENCOUNTER — INFUSION (OUTPATIENT)
Dept: INFUSION THERAPY | Facility: HOSPITAL | Age: 70
End: 2025-07-14
Attending: INTERNAL MEDICINE
Payer: MEDICARE

## 2025-07-14 VITALS
WEIGHT: 127.44 LBS | DIASTOLIC BLOOD PRESSURE: 55 MMHG | TEMPERATURE: 98 F | SYSTOLIC BLOOD PRESSURE: 104 MMHG | RESPIRATION RATE: 18 BRPM | BODY MASS INDEX: 20 KG/M2 | OXYGEN SATURATION: 97 % | HEIGHT: 67 IN | HEART RATE: 76 BPM

## 2025-07-14 DIAGNOSIS — C25.8 OVERLAPPING MALIGNANT NEOPLASM OF PANCREAS: Primary | ICD-10-CM

## 2025-07-14 PROCEDURE — 25000003 PHARM REV CODE 250: Performed by: INTERNAL MEDICINE

## 2025-07-14 PROCEDURE — 96413 CHEMO IV INFUSION 1 HR: CPT

## 2025-07-14 PROCEDURE — 96375 TX/PRO/DX INJ NEW DRUG ADDON: CPT

## 2025-07-14 PROCEDURE — 96416 CHEMO PROLONG INFUSE W/PUMP: CPT

## 2025-07-14 PROCEDURE — 96417 CHEMO IV INFUS EACH ADDL SEQ: CPT

## 2025-07-14 PROCEDURE — 96367 TX/PROPH/DG ADDL SEQ IV INF: CPT

## 2025-07-14 PROCEDURE — 96361 HYDRATE IV INFUSION ADD-ON: CPT

## 2025-07-14 PROCEDURE — 63600175 PHARM REV CODE 636 W HCPCS: Performed by: INTERNAL MEDICINE

## 2025-07-14 PROCEDURE — 96415 CHEMO IV INFUSION ADDL HR: CPT

## 2025-07-14 RX ORDER — DIPHENHYDRAMINE HYDROCHLORIDE 50 MG/ML
50 INJECTION, SOLUTION INTRAMUSCULAR; INTRAVENOUS ONCE AS NEEDED
Status: DISCONTINUED | OUTPATIENT
Start: 2025-07-14 | End: 2025-07-14 | Stop reason: HOSPADM

## 2025-07-14 RX ORDER — EPINEPHRINE 0.3 MG/.3ML
0.3 INJECTION SUBCUTANEOUS ONCE AS NEEDED
Status: DISCONTINUED | OUTPATIENT
Start: 2025-07-14 | End: 2025-07-14 | Stop reason: HOSPADM

## 2025-07-14 RX ORDER — SODIUM CHLORIDE 0.9 % (FLUSH) 0.9 %
10 SYRINGE (ML) INJECTION
Status: DISCONTINUED | OUTPATIENT
Start: 2025-07-14 | End: 2025-07-14 | Stop reason: HOSPADM

## 2025-07-14 RX ORDER — ATROPINE SULFATE 0.4 MG/ML
0.4 INJECTION, SOLUTION ENDOTRACHEAL; INTRAMEDULLARY; INTRAMUSCULAR; INTRAVENOUS; SUBCUTANEOUS ONCE AS NEEDED
Status: COMPLETED | OUTPATIENT
Start: 2025-07-14 | End: 2025-07-14

## 2025-07-14 RX ORDER — HEPARIN 100 UNIT/ML
500 SYRINGE INTRAVENOUS
Status: DISCONTINUED | OUTPATIENT
Start: 2025-07-14 | End: 2025-07-14 | Stop reason: HOSPADM

## 2025-07-14 RX ORDER — SODIUM CHLORIDE 9 MG/ML
INJECTION, SOLUTION INTRAVENOUS
Status: COMPLETED | OUTPATIENT
Start: 2025-07-14 | End: 2025-07-14

## 2025-07-14 RX ORDER — PROCHLORPERAZINE EDISYLATE 5 MG/ML
5 INJECTION INTRAMUSCULAR; INTRAVENOUS ONCE AS NEEDED
Status: DISCONTINUED | OUTPATIENT
Start: 2025-07-14 | End: 2025-07-14 | Stop reason: HOSPADM

## 2025-07-14 RX ADMIN — SODIUM CHLORIDE: 9 INJECTION, SOLUTION INTRAVENOUS at 07:07

## 2025-07-14 RX ADMIN — SODIUM CHLORIDE 0.25 MG: 9 INJECTION, SOLUTION INTRAVENOUS at 08:07

## 2025-07-14 RX ADMIN — DEXTROSE MONOHYDRATE: 50 INJECTION, SOLUTION INTRAVENOUS at 10:07

## 2025-07-14 RX ADMIN — SODIUM CHLORIDE 150 MG: 9 INJECTION, SOLUTION INTRAVENOUS at 08:07

## 2025-07-14 RX ADMIN — LEUCOVORIN CALCIUM 650 MG: 200 INJECTION, POWDER, LYOPHILIZED, FOR SOLUTION INTRAMUSCULAR; INTRAVENOUS at 12:07

## 2025-07-14 RX ADMIN — SODIUM CHLORIDE 3240 MG: 9 INJECTION, SOLUTION INTRAVENOUS at 01:07

## 2025-07-14 RX ADMIN — OXALIPLATIN 97 MG: 5 INJECTION, SOLUTION INTRAVENOUS at 10:07

## 2025-07-14 RX ADMIN — ATROPINE SULFATE 0.4 MG: 0.4 INJECTION, SOLUTION INTRAVENOUS at 08:07

## 2025-07-14 RX ADMIN — IRINOTECAN HYDROCHLORIDE 56.76 MG: 4.3 INJECTION, POWDER, FOR SOLUTION INTRAVENOUS at 09:07

## 2025-07-14 NOTE — NURSING
Patient tolerated Nalirifox well today. CADD pump infusing at 2.2 ml/hr. RUN appears and volume decreasing prior to discharge. Settings verified by 2 RNs. Plan to rt Wed at 11am for pump dc. NAD noted upon discharge. Calendar given and reviewed with patient. Discharged home, ambulated independently with wife by side.

## 2025-07-16 ENCOUNTER — INFUSION (OUTPATIENT)
Dept: INFUSION THERAPY | Facility: HOSPITAL | Age: 70
End: 2025-07-16
Attending: INTERNAL MEDICINE
Payer: MEDICARE

## 2025-07-16 VITALS
DIASTOLIC BLOOD PRESSURE: 58 MMHG | TEMPERATURE: 99 F | SYSTOLIC BLOOD PRESSURE: 104 MMHG | RESPIRATION RATE: 18 BRPM | OXYGEN SATURATION: 98 % | HEART RATE: 102 BPM

## 2025-07-16 DIAGNOSIS — C25.8 OVERLAPPING MALIGNANT NEOPLASM OF PANCREAS: Primary | ICD-10-CM

## 2025-07-16 PROCEDURE — A4216 STERILE WATER/SALINE, 10 ML: HCPCS | Performed by: INTERNAL MEDICINE

## 2025-07-16 PROCEDURE — 25000003 PHARM REV CODE 250: Performed by: INTERNAL MEDICINE

## 2025-07-16 PROCEDURE — 63600175 PHARM REV CODE 636 W HCPCS: Performed by: INTERNAL MEDICINE

## 2025-07-16 PROCEDURE — 96360 HYDRATION IV INFUSION INIT: CPT

## 2025-07-16 RX ORDER — SODIUM CHLORIDE 0.9 % (FLUSH) 0.9 %
10 SYRINGE (ML) INJECTION
Status: DISCONTINUED | OUTPATIENT
Start: 2025-07-16 | End: 2025-07-16 | Stop reason: HOSPADM

## 2025-07-16 RX ORDER — SODIUM CHLORIDE 9 MG/ML
INJECTION, SOLUTION INTRAVENOUS
Status: COMPLETED | OUTPATIENT
Start: 2025-07-16 | End: 2025-07-16

## 2025-07-16 RX ORDER — PROCHLORPERAZINE EDISYLATE 5 MG/ML
5 INJECTION INTRAMUSCULAR; INTRAVENOUS ONCE AS NEEDED
Status: DISCONTINUED | OUTPATIENT
Start: 2025-07-16 | End: 2025-07-16 | Stop reason: HOSPADM

## 2025-07-16 RX ORDER — HEPARIN 100 UNIT/ML
500 SYRINGE INTRAVENOUS
Status: DISCONTINUED | OUTPATIENT
Start: 2025-07-16 | End: 2025-07-16 | Stop reason: HOSPADM

## 2025-07-16 RX ADMIN — HEPARIN 500 UNITS: 100 SYRINGE at 12:07

## 2025-07-16 RX ADMIN — Medication 10 ML: at 12:07

## 2025-07-16 RX ADMIN — SODIUM CHLORIDE: 9 INJECTION, SOLUTION INTRAVENOUS at 11:07

## 2025-07-16 NOTE — PLAN OF CARE
Pt came in for pump D/C. Pt tolerated home 5FU well. Reports of diarrhea.  1L NS given per orders. PAC flushed with NS and Heparin, then deaccessed. Pt education reinforced and explained what to expect in the next couple of days and when to notify MD. Pt verbalized understanding.

## 2025-07-22 DIAGNOSIS — R63.0 ANOREXIA: ICD-10-CM

## 2025-07-22 RX ORDER — CYPROHEPTADINE HYDROCHLORIDE 4 MG/1
4 TABLET ORAL 3 TIMES DAILY
Qty: 90 TABLET | Refills: 11 | Status: CANCELLED | OUTPATIENT
Start: 2025-07-22

## 2025-07-23 ENCOUNTER — HOSPITAL ENCOUNTER (OUTPATIENT)
Dept: RADIOLOGY | Facility: HOSPITAL | Age: 70
Discharge: HOME | End: 2025-07-23
Attending: INTERNAL MEDICINE
Payer: MEDICARE

## 2025-07-23 DIAGNOSIS — C25.8 OVERLAPPING MALIGNANT NEOPLASM OF PANCREAS: ICD-10-CM

## 2025-07-23 DIAGNOSIS — C78.7 METASTASIS TO LIVER: ICD-10-CM

## 2025-07-23 PROCEDURE — 74177 CT ABD & PELVIS W/CONTRAST: CPT | Mod: 26,,, | Performed by: STUDENT IN AN ORGANIZED HEALTH CARE EDUCATION/TRAINING PROGRAM

## 2025-07-23 PROCEDURE — A9698 NON-RAD CONTRAST MATERIALNOC: HCPCS | Performed by: INTERNAL MEDICINE

## 2025-07-23 PROCEDURE — 71260 CT THORAX DX C+: CPT | Mod: TC

## 2025-07-23 PROCEDURE — 71260 CT THORAX DX C+: CPT | Mod: 26,,, | Performed by: STUDENT IN AN ORGANIZED HEALTH CARE EDUCATION/TRAINING PROGRAM

## 2025-07-23 PROCEDURE — 25500020 PHARM REV CODE 255: Performed by: INTERNAL MEDICINE

## 2025-07-23 RX ADMIN — IOHEXOL 75 ML: 350 INJECTION, SOLUTION INTRAVENOUS at 09:07

## 2025-07-23 RX ADMIN — IOHEXOL 1000 ML: 9 SOLUTION ORAL at 08:07

## 2025-07-24 ENCOUNTER — LAB VISIT (OUTPATIENT)
Dept: LAB | Facility: HOSPITAL | Age: 70
End: 2025-07-24
Attending: INTERNAL MEDICINE
Payer: MEDICARE

## 2025-07-24 ENCOUNTER — OFFICE VISIT (OUTPATIENT)
Dept: HEMATOLOGY/ONCOLOGY | Facility: CLINIC | Age: 70
End: 2025-07-24
Payer: MEDICARE

## 2025-07-24 VITALS
DIASTOLIC BLOOD PRESSURE: 56 MMHG | WEIGHT: 133.38 LBS | SYSTOLIC BLOOD PRESSURE: 110 MMHG | OXYGEN SATURATION: 100 % | HEART RATE: 91 BPM | BODY MASS INDEX: 20.89 KG/M2

## 2025-07-24 DIAGNOSIS — I95.9 HYPOTENSION, UNSPECIFIED HYPOTENSION TYPE: ICD-10-CM

## 2025-07-24 DIAGNOSIS — C78.02 MALIGNANT NEOPLASM METASTATIC TO BOTH LUNGS: ICD-10-CM

## 2025-07-24 DIAGNOSIS — G89.3 CANCER RELATED PAIN: ICD-10-CM

## 2025-07-24 DIAGNOSIS — C78.7 METASTASIS TO LIVER: ICD-10-CM

## 2025-07-24 DIAGNOSIS — K86.89 PANCREATIC INSUFFICIENCY: ICD-10-CM

## 2025-07-24 DIAGNOSIS — K12.30 MUCOSITIS: ICD-10-CM

## 2025-07-24 DIAGNOSIS — C25.8 OVERLAPPING MALIGNANT NEOPLASM OF PANCREAS: ICD-10-CM

## 2025-07-24 DIAGNOSIS — C77.8 SECONDARY MALIGNANT NEOPLASM OF LYMPH NODES OF MULTIPLE SITES: ICD-10-CM

## 2025-07-24 DIAGNOSIS — E11.51 TYPE 2 DIABETES MELLITUS WITH DIABETIC PERIPHERAL ANGIOPATHY WITHOUT GANGRENE, WITHOUT LONG-TERM CURRENT USE OF INSULIN: ICD-10-CM

## 2025-07-24 DIAGNOSIS — R19.7 DIARRHEA, UNSPECIFIED TYPE: ICD-10-CM

## 2025-07-24 DIAGNOSIS — C25.8 OVERLAPPING MALIGNANT NEOPLASM OF PANCREAS: Primary | ICD-10-CM

## 2025-07-24 DIAGNOSIS — D49.9 IMMUNODEFICIENCY SECONDARY TO NEOPLASM: ICD-10-CM

## 2025-07-24 DIAGNOSIS — I81 PORTAL VEIN THROMBOSIS: ICD-10-CM

## 2025-07-24 DIAGNOSIS — C78.01 MALIGNANT NEOPLASM METASTATIC TO BOTH LUNGS: ICD-10-CM

## 2025-07-24 DIAGNOSIS — D84.81 IMMUNODEFICIENCY SECONDARY TO NEOPLASM: ICD-10-CM

## 2025-07-24 DIAGNOSIS — Z79.69 IMMUNODEFICIENCY SECONDARY TO CHEMOTHERAPY: ICD-10-CM

## 2025-07-24 DIAGNOSIS — T45.1X5A IMMUNODEFICIENCY SECONDARY TO CHEMOTHERAPY: ICD-10-CM

## 2025-07-24 DIAGNOSIS — D84.821 IMMUNODEFICIENCY SECONDARY TO CHEMOTHERAPY: ICD-10-CM

## 2025-07-24 LAB
ABSOLUTE EOSINOPHIL (OHS): 0.17 K/UL
ABSOLUTE MONOCYTE (OHS): 0.95 K/UL (ref 0.3–1)
ABSOLUTE NEUTROPHIL COUNT (OHS): 9.17 K/UL (ref 1.8–7.7)
ALBUMIN SERPL BCP-MCNC: 3 G/DL (ref 3.5–5.2)
ALP SERPL-CCNC: 84 UNIT/L (ref 40–150)
ALT SERPL W/O P-5'-P-CCNC: 8 UNIT/L (ref 10–44)
ANION GAP (OHS): 9 MMOL/L (ref 8–16)
AST SERPL-CCNC: 19 UNIT/L (ref 11–45)
BASOPHILS # BLD AUTO: 0.07 K/UL
BASOPHILS NFR BLD AUTO: 0.6 %
BILIRUB SERPL-MCNC: 0.3 MG/DL (ref 0.1–1)
BUN SERPL-MCNC: 15 MG/DL (ref 8–23)
CALCIUM SERPL-MCNC: 9.1 MG/DL (ref 8.7–10.5)
CANCER AG19-9 SERPL-ACNC: 13.9 U/ML
CHLORIDE SERPL-SCNC: 104 MMOL/L (ref 95–110)
CO2 SERPL-SCNC: 26 MMOL/L (ref 23–29)
CREAT SERPL-MCNC: 0.5 MG/DL (ref 0.5–1.4)
ERYTHROCYTE [DISTWIDTH] IN BLOOD BY AUTOMATED COUNT: 16.4 % (ref 11.5–14.5)
GFR SERPLBLD CREATININE-BSD FMLA CKD-EPI: >60 ML/MIN/1.73/M2
GLUCOSE SERPL-MCNC: 175 MG/DL (ref 70–110)
HCT VFR BLD AUTO: 28.3 % (ref 40–54)
HGB BLD-MCNC: 9.2 GM/DL (ref 14–18)
IMM GRANULOCYTES # BLD AUTO: 0.04 K/UL (ref 0–0.04)
IMM GRANULOCYTES NFR BLD AUTO: 0.4 % (ref 0–0.5)
LYMPHOCYTES # BLD AUTO: 0.66 K/UL (ref 1–4.8)
MCH RBC QN AUTO: 33 PG (ref 27–31)
MCHC RBC AUTO-ENTMCNC: 32.5 G/DL (ref 32–36)
MCV RBC AUTO: 101 FL (ref 82–98)
NUCLEATED RBC (/100WBC) (OHS): 0 /100 WBC
PLATELET # BLD AUTO: 238 K/UL (ref 150–450)
PMV BLD AUTO: 10 FL (ref 9.2–12.9)
POTASSIUM SERPL-SCNC: 4.4 MMOL/L (ref 3.5–5.1)
PROT SERPL-MCNC: 6 GM/DL (ref 6–8.4)
RBC # BLD AUTO: 2.79 M/UL (ref 4.6–6.2)
RELATIVE EOSINOPHIL (OHS): 1.5 %
RELATIVE LYMPHOCYTE (OHS): 6 % (ref 18–48)
RELATIVE MONOCYTE (OHS): 8.6 % (ref 4–15)
RELATIVE NEUTROPHIL (OHS): 82.9 % (ref 38–73)
SODIUM SERPL-SCNC: 139 MMOL/L (ref 136–145)
WBC # BLD AUTO: 11.06 K/UL (ref 3.9–12.7)

## 2025-07-24 PROCEDURE — 3074F SYST BP LT 130 MM HG: CPT | Mod: CPTII,S$GLB,, | Performed by: INTERNAL MEDICINE

## 2025-07-24 PROCEDURE — 3008F BODY MASS INDEX DOCD: CPT | Mod: CPTII,S$GLB,, | Performed by: INTERNAL MEDICINE

## 2025-07-24 PROCEDURE — 1160F RVW MEDS BY RX/DR IN RCRD: CPT | Mod: CPTII,S$GLB,, | Performed by: INTERNAL MEDICINE

## 2025-07-24 PROCEDURE — 99215 OFFICE O/P EST HI 40 MIN: CPT | Mod: S$GLB,,, | Performed by: INTERNAL MEDICINE

## 2025-07-24 PROCEDURE — 1101F PT FALLS ASSESS-DOCD LE1/YR: CPT | Mod: CPTII,S$GLB,, | Performed by: INTERNAL MEDICINE

## 2025-07-24 PROCEDURE — 80053 COMPREHEN METABOLIC PANEL: CPT

## 2025-07-24 PROCEDURE — 86301 IMMUNOASSAY TUMOR CA 19-9: CPT

## 2025-07-24 PROCEDURE — G2211 COMPLEX E/M VISIT ADD ON: HCPCS | Mod: S$GLB,,, | Performed by: INTERNAL MEDICINE

## 2025-07-24 PROCEDURE — 1126F AMNT PAIN NOTED NONE PRSNT: CPT | Mod: CPTII,S$GLB,, | Performed by: INTERNAL MEDICINE

## 2025-07-24 PROCEDURE — 85025 COMPLETE CBC W/AUTO DIFF WBC: CPT

## 2025-07-24 PROCEDURE — 1157F ADVNC CARE PLAN IN RCRD: CPT | Mod: CPTII,S$GLB,, | Performed by: INTERNAL MEDICINE

## 2025-07-24 PROCEDURE — 3288F FALL RISK ASSESSMENT DOCD: CPT | Mod: CPTII,S$GLB,, | Performed by: INTERNAL MEDICINE

## 2025-07-24 PROCEDURE — 3078F DIAST BP <80 MM HG: CPT | Mod: CPTII,S$GLB,, | Performed by: INTERNAL MEDICINE

## 2025-07-24 PROCEDURE — 1159F MED LIST DOCD IN RCRD: CPT | Mod: CPTII,S$GLB,, | Performed by: INTERNAL MEDICINE

## 2025-07-24 PROCEDURE — 36415 COLL VENOUS BLD VENIPUNCTURE: CPT

## 2025-07-24 PROCEDURE — 99999 PR PBB SHADOW E&M-EST. PATIENT-LVL IV: CPT | Mod: PBBFAC,,, | Performed by: INTERNAL MEDICINE

## 2025-07-24 RX ORDER — EPINEPHRINE 0.3 MG/.3ML
0.3 INJECTION SUBCUTANEOUS ONCE AS NEEDED
OUTPATIENT
Start: 2025-07-28

## 2025-07-24 RX ORDER — HEPARIN 100 UNIT/ML
500 SYRINGE INTRAVENOUS
OUTPATIENT
Start: 2025-07-30

## 2025-07-24 RX ORDER — SODIUM CHLORIDE 0.9 % (FLUSH) 0.9 %
10 SYRINGE (ML) INJECTION
OUTPATIENT
Start: 2025-07-30

## 2025-07-24 RX ORDER — PROCHLORPERAZINE EDISYLATE 5 MG/ML
5 INJECTION INTRAMUSCULAR; INTRAVENOUS ONCE AS NEEDED
OUTPATIENT
Start: 2025-07-28

## 2025-07-24 RX ORDER — SODIUM CHLORIDE 9 MG/ML
INJECTION, SOLUTION INTRAVENOUS
Start: 2025-07-30 | End: 2025-07-30

## 2025-07-24 RX ORDER — SODIUM CHLORIDE 9 MG/ML
INJECTION, SOLUTION INTRAVENOUS
Start: 2025-07-28 | End: 2025-07-28

## 2025-07-24 RX ORDER — DIPHENHYDRAMINE HYDROCHLORIDE 50 MG/ML
50 INJECTION, SOLUTION INTRAMUSCULAR; INTRAVENOUS ONCE AS NEEDED
OUTPATIENT
Start: 2025-07-28

## 2025-07-24 RX ORDER — ATROPINE SULFATE 0.4 MG/ML
0.4 INJECTION, SOLUTION ENDOTRACHEAL; INTRAMEDULLARY; INTRAMUSCULAR; INTRAVENOUS; SUBCUTANEOUS ONCE AS NEEDED
OUTPATIENT
Start: 2025-07-28

## 2025-07-24 RX ORDER — SODIUM CHLORIDE 0.9 % (FLUSH) 0.9 %
10 SYRINGE (ML) INJECTION
OUTPATIENT
Start: 2025-07-28

## 2025-07-24 RX ORDER — PROCHLORPERAZINE EDISYLATE 5 MG/ML
5 INJECTION INTRAMUSCULAR; INTRAVENOUS ONCE AS NEEDED
OUTPATIENT
Start: 2025-07-30

## 2025-07-24 RX ORDER — HEPARIN 100 UNIT/ML
500 SYRINGE INTRAVENOUS
OUTPATIENT
Start: 2025-07-28

## 2025-07-24 NOTE — Clinical Note
Please add CBC, CMP, CA 19-9 on 8/8 and 8/21 before doctor's visit. See me on 9/4 with CBC, CMP, CA 19-9

## 2025-07-24 NOTE — PROGRESS NOTES
PROGRESS NOTE    Subjective:       Patient ID: Gael Pedroza is a 70 y.o. male.    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 ALY  Tempus blood 5/8/25: KRAS G12D, TP53, TERT, ctDNA 17.0%, TMB 3.8    Oncologic History:  1.Mr Pedroza is a 71 yo man with carotic artery stenosis, left ventricular diastolic dysfunction, HTN, stenosis of left subclavian artery, T2DM, who first saw me on 5/8/25 for further management of pancreatic cancer. He presents with worsening of his chronic back pain in Jan 2025. He was found to have a pancreatic mass on CT lumbar spine 4/24/25. C/A/P on 4/29/25 showed 9.4 cm mass involving the pancreatic body and tail highly concerning for adenocarcinoma. It abuts the stomach, duodenum, proximal jejunum, and left adrenal gland without convincing invasion. It encases the celiac, common hepatic, and superior mesenteric arteries which remain patent. It encases the splenic artery which is moderately narrowed. It abuts the aorta and left renal artery which remain patent. Occludes the splenic and superior mesenteric veins, with nonocclusive thrombus extending into the main portal vein. There are portosystemic collaterals in the upper abdomen. Enlarged periportal lymph node concerning for metastasis. Multiple liver metastases measuring up to 2.8 cm. Few solid pulmonary nodules measuring up to 0.7 cm concerning for metastases. Upper EUS biopsy of the pancreatic tail mass on 4/30/25 showed undifferentiated carcinoma. MMR-proficient, ALY. Comment: Histologic sections reveal  pleomorphic tumor cells with bizarre nuclear atypia, moderately abundant amphophilic cytoplasm, and multinucleated forms. Rare cells that may represent osteoclast-like giant cells are noted. Background necrosis is readily apparent. Tumor cells exhibits extensive positivity for pankeratin AE1/3 by immunohistochemistry. BerEP4 is negative. The morphologic features and immunoprofile support the above interpretation. Tempus pending. On 25, CA 19-9 normal at 33.8. PGX: DPYD normal metabolizer, UGT1A1 intermediate metabolizer. Patient noted 30 lbs weight loss over the past 3 months, anorexia, pain better on current pain regimen. He has a daughter and a son. Accompanied by wife, brother and sister-in-law today.   Discussed palliative NALIRIFOX.   2. NALIFIROX started on 25, s/p 4 cycles    Interval History:   Mr Pedroza returns for follow up. Still had some diarrhea after cycle 3. But feeling okay overall.  Has one mouth sores.     ECO    ROS:   A ten-point system review is obtained and negative except for what was stated in the Interval History.     Physical Examination:   Vital signs reviewed. /56  General: well hydrated, well developed, in no acute distress  HEENT: normocephalic, EOMI, anicteric sclerae  Neck: supple, no JVD, thyromegaly, cervical or supraclavicular lymphadenopathy  Lungs: clear breath sounds bilaterally, no wheezing, rales, or rhonchi  Heart: RRR, no M/R/G  Abdomen: soft, no tenderness, non-distended, no hepatosplenomegaly, mass, or hernia. BS present  Extremities: no clubbing, cyanosis, or edema  Skin: no rash  Neuro: alert and oriented x 4, no focal neuro deficit  Psych: pleasant and appropriate mood and affect    Objective:     Laboratory Data:  Labs reviewed. CBC, CMP adequate for chemo    Imaging Data:  CT C/A/P 25:  Impression:     No evidence of acute pulmonary embolus to the proximal segmental level.     Similar to mildly worse pancreatic mass with vascular interfaces  discussed above.  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     New small left pleural effusion with ground-glass opacities in the left lung base.  Similar to minimally increased size of left lower lobe pulmonary nodule.     Similar to mildly worse hepatic metastatic disease and enlarged periportal lymph node.     New/worse anasarca and small volume abdominopelvic free fluid measuring slightly denser than simple fluid.     CT C/A/P 7/23/25:  COMPARISON:  CTA chest non coronary 05/20/2025, CT abdomen pelvis with contrast 05/20/2025     FINDINGS:  Structures at the base of the neck are unremarkable.     The thyroid is normal size with no focal lesions.     Right-sided chest port with tip in similar position.     Axillary and thoracic soft tissues are unremarkable.     The heart is normal size with no pericardial effusion.  Coronary artery calcification.     Pulmonary vasculature distribute normally.  No evidence for pulmonary artery thromboembolism.     Left-sided aortic arch with mild calcific atherosclerosis.     No hilar or mediastinal lymphadenopathy.     Airways are patent.     Lungs are symmetrically expanded.  Small bilateral pleural effusions with associated compressive atelectasis.  No focal consolidation or pneumothorax.  Few scattered pulmonary nodules, for example about the right lung measuring 4 mm (series 6, image 235), about the inferior lingula measuring 7 mm (series 6, image 289), about the right lung base measuring 6 mm (series 6, image 343), and about the superior segment left lower lobe measuring 5 mm (series 6, image 269).  Several nodules are new from comparison CT 5/25, for example about the superior segment right lower lobe measuring 6 mm (series 6, image 232).     Liver is normal in size, demonstrating diffusely decreased attenuation, suggesting hepatic steatosis.  Multiple hepatic  masses, the largest in the right hepatic lobe measuring approximately 5.1 cm (series 3, image 36), previously measuring approximately 3.1 cm.  Some hepatic masses have decreased in size, for example posterior right hepatic lobe measuring approximately 3.1 cm, previously measuring 3.7 cm (series 3, image 50).     Gallbladder is unremarkable.  Similar prominence of the common bile duct with mild central intrahepatic ductal dilatation.     The spleen demonstrates multiple small peripheral hypodensities, nonspecific.     Adrenal glands are unremarkable.     Redemonstration of a centrally necrotic pancreatic body/tail mass measuring on the order of 10.3 x 11.6 x 10.5 cm (series 3, image 52), previously measuring 6.8 x 9.4 x 7.1 cm.  Mass again extends about the lesser sac with abutment of the stomach and left adrenal gland.  Splenic vein is not well visualized.  Mass again encompasses the celiac artery, common hepatic artery, and superior mesenteric artery.  There is worsening mass effect on the aorta and left renal artery.  Celiac artery appears patent.  Splenic artery not well visualized.  Increased narrowing of the superior mesenteric artery from comparison imaging.     Multiple enlarged brenda hepatic and peripancreatic lymph nodes, for example adjacent to large mass measuring 1.7 cm (series 3, image 52).     Kidneys are normal in size and location, concentrating contrast appropriately.  Ureters not well visualized.  Urinary bladder is unremarkable.     Prostatic calcifications.     The stomach and duodenum are unremarkable.  Loops of small bowel demonstrate no evidence for obstructive or inflammatory process.  Appendix not well visualized.  The colon demonstrates no significant abnormality.  Few scattered colonic diverticula.     Large volume ascites throughout the visualized abdomen and pelvis.  No free intra-abdominal air.     Moderate calcific atherosclerosis about the visualized aorta.  Main portal vein thrombus  with multiple collateral vessels about the splenic hilum, gastroesophageal region, and right upper quadrant, increased from comparison CT 04/29/2025.  Scratch     Osseous structures demonstrate no evidence for acute fracture or osseous destructive lesion.     Degenerative change as well as postoperative change including posterior instrumented fusion L5-S1.  Multiple syndesmophytes and fusion of the bilateral sacroiliac joints, in keeping with patient's history of ankylosing spondylitis     Impression:     Patient with history of pancreatic adenocarcinoma.  Interval enlargement of large centrally necrotic pancreatic body/tail mass from comparison CT 04/29/2025, as well as interval development of multiple new pulmonary nodules and enlargement of previously described hepatic masses.  Findings overall remain concerning for progression of disease.  Correlation is advised.     Small bilateral pleural effusions with associated compressive atelectasis.     Mass again encompasses the celiac and superior mesenteric arteries with increase narrowing about the superior mesenteric artery on today's imaging.  There is increased mass effect on the aorta and left renal artery with nonvisualization of the left splenic artery.  Additionally, there is portal vein thrombus with multiple collateral vessels about the right and left upper quadrants, new from comparison CT 04/29/2025.     Multiple prominent peripancreatic and brenda hepatic lymph nodes, for example adjacent to large pancreatic mass measuring 1.7 cm.     Few peripheral oriented hypodensities throughout the visualized spleen, possibly representing contrast bolus timing, however evolving splenic infarcts remain a top differential consideration.     Large volume ascites at the visualized abdomen and pelvis.     This report was flagged in Epic as abnormal.  Assessment and Plan:     1. Overlapping malignant neoplasm of pancreas    2. Metastasis to liver    3. 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 Mucositis    8. Diarrhea, unspecified type    9. Pancreatic insufficiency    10. Cancer related pain    11. Hypotension, unspecified hypotension type    12. Portal vein thrombosis    13. Type 2 diabetes mellitus with diabetic peripheral angiopathy without gangrene, without long-term current use of insulin        1-6  - Mr Pedroza is a 69 yo man with stage IV undifferentiated carcinoma of the pancreatic body/tail, with mets to lymph nodes, liver and lung, MMR-proficient   - BP has been low since celiac block, slowly improving  - NALIFIROX started on 5/30/25, s/p 4 cycles  - I have personally reviewed the CT scan images and compared to the scan from May 2025. Discussed with patient and wife. The pancreatic primary look stable to me. A few liver metastases got smaller. Some liver mets got slightly bigger compared to the CT scan from May 2025. We can try other chemo such as gem/abraxane but I am not confident that alternative chemo will be stronger than NALIRIFOX. I do feel NALIRIFOX has been slowing down the growth of tumor as tumor was rapidly growing within a month before we started chemo.   - c/w NALIFIROX. Increase onivyde to 40 mg/m2. Discussed with him if diarrhea gets worse we need to reduce the dose again  - RTC in 2 weeks for cycle 6  - will review at [facility] for clinical trials.     7.  - try [facility] solution    8.  - better. C/w imodium, lomotil, opium tincture prn    9.  - c/w creon 3 cap tid with meals. Can take 1 cap with snaks.     10.  - better after celiac block  - f/u with palliative medicine    11.  - after celiac block. It is better now.   - c/w IVF with chemo  - continue to hold valsartan  - monitor    12.  - from cancer compression  - c/w eliquis 5 mg bid    13.  - monitor BS    Follow-up:     RTC in 2 weeks.    Knows to call in the interval if any problems arise.    I spent 50 minutes reviewing the chart, interpreting laboratory result and imaging data, coordinating patient's care, with at least 50% of the time on face-to-face counseling.    code applied: patient requires a continuous, longitudinal, and active collaborative plan of care related to this patient's health condition, undifferentiated carcinoma of the pancreas -- the management of which requires the direction of a practitioner with specialized clinical knowledge, skill, and expertise.        Electronically signed by Dar Granados

## 2025-07-28 ENCOUNTER — INFUSION (OUTPATIENT)
Dept: INFUSION THERAPY | Facility: HOSPITAL | Age: 70
End: 2025-07-28
Attending: INTERNAL MEDICINE
Payer: MEDICARE

## 2025-07-28 VITALS
RESPIRATION RATE: 18 BRPM | DIASTOLIC BLOOD PRESSURE: 65 MMHG | TEMPERATURE: 98 F | OXYGEN SATURATION: 98 % | BODY MASS INDEX: 20.93 KG/M2 | SYSTOLIC BLOOD PRESSURE: 101 MMHG | HEIGHT: 67 IN | HEART RATE: 94 BPM | WEIGHT: 133.38 LBS

## 2025-07-28 DIAGNOSIS — C25.8 OVERLAPPING MALIGNANT NEOPLASM OF PANCREAS: Primary | ICD-10-CM

## 2025-07-28 PROCEDURE — 96367 TX/PROPH/DG ADDL SEQ IV INF: CPT

## 2025-07-28 PROCEDURE — 96415 CHEMO IV INFUSION ADDL HR: CPT

## 2025-07-28 PROCEDURE — 25000003 PHARM REV CODE 250: Performed by: INTERNAL MEDICINE

## 2025-07-28 PROCEDURE — 96417 CHEMO IV INFUS EACH ADDL SEQ: CPT

## 2025-07-28 PROCEDURE — 96416 CHEMO PROLONG INFUSE W/PUMP: CPT

## 2025-07-28 PROCEDURE — 96413 CHEMO IV INFUSION 1 HR: CPT

## 2025-07-28 PROCEDURE — 63600175 PHARM REV CODE 636 W HCPCS: Performed by: INTERNAL MEDICINE

## 2025-07-28 PROCEDURE — 96375 TX/PRO/DX INJ NEW DRUG ADDON: CPT

## 2025-07-28 RX ORDER — SODIUM CHLORIDE 9 MG/ML
INJECTION, SOLUTION INTRAVENOUS
Status: COMPLETED | OUTPATIENT
Start: 2025-07-28 | End: 2025-07-28

## 2025-07-28 RX ORDER — ATROPINE SULFATE 0.4 MG/ML
0.4 INJECTION, SOLUTION ENDOTRACHEAL; INTRAMEDULLARY; INTRAMUSCULAR; INTRAVENOUS; SUBCUTANEOUS ONCE AS NEEDED
Status: COMPLETED | OUTPATIENT
Start: 2025-07-28 | End: 2025-07-28

## 2025-07-28 RX ORDER — HEPARIN 100 UNIT/ML
500 SYRINGE INTRAVENOUS
Status: DISCONTINUED | OUTPATIENT
Start: 2025-07-28 | End: 2025-07-28 | Stop reason: HOSPADM

## 2025-07-28 RX ORDER — PROCHLORPERAZINE EDISYLATE 5 MG/ML
5 INJECTION INTRAMUSCULAR; INTRAVENOUS ONCE AS NEEDED
Status: DISCONTINUED | OUTPATIENT
Start: 2025-07-28 | End: 2025-07-28 | Stop reason: HOSPADM

## 2025-07-28 RX ORDER — EPINEPHRINE 0.3 MG/.3ML
0.3 INJECTION SUBCUTANEOUS ONCE AS NEEDED
Status: DISCONTINUED | OUTPATIENT
Start: 2025-07-28 | End: 2025-07-28 | Stop reason: HOSPADM

## 2025-07-28 RX ORDER — DIPHENHYDRAMINE HYDROCHLORIDE 50 MG/ML
50 INJECTION, SOLUTION INTRAMUSCULAR; INTRAVENOUS ONCE AS NEEDED
Status: DISCONTINUED | OUTPATIENT
Start: 2025-07-28 | End: 2025-07-28 | Stop reason: HOSPADM

## 2025-07-28 RX ORDER — SODIUM CHLORIDE 0.9 % (FLUSH) 0.9 %
10 SYRINGE (ML) INJECTION
Status: DISCONTINUED | OUTPATIENT
Start: 2025-07-28 | End: 2025-07-28 | Stop reason: HOSPADM

## 2025-07-28 RX ADMIN — SODIUM CHLORIDE: 9 INJECTION, SOLUTION INTRAVENOUS at 07:07

## 2025-07-28 RX ADMIN — DEXTROSE MONOHYDRATE: 50 INJECTION, SOLUTION INTRAVENOUS at 10:07

## 2025-07-28 RX ADMIN — LEUCOVORIN CALCIUM 650 MG: 200 INJECTION, POWDER, LYOPHILIZED, FOR SOLUTION INTRAMUSCULAR; INTRAVENOUS at 12:07

## 2025-07-28 RX ADMIN — OXALIPLATIN 97 MG: 5 INJECTION, SOLUTION INTRAVENOUS at 10:07

## 2025-07-28 RX ADMIN — SODIUM CHLORIDE 0.25 MG: 9 INJECTION, SOLUTION INTRAVENOUS at 07:07

## 2025-07-28 RX ADMIN — SODIUM CHLORIDE 3240 MG: 9 INJECTION, SOLUTION INTRAVENOUS at 12:07

## 2025-07-28 RX ADMIN — ATROPINE SULFATE 0.4 MG: 0.4 INJECTION, SOLUTION INTRAVENOUS at 08:07

## 2025-07-28 RX ADMIN — SODIUM CHLORIDE 150 MG: 9 INJECTION, SOLUTION INTRAVENOUS at 08:07

## 2025-07-28 RX ADMIN — SODIUM CHLORIDE 64.93 MG: 9 INJECTION, SOLUTION INTRAVENOUS at 08:07

## 2025-07-28 NOTE — PLAN OF CARE
Patient tolerated Nalirifox infusion today with 1 L IVFs. NAD noted. VSS. CADD pump infusing 5fu at 2.2 ml/hr for 46 hrs. Verified by 2 Rns. RTC 7/30/25 @ 1045 for pump dc + OBI. Discharged home and ambulated independently off unit.

## 2025-07-30 ENCOUNTER — INFUSION (OUTPATIENT)
Dept: INFUSION THERAPY | Facility: HOSPITAL | Age: 70
End: 2025-07-30
Attending: INTERNAL MEDICINE
Payer: MEDICARE

## 2025-07-30 ENCOUNTER — TUMOR BOARD CONFERENCE (OUTPATIENT)
Dept: HEMATOLOGY/ONCOLOGY | Facility: CLINIC | Age: 70
End: 2025-07-30
Payer: MEDICARE

## 2025-07-30 VITALS
WEIGHT: 137.88 LBS | RESPIRATION RATE: 18 BRPM | DIASTOLIC BLOOD PRESSURE: 57 MMHG | HEIGHT: 67 IN | OXYGEN SATURATION: 96 % | SYSTOLIC BLOOD PRESSURE: 115 MMHG | TEMPERATURE: 99 F | BODY MASS INDEX: 21.64 KG/M2 | HEART RATE: 99 BPM

## 2025-07-30 DIAGNOSIS — C25.8 OVERLAPPING MALIGNANT NEOPLASM OF PANCREAS: Primary | ICD-10-CM

## 2025-07-30 PROCEDURE — 63600175 PHARM REV CODE 636 W HCPCS: Performed by: INTERNAL MEDICINE

## 2025-07-30 PROCEDURE — 96377 APPLICATON ON-BODY INJECTOR: CPT

## 2025-07-30 PROCEDURE — 25000003 PHARM REV CODE 250: Performed by: INTERNAL MEDICINE

## 2025-07-30 PROCEDURE — A4216 STERILE WATER/SALINE, 10 ML: HCPCS | Performed by: INTERNAL MEDICINE

## 2025-07-30 RX ORDER — SODIUM CHLORIDE 9 MG/ML
INJECTION, SOLUTION INTRAVENOUS
Status: DISCONTINUED | OUTPATIENT
Start: 2025-07-30 | End: 2025-07-30 | Stop reason: HOSPADM

## 2025-07-30 RX ORDER — PROCHLORPERAZINE EDISYLATE 5 MG/ML
5 INJECTION INTRAMUSCULAR; INTRAVENOUS ONCE AS NEEDED
Status: DISCONTINUED | OUTPATIENT
Start: 2025-07-30 | End: 2025-07-30 | Stop reason: HOSPADM

## 2025-07-30 RX ORDER — SODIUM CHLORIDE 0.9 % (FLUSH) 0.9 %
10 SYRINGE (ML) INJECTION
Status: DISCONTINUED | OUTPATIENT
Start: 2025-07-30 | End: 2025-07-30 | Stop reason: HOSPADM

## 2025-07-30 RX ORDER — HEPARIN 100 UNIT/ML
500 SYRINGE INTRAVENOUS
Status: DISCONTINUED | OUTPATIENT
Start: 2025-07-30 | End: 2025-07-30 | Stop reason: HOSPADM

## 2025-07-30 RX ADMIN — Medication 10 ML: at 11:07

## 2025-07-30 RX ADMIN — HEPARIN 500 UNITS: 100 SYRINGE at 11:07

## 2025-07-30 RX ADMIN — PEGFILGRASTIM 6 MG: KIT SUBCUTANEOUS at 11:07

## 2025-07-30 NOTE — PROGRESS NOTES
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 Wu PCTP Providers:     Dr. Neel Quiñonez, Dr. Rubens Garcia, Natalee Marquez, NP, Dr. Radha Hsu    Patient Summary:  Pathology:    Has failed Chemotherapy  Failed chemotherapy: 1. NALIRIFOX started on 5/30/25, s/p 4 cycles > mixed response on 7/2025 imaging -pancreatic primary look stable. A few liver metastases got smaller. Some liver mets got slightly bigger > c/w NALIRIFOX.     Current treatment(s):    ECOG: Restricted in physically strenuous activity but ambulatory and able to carry out work of a light or sedentary nature, e.g., light house work, office work    Molecular Workup:    Tempus  Tempus details: Tempus tissue: KRAS G12D, TP53, CDKN2A copy number loss, CDKN2B copy number loss, KDM6A copny number loss, KRAS copy number gain, MTAP copy number loss, TMB 1.1. ALY.  Tempus blood 5/8/25: KRAS G12D, TP53, TERT, ctDNA 17.0%, TMB 3.8.       Genetic testing:         Board Recommendations:    Standard of care recommendations:     Trial recommendations: Late phase: no late phase trial for Metastatic Pancreatic.  Early phase: no studies.  [facility] referral: No.

## 2025-07-30 NOTE — PLAN OF CARE
Pt came in for pump D/C. Pt tolerated home 5FU. Pt had some complaints of weak, tired, abd soreness, and swelling to both feet/ankles. Dr. Granados notified. Hold IVF today. Proceed with OBI. Port flushed w/ NS and heparin, blood return noted and deaccessed. OBI placed to left arm, patent and secured. Pt education reinforced and explained what to expect in the next couple of days. Pt verbalized understanding.

## 2025-08-01 ENCOUNTER — PATIENT MESSAGE (OUTPATIENT)
Dept: HEMATOLOGY/ONCOLOGY | Facility: CLINIC | Age: 70
End: 2025-08-01
Payer: MEDICARE

## 2025-08-01 DIAGNOSIS — R63.0 ANOREXIA: ICD-10-CM

## 2025-08-01 RX ORDER — CYPROHEPTADINE HYDROCHLORIDE 4 MG/1
4 TABLET ORAL 3 TIMES DAILY
Qty: 90 TABLET | Refills: 11 | Status: SHIPPED | OUTPATIENT
Start: 2025-08-01

## 2025-08-04 ENCOUNTER — OFFICE VISIT (OUTPATIENT)
Dept: CARDIOLOGY | Facility: CLINIC | Age: 70
End: 2025-08-04
Attending: INTERNAL MEDICINE
Payer: MEDICARE

## 2025-08-04 VITALS
DIASTOLIC BLOOD PRESSURE: 54 MMHG | WEIGHT: 134.94 LBS | OXYGEN SATURATION: 98 % | HEART RATE: 119 BPM | BODY MASS INDEX: 21.18 KG/M2 | SYSTOLIC BLOOD PRESSURE: 112 MMHG | HEIGHT: 67 IN

## 2025-08-04 DIAGNOSIS — I65.21 STENOSIS OF RIGHT CAROTID ARTERY: ICD-10-CM

## 2025-08-04 DIAGNOSIS — E78.00 HIGH CHOLESTEROL: ICD-10-CM

## 2025-08-04 DIAGNOSIS — I10 PRIMARY HYPERTENSION: ICD-10-CM

## 2025-08-04 DIAGNOSIS — C25.8 OVERLAPPING MALIGNANT NEOPLASM OF PANCREAS: ICD-10-CM

## 2025-08-04 DIAGNOSIS — I77.1 STENOSIS OF LEFT SUBCLAVIAN ARTERY: ICD-10-CM

## 2025-08-04 DIAGNOSIS — E11.59 TYPE 2 DIABETES MELLITUS WITH OTHER CIRCULATORY COMPLICATION, WITHOUT LONG-TERM CURRENT USE OF INSULIN: ICD-10-CM

## 2025-08-04 PROCEDURE — 3074F SYST BP LT 130 MM HG: CPT | Mod: CPTII,S$GLB,, | Performed by: INTERNAL MEDICINE

## 2025-08-04 PROCEDURE — 3008F BODY MASS INDEX DOCD: CPT | Mod: CPTII,S$GLB,, | Performed by: INTERNAL MEDICINE

## 2025-08-04 PROCEDURE — 1159F MED LIST DOCD IN RCRD: CPT | Mod: CPTII,S$GLB,, | Performed by: INTERNAL MEDICINE

## 2025-08-04 PROCEDURE — 3078F DIAST BP <80 MM HG: CPT | Mod: CPTII,S$GLB,, | Performed by: INTERNAL MEDICINE

## 2025-08-04 PROCEDURE — 99214 OFFICE O/P EST MOD 30 MIN: CPT | Mod: S$GLB,,, | Performed by: INTERNAL MEDICINE

## 2025-08-04 PROCEDURE — 99999 PR PBB SHADOW E&M-EST. PATIENT-LVL IV: CPT | Mod: PBBFAC,,, | Performed by: INTERNAL MEDICINE

## 2025-08-04 PROCEDURE — 1157F ADVNC CARE PLAN IN RCRD: CPT | Mod: CPTII,S$GLB,, | Performed by: INTERNAL MEDICINE

## 2025-08-04 PROCEDURE — 1126F AMNT PAIN NOTED NONE PRSNT: CPT | Mod: CPTII,S$GLB,, | Performed by: INTERNAL MEDICINE

## 2025-08-04 RX ORDER — EZETIMIBE 10 MG/1
10 TABLET ORAL DAILY
Qty: 90 TABLET | Refills: 3 | Status: SHIPPED | OUTPATIENT
Start: 2025-08-04

## 2025-08-04 RX ORDER — ROSUVASTATIN CALCIUM 10 MG/1
10 TABLET, COATED ORAL DAILY
Qty: 90 TABLET | Refills: 3 | Status: SHIPPED | OUTPATIENT
Start: 2025-08-04

## 2025-08-04 NOTE — PROGRESS NOTES
"Subjective:     Gael Pedroza is a 70 y.o. male with hypertension, hypercholesterolemia and diabetes mellitus type 2. He has a "healthy weight" but used to be mildly obese. He has known carotid disease. A carotid duplex study on 4/17/2018 revealed the right internal carotid artery to have a velocity of 2.4 m/s. He had a magnetic resonance angiographic imaging study that confirmed a 70% stenosis. On 6/27/2008 he had a four vessel cerebral angiogram at [facility] that revealed the right internal carotid artery to have a 40% stenosis. The left subclavian had a 30% lesion. He was concerned about the different findings on the tests. On 10/7/2019 he had a carotid duplex which revealed the right internal carotid artery to have severe plaquing with a peak velocity of 2.0 m/s correlating with a stenosis in the 60-70% range. The left internal carotid artery had moderate plaquing with a velocity of 1.1 m/s suggesting a less than 50% narrowing. On 10/6/2020 he had a carotid duplex that revealed the right internal carotid artery to have moderate plaquing with a peak velocity of 1.5 m/s correlating with a stenosis in the 50-60% range. The left internal carotid artery had moderate plaquing with a velocity of 1.1 m/s correlating with a <50% stenosis. In 9/2021 he had a mild case of COVID-19 being fully vaccinated. Some arthritis. In 4/2025 he was diagnosed with metastatic pancreatic cancer and he begun chemotherapy. No exertional chest pain or exertional dyspnea. No palpitations or weak spells. No bleeding. No issues with any of his prescribed medications. Feeling well overall.     Hypertension  This is a chronic problem. The current episode started more than 1 year ago. The problem is unchanged. The problem is controlled (usually 120-130/70-80 mmHg at home). Pertinent negatives include no anxiety, blurred vision, chest pain, headaches, malaise/fatigue, neck pain, orthopnea, palpitations, peripheral edema, PND, shortness " of breath or sweats. There is no history of chronic renal disease.   Hyperlipidemia  This is a chronic problem. The current episode started more than 1 year ago. The problem is controlled. Recent lipid tests were reviewed and are normal. Exacerbating diseases include diabetes and obesity. He has no history of chronic renal disease, hypothyroidism, liver disease or nephrotic syndrome. Pertinent negatives include no chest pain, focal sensory loss, focal weakness, leg pain, myalgias or shortness of breath.     Review of Systems   Constitutional: Positive for decreased appetite. Negative for chills, fever and malaise/fatigue.   HENT:  Negative for nosebleeds.    Eyes:  Negative for blurred vision, double vision, photophobia, vision loss in left eye and vision loss in right eye.   Cardiovascular:  Negative for chest pain, claudication, dyspnea on exertion, irregular heartbeat, leg swelling, near-syncope, orthopnea, palpitations, paroxysmal nocturnal dyspnea and syncope.   Respiratory:  Negative for cough, hemoptysis, shortness of breath and wheezing.    Endocrine: Negative for cold intolerance and heat intolerance.   Hematologic/Lymphatic: Negative for bleeding problem. Does not bruise/bleed easily.   Skin:  Negative for color change and rash.   Musculoskeletal:  Positive for arthritis, back pain and joint pain. Negative for falls, muscle weakness, myalgias and neck pain.   Gastrointestinal:  Positive for abdominal pain. Negative for heartburn, hematemesis, hematochezia, hemorrhoids, jaundice, melena, nausea and vomiting.   Genitourinary:  Negative for dysuria and hematuria.   Neurological:  Negative for dizziness, focal weakness, headaches, light-headedness, loss of balance, numbness, tremors, vertigo and weakness.   Psychiatric/Behavioral:  Negative for altered mental status, depression and memory loss. The patient is not nervous/anxious.    Allergic/Immunologic: Negative for hives and persistent infections.      Current Outpatient Medications on File Prior to Visit   Medication Sig Dispense Refill    ascorbic acid, vitamin C, (VITAMIN C) 500 MG tablet Take 500 mg by mouth once daily.      blood sugar diagnostic (TRUE METRIX GLUCOSE TEST STRIP) Strp 200 each by Misc.(Non-Drug; Combo Route) route Daily. 200 each 0    cholecalciferol, vitamin D3, 1,000 unit capsule Take 1,000 Units by mouth once daily.      cyproheptadine (PERIACTIN) 4 mg tablet Take 1 tablet (4 mg total) by mouth 3 (three) times daily. 90 tablet 11    diphenoxylate-atropine 2.5-0.025 mg (LOMOTIL) 2.5-0.025 mg per tablet Take 1 tablet by mouth 4 (four) times daily as needed for Diarrhea. 120 tablet 1    ezetimibe (ZETIA) 10 mg tablet Take 1 tablet (10 mg total) by mouth once daily. 90 tablet 3    lancets 30 gauge Misc 1 lancet  by Misc.(Non-Drug; Combo Route) route Daily. 200 each 3    LIDOcaine-prilocaine (EMLA) cream Apply topically as needed.      lipase-protease-amylase (CREON) 36,000-114,000- 180,000 unit CpDR Take 3 capsules by mouth 3 (three) times daily with meals. 300 capsule 11    loperamide (IMODIUM) 2 mg capsule Take 2 tablets (4mg) by mouth after first loose stool, 1 tablet every 2 hours until diarrhea free for 12 hours. May take 2 tablets (4mg) by mouth every 4 hours at night. May require more than the package labeling maximum dose of 16mg/day. 30 capsule 11    LORazepam (ATIVAN) 0.5 MG tablet Take 1 tablet (0.5 mg total) by mouth every 8 (eight) hours as needed for Anxiety. 21 tablet 0    magic mouthwash diphen/antac/lidoc/nysta Take 10 mLs by mouth 4 (four) times daily. 120 mL 0    metoclopramide HCl (REGLAN) 5 MG tablet Take 1 tablet (5 mg total) by mouth 4 (four) times daily before meals and nightly. Take 30-60 minutes prior to meals. 120 tablet 5    multivitamin with minerals tablet Take 1 tablet by mouth once daily.      OLANZapine (ZYPREXA) 5 MG tablet Take 1 tablet (5 mg total) by mouth every evening. On days 1-3 of each chemotherapy  "cycle. 6 tablet 11    opium tincture 10 mg/mL (morphine) Tinc Take 0.6 mLs (6 mg total) by mouth 4 (four) times daily as needed (diarrhea). (Patient taking differently: Take 6 mg by mouth 4 (four) times daily as needed (diarrhea). Patient taking 0.3ml approx 4 times daily.) 473 mL 0    rosuvastatin (CRESTOR) 10 MG tablet Take 1 tablet (10 mg total) by mouth once daily. 90 tablet 3    tamsulosin (FLOMAX) 0.4 mg Cap TAKE ONE CAPSULE BY MOUTH EVERY DAY 90 capsule 1    XIGDUO XR 5-1,000 mg TAKE TWO TABLETS BY MOUTH EVERY  tablet 1    alcohol swabs PadM Apply 1 each topically as needed. (Patient not taking: Reported on 2025) 100 each 3    [] apixaban (ELIQUIS) 5 mg Tab Take 2 tablets (10 mg total) by mouth 2 (two) times daily for 2 days, THEN 1 tablet (5 mg total) 2 (two) times daily. 188 tablet 0    aspirin (ECOTRIN) 81 MG EC tablet Take 1 tablet (81 mg total) by mouth once daily. (Patient not taking: Reported on 2025) 90 tablet 3    gabapentin (NEURONTIN) 300 MG capsule Take 1 capsule (300 mg total) by mouth every evening. (Patient not taking: Reported on 2025) 30 capsule 2    [Paused] nebivoloL (BYSTOLIC) 5 MG Tab Take 1 tablet (5 mg total) by mouth once daily. (Patient not taking: Reported on 2025) 90 tablet 3    oxyCODONE myristate (XTAMPZA ER) 18 mg CSpT Take 1 capsule (18 mg total) by mouth every 12 (twelve) hours. (Patient not taking: Reported on 2025) 56 capsule 0    prochlorperazine (COMPAZINE) 5 MG tablet Take 1 tablet (5 mg total) by mouth every 6 (six) hours as needed for Nausea. (Patient not taking: Reported on 2025) 20 tablet 11     No current facility-administered medications on file prior to visit.     Objective:     BP (!) 112/54   Pulse (!) 119   Ht 5' 7" (1.702 m)   Wt 61.2 kg (134 lb 14.7 oz)   SpO2 98%   BMI 21.13 kg/m²     Physical Exam  Constitutional:       General: He is not in acute distress.     Appearance: Normal appearance. He is well-developed. He " is ill-appearing. He is not toxic-appearing or diaphoretic.   HENT:      Head: Normocephalic and atraumatic.      Nose: Nose normal.   Eyes:      General:         Right eye: No discharge.         Left eye: No discharge.      Conjunctiva/sclera:      Right eye: Right conjunctiva is not injected.      Left eye: Left conjunctiva is not injected.      Pupils: Pupils are equal.      Right eye: Pupil is round.      Left eye: Pupil is round.   Neck:      Thyroid: No thyromegaly.      Vascular: Carotid bruit (soft bruit on the right side) present. No JVD.      Comments: Bruit over left subclavian artery.   Cardiovascular:      Rate and Rhythm: Normal rate and regular rhythm. No extrasystoles are present.     Chest Wall: PMI is not displaced.      Pulses:           Radial pulses are 2+ on the right side and 2+ on the left side.        Femoral pulses are 2+ on the right side and 2+ on the left side.       Dorsalis pedis pulses are 2+ on the right side and 2+ on the left side.        Posterior tibial pulses are 2+ on the right side and 2+ on the left side.      Heart sounds: S1 normal and S2 normal.      No gallop.   Pulmonary:      Effort: Pulmonary effort is normal.      Breath sounds: Normal breath sounds.   Abdominal:      Palpations: Abdomen is soft.      Tenderness: There is no abdominal tenderness.   Musculoskeletal:      Cervical back: Neck supple.      Right ankle: No swelling, deformity or ecchymosis.      Left ankle: No swelling, deformity or ecchymosis.   Lymphadenopathy:      Head:      Right side of head: No submandibular adenopathy.      Left side of head: No submandibular adenopathy.      Cervical: No cervical adenopathy.   Skin:     General: Skin is warm and dry.      Findings: No rash.   Neurological:      General: No focal deficit present.      Mental Status: He is alert and oriented to person, place, and time. He is not disoriented.      Cranial Nerves: No cranial nerve deficit.   Psychiatric:          Attention and Perception: Attention and perception normal.         Mood and Affect: Mood and affect normal.         Speech: Speech normal.         Behavior: Behavior normal.         Thought Content: Thought content normal.         Cognition and Memory: Cognition and memory normal.         Judgment: Judgment normal.       Assessment:     1. Stenosis of right carotid artery    2. Stenosis of left subclavian artery    3. Primary hypertension    4. High cholesterol    5. Type 2 diabetes mellitus with other circulatory complication, without long-term current use of insulin    6. Overlapping malignant neoplasm of pancreas        Plan:     1. Carotid Artery Stenosis   4/17/2018: Carotid Duplex: SUZANNE: 2.4 m/s - 70-80%. LICA: 1.3 m/s - <50%.   6/1/2018: MRA: SUZANNE: About 70%.   6/27/2018: [facility]: AA, 4V: SUZANNE: 40%. LICA: Mild. Left Subclavian: 30%.   10/7/2019: Carotid Duplex: SUZANNE: Severe plaquing - 2.0 m/s - 60-70%. LICA: Moderate plaquing - 1.1 m/s - <50%.   10/6/2020: Carotid Duplex: SUZANNE: Moderate plaquing - 1.5 m/s - 50-60%. LICA: Moderate plaquing - 1.1 m/s - <50%.   10/1/2021: Carotid Duplex: SUZANNE: Severe plaquing - 1.9 m/s - 60-70%. LICA: Severe plaquing - 1.1 m/s - <50%.   10/20/2022: Carotid Duplex: SUZANNE: Moderate plaquing - 1.5 m/s - 50-60%. LICA: Moderate plaquing - 1.1 m/s - <50%.    2/26/2024: Carotid Duplex: SUZANNE: Severe plaquing - 1.3 m/s - 50-60%. LICA: Severe plaquing - 1.4 m/s - 50-60%.   2/19/2025: Carotid Duplex: SUZANNE: Severe plaquing - 2.2 m/s - 60-70%. LICA: Severe plaquing - 1.6 m/s - 50-60%.   Off aspirin 81 mg Q24.   No further carotid duplex in setting of cancer of the pancreas.    2. Subclavian Artery Disease   6/27/2018: [facility]: AA, 4V: Left Subclavian: 30%.   Addressing all risk factors.   Asymptomatic.   Off aspirin 81 mg Q24.    3. Hypertension   2012: Diagnosed.   2025: Valsartan 160 mg Q24 and nebivolol 5 mg Q24 were discontinued in the setting of metastatic pancreatic cancer..   Keeping log  at home.   Well controlled.    4. Hypercholesterolemia   2012: Began statin.   Felt he had muscle and joint pain on rosuvastatin 20 mg Q24.   On rosuvastatin 10 mg Q24.   6/11/2019: Chol 131. HDL 54. LDL 59. TG 93.   12/11/2019: Chol 151. HDL 52. LDL 82. TG 87.   6/30/2020: Chol 149. HDL 52. LDL 80. TG 85.   On rosuvastatin 10 mg Q24.   10/13/2020: Ezetimibe 10 mg Q24 was begun in addition to rosuvastatin 10 mg Q24 with quite favorable lipid panel but JERICA.    On rosuvastatin 10 mg Q24 and ezetimibe 10 mg Q24.   12/18/2020: Chol 115. HDL 51. LDL 49. TG 77.   12/15/2021: Chol 101. HDL 43. LDL 43. TG 74.   7/1/2022: Chol 106. HDL 49. LDL 45. TG 58.   1/10/2023: Chol 80. HDL 42. LDL 30. TG 38.   12/14/2023: Chol 93. HDL 45. LDL 39. TG 45.   6/13/2024: Chol 98. HDL 51. LDL 37. TG 52.   12/13/2024: Chol 95. HDL 47. LDL 36. TG 59.   On rosuvastatin 10 mg Q24 and ezetimibe 10 mg Q24.   Very favorable lipid panel.    5. Diabetes Mellitus, Type 2   2019: Diagnosed. Complications: JERICA & PAD. Medications: Oral agent.   On dapagliflozin 5 mg/metformin 1,000 mg 2 tabs Q24.   12/15/2021: HgbA1C 8.0%.   7/1/2022: HbgA1C 9.1%.   1/10/2023: HgbA1C 6.0%.   6/13/2024: HgbA1C 6.4%.   12/13/2024: HgbAC 6.9%.    6. Overweight   9/9/2019: Weight 88 kg. BMI 31.   10/14/2021: Weight 85 kg. BMI 28.   10/18/2022: Weight 79 kg. BMI 26.   2/12/2024: Weight 75 kg. BMI 25.    7. Pancreatic Cancer   4/2025: Diagnosed with metastatic pancreatic cancer.     8. History of Severe Acute Respiratory Syndrome - Corona Virus 2 Infection   9/2021: Mild course. Fully vaccinated. Received antibody.    9. Primary Care   Dr. Lino Steele.    F/u 6 months.    Raquel Elise M.D.

## 2025-08-05 ENCOUNTER — OFFICE VISIT (OUTPATIENT)
Dept: PALLIATIVE MEDICINE | Facility: CLINIC | Age: 70
End: 2025-08-05
Payer: MEDICARE

## 2025-08-05 VITALS
HEIGHT: 68 IN | OXYGEN SATURATION: 93 % | DIASTOLIC BLOOD PRESSURE: 80 MMHG | TEMPERATURE: 97 F | WEIGHT: 132.94 LBS | HEART RATE: 110 BPM | SYSTOLIC BLOOD PRESSURE: 110 MMHG | RESPIRATION RATE: 18 BRPM | BODY MASS INDEX: 20.15 KG/M2

## 2025-08-05 DIAGNOSIS — C25.9 PANCREATIC CANCER METASTASIZED TO INTRA-ABDOMINAL LYMPH NODE: ICD-10-CM

## 2025-08-05 DIAGNOSIS — F41.0 ANXIETY DISORDER DUE TO GENERAL MEDICAL CONDITION WITH PANIC ATTACK: ICD-10-CM

## 2025-08-05 DIAGNOSIS — K86.89 PANCREATIC MASS: ICD-10-CM

## 2025-08-05 DIAGNOSIS — R60.0 LEG EDEMA: Primary | ICD-10-CM

## 2025-08-05 DIAGNOSIS — C77.2 PANCREATIC CANCER METASTASIZED TO INTRA-ABDOMINAL LYMPH NODE: ICD-10-CM

## 2025-08-05 DIAGNOSIS — F06.4 ANXIETY DISORDER DUE TO GENERAL MEDICAL CONDITION WITH PANIC ATTACK: ICD-10-CM

## 2025-08-05 PROCEDURE — 1123F ACP DISCUSS/DSCN MKR DOCD: CPT | Mod: CPTII,S$GLB,, | Performed by: STUDENT IN AN ORGANIZED HEALTH CARE EDUCATION/TRAINING PROGRAM

## 2025-08-05 PROCEDURE — 3079F DIAST BP 80-89 MM HG: CPT | Mod: CPTII,S$GLB,, | Performed by: STUDENT IN AN ORGANIZED HEALTH CARE EDUCATION/TRAINING PROGRAM

## 2025-08-05 PROCEDURE — 3288F FALL RISK ASSESSMENT DOCD: CPT | Mod: CPTII,S$GLB,, | Performed by: STUDENT IN AN ORGANIZED HEALTH CARE EDUCATION/TRAINING PROGRAM

## 2025-08-05 PROCEDURE — 1101F PT FALLS ASSESS-DOCD LE1/YR: CPT | Mod: CPTII,S$GLB,, | Performed by: STUDENT IN AN ORGANIZED HEALTH CARE EDUCATION/TRAINING PROGRAM

## 2025-08-05 PROCEDURE — 3074F SYST BP LT 130 MM HG: CPT | Mod: CPTII,S$GLB,, | Performed by: STUDENT IN AN ORGANIZED HEALTH CARE EDUCATION/TRAINING PROGRAM

## 2025-08-05 PROCEDURE — 1126F AMNT PAIN NOTED NONE PRSNT: CPT | Mod: CPTII,S$GLB,, | Performed by: STUDENT IN AN ORGANIZED HEALTH CARE EDUCATION/TRAINING PROGRAM

## 2025-08-05 PROCEDURE — 99215 OFFICE O/P EST HI 40 MIN: CPT | Mod: S$GLB,,, | Performed by: STUDENT IN AN ORGANIZED HEALTH CARE EDUCATION/TRAINING PROGRAM

## 2025-08-05 PROCEDURE — 3008F BODY MASS INDEX DOCD: CPT | Mod: CPTII,S$GLB,, | Performed by: STUDENT IN AN ORGANIZED HEALTH CARE EDUCATION/TRAINING PROGRAM

## 2025-08-05 PROCEDURE — 99999 PR PBB SHADOW E&M-EST. PATIENT-LVL IV: CPT | Mod: PBBFAC,,, | Performed by: STUDENT IN AN ORGANIZED HEALTH CARE EDUCATION/TRAINING PROGRAM

## 2025-08-05 RX ORDER — OXYCODONE 18 MG/1
18 CAPSULE, EXTENDED RELEASE ORAL EVERY 12 HOURS
Qty: 56 CAPSULE | Refills: 0 | Status: SHIPPED | OUTPATIENT
Start: 2025-08-07 | End: 2025-09-04

## 2025-08-05 RX ORDER — LORAZEPAM 0.5 MG/1
0.5 TABLET ORAL EVERY 8 HOURS PRN
Qty: 21 TABLET | Refills: 0 | Status: CANCELLED | OUTPATIENT
Start: 2025-08-05 | End: 2025-08-12

## 2025-08-05 RX ORDER — OXYCODONE 18 MG/1
18 CAPSULE, EXTENDED RELEASE ORAL EVERY 12 HOURS
Qty: 56 CAPSULE | Refills: 0 | Status: SHIPPED | OUTPATIENT
Start: 2025-09-04 | End: 2025-10-02

## 2025-08-05 RX ORDER — HYDROCHLOROTHIAZIDE 12.5 MG/1
12.5 TABLET ORAL DAILY PRN
Qty: 30 TABLET | Refills: 5 | Status: SHIPPED | OUTPATIENT
Start: 2025-08-05 | End: 2026-08-05

## 2025-08-06 NOTE — PROGRESS NOTES
PROGRESS NOTE    Subjective:       Patient ID: Gael Pedroza is a 70 y.o. male.    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 LAY  Tempus blood 5/8/25: KRAS G12D, TP53, TERT, ctDNA 17.0%, TMB 3.8    Oncologic History:  1.Mr Pedroza is a 71 yo man with carotic artery stenosis, left ventricular diastolic dysfunction, HTN, stenosis of left subclavian artery, T2DM, who first saw me on 5/8/25 for further management of pancreatic cancer. He presents with worsening of his chronic back pain in Jan 2025. He was found to have a pancreatic mass on CT lumbar spine 4/24/25. C/A/P on 4/29/25 showed 9.4 cm mass involving the pancreatic body and tail highly concerning for adenocarcinoma. It abuts the stomach, duodenum, proximal jejunum, and left adrenal gland without convincing invasion. It encases the celiac, common hepatic, and superior mesenteric arteries which remain patent. It encases the splenic artery which is moderately narrowed. It abuts the aorta and left renal artery which remain patent. Occludes the splenic and superior mesenteric veins, with nonocclusive thrombus extending into the main portal vein. There are portosystemic collaterals in the upper abdomen. Enlarged periportal lymph node concerning for metastasis. Multiple liver metastases measuring up to 2.8 cm. Few solid pulmonary nodules measuring up to 0.7 cm concerning for metastases. Upper EUS biopsy of the pancreatic tail mass on 4/30/25 showed undifferentiated carcinoma. MMR-proficient, ALY. Comment: Histologic sections reveal  "pleomorphic tumor cells with bizarre nuclear atypia, moderately abundant amphophilic cytoplasm, and multinucleated forms. Rare cells that may represent osteoclast-like giant cells are noted. Background necrosis is readily apparent. Tumor cells exhibits extensive positivity for pankeratin AE1/3 by immunohistochemistry. BerEP4 is negative. The morphologic features and immunoprofile support the above interpretation. Tempus pending. On 25, CA 19-9 normal at 33.8. PGX: DPYD normal metabolizer, UGT1A1 intermediate metabolizer. Patient noted 30 lbs weight loss over the past 3 months, anorexia, pain better on current pain regimen. He has a daughter and a son. Accompanied by wife, brother and sister-in-law today.   Discussed palliative NALIRIFOX.   2. NALIFIROX started on 25, s/p 4 cycles    Interval History:   - Mr Pedroza returns for follow up for his pancreatic cancer  - he is scheduled for cycle #6 of NALIRIFOX on 25 - 25. Mouth sores have resolved. Diarrhea is stable. Slightly worsened abdominal pain.     ECO    ROS:   Review of Systems   Constitutional:  Positive for fatigue.   HENT:  Negative for sore throat.    Eyes:  Negative for visual disturbance.   Respiratory:  Negative for shortness of breath.    Cardiovascular:  Negative for chest pain.   Gastrointestinal:  Positive for diarrhea. Negative for abdominal pain.   Genitourinary:  Negative for dysuria.   Musculoskeletal:  Negative for back pain.   Skin:  Negative for rash.   Neurological:  Positive for weakness. Negative for headaches.   Hematological:  Negative for adenopathy.   Psychiatric/Behavioral:  The patient is not nervous/anxious.         Objective:     /65 (BP Location: Right arm, Patient Position: Sitting)   Pulse 109   Temp 98.3 °F (36.8 °C) (Oral)   Resp 18   Ht 5' 7" (1.702 m)   Wt 58.7 kg (129 lb 6.6 oz)   SpO2 98%   BMI 20.27 kg/m²         Physical Examination:   Vital signs reviewed. /56  General: well " hydrated, well developed, in no acute distress  HEENT: normocephalic, EOMI, anicteric sclerae  Neck: supple, no JVD, thyromegaly, cervical or supraclavicular lymphadenopathy  Lungs: clear breath sounds bilaterally, no wheezing, rales, or rhonchi  Heart: RRR, no M/R/G  Abdomen: soft, no tenderness, non-distended, no hepatosplenomegaly, mass, or hernia. BS present  Extremities: no clubbing, cyanosis, or edema  Skin: no rash  Neuro: alert and oriented x 4, no focal neuro deficit  Psych: pleasant and appropriate mood and affect    Laboratory Data:  Labs have been reviewed.    Lab Results   Component Value Date    WBC 11.06 07/24/2025    HGB 9.2 (L) 07/24/2025    HCT 28.3 (L) 07/24/2025     (H) 07/24/2025     07/24/2025           Imaging Data:  CT C/A/P 5/20/25:  Impression:     No evidence of acute pulmonary embolus to the proximal segmental level.     Similar to mildly worse pancreatic mass with vascular interfaces discussed above.  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     New small left pleural effusion with ground-glass opacities in the left lung base.  Similar to minimally increased size of left lower lobe pulmonary nodule.     Similar to mildly worse hepatic metastatic disease and enlarged periportal lymph node.     New/worse anasarca and small volume abdominopelvic free fluid measuring slightly denser than simple fluid.     CT C/A/P 7/23/25:  COMPARISON:  CTA chest non coronary 05/20/2025, CT abdomen pelvis with contrast 05/20/2025     FINDINGS:  Structures at the base of the neck are unremarkable.     The thyroid is normal size with no focal lesions.     Right-sided chest port with tip in similar position.     Axillary and thoracic soft tissues are unremarkable.     The heart is normal size with no pericardial effusion.  Coronary artery calcification.     Pulmonary vasculature  distribute normally.  No evidence for pulmonary artery thromboembolism.     Left-sided aortic arch with mild calcific atherosclerosis.     No hilar or mediastinal lymphadenopathy.     Airways are patent.     Lungs are symmetrically expanded.  Small bilateral pleural effusions with associated compressive atelectasis.  No focal consolidation or pneumothorax.  Few scattered pulmonary nodules, for example about the right lung measuring 4 mm (series 6, image 235), about the inferior lingula measuring 7 mm (series 6, image 289), about the right lung base measuring 6 mm (series 6, image 343), and about the superior segment left lower lobe measuring 5 mm (series 6, image 269).  Several nodules are new from comparison CT 5/25, for example about the superior segment right lower lobe measuring 6 mm (series 6, image 232).     Liver is normal in size, demonstrating diffusely decreased attenuation, suggesting hepatic steatosis.  Multiple hepatic masses, the largest in the right hepatic lobe measuring approximately 5.1 cm (series 3, image 36), previously measuring approximately 3.1 cm.  Some hepatic masses have decreased in size, for example posterior right hepatic lobe measuring approximately 3.1 cm, previously measuring 3.7 cm (series 3, image 50).     Gallbladder is unremarkable.  Similar prominence of the common bile duct with mild central intrahepatic ductal dilatation.     The spleen demonstrates multiple small peripheral hypodensities, nonspecific.     Adrenal glands are unremarkable.     Redemonstration of a centrally necrotic pancreatic body/tail mass measuring on the order of 10.3 x 11.6 x 10.5 cm (series 3, image 52), previously measuring 6.8 x 9.4 x 7.1 cm.  Mass again extends about the lesser sac with abutment of the stomach and left adrenal gland.  Splenic vein is not well visualized.  Mass again encompasses the celiac artery, common hepatic artery, and superior mesenteric artery.  There is worsening mass effect on  the aorta and left renal artery.  Celiac artery appears patent.  Splenic artery not well visualized.  Increased narrowing of the superior mesenteric artery from comparison imaging.     Multiple enlarged brenda hepatic and peripancreatic lymph nodes, for example adjacent to large mass measuring 1.7 cm (series 3, image 52).     Kidneys are normal in size and location, concentrating contrast appropriately.  Ureters not well visualized.  Urinary bladder is unremarkable.     Prostatic calcifications.     The stomach and duodenum are unremarkable.  Loops of small bowel demonstrate no evidence for obstructive or inflammatory process.  Appendix not well visualized.  The colon demonstrates no significant abnormality.  Few scattered colonic diverticula.     Large volume ascites throughout the visualized abdomen and pelvis.  No free intra-abdominal air.     Moderate calcific atherosclerosis about the visualized aorta.  Main portal vein thrombus with multiple collateral vessels about the splenic hilum, gastroesophageal region, and right upper quadrant, increased from comparison CT 04/29/2025.  Scratch     Osseous structures demonstrate no evidence for acute fracture or osseous destructive lesion.     Degenerative change as well as postoperative change including posterior instrumented fusion L5-S1.  Multiple syndesmophytes and fusion of the bilateral sacroiliac joints, in keeping with patient's history of ankylosing spondylitis     Impression:     Patient with history of pancreatic adenocarcinoma.  Interval enlargement of large centrally necrotic pancreatic body/tail mass from comparison CT 04/29/2025, as well as interval development of multiple new pulmonary nodules and enlargement of previously described hepatic masses.  Findings overall remain concerning for progression of disease.  Correlation is advised.     Small bilateral pleural effusions with associated compressive atelectasis.     Mass again encompasses the celiac and  superior mesenteric arteries with increase narrowing about the superior mesenteric artery on today's imaging.  There is increased mass effect on the aorta and left renal artery with nonvisualization of the left splenic artery.  Additionally, there is portal vein thrombus with multiple collateral vessels about the right and left upper quadrants, new from comparison CT 04/29/2025.     Multiple prominent peripancreatic and brenda hepatic lymph nodes, for example adjacent to large pancreatic mass measuring 1.7 cm.     Few peripheral oriented hypodensities throughout the visualized spleen, possibly representing contrast bolus timing, however evolving splenic infarcts remain a top differential consideration.     Large volume ascites at the visualized abdomen and pelvis.     This report was flagged in Epic as abnormal.  Assessment and Plan:     1. Overlapping malignant neoplasm of pancreas    2. 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 Secondary malignant neoplasm of liver    4. Secondary malignant neoplasm of both lungs    5. Immunodeficiency due to drug therapy    6. Mucositis    7. Chronic neoplasm-related pain    8. Pancreatic insufficiency    9. Chemotherapy-induced nausea and vomiting    10. Portal vein thrombosis    11. Type 2 diabetes mellitus with diabetic peripheral angiopathy without gangrene, without long-term current use of insulin        Metastatic pancreatic cancer / immunodeficiency due to drugs  - Mr Pedroza is a 69 yo man with stage IV undifferentiated carcinoma of the pancreatic body/tail, with mets to lymph nodes, liver and lung, MMR-proficient   - BP has been low since celiac block, slowly improving  - NALIFIROX started on 5/30/25, s/p 5 cycles  - I have personally reviewed the CT scan images and compared to the scan from May 2025. Discussed with patient and wife. The pancreatic primary look stable to me. A few liver metastases got smaller. Some liver mets got slightly bigger  compared to the CT scan from May 2025. We can try other chemo such as gem/abraxane but I am not confident that alternative chemo will be stronger than NALIRIFOX. I do feel NALIRIFOX has been slowing down the growth of tumor as tumor was rapidly growing within a month before we started chemo.   - I reviewed the CT scan (7/23/25) with him/his wife. It appears his case was discussed at conference, and the team felt he had a mixed response to therapy. Plan is to continue NALIFIROX  - c/w NALIFIROX. Increased onivyde with cycle #5 to 40 mg/m2. Discussed with him if diarrhea gets worse we need to reduce the dose again  - follow up labs today. Once reviewed, I will sign orders for cycle #6 of NALIFIROX, scheduled on 8/11/25 - 8/13/25.  - RTC in 2 weeks in preparation for cycle #7 of NALIFIROX.    Addendum: Labs have been reviewed. Okay to proceed with cycle #6 of NALIFIROX, scheduled on 8/11/25 - 8/13/25.    2. Chemo-induced mucositis  - improved. Continue tapia's solution and sal water swishes.  - try Duke's solution    3. Chemo-induced diarrhea  - stable. Continue loperamide, lomotil, opium tincture as needed.     4. Pancreatic insufficiency  - stable. Continue creon 3 cap tid with meals. Can take 1 cap with snaks.     5. Neoplasm-related pain.  - starting to worsen (it was better after celiac block)  - continue opioid medication  - f/u with palliative medicine    6. Portal vein thrombosis  - from cancer compression  - c/w eliquis 5 mg bid     7. Chemo-induced nausea/vomiting  - controlled symptoms  - continue compazine    8. Type 2 diabetes  - last hemoglobin A1c was 6.9%  - continue xigduo    Follow-up:          Visit today included increased complexity associated with the care of the episodic problem neoplasm-related pain addressed and managing the longitudinal care of the patient due to the serious and/or complex managed problem(s) pancreatic cancer.      - RTC in 2 weeks in preparation for cycle #7 of  NALIFIROX.    Phu Anderson M.D.  Hematology/Oncology  [facility] - [address], [address]  [place] [address]  Phone: [phone]  Fax: [phone]

## 2025-08-07 RX ORDER — APIXABAN 5 MG/1
TABLET, FILM COATED ORAL
Qty: 180 TABLET | Refills: 3 | Status: SHIPPED | OUTPATIENT
Start: 2025-08-07

## 2025-08-08 ENCOUNTER — LAB VISIT (OUTPATIENT)
Dept: LAB | Facility: HOSPITAL | Age: 70
End: 2025-08-08
Attending: INTERNAL MEDICINE
Payer: MEDICARE

## 2025-08-08 ENCOUNTER — OFFICE VISIT (OUTPATIENT)
Dept: HEMATOLOGY/ONCOLOGY | Facility: CLINIC | Age: 70
End: 2025-08-08
Payer: MEDICARE

## 2025-08-08 VITALS
RESPIRATION RATE: 18 BRPM | DIASTOLIC BLOOD PRESSURE: 65 MMHG | HEIGHT: 67 IN | WEIGHT: 129.44 LBS | HEART RATE: 109 BPM | SYSTOLIC BLOOD PRESSURE: 134 MMHG | TEMPERATURE: 98 F | OXYGEN SATURATION: 98 % | BODY MASS INDEX: 20.31 KG/M2

## 2025-08-08 DIAGNOSIS — C78.7 SECONDARY MALIGNANT NEOPLASM OF LIVER: ICD-10-CM

## 2025-08-08 DIAGNOSIS — C78.02 SECONDARY MALIGNANT NEOPLASM OF BOTH LUNGS: ICD-10-CM

## 2025-08-08 DIAGNOSIS — R11.2 CHEMOTHERAPY-INDUCED NAUSEA AND VOMITING: ICD-10-CM

## 2025-08-08 DIAGNOSIS — K12.30 MUCOSITIS: ICD-10-CM

## 2025-08-08 DIAGNOSIS — I81 PORTAL VEIN THROMBOSIS: ICD-10-CM

## 2025-08-08 DIAGNOSIS — D84.821 IMMUNODEFICIENCY DUE TO DRUG THERAPY: ICD-10-CM

## 2025-08-08 DIAGNOSIS — C77.8 SECONDARY MALIGNANT NEOPLASM OF LYMPH NODES OF MULTIPLE SITES: ICD-10-CM

## 2025-08-08 DIAGNOSIS — T45.1X5A CHEMOTHERAPY-INDUCED NAUSEA AND VOMITING: ICD-10-CM

## 2025-08-08 DIAGNOSIS — C25.8 OVERLAPPING MALIGNANT NEOPLASM OF PANCREAS: Primary | ICD-10-CM

## 2025-08-08 DIAGNOSIS — K86.89 PANCREATIC INSUFFICIENCY: ICD-10-CM

## 2025-08-08 DIAGNOSIS — Z79.899 IMMUNODEFICIENCY DUE TO DRUG THERAPY: ICD-10-CM

## 2025-08-08 DIAGNOSIS — E87.6 HYPOKALEMIA: ICD-10-CM

## 2025-08-08 DIAGNOSIS — E11.51 TYPE 2 DIABETES MELLITUS WITH DIABETIC PERIPHERAL ANGIOPATHY WITHOUT GANGRENE, WITHOUT LONG-TERM CURRENT USE OF INSULIN: ICD-10-CM

## 2025-08-08 DIAGNOSIS — C78.01 SECONDARY MALIGNANT NEOPLASM OF BOTH LUNGS: ICD-10-CM

## 2025-08-08 DIAGNOSIS — G89.3 CHRONIC NEOPLASM-RELATED PAIN: ICD-10-CM

## 2025-08-08 DIAGNOSIS — C25.8 OVERLAPPING MALIGNANT NEOPLASM OF PANCREAS: ICD-10-CM

## 2025-08-08 LAB
ABSOLUTE NEUTROPHIL MANUAL (OHS): 22.3 K/UL (ref 1.8–7.7)
ALBUMIN SERPL BCP-MCNC: 3.3 G/DL (ref 3.5–5.2)
ALP SERPL-CCNC: 192 UNIT/L (ref 40–150)
ALT SERPL W/O P-5'-P-CCNC: 9 UNIT/L (ref 10–44)
ANION GAP (OHS): 8 MMOL/L (ref 8–16)
ANISOCYTOSIS BLD QL SMEAR: SLIGHT
AST SERPL-CCNC: 24 UNIT/L (ref 11–45)
BILIRUB SERPL-MCNC: 0.4 MG/DL (ref 0.1–1)
BUN SERPL-MCNC: 17 MG/DL (ref 8–23)
CALCIUM SERPL-MCNC: 9.4 MG/DL (ref 8.7–10.5)
CANCER AG19-9 SERPL-ACNC: 14.9 U/ML
CHLORIDE SERPL-SCNC: 101 MMOL/L (ref 95–110)
CO2 SERPL-SCNC: 29 MMOL/L (ref 23–29)
CREAT SERPL-MCNC: 0.6 MG/DL (ref 0.5–1.4)
ERYTHROCYTE [DISTWIDTH] IN BLOOD BY AUTOMATED COUNT: 16.9 % (ref 11.5–14.5)
GFR SERPLBLD CREATININE-BSD FMLA CKD-EPI: >60 ML/MIN/1.73/M2
GLUCOSE SERPL-MCNC: 214 MG/DL (ref 70–110)
HCT VFR BLD AUTO: 30.4 % (ref 40–54)
HGB BLD-MCNC: 9.7 GM/DL (ref 14–18)
HYPOCHROMIA BLD QL SMEAR: ABNORMAL
LYMPHOCYTES NFR BLD MANUAL: 11 % (ref 18–48)
MCH RBC QN AUTO: 32.4 PG (ref 27–31)
MCHC RBC AUTO-ENTMCNC: 31.9 G/DL (ref 32–36)
MCV RBC AUTO: 102 FL (ref 82–98)
MONOCYTES NFR BLD MANUAL: 2 % (ref 4–15)
NEUTROPHILS NFR BLD MANUAL: 87 % (ref 38–73)
NUCLEATED RBC (/100WBC) (OHS): 0 /100 WBC
PLATELET # BLD AUTO: 251 K/UL (ref 150–450)
PLATELET BLD QL SMEAR: ABNORMAL
PMV BLD AUTO: 10.8 FL (ref 9.2–12.9)
POLYCHROMASIA BLD QL SMEAR: ABNORMAL
POTASSIUM SERPL-SCNC: 3.4 MMOL/L (ref 3.5–5.1)
PROT SERPL-MCNC: 6.6 GM/DL (ref 6–8.4)
RBC # BLD AUTO: 2.99 M/UL (ref 4.6–6.2)
SODIUM SERPL-SCNC: 138 MMOL/L (ref 136–145)
WBC # BLD AUTO: 25.59 K/UL (ref 3.9–12.7)

## 2025-08-08 PROCEDURE — 86301 IMMUNOASSAY TUMOR CA 19-9: CPT

## 2025-08-08 PROCEDURE — 36415 COLL VENOUS BLD VENIPUNCTURE: CPT

## 2025-08-08 PROCEDURE — 80053 COMPREHEN METABOLIC PANEL: CPT

## 2025-08-08 PROCEDURE — 85025 COMPLETE CBC W/AUTO DIFF WBC: CPT

## 2025-08-08 PROCEDURE — 99999 PR PBB SHADOW E&M-EST. PATIENT-LVL V: CPT | Mod: PBBFAC,,, | Performed by: INTERNAL MEDICINE

## 2025-08-08 RX ORDER — SODIUM CHLORIDE 9 MG/ML
INJECTION, SOLUTION INTRAVENOUS
Start: 2025-08-13 | End: 2025-08-13

## 2025-08-08 RX ORDER — SODIUM CHLORIDE 0.9 % (FLUSH) 0.9 %
10 SYRINGE (ML) INJECTION
OUTPATIENT
Start: 2025-08-13

## 2025-08-08 RX ORDER — SODIUM CHLORIDE 0.9 % (FLUSH) 0.9 %
10 SYRINGE (ML) INJECTION
OUTPATIENT
Start: 2025-08-11

## 2025-08-08 RX ORDER — SODIUM CHLORIDE AND POTASSIUM CHLORIDE 150; 900 MG/100ML; MG/100ML
INJECTION, SOLUTION INTRAVENOUS CONTINUOUS
Start: 2025-08-11

## 2025-08-08 RX ORDER — PROCHLORPERAZINE EDISYLATE 5 MG/ML
5 INJECTION INTRAMUSCULAR; INTRAVENOUS ONCE AS NEEDED
OUTPATIENT
Start: 2025-08-13

## 2025-08-08 RX ORDER — PROCHLORPERAZINE EDISYLATE 5 MG/ML
5 INJECTION INTRAMUSCULAR; INTRAVENOUS ONCE AS NEEDED
OUTPATIENT
Start: 2025-08-11

## 2025-08-08 RX ORDER — ATROPINE SULFATE 0.4 MG/ML
0.4 INJECTION, SOLUTION ENDOTRACHEAL; INTRAMEDULLARY; INTRAMUSCULAR; INTRAVENOUS; SUBCUTANEOUS ONCE AS NEEDED
OUTPATIENT
Start: 2025-08-11

## 2025-08-08 RX ORDER — SODIUM CHLORIDE 9 MG/ML
INJECTION, SOLUTION INTRAVENOUS
Status: CANCELLED
Start: 2025-08-11 | End: 2025-08-11

## 2025-08-08 RX ORDER — EPINEPHRINE 0.3 MG/.3ML
0.3 INJECTION SUBCUTANEOUS ONCE AS NEEDED
OUTPATIENT
Start: 2025-08-11

## 2025-08-08 RX ORDER — HEPARIN 100 UNIT/ML
500 SYRINGE INTRAVENOUS
OUTPATIENT
Start: 2025-08-13

## 2025-08-08 RX ORDER — DIPHENHYDRAMINE HYDROCHLORIDE 50 MG/ML
50 INJECTION, SOLUTION INTRAMUSCULAR; INTRAVENOUS ONCE AS NEEDED
OUTPATIENT
Start: 2025-08-11

## 2025-08-08 RX ORDER — HEPARIN 100 UNIT/ML
500 SYRINGE INTRAVENOUS
OUTPATIENT
Start: 2025-08-11

## 2025-08-11 ENCOUNTER — TELEPHONE (OUTPATIENT)
Dept: HEMATOLOGY/ONCOLOGY | Facility: CLINIC | Age: 70
End: 2025-08-11
Payer: MEDICARE

## 2025-08-11 ENCOUNTER — INFUSION (OUTPATIENT)
Dept: INFUSION THERAPY | Facility: HOSPITAL | Age: 70
End: 2025-08-11
Attending: INTERNAL MEDICINE
Payer: MEDICARE

## 2025-08-11 VITALS
HEIGHT: 67 IN | DIASTOLIC BLOOD PRESSURE: 68 MMHG | TEMPERATURE: 98 F | SYSTOLIC BLOOD PRESSURE: 125 MMHG | BODY MASS INDEX: 20.31 KG/M2 | OXYGEN SATURATION: 97 % | WEIGHT: 129.44 LBS | RESPIRATION RATE: 18 BRPM | HEART RATE: 117 BPM

## 2025-08-11 DIAGNOSIS — C25.8 OVERLAPPING MALIGNANT NEOPLASM OF PANCREAS: Primary | ICD-10-CM

## 2025-08-11 DIAGNOSIS — E87.6 HYPOKALEMIA: ICD-10-CM

## 2025-08-11 PROCEDURE — 63600175 PHARM REV CODE 636 W HCPCS: Performed by: INTERNAL MEDICINE

## 2025-08-11 PROCEDURE — 25000003 PHARM REV CODE 250: Performed by: INTERNAL MEDICINE

## 2025-08-11 RX ORDER — SODIUM CHLORIDE AND POTASSIUM CHLORIDE 150; 900 MG/100ML; MG/100ML
500 INJECTION, SOLUTION INTRAVENOUS CONTINUOUS
Status: DISPENSED | OUTPATIENT
Start: 2025-08-11 | End: 2025-08-11

## 2025-08-11 RX ORDER — ATROPINE SULFATE 0.4 MG/ML
0.4 INJECTION, SOLUTION ENDOTRACHEAL; INTRAMEDULLARY; INTRAMUSCULAR; INTRAVENOUS; SUBCUTANEOUS ONCE AS NEEDED
Status: COMPLETED | OUTPATIENT
Start: 2025-08-11 | End: 2025-08-11

## 2025-08-11 RX ORDER — HEPARIN 100 UNIT/ML
500 SYRINGE INTRAVENOUS
Status: DISCONTINUED | OUTPATIENT
Start: 2025-08-11 | End: 2025-08-11 | Stop reason: HOSPADM

## 2025-08-11 RX ORDER — PROCHLORPERAZINE EDISYLATE 5 MG/ML
5 INJECTION INTRAMUSCULAR; INTRAVENOUS ONCE AS NEEDED
Status: DISCONTINUED | OUTPATIENT
Start: 2025-08-11 | End: 2025-08-11 | Stop reason: HOSPADM

## 2025-08-11 RX ORDER — EPINEPHRINE 0.3 MG/.3ML
0.3 INJECTION SUBCUTANEOUS ONCE AS NEEDED
Status: DISCONTINUED | OUTPATIENT
Start: 2025-08-11 | End: 2025-08-11 | Stop reason: HOSPADM

## 2025-08-11 RX ORDER — SODIUM CHLORIDE 0.9 % (FLUSH) 0.9 %
10 SYRINGE (ML) INJECTION
Status: DISCONTINUED | OUTPATIENT
Start: 2025-08-11 | End: 2025-08-11 | Stop reason: HOSPADM

## 2025-08-11 RX ORDER — DIPHENHYDRAMINE HYDROCHLORIDE 50 MG/ML
50 INJECTION, SOLUTION INTRAMUSCULAR; INTRAVENOUS ONCE AS NEEDED
Status: DISCONTINUED | OUTPATIENT
Start: 2025-08-11 | End: 2025-08-11 | Stop reason: HOSPADM

## 2025-08-11 RX ADMIN — ATROPINE SULFATE 0.4 MG: 0.4 INJECTION, SOLUTION INTRAVENOUS at 12:08

## 2025-08-11 RX ADMIN — POTASSIUM CHLORIDE 500 ML/HR: 2 INJECTION, SOLUTION, CONCENTRATE INTRAVENOUS at 09:08

## 2025-08-11 RX ADMIN — SODIUM CHLORIDE 0.25 MG: 9 INJECTION, SOLUTION INTRAVENOUS at 12:08

## 2025-08-11 RX ADMIN — OXALIPLATIN 97 MG: 5 INJECTION, SOLUTION INTRAVENOUS at 02:08

## 2025-08-11 RX ADMIN — SODIUM CHLORIDE 3240 MG: 9 INJECTION, SOLUTION INTRAVENOUS at 04:08

## 2025-08-11 RX ADMIN — SODIUM CHLORIDE 64.93 MG: 9 INJECTION, SOLUTION INTRAVENOUS at 12:08

## 2025-08-11 RX ADMIN — SODIUM CHLORIDE 150 MG: 9 INJECTION, SOLUTION INTRAVENOUS at 11:08

## 2025-08-11 RX ADMIN — LEUCOVORIN CALCIUM 650 MG: 200 INJECTION, POWDER, LYOPHILIZED, FOR SOLUTION INTRAMUSCULAR; INTRAVENOUS at 04:08

## 2025-08-11 RX ADMIN — DEXTROSE MONOHYDRATE: 5 INJECTION, SOLUTION INTRAVENOUS at 02:08

## 2025-08-11 RX ADMIN — SODIUM CHLORIDE: 9 INJECTION, SOLUTION INTRAVENOUS at 08:08

## 2025-08-11 RX ADMIN — PROCHLORPERAZINE EDISYLATE 5 MG: 5 INJECTION INTRAMUSCULAR; INTRAVENOUS at 04:08

## 2025-08-12 ENCOUNTER — TELEPHONE (OUTPATIENT)
Dept: HEMATOLOGY/ONCOLOGY | Facility: CLINIC | Age: 70
End: 2025-08-12
Payer: MEDICARE

## 2025-08-13 ENCOUNTER — INFUSION (OUTPATIENT)
Dept: INFUSION THERAPY | Facility: HOSPITAL | Age: 70
End: 2025-08-13
Attending: INTERNAL MEDICINE
Payer: MEDICARE

## 2025-08-13 VITALS
DIASTOLIC BLOOD PRESSURE: 74 MMHG | RESPIRATION RATE: 18 BRPM | HEART RATE: 120 BPM | SYSTOLIC BLOOD PRESSURE: 122 MMHG | TEMPERATURE: 98 F | OXYGEN SATURATION: 97 %

## 2025-08-13 DIAGNOSIS — E87.6 HYPOKALEMIA: ICD-10-CM

## 2025-08-13 DIAGNOSIS — C25.8 OVERLAPPING MALIGNANT NEOPLASM OF PANCREAS: Primary | ICD-10-CM

## 2025-08-13 PROCEDURE — 96377 APPLICATON ON-BODY INJECTOR: CPT

## 2025-08-13 PROCEDURE — A4216 STERILE WATER/SALINE, 10 ML: HCPCS | Performed by: INTERNAL MEDICINE

## 2025-08-13 PROCEDURE — 25000003 PHARM REV CODE 250: Performed by: INTERNAL MEDICINE

## 2025-08-13 PROCEDURE — 63600175 PHARM REV CODE 636 W HCPCS: Mod: JZ,TB | Performed by: INTERNAL MEDICINE

## 2025-08-13 RX ORDER — HEPARIN 100 UNIT/ML
500 SYRINGE INTRAVENOUS
Status: DISCONTINUED | OUTPATIENT
Start: 2025-08-13 | End: 2025-08-13 | Stop reason: HOSPADM

## 2025-08-13 RX ORDER — SODIUM CHLORIDE 0.9 % (FLUSH) 0.9 %
10 SYRINGE (ML) INJECTION
Status: DISCONTINUED | OUTPATIENT
Start: 2025-08-13 | End: 2025-08-13 | Stop reason: HOSPADM

## 2025-08-13 RX ORDER — SODIUM CHLORIDE 9 MG/ML
INJECTION, SOLUTION INTRAVENOUS
Status: DISCONTINUED | OUTPATIENT
Start: 2025-08-13 | End: 2025-08-13 | Stop reason: HOSPADM

## 2025-08-13 RX ORDER — PROCHLORPERAZINE EDISYLATE 5 MG/ML
5 INJECTION INTRAMUSCULAR; INTRAVENOUS ONCE AS NEEDED
Status: DISCONTINUED | OUTPATIENT
Start: 2025-08-13 | End: 2025-08-13 | Stop reason: HOSPADM

## 2025-08-13 RX ADMIN — HEPARIN 500 UNITS: 100 SYRINGE at 02:08

## 2025-08-13 RX ADMIN — Medication 10 ML: at 02:08

## 2025-08-13 RX ADMIN — PEGFILGRASTIM 6 MG: KIT SUBCUTANEOUS at 02:08

## 2025-08-16 ENCOUNTER — PATIENT MESSAGE (OUTPATIENT)
Dept: HEMATOLOGY/ONCOLOGY | Facility: CLINIC | Age: 70
End: 2025-08-16
Payer: MEDICARE

## 2025-08-18 DIAGNOSIS — C25.8 OVERLAPPING MALIGNANT NEOPLASM OF PANCREAS: Primary | ICD-10-CM

## 2025-08-18 DIAGNOSIS — R13.10 DYSPHAGIA, UNSPECIFIED TYPE: ICD-10-CM

## 2025-08-19 ENCOUNTER — TELEPHONE (OUTPATIENT)
Dept: HEMATOLOGY/ONCOLOGY | Facility: CLINIC | Age: 70
End: 2025-08-19
Payer: MEDICARE

## 2025-08-21 ENCOUNTER — HOSPITAL ENCOUNTER (INPATIENT)
Facility: HOSPITAL | Age: 70
LOS: 1 days | Discharge: HOME | DRG: 444 | End: 2025-08-22
Admitting: INTERNAL MEDICINE
Payer: MEDICARE

## 2025-08-21 ENCOUNTER — CLINICAL SUPPORT (OUTPATIENT)
Dept: ENDOSCOPY | Facility: HOSPITAL | Age: 70
End: 2025-08-21
Attending: INTERNAL MEDICINE
Payer: MEDICARE

## 2025-08-21 ENCOUNTER — OFFICE VISIT (OUTPATIENT)
Dept: HEMATOLOGY/ONCOLOGY | Facility: CLINIC | Age: 70
End: 2025-08-21
Payer: MEDICARE

## 2025-08-21 ENCOUNTER — TELEPHONE (OUTPATIENT)
Dept: ENDOSCOPY | Facility: HOSPITAL | Age: 70
End: 2025-08-21

## 2025-08-21 VITALS
BODY MASS INDEX: 19.58 KG/M2 | SYSTOLIC BLOOD PRESSURE: 107 MMHG | OXYGEN SATURATION: 93 % | TEMPERATURE: 98 F | WEIGHT: 125 LBS | DIASTOLIC BLOOD PRESSURE: 69 MMHG | HEART RATE: 114 BPM

## 2025-08-21 DIAGNOSIS — R13.10 DYSPHAGIA, UNSPECIFIED TYPE: ICD-10-CM

## 2025-08-21 DIAGNOSIS — Z79.899 IMMUNODEFICIENCY DUE TO DRUG THERAPY: ICD-10-CM

## 2025-08-21 DIAGNOSIS — C25.8 OVERLAPPING MALIGNANT NEOPLASM OF PANCREAS: Primary | ICD-10-CM

## 2025-08-21 DIAGNOSIS — K86.89 PANCREATIC INSUFFICIENCY: ICD-10-CM

## 2025-08-21 DIAGNOSIS — C77.8 SECONDARY MALIGNANT NEOPLASM OF LYMPH NODES OF MULTIPLE SITES: ICD-10-CM

## 2025-08-21 DIAGNOSIS — D84.821 IMMUNODEFICIENCY DUE TO DRUG THERAPY: ICD-10-CM

## 2025-08-21 DIAGNOSIS — D72.829 LEUKOCYTOSIS, UNSPECIFIED TYPE: ICD-10-CM

## 2025-08-21 DIAGNOSIS — C78.02 SECONDARY MALIGNANT NEOPLASM OF BOTH LUNGS: ICD-10-CM

## 2025-08-21 DIAGNOSIS — D49.9 IMMUNODEFICIENCY SECONDARY TO NEOPLASM: ICD-10-CM

## 2025-08-21 DIAGNOSIS — C78.7 SECONDARY MALIGNANT NEOPLASM OF LIVER: ICD-10-CM

## 2025-08-21 DIAGNOSIS — D84.81 IMMUNODEFICIENCY SECONDARY TO NEOPLASM: ICD-10-CM

## 2025-08-21 DIAGNOSIS — C78.01 SECONDARY MALIGNANT NEOPLASM OF BOTH LUNGS: ICD-10-CM

## 2025-08-21 DIAGNOSIS — I81 PORTAL VEIN THROMBOSIS: ICD-10-CM

## 2025-08-21 DIAGNOSIS — G89.3 CHRONIC NEOPLASM-RELATED PAIN: ICD-10-CM

## 2025-08-21 PROBLEM — C79.9 METASTATIC MALIGNANT NEOPLASM, UNSPECIFIED SITE: Status: ACTIVE | Noted: 2025-08-21

## 2025-08-21 PROCEDURE — 99999 PR PBB SHADOW E&M-EST. PATIENT-LVL IV: CPT | Mod: PBBFAC,,, | Performed by: INTERNAL MEDICINE

## 2025-08-22 ENCOUNTER — ANESTHESIA EVENT (OUTPATIENT)
Dept: ENDOSCOPY | Facility: HOSPITAL | Age: 70
DRG: 444 | End: 2025-08-22
Payer: MEDICARE

## 2025-08-22 ENCOUNTER — ANESTHESIA (OUTPATIENT)
Dept: ENDOSCOPY | Facility: HOSPITAL | Age: 70
DRG: 444 | End: 2025-08-22
Payer: MEDICARE

## 2025-08-22 ENCOUNTER — CLINICAL SUPPORT (OUTPATIENT)
Dept: ENDOSCOPY | Facility: HOSPITAL | Age: 70
End: 2025-08-22
Attending: INTERNAL MEDICINE
Payer: MEDICARE

## 2025-08-22 ENCOUNTER — PATIENT MESSAGE (OUTPATIENT)
Dept: HEMATOLOGY/ONCOLOGY | Facility: CLINIC | Age: 70
End: 2025-08-22

## 2025-08-22 ENCOUNTER — TELEPHONE (OUTPATIENT)
Dept: ENDOSCOPY | Facility: HOSPITAL | Age: 70
End: 2025-08-22

## 2025-08-22 DIAGNOSIS — R13.10 DYSPHAGIA, UNSPECIFIED TYPE: ICD-10-CM

## 2025-08-22 DIAGNOSIS — C25.8 OVERLAPPING MALIGNANT NEOPLASM OF PANCREAS: Primary | ICD-10-CM

## 2025-08-22 PROBLEM — C80.1 OBSTRUCTIVE JAUNDICE DUE TO MALIGNANT NEOPLASM: Status: ACTIVE | Noted: 2025-08-22

## 2025-08-22 PROBLEM — K83.1 OBSTRUCTIVE JAUNDICE DUE TO MALIGNANT NEOPLASM: Status: ACTIVE | Noted: 2025-08-22

## 2025-08-22 PROCEDURE — 25000003 PHARM REV CODE 250

## 2025-08-22 PROCEDURE — 63600175 PHARM REV CODE 636 W HCPCS

## 2025-08-22 RX ORDER — LIDOCAINE HYDROCHLORIDE 20 MG/ML
INJECTION INTRAVENOUS
Status: DISCONTINUED | OUTPATIENT
Start: 2025-08-22 | End: 2025-08-22

## 2025-08-22 RX ORDER — PROPOFOL 10 MG/ML
VIAL (ML) INTRAVENOUS
Status: DISCONTINUED | OUTPATIENT
Start: 2025-08-22 | End: 2025-08-22

## 2025-08-22 RX ORDER — PROPOFOL 10 MG/ML
VIAL (ML) INTRAVENOUS CONTINUOUS PRN
Status: DISCONTINUED | OUTPATIENT
Start: 2025-08-22 | End: 2025-08-22

## 2025-08-22 RX ADMIN — PROPOFOL 100 MCG/KG/MIN: 10 INJECTION, EMULSION INTRAVENOUS at 10:08

## 2025-08-22 RX ADMIN — LIDOCAINE HYDROCHLORIDE 50 MG: 20 INJECTION, SOLUTION INTRAVENOUS at 10:08

## 2025-08-22 RX ADMIN — PROPOFOL 10 MG: 10 INJECTION, EMULSION INTRAVENOUS at 10:08

## 2025-08-22 RX ADMIN — PROPOFOL 60 MG: 10 INJECTION, EMULSION INTRAVENOUS at 10:08

## 2025-08-22 RX ADMIN — SODIUM CHLORIDE: 0.9 INJECTION, SOLUTION INTRAVENOUS at 10:08

## 2025-08-22 RX ADMIN — LIDOCAINE HYDROCHLORIDE 100 MG: 20 INJECTION, SOLUTION INTRAVENOUS at 10:08

## 2025-08-25 ENCOUNTER — PATIENT OUTREACH (OUTPATIENT)
Dept: ADMINISTRATIVE | Facility: CLINIC | Age: 70
End: 2025-08-25
Payer: MEDICARE

## 2025-08-25 ENCOUNTER — TELEPHONE (OUTPATIENT)
Dept: HEMATOLOGY/ONCOLOGY | Facility: CLINIC | Age: 70
End: 2025-08-25
Payer: MEDICARE

## 2025-08-28 ENCOUNTER — DOCUMENTATION ONLY (OUTPATIENT)
Dept: HEMATOLOGY/ONCOLOGY | Facility: CLINIC | Age: 70
End: 2025-08-28
Payer: MEDICARE

## 2025-08-28 ENCOUNTER — OFFICE VISIT (OUTPATIENT)
Dept: HEMATOLOGY/ONCOLOGY | Facility: CLINIC | Age: 70
End: 2025-08-28
Payer: MEDICARE

## 2025-08-28 VITALS
WEIGHT: 120.13 LBS | BODY MASS INDEX: 18.82 KG/M2 | HEART RATE: 106 BPM | DIASTOLIC BLOOD PRESSURE: 54 MMHG | OXYGEN SATURATION: 96 % | SYSTOLIC BLOOD PRESSURE: 97 MMHG

## 2025-08-28 DIAGNOSIS — C25.9 PANCREATIC CANCER METASTASIZED TO INTRA-ABDOMINAL LYMPH NODE: Primary | ICD-10-CM

## 2025-08-28 DIAGNOSIS — K86.89 PANCREATIC INSUFFICIENCY: ICD-10-CM

## 2025-08-28 DIAGNOSIS — C78.7 SECONDARY MALIGNANT NEOPLASM OF LIVER: ICD-10-CM

## 2025-08-28 DIAGNOSIS — C25.8 OVERLAPPING MALIGNANT NEOPLASM OF PANCREAS: Primary | ICD-10-CM

## 2025-08-28 DIAGNOSIS — G89.3 CANCER ASSOCIATED PAIN: ICD-10-CM

## 2025-08-28 DIAGNOSIS — I81 PORTAL VEIN THROMBOSIS: ICD-10-CM

## 2025-08-28 DIAGNOSIS — C77.2 PANCREATIC CANCER METASTASIZED TO INTRA-ABDOMINAL LYMPH NODE: Primary | ICD-10-CM

## 2025-08-28 DIAGNOSIS — C78.01 SECONDARY MALIGNANT NEOPLASM OF BOTH LUNGS: ICD-10-CM

## 2025-08-28 DIAGNOSIS — Z79.899 IMMUNODEFICIENCY DUE TO DRUG THERAPY: ICD-10-CM

## 2025-08-28 DIAGNOSIS — D49.9 IMMUNODEFICIENCY SECONDARY TO NEOPLASM: ICD-10-CM

## 2025-08-28 DIAGNOSIS — G89.3 CHRONIC NEOPLASM-RELATED PAIN: ICD-10-CM

## 2025-08-28 DIAGNOSIS — C77.8 SECONDARY MALIGNANT NEOPLASM OF LYMPH NODES OF MULTIPLE SITES: ICD-10-CM

## 2025-08-28 DIAGNOSIS — D84.81 IMMUNODEFICIENCY SECONDARY TO NEOPLASM: ICD-10-CM

## 2025-08-28 DIAGNOSIS — R19.7 DIARRHEA, UNSPECIFIED TYPE: ICD-10-CM

## 2025-08-28 DIAGNOSIS — C78.02 SECONDARY MALIGNANT NEOPLASM OF BOTH LUNGS: ICD-10-CM

## 2025-08-28 DIAGNOSIS — D84.821 IMMUNODEFICIENCY DUE TO DRUG THERAPY: ICD-10-CM

## 2025-08-28 PROCEDURE — 3008F BODY MASS INDEX DOCD: CPT | Mod: CPTII,S$GLB,, | Performed by: INTERNAL MEDICINE

## 2025-08-28 PROCEDURE — G2211 COMPLEX E/M VISIT ADD ON: HCPCS | Mod: S$GLB,,, | Performed by: INTERNAL MEDICINE

## 2025-08-28 PROCEDURE — 99215 OFFICE O/P EST HI 40 MIN: CPT | Mod: S$GLB,,, | Performed by: INTERNAL MEDICINE

## 2025-08-28 PROCEDURE — 1157F ADVNC CARE PLAN IN RCRD: CPT | Mod: CPTII,S$GLB,, | Performed by: INTERNAL MEDICINE

## 2025-08-28 PROCEDURE — 1101F PT FALLS ASSESS-DOCD LE1/YR: CPT | Mod: CPTII,S$GLB,, | Performed by: INTERNAL MEDICINE

## 2025-08-28 PROCEDURE — 3288F FALL RISK ASSESSMENT DOCD: CPT | Mod: CPTII,S$GLB,, | Performed by: INTERNAL MEDICINE

## 2025-08-28 PROCEDURE — 3078F DIAST BP <80 MM HG: CPT | Mod: CPTII,S$GLB,, | Performed by: INTERNAL MEDICINE

## 2025-08-28 PROCEDURE — 1160F RVW MEDS BY RX/DR IN RCRD: CPT | Mod: CPTII,S$GLB,, | Performed by: INTERNAL MEDICINE

## 2025-08-28 PROCEDURE — 1111F DSCHRG MED/CURRENT MED MERGE: CPT | Mod: CPTII,S$GLB,, | Performed by: INTERNAL MEDICINE

## 2025-08-28 PROCEDURE — 99999 PR PBB SHADOW E&M-EST. PATIENT-LVL IV: CPT | Mod: PBBFAC,,, | Performed by: INTERNAL MEDICINE

## 2025-08-28 PROCEDURE — 1125F AMNT PAIN NOTED PAIN PRSNT: CPT | Mod: CPTII,S$GLB,, | Performed by: INTERNAL MEDICINE

## 2025-08-28 PROCEDURE — 1159F MED LIST DOCD IN RCRD: CPT | Mod: CPTII,S$GLB,, | Performed by: INTERNAL MEDICINE

## 2025-08-28 PROCEDURE — 3074F SYST BP LT 130 MM HG: CPT | Mod: CPTII,S$GLB,, | Performed by: INTERNAL MEDICINE

## 2025-08-28 RX ORDER — DEXAMETHASONE 4 MG/1
4 TABLET ORAL DAILY
Qty: 7 TABLET | Refills: 0 | Status: SHIPPED | OUTPATIENT
Start: 2025-08-28 | End: 2025-09-05

## 2025-08-28 RX ORDER — PROCHLORPERAZINE EDISYLATE 5 MG/ML
5 INJECTION INTRAMUSCULAR; INTRAVENOUS ONCE AS NEEDED
OUTPATIENT
Start: 2025-08-28

## 2025-08-28 RX ORDER — HEPARIN 100 UNIT/ML
500 SYRINGE INTRAVENOUS
OUTPATIENT
Start: 2025-08-28

## 2025-08-28 RX ORDER — MORPHINE 10 MG/ML
6 TINCTURE ORAL 4 TIMES DAILY PRN
Qty: 473 ML | Refills: 0 | Status: SHIPPED | OUTPATIENT
Start: 2025-08-28

## 2025-08-28 RX ORDER — OXYCODONE HCL 20 MG/ML
10 CONCENTRATE, ORAL ORAL EVERY 4 HOURS
Qty: 30 ML | Refills: 0 | Status: SHIPPED | OUTPATIENT
Start: 2025-08-28 | End: 2025-09-08

## 2025-08-28 RX ORDER — DIPHENHYDRAMINE HYDROCHLORIDE 50 MG/ML
50 INJECTION, SOLUTION INTRAMUSCULAR; INTRAVENOUS ONCE AS NEEDED
OUTPATIENT
Start: 2025-08-28 | End: 2037-01-24

## 2025-08-28 RX ORDER — SODIUM CHLORIDE 0.9 % (FLUSH) 0.9 %
10 SYRINGE (ML) INJECTION
OUTPATIENT
Start: 2025-08-28

## 2025-08-28 RX ORDER — EPINEPHRINE 0.3 MG/.3ML
0.3 INJECTION SUBCUTANEOUS ONCE AS NEEDED
OUTPATIENT
Start: 2025-08-28 | End: 2037-01-24

## 2025-08-28 RX ORDER — PROCHLORPERAZINE MALEATE 5 MG
5 TABLET ORAL EVERY 6 HOURS PRN
Qty: 20 TABLET | Refills: 5 | Status: SHIPPED | OUTPATIENT
Start: 2025-08-31

## 2025-08-28 RX ORDER — ONDANSETRON HYDROCHLORIDE 2 MG/ML
8 INJECTION, SOLUTION INTRAVENOUS
OUTPATIENT
Start: 2025-08-28 | End: 2025-08-28

## 2025-08-28 RX ORDER — DIPHENOXYLATE HYDROCHLORIDE AND ATROPINE SULFATE 2.5; .025 MG/1; MG/1
1 TABLET ORAL 4 TIMES DAILY PRN
Qty: 120 TABLET | Refills: 1 | Status: SHIPPED | OUTPATIENT
Start: 2025-08-28 | End: 2026-08-28

## 2025-08-29 ENCOUNTER — TELEPHONE (OUTPATIENT)
Dept: PALLIATIVE MEDICINE | Facility: CLINIC | Age: 70
End: 2025-08-29
Payer: MEDICARE

## 2025-09-03 PROBLEM — R52 INTRACTABLE PAIN: Status: ACTIVE | Noted: 2025-01-01

## 2025-09-04 PROBLEM — R11.2 NAUSEA AND VOMITING, UNSPECIFIED VOMITING TYPE: Status: ACTIVE | Noted: 2025-01-01

## 2025-09-04 PROBLEM — E87.0 HYPERNATREMIA: Status: ACTIVE | Noted: 2025-01-01

## (undated) DEVICE — UNDERGLOVE BIOGEL PI SZ 6.5 LF

## (undated) DEVICE — SUT VICRYL 3-0 27 SH

## (undated) DEVICE — SOL IRR SOD CHL .9% POUR

## (undated) DEVICE — TOURNIQUET SB QC DP 18X4IN

## (undated) DEVICE — GLOVE BIOGEL SKINSENSE PI 7.0

## (undated) DEVICE — GLOVE BIOGEL SKINSENSE PI 7.5

## (undated) DEVICE — NDL HYPO REG 25G X 1 1/2

## (undated) DEVICE — CORD BIPOLAR 12 FOOT

## (undated) DEVICE — DRAPE C-ARM MINI DISP

## (undated) DEVICE — PACK UPPER EXTREMITY BAPTIST

## (undated) DEVICE — GLOVE BIOGEL SKINSENSE PI 6.5

## (undated) DEVICE — SUT 4/0 18IN ETHILON BL P3

## (undated) DEVICE — DRESSING XEROFORM NONADH 1X8IN

## (undated) DEVICE — UNDERGLOVES BIOGEL PI SIZE 8

## (undated) DEVICE — BUCKET PLASTER DISPOSABLE

## (undated) DEVICE — APPLICATOR CHLORAPREP ORN 26ML

## (undated) DEVICE — BLADE SURG STAINLESS STEEL #15

## (undated) DEVICE — ALCOHOL ISOPROPYL BLU 70% 16OZ

## (undated) DEVICE — PAD CAST SPECIALIST STRL 4

## (undated) DEVICE — PAD UNDERPAD 30X30

## (undated) DEVICE — SPLINT PLASTER FAST SET 5X30IN

## (undated) DEVICE — SYR B-D DISP CONTROL 10CC100/C

## (undated) DEVICE — SUT MONOCRYL PLUS 4-0 P3

## (undated) DEVICE — SPONGE COTTON TRAY 4X4IN